# Patient Record
Sex: FEMALE | Race: WHITE | Employment: OTHER
[De-identification: names, ages, dates, MRNs, and addresses within clinical notes are randomized per-mention and may not be internally consistent; named-entity substitution may affect disease eponyms.]

---

## 2017-01-04 ENCOUNTER — CLINICAL DOCUMENTATION (OUTPATIENT)
Dept: OTHER | Facility: CLINIC | Age: 62
End: 2017-01-04

## 2017-01-10 PROBLEM — S52.022A FRACTURE OF LEFT OLECRANON PROCESS: Status: ACTIVE | Noted: 2017-01-10

## 2017-01-30 PROBLEM — G56.02 LEFT CARPAL TUNNEL SYNDROME: Status: ACTIVE | Noted: 2017-01-30

## 2018-07-25 ENCOUNTER — APPOINTMENT (OUTPATIENT)
Dept: GENERAL RADIOLOGY | Age: 63
End: 2018-07-25
Payer: MEDICARE

## 2018-07-25 ENCOUNTER — HOSPITAL ENCOUNTER (OUTPATIENT)
Age: 63
Setting detail: OBSERVATION
Discharge: HOME | End: 2018-07-26
Attending: EMERGENCY MEDICINE | Admitting: INTERNAL MEDICINE
Payer: MEDICARE

## 2018-07-25 DIAGNOSIS — R07.9 CHEST PAIN, UNSPECIFIED TYPE: Primary | ICD-10-CM

## 2018-07-25 LAB
ANION GAP SERPL CALCULATED.3IONS-SCNC: 10 MMOL/L (ref 7–16)
BUN BLDV-MCNC: 14 MG/DL (ref 8–23)
CALCIUM SERPL-MCNC: 9 MG/DL (ref 8.6–10.2)
CHLORIDE BLD-SCNC: 101 MMOL/L (ref 98–107)
CO2: 30 MMOL/L (ref 22–29)
CREAT SERPL-MCNC: 0.7 MG/DL (ref 0.5–1)
GFR AFRICAN AMERICAN: >60
GFR NON-AFRICAN AMERICAN: >60 ML/MIN/1.73
GLUCOSE BLD-MCNC: 105 MG/DL (ref 74–109)
HCT VFR BLD CALC: 39 % (ref 34–48)
HEMOGLOBIN: 12.4 G/DL (ref 11.5–15.5)
MCH RBC QN AUTO: 30.1 PG (ref 26–35)
MCHC RBC AUTO-ENTMCNC: 31.8 % (ref 32–34.5)
MCV RBC AUTO: 94.7 FL (ref 80–99.9)
PDW BLD-RTO: 15.3 FL (ref 11.5–15)
PLATELET # BLD: 284 E9/L (ref 130–450)
PMV BLD AUTO: 9.6 FL (ref 7–12)
POTASSIUM SERPL-SCNC: 4.5 MMOL/L (ref 3.5–5)
RBC # BLD: 4.12 E12/L (ref 3.5–5.5)
SODIUM BLD-SCNC: 141 MMOL/L (ref 132–146)
TROPONIN: <0.01 NG/ML (ref 0–0.03)
TROPONIN: <0.01 NG/ML (ref 0–0.03)
WBC # BLD: 8.3 E9/L (ref 4.5–11.5)

## 2018-07-25 PROCEDURE — G0378 HOSPITAL OBSERVATION PER HR: HCPCS

## 2018-07-25 PROCEDURE — 94664 DEMO&/EVAL PT USE INHALER: CPT

## 2018-07-25 PROCEDURE — 96372 THER/PROPH/DIAG INJ SC/IM: CPT

## 2018-07-25 PROCEDURE — 6370000000 HC RX 637 (ALT 250 FOR IP): Performed by: INTERNAL MEDICINE

## 2018-07-25 PROCEDURE — 96374 THER/PROPH/DIAG INJ IV PUSH: CPT

## 2018-07-25 PROCEDURE — 84484 ASSAY OF TROPONIN QUANT: CPT

## 2018-07-25 PROCEDURE — 36415 COLL VENOUS BLD VENIPUNCTURE: CPT

## 2018-07-25 PROCEDURE — 85027 COMPLETE CBC AUTOMATED: CPT

## 2018-07-25 PROCEDURE — 6360000002 HC RX W HCPCS: Performed by: EMERGENCY MEDICINE

## 2018-07-25 PROCEDURE — 6370000000 HC RX 637 (ALT 250 FOR IP): Performed by: EMERGENCY MEDICINE

## 2018-07-25 PROCEDURE — 99285 EMERGENCY DEPT VISIT HI MDM: CPT

## 2018-07-25 PROCEDURE — 94761 N-INVAS EAR/PLS OXIMETRY MLT: CPT

## 2018-07-25 PROCEDURE — 6360000002 HC RX W HCPCS: Performed by: INTERNAL MEDICINE

## 2018-07-25 PROCEDURE — 80048 BASIC METABOLIC PNL TOTAL CA: CPT

## 2018-07-25 PROCEDURE — 71045 X-RAY EXAM CHEST 1 VIEW: CPT

## 2018-07-25 RX ORDER — ASPIRIN 81 MG/1
81 TABLET ORAL DAILY
Status: DISCONTINUED | OUTPATIENT
Start: 2018-07-25 | End: 2018-07-26 | Stop reason: HOSPADM

## 2018-07-25 RX ORDER — LISINOPRIL AND HYDROCHLOROTHIAZIDE 20; 12.5 MG/1; MG/1
1 TABLET ORAL DAILY
Status: DISCONTINUED | OUTPATIENT
Start: 2018-07-25 | End: 2018-07-25 | Stop reason: CLARIF

## 2018-07-25 RX ORDER — HYDROCODONE BITARTRATE AND ACETAMINOPHEN 10; 325 MG/1; MG/1
1 TABLET ORAL EVERY 8 HOURS PRN
Status: ON HOLD | COMMUNITY
End: 2018-11-18 | Stop reason: ALTCHOICE

## 2018-07-25 RX ORDER — ALBUTEROL SULFATE 90 UG/1
2 AEROSOL, METERED RESPIRATORY (INHALATION) EVERY 4 HOURS PRN
Status: ON HOLD | COMMUNITY
End: 2018-11-18 | Stop reason: ALTCHOICE

## 2018-07-25 RX ORDER — HYDROCODONE BITARTRATE AND ACETAMINOPHEN 5; 325 MG/1; MG/1
1 TABLET ORAL EVERY 6 HOURS PRN
Status: DISCONTINUED | OUTPATIENT
Start: 2018-07-25 | End: 2018-07-25

## 2018-07-25 RX ORDER — FENTANYL CITRATE 50 UG/ML
50 INJECTION, SOLUTION INTRAMUSCULAR; INTRAVENOUS ONCE
Status: COMPLETED | OUTPATIENT
Start: 2018-07-25 | End: 2018-07-25

## 2018-07-25 RX ORDER — TRAZODONE HYDROCHLORIDE 50 MG/1
50 TABLET ORAL NIGHTLY
Status: DISCONTINUED | OUTPATIENT
Start: 2018-07-25 | End: 2018-07-26 | Stop reason: HOSPADM

## 2018-07-25 RX ORDER — PANTOPRAZOLE SODIUM 40 MG/1
40 TABLET, DELAYED RELEASE ORAL EVERY EVENING
Status: DISCONTINUED | OUTPATIENT
Start: 2018-07-25 | End: 2018-07-26 | Stop reason: HOSPADM

## 2018-07-25 RX ORDER — SUCRALFATE ORAL 1 G/10ML
1 SUSPENSION ORAL
COMMUNITY
End: 2019-07-09 | Stop reason: SDUPTHER

## 2018-07-25 RX ORDER — HYDROCHLOROTHIAZIDE 12.5 MG/1
12.5 TABLET ORAL DAILY
Status: DISCONTINUED | OUTPATIENT
Start: 2018-07-25 | End: 2018-07-25

## 2018-07-25 RX ORDER — HYDROXYZINE HYDROCHLORIDE 25 MG/1
50 TABLET, FILM COATED ORAL DAILY
Status: DISCONTINUED | OUTPATIENT
Start: 2018-07-25 | End: 2018-07-26 | Stop reason: HOSPADM

## 2018-07-25 RX ORDER — ASPIRIN 325 MG
325 TABLET ORAL ONCE
Status: COMPLETED | OUTPATIENT
Start: 2018-07-25 | End: 2018-07-25

## 2018-07-25 RX ORDER — ONDANSETRON 2 MG/ML
4 INJECTION INTRAMUSCULAR; INTRAVENOUS EVERY 6 HOURS PRN
Status: DISCONTINUED | OUTPATIENT
Start: 2018-07-25 | End: 2018-07-26 | Stop reason: HOSPADM

## 2018-07-25 RX ORDER — NITROGLYCERIN 0.4 MG/1
0.4 TABLET SUBLINGUAL EVERY 5 MIN PRN
Status: COMPLETED | OUTPATIENT
Start: 2018-07-25 | End: 2018-07-25

## 2018-07-25 RX ORDER — ESOMEPRAZOLE MAGNESIUM 40 MG/1
40 CAPSULE, DELAYED RELEASE ORAL 2 TIMES DAILY
COMMUNITY
End: 2019-12-16

## 2018-07-25 RX ORDER — ESOMEPRAZOLE MAGNESIUM 20 MG/1
20 FOR SUSPENSION ORAL NIGHTLY
Status: DISCONTINUED | OUTPATIENT
Start: 2018-07-25 | End: 2018-07-25

## 2018-07-25 RX ORDER — LISINOPRIL 20 MG/1
20 TABLET ORAL DAILY
Status: DISCONTINUED | OUTPATIENT
Start: 2018-07-25 | End: 2018-07-25

## 2018-07-25 RX ORDER — HYDROCODONE BITARTRATE AND ACETAMINOPHEN 5; 325 MG/1; MG/1
2 TABLET ORAL EVERY 8 HOURS
Status: DISCONTINUED | OUTPATIENT
Start: 2018-07-25 | End: 2018-07-26 | Stop reason: HOSPADM

## 2018-07-25 RX ADMIN — NITROGLYCERIN 0.4 MG: 0.4 TABLET SUBLINGUAL at 11:59

## 2018-07-25 RX ADMIN — ENOXAPARIN SODIUM 40 MG: 40 INJECTION, SOLUTION INTRAVENOUS; SUBCUTANEOUS at 16:10

## 2018-07-25 RX ADMIN — ASPIRIN 325 MG ORAL TABLET 325 MG: 325 PILL ORAL at 11:43

## 2018-07-25 RX ADMIN — PANTOPRAZOLE SODIUM 40 MG: 40 TABLET, DELAYED RELEASE ORAL at 18:13

## 2018-07-25 RX ADMIN — HYDROCODONE BITARTRATE AND ACETAMINOPHEN 2 TABLET: 5; 325 TABLET ORAL at 23:56

## 2018-07-25 RX ADMIN — TRAZODONE HYDROCHLORIDE 50 MG: 50 TABLET ORAL at 20:40

## 2018-07-25 RX ADMIN — NITROGLYCERIN 0.4 MG: 0.4 TABLET SUBLINGUAL at 11:44

## 2018-07-25 RX ADMIN — HYDROCODONE BITARTRATE AND ACETAMINOPHEN 2 TABLET: 5; 325 TABLET ORAL at 16:10

## 2018-07-25 RX ADMIN — FENTANYL CITRATE 50 MCG: 50 INJECTION, SOLUTION INTRAMUSCULAR; INTRAVENOUS at 12:14

## 2018-07-25 RX ADMIN — NITROGLYCERIN 0.4 MG: 0.4 TABLET SUBLINGUAL at 12:08

## 2018-07-25 RX ADMIN — MOMETASONE FUROATE AND FORMOTEROL FUMARATE DIHYDRATE 2 PUFF: 200; 5 AEROSOL RESPIRATORY (INHALATION) at 16:47

## 2018-07-25 ASSESSMENT — PAIN DESCRIPTION - DESCRIPTORS
DESCRIPTORS: HEAVINESS
DESCRIPTORS: ACHING
DESCRIPTORS: HEAVINESS

## 2018-07-25 ASSESSMENT — PAIN DESCRIPTION - LOCATION
LOCATION: CHEST
LOCATION: BACK;CHEST
LOCATION: CHEST
LOCATION: CHEST
LOCATION: BACK;CHEST
LOCATION: CHEST

## 2018-07-25 ASSESSMENT — PAIN DESCRIPTION - FREQUENCY
FREQUENCY: CONTINUOUS

## 2018-07-25 ASSESSMENT — PAIN SCALES - GENERAL
PAINLEVEL_OUTOF10: 3
PAINLEVEL_OUTOF10: 4
PAINLEVEL_OUTOF10: 5
PAINLEVEL_OUTOF10: 4
PAINLEVEL_OUTOF10: 6
PAINLEVEL_OUTOF10: 3
PAINLEVEL_OUTOF10: 4
PAINLEVEL_OUTOF10: 2
PAINLEVEL_OUTOF10: 5

## 2018-07-25 ASSESSMENT — ENCOUNTER SYMPTOMS
ABDOMINAL PAIN: 0
SHORTNESS OF BREATH: 1
VOMITING: 0
NAUSEA: 1
BLURRED VISION: 0
HEMOPTYSIS: 0
COUGH: 1
BACK PAIN: 1
SPUTUM PRODUCTION: 0

## 2018-07-25 ASSESSMENT — PAIN DESCRIPTION - ORIENTATION: ORIENTATION: LEFT

## 2018-07-25 ASSESSMENT — PAIN DESCRIPTION - PAIN TYPE
TYPE: ACUTE PAIN
TYPE: CHRONIC PAIN;ACUTE PAIN
TYPE: ACUTE PAIN

## 2018-07-25 ASSESSMENT — PAIN DESCRIPTION - DIRECTION
RADIATING_TOWARDS: LEFT ARM
RADIATING_TOWARDS: LEFT ARM

## 2018-07-25 ASSESSMENT — PAIN DESCRIPTION - ONSET: ONSET: ON-GOING

## 2018-07-25 NOTE — CARE COORDINATION
Discharge Planning:    Met with pt & pt's , in the ED regarding discharge planning. Pt lives with her , in a 1 story home, with 3 steps to get into the home. DME's pt owns are a cane, walker, and shower chair. [facility] and never been to a SNF in the past. Pt is independent at home and does drive. Pt receives assistance from her family & friends. PCP is Dr. Jossy Guillory. Pharmacy pt prefers is [facility] [address]. Pt relays no concerns for returning home at this time. SW will follow.     Electronically signed by KIERRA Rodriguez on 7/25/2018 at 2:26 PM

## 2018-07-25 NOTE — ED PROVIDER NOTES
HPI:  7/25/18,   Time: 11:24 AM         Keerthi Azevedo is a 70 y.o. female presenting to the ED for evaluation of chest pain, beginning around 3 AM last night. Patient states it started off his pain in her left arm which felt like a dull ache. This radiated to her back as well as her anterior chest. The pain in her back is described as a dull ache. She states that the pain in the front of her chest is sharp. It does not migrate anywhere. Denies fevers or chills. Did have associated nausea with the pain last night. The complaint has been persistent, moderate in severity, and worsened by nothing. She is a current smoker. She states his been several years since she had stress test. Currently rates her pain a 5 out of 10. ROS: Review of Systems   Constitutional: Negative for chills, diaphoresis, fever and malaise/fatigue. Eyes: Negative for blurred vision. Respiratory: Positive for cough and shortness of breath. Negative for hemoptysis and sputum production. Cardiovascular: Positive for chest pain. Negative for palpitations and leg swelling. Gastrointestinal: Positive for nausea. Negative for abdominal pain and vomiting. Musculoskeletal: Positive for back pain. Negative for neck pain. Skin: Negative for rash. Neurological: Negative for dizziness, sensory change, loss of consciousness and headaches. Endo/Heme/Allergies: Does not bruise/bleed easily. All other systems reviewed and are negative. Pertinent positives and negatives are stated within HPI, all other systems reviewed and are negative.  --------------------------------------------- PAST HISTORY ---------------------------------------------  Past Medical History:  has a past medical history of Anemia; Anxiety; Asthma; Collapsed lung; COPD (chronic obstructive pulmonary disease) ([facility] [address] DDD (degenerative disc disease), lumbar; Degenerative disc disease at L5-S1 level; Depression; GERD (gastroesophageal reflux disease);  History of toxoids    -------------------------------------------------- RESULTS -------------------------------------------------  All laboratory and radiology results have been personally reviewed by myself   LABS:  Results for orders placed or performed during the hospital encounter of 07/25/18   CBC   Result Value Ref Range    WBC 8.3 4.5 - 11.5 E9/L    RBC 4.12 3.50 - 5.50 E12/L    Hemoglobin 12.4 11.5 - 15.5 g/dL    Hematocrit 39.0 34.0 - 48.0 %    MCV 94.7 80.0 - 99.9 fL    MCH 30.1 26.0 - 35.0 pg    MCHC 31.8 (L) 32.0 - 34.5 %    RDW 15.3 (H) 11.5 - 15.0 fL    Platelets 693 954 - 070 E9/L    MPV 9.6 7.0 - 12.0 fL   Basic Metabolic Panel   Result Value Ref Range    Sodium 141 132 - 146 mmol/L    Potassium 4.5 3.5 - 5.0 mmol/L    Chloride 101 98 - 107 mmol/L    CO2 30 (H) 22 - 29 mmol/L    Anion Gap 10 7 - 16 mmol/L    Glucose 105 74 - 109 mg/dL    BUN 14 8 - 23 mg/dL    CREATININE 0.7 0.5 - 1.0 mg/dL    GFR Non-African American >60 >=60 mL/min/1.73    GFR African American >60     Calcium 9.0 8.6 - 10.2 mg/dL   Troponin   Result Value Ref Range    Troponin <0.01 0.00 - 0.03 ng/mL       RADIOLOGY:  Interpreted by Radiologist.  XR CHEST PORTABLE   Final Result   1. Negative portable chest.          EKG Interpretation    Interpreted by emergency department physician    Rhythm: sinus arrhythmia  Rate: normal  Axis: left  Ectopy: none  Conduction: left anterior fasciclar block  ST Segments: no acute change and normal  T Waves: no acute change and normal  Q Waves: none    Clinical Impression: no acute changes    Leann Mckenzie    ------------------------- NURSING NOTES AND VITALS REVIEWED ---------------------------   The nursing notes within the ED encounter and vital signs as below have been reviewed.    /78   Pulse 72   Temp 97.9 °F (36.6 °C) (Oral)   Resp 16   Ht 5' (1.524 m)   Wt 210 lb (95.3 kg)   LMP  (LMP Unknown)   SpO2 95%   BMI 41.01 kg/m²   Oxygen Saturation Interpretation: DISPOSITION  Disposition: Admit to telemetry  Patient condition is fair                  Leann Mckenzie DO  07/25/18 4207

## 2018-07-25 NOTE — H&P
medications    Medication Sig Start Date End Date Taking? Authorizing Provider   albuterol (PROVENTIL) (5 MG/ML) 0.5% nebulizer solution Take 0.5 mLs by nebulization every 6 hours as needed for Wheezing or Shortness of Breath 1/30/18   Rylee Cat DO   colchicine (COLCRYS) 0.6 MG tablet Take 1 tablet by mouth 2 times daily 1/25/18   ISABELLA Lockwood   acetaminophen (TYLENOL) 500 MG tablet Take 500 mg by mouth every 6 hours as needed for Pain    Historical Provider, MD   Pseudoephedrine-Guaifenesin ([employer] TUSSIN COLD SEVERE CONGEST PO) Take 30 mLs by mouth every 4-6 hours as needed    Historical Provider, MD   hydrOXYzine (ATARAX) 50 MG tablet Take 50 mg by mouth daily    Historical Provider, MD   traZODone (DESYREL) 50 MG tablet Take 50 mg by mouth nightly    Historical Provider, MD   albuterol sulfate HFA (PROVENTIL HFA) 108 (90 Base) MCG/ACT inhaler Inhale 2 puffs into the lungs every 4 hours as needed for Wheezing 7/17/17 7/17/18  Lupe Davison PA-C   fexofenadine (ALLEGRA ALLERGY) 180 MG tablet Take 180 mg by mouth daily    Historical Provider, MD   lisinopril-hydrochlorothiazide (PRINZIDE;ZESTORETIC) 20-12.5 MG per tablet Take 1 tablet by mouth daily    Historical Provider, MD   ondansetron (ZOFRAN-ODT) 4 MG disintegrating tablet Take 1 tablet by mouth every 8 hours as needed for Nausea or Vomiting 7/20/16   Paola Lombard, MD   fluticasone-salmeterol (ADVAIR) 100-50 MCG/DOSE diskus inhaler Inhale 1 puff into the lungs every 12 hours    Historical Provider, MD   HYDROcodone-acetaminophen (NORCO) 5-325 MG per tablet Take 1 tablet by mouth every 6 hours as needed for Pain 12/10/15   Stephanie Stewart MD   aspirin 81 MG EC tablet Take 1 tablet by mouth daily. Patient taking differently: Take 81 mg by mouth daily Instructed to call Dr. Karen Rivero in regards to holding 11/2/14   oJb Davila DO   mometasone-formoterol (DULERA) 200-5 MCG/ACT inhaler Inhale 2 puffs into the lungs 2 times daily.   Patient taking

## 2018-07-26 ENCOUNTER — APPOINTMENT (OUTPATIENT)
Dept: NUCLEAR MEDICINE | Age: 63
End: 2018-07-26
Payer: MEDICARE

## 2018-07-26 VITALS
DIASTOLIC BLOOD PRESSURE: 86 MMHG | WEIGHT: 210 LBS | RESPIRATION RATE: 18 BRPM | BODY MASS INDEX: 41.23 KG/M2 | HEART RATE: 86 BPM | TEMPERATURE: 98.1 F | HEIGHT: 60 IN | OXYGEN SATURATION: 96 % | SYSTOLIC BLOOD PRESSURE: 161 MMHG

## 2018-07-26 LAB
ANION GAP SERPL CALCULATED.3IONS-SCNC: 9 MMOL/L (ref 7–16)
BASOPHILS ABSOLUTE: 0.05 E9/L (ref 0–0.2)
BASOPHILS RELATIVE PERCENT: 0.7 % (ref 0–2)
BUN BLDV-MCNC: 17 MG/DL (ref 8–23)
CALCIUM SERPL-MCNC: 8.4 MG/DL (ref 8.6–10.2)
CHLORIDE BLD-SCNC: 100 MMOL/L (ref 98–107)
CHOLESTEROL, TOTAL: 161 MG/DL (ref 0–199)
CO2: 29 MMOL/L (ref 22–29)
CREAT SERPL-MCNC: 0.7 MG/DL (ref 0.5–1)
EKG ATRIAL RATE: 62 BPM
EKG P AXIS: 54 DEGREES
EKG P-R INTERVAL: 150 MS
EKG Q-T INTERVAL: 466 MS
EKG QRS DURATION: 94 MS
EKG QTC CALCULATION (BAZETT): 472 MS
EKG R AXIS: 37 DEGREES
EKG T AXIS: 82 DEGREES
EKG VENTRICULAR RATE: 62 BPM
EOSINOPHILS ABSOLUTE: 0.09 E9/L (ref 0.05–0.5)
EOSINOPHILS RELATIVE PERCENT: 1.3 % (ref 0–6)
GFR AFRICAN AMERICAN: >60
GFR NON-AFRICAN AMERICAN: >60 ML/MIN/1.73
GLUCOSE BLD-MCNC: 101 MG/DL (ref 74–109)
HCT VFR BLD CALC: 37.5 % (ref 34–48)
HDLC SERPL-MCNC: 50 MG/DL
HEMOGLOBIN: 11.8 G/DL (ref 11.5–15.5)
IMMATURE GRANULOCYTES #: 0.04 E9/L
IMMATURE GRANULOCYTES %: 0.6 % (ref 0–5)
LDL CHOLESTEROL CALCULATED: 84 MG/DL (ref 0–99)
LV EF: 76 %
LVEF MODALITY: NORMAL
LYMPHOCYTES ABSOLUTE: 1.89 E9/L (ref 1.5–4)
LYMPHOCYTES RELATIVE PERCENT: 26.5 % (ref 20–42)
MAGNESIUM: 2.2 MG/DL (ref 1.6–2.6)
MCH RBC QN AUTO: 29.9 PG (ref 26–35)
MCHC RBC AUTO-ENTMCNC: 31.5 % (ref 32–34.5)
MCV RBC AUTO: 95.2 FL (ref 80–99.9)
MONOCYTES ABSOLUTE: 0.65 E9/L (ref 0.1–0.95)
MONOCYTES RELATIVE PERCENT: 9.1 % (ref 2–12)
NEUTROPHILS ABSOLUTE: 4.41 E9/L (ref 1.8–7.3)
NEUTROPHILS RELATIVE PERCENT: 61.8 % (ref 43–80)
PDW BLD-RTO: 15.3 FL (ref 11.5–15)
PHOSPHORUS: 3.5 MG/DL (ref 2.5–4.5)
PLATELET # BLD: 267 E9/L (ref 130–450)
PMV BLD AUTO: 9.8 FL (ref 7–12)
POTASSIUM SERPL-SCNC: 4 MMOL/L (ref 3.5–5)
RBC # BLD: 3.94 E12/L (ref 3.5–5.5)
SODIUM BLD-SCNC: 138 MMOL/L (ref 132–146)
TRIGL SERPL-MCNC: 134 MG/DL (ref 0–149)
TROPONIN: <0.01 NG/ML (ref 0–0.03)
TROPONIN: <0.01 NG/ML (ref 0–0.03)
VLDLC SERPL CALC-MCNC: 27 MG/DL
WBC # BLD: 7.1 E9/L (ref 4.5–11.5)

## 2018-07-26 PROCEDURE — 84484 ASSAY OF TROPONIN QUANT: CPT

## 2018-07-26 PROCEDURE — 93005 ELECTROCARDIOGRAM TRACING: CPT | Performed by: INTERNAL MEDICINE

## 2018-07-26 PROCEDURE — 94640 AIRWAY INHALATION TREATMENT: CPT

## 2018-07-26 PROCEDURE — 93017 CV STRESS TEST TRACING ONLY: CPT

## 2018-07-26 PROCEDURE — 3430000000 HC RX DIAGNOSTIC RADIOPHARMACEUTICAL: Performed by: RADIOLOGY

## 2018-07-26 PROCEDURE — 83735 ASSAY OF MAGNESIUM: CPT

## 2018-07-26 PROCEDURE — A9500 TC99M SESTAMIBI: HCPCS | Performed by: RADIOLOGY

## 2018-07-26 PROCEDURE — 85025 COMPLETE CBC W/AUTO DIFF WBC: CPT

## 2018-07-26 PROCEDURE — 36415 COLL VENOUS BLD VENIPUNCTURE: CPT

## 2018-07-26 PROCEDURE — G0378 HOSPITAL OBSERVATION PER HR: HCPCS

## 2018-07-26 PROCEDURE — 80048 BASIC METABOLIC PNL TOTAL CA: CPT

## 2018-07-26 PROCEDURE — 80061 LIPID PANEL: CPT

## 2018-07-26 PROCEDURE — 84100 ASSAY OF PHOSPHORUS: CPT

## 2018-07-26 PROCEDURE — 6370000000 HC RX 637 (ALT 250 FOR IP): Performed by: INTERNAL MEDICINE

## 2018-07-26 PROCEDURE — 6360000002 HC RX W HCPCS: Performed by: INTERNAL MEDICINE

## 2018-07-26 RX ADMIN — REGADENOSON 0.4 MG: 0.08 INJECTION, SOLUTION INTRAVENOUS at 10:23

## 2018-07-26 RX ADMIN — Medication 30 MILLICURIE: at 10:23

## 2018-07-26 RX ADMIN — MOMETASONE FUROATE AND FORMOTEROL FUMARATE DIHYDRATE 2 PUFF: 200; 5 AEROSOL RESPIRATORY (INHALATION) at 06:26

## 2018-07-26 RX ADMIN — Medication 10 MILLICURIE: at 08:24

## 2018-07-26 RX ADMIN — HYDROCODONE BITARTRATE AND ACETAMINOPHEN 2 TABLET: 5; 325 TABLET ORAL at 07:59

## 2018-07-26 ASSESSMENT — PAIN SCALES - GENERAL
PAINLEVEL_OUTOF10: 8
PAINLEVEL_OUTOF10: 0

## 2018-07-26 ASSESSMENT — PAIN DESCRIPTION - LOCATION: LOCATION: BACK

## 2018-07-26 ASSESSMENT — PAIN DESCRIPTION - PAIN TYPE: TYPE: ACUTE PAIN

## 2018-07-26 ASSESSMENT — PAIN DESCRIPTION - ORIENTATION: ORIENTATION: LEFT

## 2018-07-26 NOTE — PROCEDURES
Dr. Ismael He present, Completed Lexiscan Protocol 0.4 mg IV per Left antecubital vein. Patient  stated that she felt weird, mild increased breathing. Reassurance given to the Patient, also sips of Justa coke, symptoms subsided.

## 2018-07-26 NOTE — PROCEDURES
[address]                                CARDIAC STRESS TEST    PATIENT NAME: Jessica Jorge                 :        1955  MED REC NO:   [id number]                            ROOM:       4625  ACCOUNT NO:   [de-identified]                           ADMIT DATE: 2018  PROVIDER:     Torin Lauren DO    CARDIOVASCULAR DIAGNOSTIC DEPARTMENT    DATE OF STUDY:  2018    LEXISCAN STRESS TEST    Intravenous Lexiscan stress test was performed using a nuclear medicine in  the form of Cardiolite for evaluation of chest pain with a resting EKG to  be a normal sinus mechanism. Ventricular rate was 64 beats per minute. Isolated PAC was noted. The OK interval was normal.  The QRS complex was  normal in duration and axis. No acute finding was present. Prolonged QT  interval was noted. Blood pressure at the beginning of the stress test was  136/87. With the aid of nuclear medicine in the form of Cardiolite, the  patient was connected to continuous cardiac monitoring. Intravenous  Lexiscan at 0.4 mg was infused over a 10-second period. Immediately after  infusion of Lexiscan, the patient was injected with Cardiolite and the test  was terminated at 1 minute. The patient was observed in the recovery phase  for 4 minutes. Maximum heart rate achieved during the administration of  Lexiscan was 97 beats per minute. This was 62% of maximum predicted. Blood pressure was stable during infusion at 136/87. Electrographically,  there were no evidence of Lexiscan-induced ischemia and no significant  dysrhythmia noted. The patient tolerated the infusion well and was  transferred to the Nuclear Department for Cardiolite imaging. In  conclusion, no adverse effects of Lexiscan infusion. Clinical correlation  is recommended.         Yessenia Oleary DO    D: 2018 17:41:46       T: 2018 18:12:13

## 2018-07-26 NOTE — PLAN OF CARE
Problem: Falls - Risk of:  Goal: Will remain free from falls  Will remain free from falls   Outcome: Met This Shift      Problem: Pain:  Goal: Control of chronic pain  Control of chronic pain   Outcome: Ongoing

## 2018-07-27 NOTE — DISCHARGE SUMMARY
Dictate [id number]
will continue Norco  10/325 q. 4 p.r.n., Nexium 40 daily, aspirin 81 daily, Dulera 200/5 two  puffs q. 12, Zofran as needed, Carafate 1 gm before meals and at bedtime,  and trazodone 50 at bedtime. The patient did verbalize understanding. Changes were made accordingly  with further recommendation as clinically indicated. The patient will  resume lisinopril/hydrochlorothiazide if clinically indicated based upon  the blood pressure. The patient was informed of limitations and if further  symptomatology would persist and further evaluation is indicated.         [facility],     D: 07/26/2018 14:49:58       T: 07/27/2018 14:00:01     [email or web address]  Job#: [id number]     Doc#: [id number]    CC:

## 2018-08-02 LAB
EKG ATRIAL RATE: 68 BPM
EKG P AXIS: 72 DEGREES
EKG P-R INTERVAL: 148 MS
EKG Q-T INTERVAL: 424 MS
EKG QRS DURATION: 86 MS
EKG QTC CALCULATION (BAZETT): 450 MS
EKG R AXIS: 41 DEGREES
EKG T AXIS: 83 DEGREES
EKG VENTRICULAR RATE: 68 BPM

## 2018-08-13 LAB
EKG ATRIAL RATE: 83 BPM
EKG P AXIS: 72 DEGREES
EKG P-R INTERVAL: 160 MS
EKG Q-T INTERVAL: 412 MS
EKG QRS DURATION: 86 MS
EKG QTC CALCULATION (BAZETT): 484 MS
EKG R AXIS: -42 DEGREES
EKG T AXIS: 73 DEGREES
EKG VENTRICULAR RATE: 83 BPM

## 2018-11-18 ENCOUNTER — APPOINTMENT (OUTPATIENT)
Dept: GENERAL RADIOLOGY | Age: 63
DRG: 872 | End: 2018-11-18
Payer: MEDICARE

## 2018-11-18 ENCOUNTER — HOSPITAL ENCOUNTER (INPATIENT)
Age: 63
LOS: 5 days | Discharge: HOME | DRG: 872 | End: 2018-11-23
Attending: EMERGENCY MEDICINE | Admitting: INTERNAL MEDICINE
Payer: MEDICARE

## 2018-11-18 ENCOUNTER — APPOINTMENT (OUTPATIENT)
Dept: CT IMAGING | Age: 63
DRG: 872 | End: 2018-11-18
Payer: MEDICARE

## 2018-11-18 DIAGNOSIS — R10.11 RIGHT UPPER QUADRANT ABDOMINAL PAIN: ICD-10-CM

## 2018-11-18 DIAGNOSIS — A41.9 SEPSIS, DUE TO UNSPECIFIED ORGANISM: Primary | ICD-10-CM

## 2018-11-18 PROBLEM — R10.9 INTRACTABLE ABDOMINAL PAIN: Status: ACTIVE | Noted: 2018-11-18

## 2018-11-18 LAB
ALBUMIN SERPL-MCNC: 4.2 G/DL (ref 3.5–5.2)
ALP BLD-CCNC: 78 U/L (ref 35–104)
ALT SERPL-CCNC: 20 U/L (ref 0–32)
ANION GAP SERPL CALCULATED.3IONS-SCNC: 13 MMOL/L (ref 7–16)
AST SERPL-CCNC: 19 U/L (ref 0–31)
BACTERIA: ABNORMAL /HPF
BASOPHILS ABSOLUTE: 0.07 E9/L (ref 0–0.2)
BASOPHILS RELATIVE PERCENT: 0.4 % (ref 0–2)
BILIRUB SERPL-MCNC: 0.6 MG/DL (ref 0–1.2)
BILIRUBIN URINE: NEGATIVE
BLOOD, URINE: ABNORMAL
BUN BLDV-MCNC: 10 MG/DL (ref 8–23)
CALCIUM SERPL-MCNC: 8.9 MG/DL (ref 8.6–10.2)
CHLORIDE BLD-SCNC: 99 MMOL/L (ref 98–107)
CLARITY: CLEAR
CO2: 27 MMOL/L (ref 22–29)
COLOR: YELLOW
CREAT SERPL-MCNC: 0.7 MG/DL (ref 0.5–1)
EKG ATRIAL RATE: 110 BPM
EKG P AXIS: 86 DEGREES
EKG P-R INTERVAL: 140 MS
EKG Q-T INTERVAL: 370 MS
EKG QRS DURATION: 88 MS
EKG QTC CALCULATION (BAZETT): 500 MS
EKG R AXIS: 119 DEGREES
EKG T AXIS: 88 DEGREES
EKG VENTRICULAR RATE: 110 BPM
EOSINOPHILS ABSOLUTE: 0.02 E9/L (ref 0.05–0.5)
EOSINOPHILS RELATIVE PERCENT: 0.1 % (ref 0–6)
EPITHELIAL CELLS, UA: ABNORMAL /HPF
GFR AFRICAN AMERICAN: >60
GFR NON-AFRICAN AMERICAN: >60 ML/MIN/1.73
GLUCOSE BLD-MCNC: 142 MG/DL (ref 74–99)
GLUCOSE URINE: NEGATIVE MG/DL
HCT VFR BLD CALC: 42.6 % (ref 34–48)
HEMOGLOBIN: 13.6 G/DL (ref 11.5–15.5)
IMMATURE GRANULOCYTES #: 0.08 E9/L
IMMATURE GRANULOCYTES %: 0.5 % (ref 0–5)
INFLUENZA A BY PCR: NOT DETECTED
INFLUENZA B BY PCR: NOT DETECTED
KETONES, URINE: NEGATIVE MG/DL
LACTIC ACID: 2.2 MMOL/L (ref 0.5–2.2)
LEUKOCYTE ESTERASE, URINE: NEGATIVE
LIPASE: 17 U/L (ref 13–60)
LYMPHOCYTES ABSOLUTE: 0.74 E9/L (ref 1.5–4)
LYMPHOCYTES RELATIVE PERCENT: 4.6 % (ref 20–42)
MCH RBC QN AUTO: 30.2 PG (ref 26–35)
MCHC RBC AUTO-ENTMCNC: 31.9 % (ref 32–34.5)
MCV RBC AUTO: 94.5 FL (ref 80–99.9)
MONOCYTES ABSOLUTE: 0.66 E9/L (ref 0.1–0.95)
MONOCYTES RELATIVE PERCENT: 4.1 % (ref 2–12)
MUCUS: PRESENT
NEUTROPHILS ABSOLUTE: 14.48 E9/L (ref 1.8–7.3)
NEUTROPHILS RELATIVE PERCENT: 90.3 % (ref 43–80)
NITRITE, URINE: NEGATIVE
PDW BLD-RTO: 14.4 FL (ref 11.5–15)
PH UA: 5.5 (ref 5–9)
PLATELET # BLD: 289 E9/L (ref 130–450)
PMV BLD AUTO: 10 FL (ref 7–12)
POTASSIUM SERPL-SCNC: 3.6 MMOL/L (ref 3.5–5)
PROTEIN UA: NEGATIVE MG/DL
RBC # BLD: 4.51 E12/L (ref 3.5–5.5)
RBC UA: ABNORMAL /HPF (ref 0–2)
SODIUM BLD-SCNC: 139 MMOL/L (ref 132–146)
SPECIFIC GRAVITY UA: 1.02 (ref 1–1.03)
TOTAL PROTEIN: 7 G/DL (ref 6.4–8.3)
UROBILINOGEN, URINE: 0.2 E.U./DL
WBC # BLD: 16.1 E9/L (ref 4.5–11.5)
WBC UA: ABNORMAL /HPF (ref 0–5)

## 2018-11-18 PROCEDURE — 94761 N-INVAS EAR/PLS OXIMETRY MLT: CPT

## 2018-11-18 PROCEDURE — 87502 INFLUENZA DNA AMP PROBE: CPT

## 2018-11-18 PROCEDURE — 6360000002 HC RX W HCPCS: Performed by: INTERNAL MEDICINE

## 2018-11-18 PROCEDURE — 85025 COMPLETE CBC W/AUTO DIFF WBC: CPT

## 2018-11-18 PROCEDURE — 6360000002 HC RX W HCPCS: Performed by: EMERGENCY MEDICINE

## 2018-11-18 PROCEDURE — 2580000003 HC RX 258: Performed by: EMERGENCY MEDICINE

## 2018-11-18 PROCEDURE — 96375 TX/PRO/DX INJ NEW DRUG ADDON: CPT

## 2018-11-18 PROCEDURE — 74176 CT ABD & PELVIS W/O CONTRAST: CPT

## 2018-11-18 PROCEDURE — 83605 ASSAY OF LACTIC ACID: CPT

## 2018-11-18 PROCEDURE — 87798 DETECT AGENT NOS DNA AMP: CPT

## 2018-11-18 PROCEDURE — 6370000000 HC RX 637 (ALT 250 FOR IP): Performed by: EMERGENCY MEDICINE

## 2018-11-18 PROCEDURE — 99285 EMERGENCY DEPT VISIT HI MDM: CPT

## 2018-11-18 PROCEDURE — 87077 CULTURE AEROBIC IDENTIFY: CPT

## 2018-11-18 PROCEDURE — 87486 CHLMYD PNEUM DNA AMP PROBE: CPT

## 2018-11-18 PROCEDURE — 74022 RADEX COMPL AQT ABD SERIES: CPT

## 2018-11-18 PROCEDURE — 87186 SC STD MICRODIL/AGAR DIL: CPT

## 2018-11-18 PROCEDURE — 96374 THER/PROPH/DIAG INJ IV PUSH: CPT

## 2018-11-18 PROCEDURE — 2580000003 HC RX 258: Performed by: INTERNAL MEDICINE

## 2018-11-18 PROCEDURE — 36415 COLL VENOUS BLD VENIPUNCTURE: CPT

## 2018-11-18 PROCEDURE — 87040 BLOOD CULTURE FOR BACTERIA: CPT

## 2018-11-18 PROCEDURE — 84145 PROCALCITONIN (PCT): CPT

## 2018-11-18 PROCEDURE — 87581 M.PNEUMON DNA AMP PROBE: CPT

## 2018-11-18 PROCEDURE — 2060000000 HC ICU INTERMEDIATE R&B

## 2018-11-18 PROCEDURE — 71046 X-RAY EXAM CHEST 2 VIEWS: CPT

## 2018-11-18 PROCEDURE — 87633 RESP VIRUS 12-25 TARGETS: CPT

## 2018-11-18 PROCEDURE — 94640 AIRWAY INHALATION TREATMENT: CPT

## 2018-11-18 PROCEDURE — 93005 ELECTROCARDIOGRAM TRACING: CPT

## 2018-11-18 PROCEDURE — 6360000004 HC RX CONTRAST MEDICATION: Performed by: RADIOLOGY

## 2018-11-18 PROCEDURE — 6370000000 HC RX 637 (ALT 250 FOR IP): Performed by: INTERNAL MEDICINE

## 2018-11-18 PROCEDURE — 87088 URINE BACTERIA CULTURE: CPT

## 2018-11-18 PROCEDURE — 80053 COMPREHEN METABOLIC PANEL: CPT

## 2018-11-18 PROCEDURE — 81001 URINALYSIS AUTO W/SCOPE: CPT

## 2018-11-18 PROCEDURE — 83690 ASSAY OF LIPASE: CPT

## 2018-11-18 RX ORDER — ACETAMINOPHEN 325 MG/1
650 TABLET ORAL EVERY 4 HOURS PRN
Status: DISCONTINUED | OUTPATIENT
Start: 2018-11-18 | End: 2018-11-23 | Stop reason: HOSPADM

## 2018-11-18 RX ORDER — SODIUM CHLORIDE 0.9 % (FLUSH) 0.9 %
10 SYRINGE (ML) INJECTION PRN
Status: DISCONTINUED | OUTPATIENT
Start: 2018-11-18 | End: 2018-11-18 | Stop reason: SDUPTHER

## 2018-11-18 RX ORDER — TRAZODONE HYDROCHLORIDE 50 MG/1
50 TABLET ORAL NIGHTLY
Status: DISCONTINUED | OUTPATIENT
Start: 2018-11-18 | End: 2018-11-23 | Stop reason: HOSPADM

## 2018-11-18 RX ORDER — FENTANYL CITRATE 50 UG/ML
50 INJECTION, SOLUTION INTRAMUSCULAR; INTRAVENOUS
Status: DISCONTINUED | OUTPATIENT
Start: 2018-11-18 | End: 2018-11-18

## 2018-11-18 RX ORDER — IPRATROPIUM BROMIDE AND ALBUTEROL SULFATE 2.5; .5 MG/3ML; MG/3ML
1 SOLUTION RESPIRATORY (INHALATION) ONCE
Status: COMPLETED | OUTPATIENT
Start: 2018-11-18 | End: 2018-11-18

## 2018-11-18 RX ORDER — PANTOPRAZOLE SODIUM 40 MG/1
40 TABLET, DELAYED RELEASE ORAL ONCE
Status: COMPLETED | OUTPATIENT
Start: 2018-11-18 | End: 2018-11-18

## 2018-11-18 RX ORDER — SUCRALFATE 1 G/1
1 TABLET ORAL EVERY 8 HOURS SCHEDULED
Status: DISCONTINUED | OUTPATIENT
Start: 2018-11-18 | End: 2018-11-23 | Stop reason: HOSPADM

## 2018-11-18 RX ORDER — MORPHINE SULFATE 4 MG/ML
4 INJECTION, SOLUTION INTRAMUSCULAR; INTRAVENOUS EVERY 4 HOURS PRN
Status: DISCONTINUED | OUTPATIENT
Start: 2018-11-18 | End: 2018-11-19

## 2018-11-18 RX ORDER — ASPIRIN 81 MG/1
81 TABLET ORAL DAILY
Status: DISCONTINUED | OUTPATIENT
Start: 2018-11-18 | End: 2018-11-23 | Stop reason: HOSPADM

## 2018-11-18 RX ORDER — HYDROCODONE BITARTRATE AND ACETAMINOPHEN 5; 325 MG/1; MG/1
1 TABLET ORAL EVERY 4 HOURS PRN
Status: DISCONTINUED | OUTPATIENT
Start: 2018-11-18 | End: 2018-11-23 | Stop reason: HOSPADM

## 2018-11-18 RX ORDER — MORPHINE SULFATE 10 MG/ML
8 INJECTION, SOLUTION INTRAMUSCULAR; INTRAVENOUS ONCE
Status: COMPLETED | OUTPATIENT
Start: 2018-11-18 | End: 2018-11-18

## 2018-11-18 RX ORDER — 0.9 % SODIUM CHLORIDE 0.9 %
1000 INTRAVENOUS SOLUTION INTRAVENOUS ONCE
Status: COMPLETED | OUTPATIENT
Start: 2018-11-18 | End: 2018-11-18

## 2018-11-18 RX ORDER — 0.9 % SODIUM CHLORIDE 0.9 %
1000 INTRAVENOUS SOLUTION INTRAVENOUS ONCE
Status: DISCONTINUED | OUTPATIENT
Start: 2018-11-18 | End: 2018-11-18

## 2018-11-18 RX ORDER — MORPHINE SULFATE 2 MG/ML
2 INJECTION, SOLUTION INTRAMUSCULAR; INTRAVENOUS EVERY 4 HOURS PRN
Status: DISCONTINUED | OUTPATIENT
Start: 2018-11-18 | End: 2018-11-19

## 2018-11-18 RX ORDER — HYDROCODONE BITARTRATE AND ACETAMINOPHEN 5; 325 MG/1; MG/1
2 TABLET ORAL EVERY 4 HOURS PRN
Status: DISCONTINUED | OUTPATIENT
Start: 2018-11-18 | End: 2018-11-23 | Stop reason: HOSPADM

## 2018-11-18 RX ORDER — ONDANSETRON 2 MG/ML
4 INJECTION INTRAMUSCULAR; INTRAVENOUS ONCE
Status: COMPLETED | OUTPATIENT
Start: 2018-11-18 | End: 2018-11-18

## 2018-11-18 RX ORDER — SODIUM CHLORIDE 0.9 % (FLUSH) 0.9 %
10 SYRINGE (ML) INJECTION PRN
Status: DISCONTINUED | OUTPATIENT
Start: 2018-11-18 | End: 2018-11-23 | Stop reason: HOSPADM

## 2018-11-18 RX ORDER — LEVOFLOXACIN 5 MG/ML
500 INJECTION, SOLUTION INTRAVENOUS EVERY 24 HOURS
Status: DISCONTINUED | OUTPATIENT
Start: 2018-11-18 | End: 2018-11-19

## 2018-11-18 RX ORDER — ACETAMINOPHEN 500 MG
1000 TABLET ORAL ONCE
Status: COMPLETED | OUTPATIENT
Start: 2018-11-18 | End: 2018-11-18

## 2018-11-18 RX ORDER — SODIUM CHLORIDE 0.9 % (FLUSH) 0.9 %
10 SYRINGE (ML) INJECTION EVERY 12 HOURS SCHEDULED
Status: DISCONTINUED | OUTPATIENT
Start: 2018-11-18 | End: 2018-11-23 | Stop reason: HOSPADM

## 2018-11-18 RX ORDER — SODIUM CHLORIDE AND POTASSIUM CHLORIDE .9; .15 G/100ML; G/100ML
SOLUTION INTRAVENOUS CONTINUOUS
Status: DISCONTINUED | OUTPATIENT
Start: 2018-11-18 | End: 2018-11-23 | Stop reason: HOSPADM

## 2018-11-18 RX ORDER — ALBUTEROL SULFATE 90 UG/1
2 AEROSOL, METERED RESPIRATORY (INHALATION) EVERY 4 HOURS PRN
Status: DISCONTINUED | OUTPATIENT
Start: 2018-11-18 | End: 2018-11-23 | Stop reason: HOSPADM

## 2018-11-18 RX ORDER — PANTOPRAZOLE SODIUM 40 MG/1
40 TABLET, DELAYED RELEASE ORAL
Status: DISCONTINUED | OUTPATIENT
Start: 2018-11-19 | End: 2018-11-19

## 2018-11-18 RX ADMIN — SUCRALFATE 1 G: 1 TABLET ORAL at 20:53

## 2018-11-18 RX ADMIN — VANCOMYCIN HYDROCHLORIDE 1500 MG: 10 INJECTION, POWDER, LYOPHILIZED, FOR SOLUTION INTRAVENOUS at 20:53

## 2018-11-18 RX ADMIN — IPRATROPIUM BROMIDE AND ALBUTEROL SULFATE 1 AMPULE: .5; 3 SOLUTION RESPIRATORY (INHALATION) at 09:33

## 2018-11-18 RX ADMIN — IOHEXOL 50 ML: 240 INJECTION, SOLUTION INTRATHECAL; INTRAVASCULAR; INTRAVENOUS; ORAL at 12:54

## 2018-11-18 RX ADMIN — MORPHINE SULFATE 4 MG: 4 INJECTION INTRAVENOUS at 23:37

## 2018-11-18 RX ADMIN — LEVOFLOXACIN 500 MG: 5 INJECTION, SOLUTION INTRAVENOUS at 18:46

## 2018-11-18 RX ADMIN — ALBUTEROL SULFATE 2.5 MG: 2.5 SOLUTION RESPIRATORY (INHALATION) at 23:53

## 2018-11-18 RX ADMIN — POTASSIUM CHLORIDE AND SODIUM CHLORIDE: 900; 150 INJECTION, SOLUTION INTRAVENOUS at 18:46

## 2018-11-18 RX ADMIN — HYDROCODONE BITARTRATE AND ACETAMINOPHEN 2 TABLET: 5; 325 TABLET ORAL at 18:46

## 2018-11-18 RX ADMIN — FENTANYL CITRATE 50 MCG: 50 INJECTION, SOLUTION INTRAMUSCULAR; INTRAVENOUS at 20:53

## 2018-11-18 RX ADMIN — SODIUM CHLORIDE 1000 ML: 9 INJECTION, SOLUTION INTRAVENOUS at 09:37

## 2018-11-18 RX ADMIN — ASPIRIN 81 MG: 81 TABLET, COATED ORAL at 18:46

## 2018-11-18 RX ADMIN — ONDANSETRON 4 MG: 2 INJECTION INTRAMUSCULAR; INTRAVENOUS at 10:58

## 2018-11-18 RX ADMIN — PANTOPRAZOLE SODIUM 40 MG: 40 TABLET, DELAYED RELEASE ORAL at 20:53

## 2018-11-18 RX ADMIN — ENOXAPARIN SODIUM 40 MG: 40 INJECTION SUBCUTANEOUS at 18:46

## 2018-11-18 RX ADMIN — MORPHINE SULFATE 8 MG: 10 INJECTION INTRAVENOUS at 10:58

## 2018-11-18 RX ADMIN — SODIUM CHLORIDE 1000 ML: 9 INJECTION, SOLUTION INTRAVENOUS at 16:17

## 2018-11-18 RX ADMIN — TRAZODONE HYDROCHLORIDE 50 MG: 50 TABLET ORAL at 20:53

## 2018-11-18 RX ADMIN — ACETAMINOPHEN 1000 MG: 500 TABLET, FILM COATED ORAL at 15:22

## 2018-11-18 ASSESSMENT — PAIN DESCRIPTION - LOCATION
LOCATION: ABDOMEN

## 2018-11-18 ASSESSMENT — PAIN SCALES - GENERAL
PAINLEVEL_OUTOF10: 9
PAINLEVEL_OUTOF10: 5
PAINLEVEL_OUTOF10: 7
PAINLEVEL_OUTOF10: 7
PAINLEVEL_OUTOF10: 10
PAINLEVEL_OUTOF10: 0
PAINLEVEL_OUTOF10: 8
PAINLEVEL_OUTOF10: 7
PAINLEVEL_OUTOF10: 10
PAINLEVEL_OUTOF10: 8
PAINLEVEL_OUTOF10: 7

## 2018-11-18 ASSESSMENT — PAIN DESCRIPTION - PAIN TYPE
TYPE: ACUTE PAIN

## 2018-11-18 ASSESSMENT — ENCOUNTER SYMPTOMS
ABDOMINAL DISTENTION: 0
SORE THROAT: 0
EYE PAIN: 0
EYE REDNESS: 0
ABDOMINAL PAIN: 1
NAUSEA: 0
WHEEZING: 1
BACK PAIN: 0
SHORTNESS OF BREATH: 0
SINUS PRESSURE: 0
VOMITING: 0
EYE DISCHARGE: 0
DIARRHEA: 0
COUGH: 1

## 2018-11-18 ASSESSMENT — PAIN DESCRIPTION - DESCRIPTORS
DESCRIPTORS: SHARP;SORE;CONSTANT
DESCRIPTORS: ACHING;SHARP
DESCRIPTORS: SHARP;SORE;ACHING
DESCRIPTORS: SHARP;CONSTANT
DESCRIPTORS: SHARP;SORE;ACHING

## 2018-11-18 ASSESSMENT — PAIN DESCRIPTION - ORIENTATION
ORIENTATION: RIGHT;MID
ORIENTATION: MID;RIGHT
ORIENTATION: MID;RIGHT
ORIENTATION: RIGHT;MID
ORIENTATION: RIGHT

## 2018-11-18 ASSESSMENT — PAIN DESCRIPTION - FREQUENCY: FREQUENCY: CONTINUOUS

## 2018-11-18 NOTE — LETTER
1-800- MEDICARE (6-948.383.6026). TTY users should call [phone]. · To find a different doctor, visit Medicare's Physician Compare website, [employer], or call 1-800-MEDICARE (167 8794). TTY users should call [phone]. · To find a different skilled nursing facility, visit [employer] website, [email or web address]://www.[employer]/, or call 1-800-MEDICARE (1- 905.138.1919). TTY users should call [phone]. · To find a different long term care hospital, visit [facility] [address] Compare website, [email or web address], or call 1-800- MEDICARE (680 1666). TTY users should call [phone]. · To find a different inpatient rehabilitation facility, visit [facility] Compare website, www.medicare.gov/ inpatientrehabilitation facilitycompare, or call 1-800-MEDICARE (7-591.512.9445). TTY users should call 8- [phone]. · To find a different home health agency, visit [address] website, www.medicare.gov/homehealthcompare, or call 1-800-MEDICARE (2-108- 011-8653). TTY users should call [phone].

## 2018-11-18 NOTE — H&P
(L) 20.0 - 42.0 %    Monocytes % 4.1 2.0 - 12.0 %    Eosinophils % 0.1 0.0 - 6.0 %    Basophils % 0.4 0.0 - 2.0 %    Neutrophils # 14.48 (H) 1.80 - 7.30 E9/L    Immature Granulocytes # 0.08 E9/L    Lymphocytes # 0.74 (L) 1.50 - 4.00 E9/L    Monocytes # 0.66 0.10 - 0.95 E9/L    Eosinophils # 0.02 (L) 0.05 - 0.50 E9/L    Basophils # 0.07 0.00 - 0.20 E9/L   Lactic Acid, Plasma    Collection Time: 11/18/18  9:24 AM   Result Value Ref Range    Lactic Acid 2.2 0.5 - 2.2 mmol/L   EKG 12 Lead    Collection Time: 11/18/18  9:24 AM   Result Value Ref Range    Ventricular Rate 110 BPM    Atrial Rate 110 BPM    P-R Interval 140 ms    QRS Duration 88 ms    Q-T Interval 370 ms    QTc Calculation (Bazett) 500 ms    P Axis 86 degrees    R Axis 119 degrees    T Axis 88 degrees   Comprehensive metabolic panel    Collection Time: 11/18/18 10:51 AM   Result Value Ref Range    Sodium 139 132 - 146 mmol/L    Potassium 3.6 3.5 - 5.0 mmol/L    Chloride 99 98 - 107 mmol/L    CO2 27 22 - 29 mmol/L    Anion Gap 13 7 - 16 mmol/L    Glucose 142 (H) 74 - 99 mg/dL    BUN 10 8 - 23 mg/dL    CREATININE 0.7 0.5 - 1.0 mg/dL    GFR Non-African American >60 >=60 mL/min/1.73    GFR African American >60     Calcium 8.9 8.6 - 10.2 mg/dL    Total Protein 7.0 6.4 - 8.3 g/dL    Alb 4.2 3.5 - 5.2 g/dL    Total Bilirubin 0.6 0.0 - 1.2 mg/dL    Alkaline Phosphatase 78 35 - 104 U/L    ALT 20 0 - 32 U/L    AST 19 0 - 31 U/L   Lipase    Collection Time: 11/18/18 10:51 AM   Result Value Ref Range    Lipase 17 13 - 60 U/L   Urinalysis    Collection Time: 11/18/18  2:30 PM   Result Value Ref Range    Color, UA Yellow Straw/Yellow    Clarity, UA Clear Clear    Glucose, Ur Negative Negative mg/dL    Bilirubin Urine Negative Negative    Ketones, Urine Negative Negative mg/dL    Specific Gravity, UA 1.025 1.005 - 1.030    Blood, Urine MODERATE (A) Negative    pH, UA 5.5 5.0 - 9.0    Protein, UA Negative Negative mg/dL    Urobilinogen, Urine 0.2 <2.0 E.U./dL    Nitrite, of free intraperitoneal air. The liver and spleen are of normal size. There are no opaque calculi in the areas of the kidneys or expected course of the ureters. The gallbladder is surgically absent The bowel gas pattern is not remarkable. No acute pulmonary parenchymal abnormality is identified. 1. Unremarkable acute abdominal series. Assessment and Plan  Patient is a 70 y.o. female who presented with abdominal pain. The active problem list is as follows:    · Intractable abdominal pain  · Fever   · S/p remote Macario-en-Y gastric bypass  · COPD  · Essential hypertension  · Degenerative disk disease  · Restless leg syndrome  · GERD  · Tobacco abuse  · QTc prolongation    -General surgery has been consulted  -Vancomycin and levaquin until cultures return  -Blood and urine culture  -Procalcitonin  -NS w/20K @100cc/hr  -Norco pain panel  -Respiratory film array  -Chest XR    · Routine labs in the morning. · DVT prophylaxis. lovenox  · Please see orders for further management and care. The plan was discussed with Dr. Minal Baker. 2 Rehab Ollie LUCAS, PGYII  3:27 PM  11/18/2018       I  discussed the patient's management with the resident. I reviewed the resident's note and agree with the documented findings and plan of care.     Mario Schmitt D.O., FACOI  4:00 PM  11/18/2018

## 2018-11-19 LAB
ALBUMIN SERPL-MCNC: 3.3 G/DL (ref 3.5–5.2)
ALP BLD-CCNC: 89 U/L (ref 35–104)
ALT SERPL-CCNC: 32 U/L (ref 0–32)
ANION GAP SERPL CALCULATED.3IONS-SCNC: 12 MMOL/L (ref 7–16)
AST SERPL-CCNC: 33 U/L (ref 0–31)
BASOPHILS ABSOLUTE: 0.05 E9/L (ref 0–0.2)
BASOPHILS RELATIVE PERCENT: 0.3 % (ref 0–2)
BILIRUB SERPL-MCNC: 0.5 MG/DL (ref 0–1.2)
BUN BLDV-MCNC: 9 MG/DL (ref 8–23)
CALCIUM SERPL-MCNC: 7.7 MG/DL (ref 8.6–10.2)
CHLORIDE BLD-SCNC: 102 MMOL/L (ref 98–107)
CHOLESTEROL, TOTAL: 105 MG/DL (ref 0–199)
CO2: 23 MMOL/L (ref 22–29)
CREAT SERPL-MCNC: 0.6 MG/DL (ref 0.5–1)
EOSINOPHILS ABSOLUTE: 0.02 E9/L (ref 0.05–0.5)
EOSINOPHILS RELATIVE PERCENT: 0.1 % (ref 0–6)
FILM ARRAY ADENOVIRUS: NORMAL
FILM ARRAY BORDETELLA PERTUSSIS: NORMAL
FILM ARRAY CHLAMYDOPHILIA PNEUMONIAE: NORMAL
FILM ARRAY CORONAVIRUS 229E: NORMAL
FILM ARRAY CORONAVIRUS HKU1: NORMAL
FILM ARRAY CORONAVIRUS NL63: NORMAL
FILM ARRAY CORONAVIRUS OC43: NORMAL
FILM ARRAY INFLUENZA A VIRUS 09H1: NORMAL
FILM ARRAY INFLUENZA A VIRUS H1: NORMAL
FILM ARRAY INFLUENZA A VIRUS H3: NORMAL
FILM ARRAY INFLUENZA A VIRUS: NORMAL
FILM ARRAY INFLUENZA B: NORMAL
FILM ARRAY METAPNEUMOVIRUS: NORMAL
FILM ARRAY MYCOPLASMA PNEUMONIAE: NORMAL
FILM ARRAY PARAINFLUENZA VIRUS 1: NORMAL
FILM ARRAY PARAINFLUENZA VIRUS 2: NORMAL
FILM ARRAY PARAINFLUENZA VIRUS 3: NORMAL
FILM ARRAY PARAINFLUENZA VIRUS 4: NORMAL
FILM ARRAY RESPIRATORY SYNCITIAL VIRUS: NORMAL
FILM ARRAY RHINOVIRUS/ENTEROVIRUS: NORMAL
GFR AFRICAN AMERICAN: >60
GFR NON-AFRICAN AMERICAN: >60 ML/MIN/1.73
GLUCOSE BLD-MCNC: 137 MG/DL (ref 74–99)
HBA1C MFR BLD: 5.6 % (ref 4–5.6)
HCT VFR BLD CALC: 35.1 % (ref 34–48)
HDLC SERPL-MCNC: 45 MG/DL
HEMOGLOBIN: 11 G/DL (ref 11.5–15.5)
IMMATURE GRANULOCYTES #: 0.12 E9/L
IMMATURE GRANULOCYTES %: 0.8 % (ref 0–5)
LDL CHOLESTEROL CALCULATED: 41 MG/DL (ref 0–99)
LYMPHOCYTES ABSOLUTE: 1.08 E9/L (ref 1.5–4)
LYMPHOCYTES RELATIVE PERCENT: 7.1 % (ref 20–42)
MAGNESIUM: 1.8 MG/DL (ref 1.6–2.6)
MCH RBC QN AUTO: 30.1 PG (ref 26–35)
MCHC RBC AUTO-ENTMCNC: 31.3 % (ref 32–34.5)
MCV RBC AUTO: 96.2 FL (ref 80–99.9)
MONOCYTES ABSOLUTE: 1.22 E9/L (ref 0.1–0.95)
MONOCYTES RELATIVE PERCENT: 8.1 % (ref 2–12)
NEUTROPHILS ABSOLUTE: 12.62 E9/L (ref 1.8–7.3)
NEUTROPHILS RELATIVE PERCENT: 83.6 % (ref 43–80)
PDW BLD-RTO: 14.6 FL (ref 11.5–15)
PHOSPHORUS: 2.6 MG/DL (ref 2.5–4.5)
PLATELET # BLD: 216 E9/L (ref 130–450)
PMV BLD AUTO: 10.2 FL (ref 7–12)
POTASSIUM SERPL-SCNC: 4.3 MMOL/L (ref 3.5–5)
PROCALCITONIN: 0.42 NG/ML (ref 0–0.08)
RBC # BLD: 3.65 E12/L (ref 3.5–5.5)
SODIUM BLD-SCNC: 137 MMOL/L (ref 132–146)
TOTAL PROTEIN: 6.5 G/DL (ref 6.4–8.3)
TRIGL SERPL-MCNC: 96 MG/DL (ref 0–149)
TSH SERPL DL<=0.05 MIU/L-ACNC: 1.32 UIU/ML (ref 0.27–4.2)
VLDLC SERPL CALC-MCNC: 19 MG/DL
WBC # BLD: 15.1 E9/L (ref 4.5–11.5)

## 2018-11-19 PROCEDURE — 90686 IIV4 VACC NO PRSV 0.5 ML IM: CPT | Performed by: INTERNAL MEDICINE

## 2018-11-19 PROCEDURE — 2500000003 HC RX 250 WO HCPCS: Performed by: INTERNAL MEDICINE

## 2018-11-19 PROCEDURE — C9113 INJ PANTOPRAZOLE SODIUM, VIA: HCPCS | Performed by: INTERNAL MEDICINE

## 2018-11-19 PROCEDURE — 6360000002 HC RX W HCPCS: Performed by: INTERNAL MEDICINE

## 2018-11-19 PROCEDURE — 80053 COMPREHEN METABOLIC PANEL: CPT

## 2018-11-19 PROCEDURE — 6360000002 HC RX W HCPCS: Performed by: SURGERY

## 2018-11-19 PROCEDURE — 80061 LIPID PANEL: CPT

## 2018-11-19 PROCEDURE — 2500000003 HC RX 250 WO HCPCS: Performed by: SPECIALIST

## 2018-11-19 PROCEDURE — 85025 COMPLETE CBC W/AUTO DIFF WBC: CPT

## 2018-11-19 PROCEDURE — 36415 COLL VENOUS BLD VENIPUNCTURE: CPT

## 2018-11-19 PROCEDURE — 84100 ASSAY OF PHOSPHORUS: CPT

## 2018-11-19 PROCEDURE — 83036 HEMOGLOBIN GLYCOSYLATED A1C: CPT

## 2018-11-19 PROCEDURE — G0008 ADMIN INFLUENZA VIRUS VAC: HCPCS | Performed by: INTERNAL MEDICINE

## 2018-11-19 PROCEDURE — 6370000000 HC RX 637 (ALT 250 FOR IP): Performed by: INTERNAL MEDICINE

## 2018-11-19 PROCEDURE — 2580000003 HC RX 258: Performed by: SPECIALIST

## 2018-11-19 PROCEDURE — 2580000003 HC RX 258: Performed by: INTERNAL MEDICINE

## 2018-11-19 PROCEDURE — 83735 ASSAY OF MAGNESIUM: CPT

## 2018-11-19 PROCEDURE — 99223 1ST HOSP IP/OBS HIGH 75: CPT | Performed by: SURGERY

## 2018-11-19 PROCEDURE — 94640 AIRWAY INHALATION TREATMENT: CPT

## 2018-11-19 PROCEDURE — 2060000000 HC ICU INTERMEDIATE R&B

## 2018-11-19 PROCEDURE — 84443 ASSAY THYROID STIM HORMONE: CPT

## 2018-11-19 RX ORDER — MORPHINE SULFATE 4 MG/ML
4 INJECTION, SOLUTION INTRAMUSCULAR; INTRAVENOUS EVERY 4 HOURS PRN
Status: DISCONTINUED | OUTPATIENT
Start: 2018-11-19 | End: 2018-11-22

## 2018-11-19 RX ORDER — 0.9 % SODIUM CHLORIDE 0.9 %
10 VIAL (ML) INJECTION EVERY 12 HOURS SCHEDULED
Status: DISCONTINUED | OUTPATIENT
Start: 2018-11-19 | End: 2018-11-23 | Stop reason: HOSPADM

## 2018-11-19 RX ORDER — PANTOPRAZOLE SODIUM 40 MG/10ML
40 INJECTION, POWDER, LYOPHILIZED, FOR SOLUTION INTRAVENOUS 2 TIMES DAILY
Status: DISCONTINUED | OUTPATIENT
Start: 2018-11-19 | End: 2018-11-21

## 2018-11-19 RX ORDER — LEVOFLOXACIN 750 MG/1
750 TABLET ORAL DAILY
Status: DISCONTINUED | OUTPATIENT
Start: 2018-11-19 | End: 2018-11-23 | Stop reason: HOSPADM

## 2018-11-19 RX ORDER — 0.9 % SODIUM CHLORIDE 0.9 %
10 VIAL (ML) INJECTION PRN
Status: DISCONTINUED | OUTPATIENT
Start: 2018-11-19 | End: 2018-11-23 | Stop reason: HOSPADM

## 2018-11-19 RX ORDER — MORPHINE SULFATE 2 MG/ML
2 INJECTION, SOLUTION INTRAMUSCULAR; INTRAVENOUS
Status: DISCONTINUED | OUTPATIENT
Start: 2018-11-19 | End: 2018-11-22

## 2018-11-19 RX ADMIN — PANTOPRAZOLE SODIUM 40 MG: 40 INJECTION, POWDER, FOR SOLUTION INTRAVENOUS at 20:52

## 2018-11-19 RX ADMIN — MORPHINE SULFATE 4 MG: 4 INJECTION INTRAVENOUS at 03:57

## 2018-11-19 RX ADMIN — SUCRALFATE 1 G: 1 TABLET ORAL at 20:51

## 2018-11-19 RX ADMIN — MOMETASONE FUROATE AND FORMOTEROL FUMARATE DIHYDRATE 2 PUFF: 200; 5 AEROSOL RESPIRATORY (INHALATION) at 16:39

## 2018-11-19 RX ADMIN — Medication 10 ML: at 10:45

## 2018-11-19 RX ADMIN — LEVOFLOXACIN 750 MG: 750 TABLET, FILM COATED ORAL at 16:40

## 2018-11-19 RX ADMIN — ACETAMINOPHEN 650 MG: 325 TABLET, FILM COATED ORAL at 13:04

## 2018-11-19 RX ADMIN — MORPHINE SULFATE 4 MG: 4 INJECTION INTRAVENOUS at 18:32

## 2018-11-19 RX ADMIN — HYDROCODONE BITARTRATE AND ACETAMINOPHEN 1 TABLET: 5; 325 TABLET ORAL at 06:00

## 2018-11-19 RX ADMIN — POTASSIUM CHLORIDE AND SODIUM CHLORIDE: 900; 150 INJECTION, SOLUTION INTRAVENOUS at 22:38

## 2018-11-19 RX ADMIN — AZTREONAM 2 G: 2 INJECTION, POWDER, LYOPHILIZED, FOR SOLUTION INTRAMUSCULAR; INTRAVENOUS at 18:33

## 2018-11-19 RX ADMIN — INFLUENZA A VIRUS A/MICHIGAN/45/2015 X-275 (H1N1) ANTIGEN (FORMALDEHYDE INACTIVATED), INFLUENZA A VIRUS A/SINGAPORE/INFIMH-16-0019/2016 IVR-186 (H3N2) ANTIGEN (FORMALDEHYDE INACTIVATED), INFLUENZA B VIRUS B/PHUKET/3073/2013 ANTIGEN (FORMALDEHYDE INACTIVATED), AND INFLUENZA B VIRUS B/MARYLAND/15/2016 BX-69A ANTIGEN (FORMALDEHYDE INACTIVATED) 0.5 ML: 15; 15; 15; 15 INJECTION, SUSPENSION INTRAMUSCULAR at 10:46

## 2018-11-19 RX ADMIN — MORPHINE SULFATE 4 MG: 4 INJECTION INTRAVENOUS at 08:23

## 2018-11-19 RX ADMIN — SUCRALFATE 1 G: 1 TABLET ORAL at 14:02

## 2018-11-19 RX ADMIN — VANCOMYCIN HYDROCHLORIDE 1500 MG: 10 INJECTION, POWDER, LYOPHILIZED, FOR SOLUTION INTRAVENOUS at 14:02

## 2018-11-19 RX ADMIN — MORPHINE SULFATE 4 MG: 4 INJECTION INTRAVENOUS at 22:37

## 2018-11-19 RX ADMIN — POTASSIUM CHLORIDE AND SODIUM CHLORIDE: 900; 150 INJECTION, SOLUTION INTRAVENOUS at 10:48

## 2018-11-19 RX ADMIN — PANTOPRAZOLE SODIUM 40 MG: 40 TABLET, DELAYED RELEASE ORAL at 05:34

## 2018-11-19 RX ADMIN — MORPHINE SULFATE 4 MG: 4 INJECTION INTRAVENOUS at 12:48

## 2018-11-19 RX ADMIN — SUCRALFATE 1 G: 1 TABLET ORAL at 05:34

## 2018-11-19 RX ADMIN — METRONIDAZOLE 500 MG: 500 INJECTION, SOLUTION INTRAVENOUS at 22:37

## 2018-11-19 RX ADMIN — ASPIRIN 81 MG: 81 TABLET, COATED ORAL at 10:47

## 2018-11-19 RX ADMIN — METRONIDAZOLE 500 MG: 500 INJECTION, SOLUTION INTRAVENOUS at 17:28

## 2018-11-19 RX ADMIN — ALBUTEROL SULFATE 2.5 MG: 2.5 SOLUTION RESPIRATORY (INHALATION) at 05:35

## 2018-11-19 RX ADMIN — ENOXAPARIN SODIUM 40 MG: 40 INJECTION SUBCUTANEOUS at 10:46

## 2018-11-19 RX ADMIN — ACETAMINOPHEN 650 MG: 325 TABLET, FILM COATED ORAL at 22:38

## 2018-11-19 ASSESSMENT — PAIN DESCRIPTION - FREQUENCY
FREQUENCY: CONTINUOUS
FREQUENCY: CONTINUOUS

## 2018-11-19 ASSESSMENT — PAIN SCALES - GENERAL
PAINLEVEL_OUTOF10: 2
PAINLEVEL_OUTOF10: 7
PAINLEVEL_OUTOF10: 2
PAINLEVEL_OUTOF10: 8
PAINLEVEL_OUTOF10: 5
PAINLEVEL_OUTOF10: 10
PAINLEVEL_OUTOF10: 3
PAINLEVEL_OUTOF10: 0
PAINLEVEL_OUTOF10: 8
PAINLEVEL_OUTOF10: 8
PAINLEVEL_OUTOF10: 3
PAINLEVEL_OUTOF10: 8
PAINLEVEL_OUTOF10: 8

## 2018-11-19 ASSESSMENT — PAIN DESCRIPTION - DESCRIPTORS
DESCRIPTORS: ACHING;SHARP
DESCRIPTORS: ACHING;SHARP
DESCRIPTORS: DISCOMFORT;ACHING

## 2018-11-19 ASSESSMENT — PAIN DESCRIPTION - LOCATION
LOCATION: ABDOMEN

## 2018-11-19 ASSESSMENT — PAIN DESCRIPTION - ORIENTATION
ORIENTATION: RIGHT
ORIENTATION: RIGHT

## 2018-11-19 ASSESSMENT — PAIN DESCRIPTION - PAIN TYPE
TYPE: ACUTE PAIN
TYPE: ACUTE PAIN;CHRONIC PAIN
TYPE: ACUTE PAIN

## 2018-11-19 NOTE — CONSULTS
reduction nasal bone fracture    OTHER SURGICAL HISTORY Left 6 2 16    seroma incision and drainage thigh    RENE-EN-Y GASTRIC BYPASS  06/14/2005    Dr. Thelma Orozco ARTHROSCOPY Right 1 2 15    rotator cuff repair, subacromial decompression, debridement    SHOULDER SURGERY  1999    left    TONSILLECTOMY  1959    UPPER GASTROINTESTINAL ENDOSCOPY  10/2004    UPPER [facility] END[facility]  [facility]18/12    [facility]    WRIST ARTHROSCOPY Right 12/02/2015    DEBRIDEMENT ASPIRATION; RIGHT DEQUARVAINES RELEASE    WRIST SURGERY Right 12/2/15       Current Facility-Administered Medications   Medication Dose Route Frequency Provider Last Rate Last Dose    morphine (PF) injection 2 mg  2 mg Intravenous Q2H PRN Daiana Samayoa MD        Or    morphine (PF) injection 4 mg  4 mg Intravenous Q4H PRN Daiana Samayoa MD        albuterol (PROVENTIL) nebulizer solution 2.5 mg  2.5 mg Nebulization Q6H PRN Adriana Henry, DO   2.5 mg at 11/19/18 0535    albuterol sulfate  (90 Base) MCG/ACT inhaler 2 puff  2 puff Inhalation Q4H PRN Adriana Henry, DO        aspirin EC tablet 81 mg  81 mg Oral Daily [facility] Sherron, DO   81 mg at 11/18/18 1846    pantoprazole (PROTONIX) tablet 40 mg  40 mg Oral QAM AC [place] Sherron, DO   40 mg at 11/19/18 0534    mometasone-formoterol (DULERA) 200-5 MCG/ACT inhaler 2 puff  2 puff Inhalation BID [facility] OMAR Siu, DO        sucralfate (CARAFATE) tablet 1 g  1 g Oral 3 times per day Adriana Kaitlynn, DO   1 g at 11/19/18 0534    traZODone (DESYREL) tablet 50 mg  50 mg Oral Nightly [facility] OMAR Siu, DO   50 mg at 11/18/18 2053    sodium chloride flush 0.9 % injection 10 mL  10 mL Intravenous 2 times per day Adriana Henry, DO        sodium chloride flush 0.9 % injection 10 mL  10 mL Intravenous PRN [facility] OMAR Siu, DO        magnesium hydroxide (MILK OF MAGNESIA) 400 MG/5ML suspension 30 mL  30 mL Oral Daily PRN Adriana Kaitlynn, DO        enoxaparin

## 2018-11-19 NOTE — PROGRESS NOTES
negative, pre-op clearsance   [facility]    right hand    CARPAL TUNNEL RELEASE Left 2017     SECTION  3/9/1984    CHOLECYSTECTOMY  1986    CHOLECYSTECTOMY      COLONOSCOPY  3/2011    DILATATION, ESOPHAGUS      DILATION AND CURETTAGE OF UTERUS      x4    ENDOSCOPY, COLON, DIAGNOSTIC      JOINT REPLACEMENT  2003    right    JOINT REPLACEMENT  2003    left knee    JOINT REPLACEMENT      bilat knee replacement    KNEE ARTHROSCOPY      bilateral    OTHER SURGICAL HISTORY  16    closed reduction nasal bone fracture    OTHER SURGICAL HISTORY Left 6 2 16    seroma incision and drainage thigh    RENE-EN-Y GASTRIC BYPASS  2005    Dr. Enrrique Perez ARTHROSCOPY Right 1 2 15    rotator cuff repair, subacromial decompression, debridement    SHOULDER SURGERY      left    TONSILLECTOMY  195    UPPER GASTROINTESTINAL ENDOSCOPY  10/2004    [facility]PP[facility]AL [facility]OSCOPY  12    [facility]    WRIST ARTHROSCOPY Right 2015    DEBRIDEMENT ASPIRATION; RIGHT DEQUARVAINES RELEASE    WRIST SURGERY Right 12/2/15       FAMILY Hx:  Family History   Problem Relation Age of Onset    Cancer Mother     Heart Disease Sister     Emphysema Father        HOME MEDICATIONS:  Prior to Admission medications    Medication Sig Start Date End Date Taking? Authorizing Provider   esomeprazole (NEXIUM) 40 MG delayed release capsule Take 40 mg by mouth 2 times daily    Yes Historical Provider, MD   albuterol (PROVENTIL) (5 MG/ML) 0.5% nebulizer solution Take 0.5 mLs by nebulization every 6 hours as needed for Wheezing or Shortness of Breath 18  Yes Annabel Aldana, DO   traZODone (DESYREL) 50 MG tablet Take 50 mg by mouth nightly   Yes Historical Provider, MD   mometasone-formoterol (DULERA) 200-5 MCG/ACT inhaler Inhale 2 puffs into the lungs 2 times daily.  14  Yes Job Davila,    sucralfate (CARAFATE) 1 GM/10ML suspension Take 1 g by mouth 3 times patient and all questions answered to her satisfaction - she is agreeable with the plan as delineated. Thank you for the opportunity to see Mrs. Chaves Share in consultation.       Callum Liu MD  11/19/2018  3:04 PM

## 2018-11-20 ENCOUNTER — APPOINTMENT (OUTPATIENT)
Dept: ULTRASOUND IMAGING | Age: 63
DRG: 872 | End: 2018-11-20
Payer: MEDICARE

## 2018-11-20 ENCOUNTER — ANESTHESIA (OUTPATIENT)
Dept: ENDOSCOPY | Age: 63
DRG: 872 | End: 2018-11-20
Payer: MEDICARE

## 2018-11-20 ENCOUNTER — ANESTHESIA EVENT (OUTPATIENT)
Dept: ENDOSCOPY | Age: 63
DRG: 872 | End: 2018-11-20
Payer: MEDICARE

## 2018-11-20 VITALS — DIASTOLIC BLOOD PRESSURE: 83 MMHG | SYSTOLIC BLOOD PRESSURE: 135 MMHG | OXYGEN SATURATION: 99 %

## 2018-11-20 LAB
ALBUMIN SERPL-MCNC: 3.5 G/DL (ref 3.5–5.2)
ALP BLD-CCNC: 79 U/L (ref 35–104)
ALT SERPL-CCNC: 19 U/L (ref 0–32)
ANION GAP SERPL CALCULATED.3IONS-SCNC: 10 MMOL/L (ref 7–16)
AST SERPL-CCNC: 13 U/L (ref 0–31)
BASOPHILS ABSOLUTE: 0.02 E9/L (ref 0–0.2)
BASOPHILS RELATIVE PERCENT: 0.2 % (ref 0–2)
BILIRUB SERPL-MCNC: 0.3 MG/DL (ref 0–1.2)
BUN BLDV-MCNC: 8 MG/DL (ref 8–23)
CALCIUM SERPL-MCNC: 7.9 MG/DL (ref 8.6–10.2)
CHLORIDE BLD-SCNC: 100 MMOL/L (ref 98–107)
CO2: 27 MMOL/L (ref 22–29)
CREAT SERPL-MCNC: 0.6 MG/DL (ref 0.5–1)
EOSINOPHILS ABSOLUTE: 0.07 E9/L (ref 0.05–0.5)
EOSINOPHILS RELATIVE PERCENT: 0.7 % (ref 0–6)
GFR AFRICAN AMERICAN: >60
GFR NON-AFRICAN AMERICAN: >60 ML/MIN/1.73
GLUCOSE BLD-MCNC: 114 MG/DL (ref 74–99)
HCT VFR BLD CALC: 35.4 % (ref 34–48)
HEMOGLOBIN: 10.9 G/DL (ref 11.5–15.5)
IMMATURE GRANULOCYTES #: 0.05 E9/L
IMMATURE GRANULOCYTES %: 0.5 % (ref 0–5)
LACTIC ACID: 0.9 MMOL/L (ref 0.5–2.2)
LYMPHOCYTES ABSOLUTE: 0.61 E9/L (ref 1.5–4)
LYMPHOCYTES RELATIVE PERCENT: 6.5 % (ref 20–42)
MCH RBC QN AUTO: 30.3 PG (ref 26–35)
MCHC RBC AUTO-ENTMCNC: 30.8 % (ref 32–34.5)
MCV RBC AUTO: 98.3 FL (ref 80–99.9)
MONOCYTES ABSOLUTE: 0.8 E9/L (ref 0.1–0.95)
MONOCYTES RELATIVE PERCENT: 8.5 % (ref 2–12)
NEUTROPHILS ABSOLUTE: 7.84 E9/L (ref 1.8–7.3)
NEUTROPHILS RELATIVE PERCENT: 83.6 % (ref 43–80)
PDW BLD-RTO: 14.6 FL (ref 11.5–15)
PLATELET # BLD: 218 E9/L (ref 130–450)
PMV BLD AUTO: 10.1 FL (ref 7–12)
POTASSIUM SERPL-SCNC: 3.5 MMOL/L (ref 3.5–5)
RBC # BLD: 3.6 E12/L (ref 3.5–5.5)
SODIUM BLD-SCNC: 137 MMOL/L (ref 132–146)
TOTAL PROTEIN: 6.3 G/DL (ref 6.4–8.3)
URINE CULTURE, ROUTINE: NORMAL
WBC # BLD: 9.4 E9/L (ref 4.5–11.5)

## 2018-11-20 PROCEDURE — 7100000011 HC PHASE II RECOVERY - ADDTL 15 MIN: Performed by: INTERNAL MEDICINE

## 2018-11-20 PROCEDURE — 0DB88ZX EXCISION OF SMALL INTESTINE, VIA NATURAL OR ARTIFICIAL OPENING ENDOSCOPIC, DIAGNOSTIC: ICD-10-PCS | Performed by: INTERNAL MEDICINE

## 2018-11-20 PROCEDURE — 2580000003 HC RX 258: Performed by: SPECIALIST

## 2018-11-20 PROCEDURE — 2060000000 HC ICU INTERMEDIATE R&B

## 2018-11-20 PROCEDURE — 80053 COMPREHEN METABOLIC PANEL: CPT

## 2018-11-20 PROCEDURE — 2500000003 HC RX 250 WO HCPCS: Performed by: INTERNAL MEDICINE

## 2018-11-20 PROCEDURE — 2580000003 HC RX 258: Performed by: NURSE ANESTHETIST, CERTIFIED REGISTERED

## 2018-11-20 PROCEDURE — 2500000003 HC RX 250 WO HCPCS: Performed by: SPECIALIST

## 2018-11-20 PROCEDURE — 93970 EXTREMITY STUDY: CPT

## 2018-11-20 PROCEDURE — 83605 ASSAY OF LACTIC ACID: CPT

## 2018-11-20 PROCEDURE — 6360000002 HC RX W HCPCS: Performed by: INTERNAL MEDICINE

## 2018-11-20 PROCEDURE — 3700000001 HC ADD 15 MINUTES (ANESTHESIA): Performed by: INTERNAL MEDICINE

## 2018-11-20 PROCEDURE — 94640 AIRWAY INHALATION TREATMENT: CPT

## 2018-11-20 PROCEDURE — 3609012400 HC EGD TRANSORAL BIOPSY SINGLE/MULTIPLE: Performed by: INTERNAL MEDICINE

## 2018-11-20 PROCEDURE — 0DB68ZX EXCISION OF STOMACH, VIA NATURAL OR ARTIFICIAL OPENING ENDOSCOPIC, DIAGNOSTIC: ICD-10-PCS | Performed by: INTERNAL MEDICINE

## 2018-11-20 PROCEDURE — 88305 TISSUE EXAM BY PATHOLOGIST: CPT

## 2018-11-20 PROCEDURE — 6360000002 HC RX W HCPCS: Performed by: SURGERY

## 2018-11-20 PROCEDURE — 36415 COLL VENOUS BLD VENIPUNCTURE: CPT

## 2018-11-20 PROCEDURE — 85025 COMPLETE CBC W/AUTO DIFF WBC: CPT

## 2018-11-20 PROCEDURE — 6370000000 HC RX 637 (ALT 250 FOR IP): Performed by: INTERNAL MEDICINE

## 2018-11-20 PROCEDURE — 6360000002 HC RX W HCPCS: Performed by: NURSE ANESTHETIST, CERTIFIED REGISTERED

## 2018-11-20 PROCEDURE — 87040 BLOOD CULTURE FOR BACTERIA: CPT

## 2018-11-20 PROCEDURE — 3700000000 HC ANESTHESIA ATTENDED CARE: Performed by: INTERNAL MEDICINE

## 2018-11-20 PROCEDURE — 2580000003 HC RX 258: Performed by: INTERNAL MEDICINE

## 2018-11-20 PROCEDURE — C9113 INJ PANTOPRAZOLE SODIUM, VIA: HCPCS | Performed by: INTERNAL MEDICINE

## 2018-11-20 PROCEDURE — 2709999900 HC NON-CHARGEABLE SUPPLY: Performed by: INTERNAL MEDICINE

## 2018-11-20 PROCEDURE — 7100000010 HC PHASE II RECOVERY - FIRST 15 MIN: Performed by: INTERNAL MEDICINE

## 2018-11-20 RX ORDER — POLYETHYLENE GLYCOL 3350 17 G/17G
17 POWDER, FOR SOLUTION ORAL DAILY
Status: DISCONTINUED | OUTPATIENT
Start: 2018-11-20 | End: 2018-11-23 | Stop reason: HOSPADM

## 2018-11-20 RX ORDER — PROPOFOL 10 MG/ML
INJECTION, EMULSION INTRAVENOUS PRN
Status: DISCONTINUED | OUTPATIENT
Start: 2018-11-20 | End: 2018-11-20 | Stop reason: SDUPTHER

## 2018-11-20 RX ORDER — SODIUM CHLORIDE, SODIUM LACTATE, POTASSIUM CHLORIDE, CALCIUM CHLORIDE 600; 310; 30; 20 MG/100ML; MG/100ML; MG/100ML; MG/100ML
INJECTION, SOLUTION INTRAVENOUS CONTINUOUS PRN
Status: DISCONTINUED | OUTPATIENT
Start: 2018-11-20 | End: 2018-11-20

## 2018-11-20 RX ORDER — SODIUM CHLORIDE, SODIUM LACTATE, POTASSIUM CHLORIDE, CALCIUM CHLORIDE 600; 310; 30; 20 MG/100ML; MG/100ML; MG/100ML; MG/100ML
INJECTION, SOLUTION INTRAVENOUS CONTINUOUS PRN
Status: DISCONTINUED | OUTPATIENT
Start: 2018-11-20 | End: 2018-11-20 | Stop reason: SDUPTHER

## 2018-11-20 RX ADMIN — SODIUM CHLORIDE, POTASSIUM CHLORIDE, SODIUM LACTATE AND CALCIUM CHLORIDE: 600; 310; 30; 20 INJECTION, SOLUTION INTRAVENOUS at 16:08

## 2018-11-20 RX ADMIN — PANTOPRAZOLE SODIUM 40 MG: 40 INJECTION, POWDER, FOR SOLUTION INTRAVENOUS at 08:51

## 2018-11-20 RX ADMIN — HYDROCODONE BITARTRATE AND ACETAMINOPHEN 2 TABLET: 5; 325 TABLET ORAL at 21:01

## 2018-11-20 RX ADMIN — Medication 10 ML: at 08:52

## 2018-11-20 RX ADMIN — MORPHINE SULFATE 4 MG: 4 INJECTION INTRAVENOUS at 02:48

## 2018-11-20 RX ADMIN — AZTREONAM 2 G: 2 INJECTION, POWDER, LYOPHILIZED, FOR SOLUTION INTRAMUSCULAR; INTRAVENOUS at 01:29

## 2018-11-20 RX ADMIN — LEVOFLOXACIN 750 MG: 750 TABLET, FILM COATED ORAL at 17:16

## 2018-11-20 RX ADMIN — ACETAMINOPHEN 650 MG: 325 TABLET, FILM COATED ORAL at 08:50

## 2018-11-20 RX ADMIN — PROPOFOL 120 MG: 10 INJECTION, EMULSION INTRAVENOUS at 16:12

## 2018-11-20 RX ADMIN — ENOXAPARIN SODIUM 40 MG: 40 INJECTION SUBCUTANEOUS at 08:53

## 2018-11-20 RX ADMIN — ASPIRIN 81 MG: 81 TABLET, COATED ORAL at 08:50

## 2018-11-20 RX ADMIN — POLYETHYLENE GLYCOL (3350) 17 G: 17 POWDER, FOR SOLUTION ORAL at 17:34

## 2018-11-20 RX ADMIN — MOMETASONE FUROATE AND FORMOTEROL FUMARATE DIHYDRATE 2 PUFF: 200; 5 AEROSOL RESPIRATORY (INHALATION) at 19:46

## 2018-11-20 RX ADMIN — VANCOMYCIN HYDROCHLORIDE 1500 MG: 10 INJECTION, POWDER, LYOPHILIZED, FOR SOLUTION INTRAVENOUS at 08:47

## 2018-11-20 RX ADMIN — AZTREONAM 2 G: 2 INJECTION, POWDER, LYOPHILIZED, FOR SOLUTION INTRAMUSCULAR; INTRAVENOUS at 17:16

## 2018-11-20 RX ADMIN — PROPOFOL 80 MG: 10 INJECTION, EMULSION INTRAVENOUS at 16:15

## 2018-11-20 RX ADMIN — METRONIDAZOLE 500 MG: 500 INJECTION, SOLUTION INTRAVENOUS at 06:15

## 2018-11-20 RX ADMIN — SUCRALFATE 1 G: 1 TABLET ORAL at 20:53

## 2018-11-20 RX ADMIN — HYDROCODONE BITARTRATE AND ACETAMINOPHEN 1 TABLET: 5; 325 TABLET ORAL at 06:15

## 2018-11-20 RX ADMIN — MOMETASONE FUROATE AND FORMOTEROL FUMARATE DIHYDRATE 2 PUFF: 200; 5 AEROSOL RESPIRATORY (INHALATION) at 06:21

## 2018-11-20 RX ADMIN — ACETAMINOPHEN 650 MG: 325 TABLET, FILM COATED ORAL at 14:00

## 2018-11-20 RX ADMIN — Medication 10 ML: at 08:51

## 2018-11-20 RX ADMIN — MORPHINE SULFATE 4 MG: 4 INJECTION INTRAVENOUS at 08:51

## 2018-11-20 RX ADMIN — METRONIDAZOLE 500 MG: 500 INJECTION, SOLUTION INTRAVENOUS at 13:32

## 2018-11-20 RX ADMIN — AZTREONAM 2 G: 2 INJECTION, POWDER, LYOPHILIZED, FOR SOLUTION INTRAMUSCULAR; INTRAVENOUS at 09:49

## 2018-11-20 ASSESSMENT — PAIN SCALES - GENERAL
PAINLEVEL_OUTOF10: 0
PAINLEVEL_OUTOF10: 7
PAINLEVEL_OUTOF10: 5
PAINLEVEL_OUTOF10: 0
PAINLEVEL_OUTOF10: 8
PAINLEVEL_OUTOF10: 0
PAINLEVEL_OUTOF10: 7
PAINLEVEL_OUTOF10: 0
PAINLEVEL_OUTOF10: 2
PAINLEVEL_OUTOF10: 3
PAINLEVEL_OUTOF10: 5
PAINLEVEL_OUTOF10: 8
PAINLEVEL_OUTOF10: 3
PAINLEVEL_OUTOF10: 6

## 2018-11-20 ASSESSMENT — PAIN DESCRIPTION - ONSET: ONSET: ON-GOING

## 2018-11-20 ASSESSMENT — PAIN DESCRIPTION - ORIENTATION: ORIENTATION: RIGHT;MID

## 2018-11-20 ASSESSMENT — PAIN DESCRIPTION - FREQUENCY: FREQUENCY: CONTINUOUS

## 2018-11-20 ASSESSMENT — PAIN DESCRIPTION - PAIN TYPE: TYPE: ACUTE PAIN

## 2018-11-20 ASSESSMENT — LIFESTYLE VARIABLES: SMOKING_STATUS: 1

## 2018-11-20 ASSESSMENT — PAIN DESCRIPTION - LOCATION: LOCATION: ABDOMEN

## 2018-11-20 ASSESSMENT — PAIN DESCRIPTION - DESCRIPTORS: DESCRIPTORS: ACHING;BURNING;DISCOMFORT

## 2018-11-20 ASSESSMENT — PAIN DESCRIPTION - PROGRESSION: CLINICAL_PROGRESSION: NOT CHANGED

## 2018-11-20 NOTE — ANESTHESIA PRE PROCEDURE
Department of Anesthesiology  Preprocedure Note       Name:  Castillo Albright   Age:  70 y.o.  :  1955                                          MRN:  [id number]         Date:  2018      Surgeon: Criss Ragsdale):  Karlie Akins MD    Procedure: EGD ESOPHAGOGASTRODUODENOSCOPY (N/A )    Medications prior to admission:   Prior to Admission medications    Medication Sig Start Date End Date Taking? Authorizing Provider   esomeprazole (NEXIUM) 40 MG delayed release capsule Take 40 mg by mouth 2 times daily    Yes Historical Provider, MD   albuterol (PROVENTIL) (5 MG/ML) 0.5% nebulizer solution Take 0.5 mLs by nebulization every 6 hours as needed for Wheezing or Shortness of Breath 18  Yes Neda Mahajan, DO   traZODone (DESYREL) 50 MG tablet Take 50 mg by mouth nightly   Yes Historical Provider, MD   mometasone-formoterol (DULERA) 200-5 MCG/ACT inhaler Inhale 2 puffs into the lungs 2 times daily. 14  Yes Job Davila DO   sucralfate (CARAFATE) 1 GM/10ML suspension Take 1 g by mouth 3 times daily (with meals)    Historical Provider, MD   ondansetron (ZOFRAN-ODT) 4 MG disintegrating tablet Take 1 tablet by mouth every 8 hours as needed for Nausea or Vomiting 16   Isma Rojas MD   aspirin 81 MG EC tablet Take 1 tablet by mouth daily.   Patient taking differently: Take 81 mg by mouth daily hasnt taken in years 14   Everardo Dunne DO       Current medications:    Current Facility-Administered Medications   Medication Dose Route Frequency Provider Last Rate Last Dose    morphine (PF) injection 2 mg  2 mg Intravenous Q2H PRN Vance Holley MD        Or    morphine (PF) injection 4 mg  4 mg Intravenous Q4H PRN Vance Holley MD   4 mg at 18 4131    metronidazole (FLAGYL) 500 mg in NaCl 100 mL IVPB premix  500 mg Intravenous Q8H Bender Samples, DO   Stopped at 18 0951    pantoprazole (PROTONIX) injection 40 mg  40 mg Intravenous BID Bender Samples, DO   40 mg at 18 9716    levofloxacin (LEVAQUIN) tablet 750 mg  750 mg Oral Daily Wandra Dross, DO   750 mg at 11/19/18 1640    sodium chloride (PF) 0.9 % injection 10 mL  10 mL Intravenous 2 times per day qAuilino Thompson, DO   10 mL at 11/20/18 0851    sodium chloride (PF) 0.9 % injection 10 mL  10 mL Intravenous PRN Aquilino Thompson, DO        aztreonam (AZACTAM) 2 g IVPB mini-bag  2 g Intravenous Q8H Margaret Carolina MD   Stopped at 11/20/18 1019    albuterol sulfate  (90 Base) MCG/ACT inhaler 2 puff  2 puff Inhalation Q4H PRN Dandre Beach, DO        aspirin EC tablet 81 mg  81 mg Oral Daily Dandre Beach, DO   81 mg at 11/20/18 0850    mometasone-formoterol (DULERA) 200-5 MCG/ACT inhaler 2 puff  2 puff Inhalation BID Dandre Beach DO   2 puff at 11/20/18 4277    sucralfate (CARAFATE) tablet 1 g  1 g Oral 3 times per day Dandre Beach, DO   Stopped at 11/20/18 0600    traZODone (DESYREL) tablet 50 mg  50 mg Oral Nightly Bro Siu, DO   50 mg at 11/18/18 2053    sodium chloride flush 0.9 % injection 10 mL  10 mL Intravenous 2 times per day Dandre Beach, DO   10 mL at 11/20/18 1848    sodium chloride flush 0.9 % injection 10 mL  10 mL Intravenous PRN Dandre Beach DO        magnesium hydroxide (MILK OF MAGNESIA) 400 MG/5ML suspension 30 mL  30 mL Oral Daily PRN Dandre Beach, DO        enoxaparin (LOVENOX) injection 40 mg  40 mg Subcutaneous Daily Bro Siu, DO   40 mg at 11/20/18 2269    acetaminophen (TYLENOL) tablet 650 mg  650 mg Oral Q4H PRN Dandre Beach, DO   650 mg at 11/20/18 1400    trimethobenzamide (TIGAN) injection 200 mg  200 mg Intramuscular Q6H PRN Dandre Beach, DO        HYDROcodone-acetaminophen [facility]) 5-325 MG per tablet 1 tablet  1 tablet Oral Q4H PRN Dandre Dull, DO   1 tablet at 11/20/18 0615    Or    HYDROcodone-acetaminophen (NORCO) 5-325 MG per tablet 2 tablet  2 tablet Oral Q4H PRSHELBY Beach DO   2 tablet at 11/18/18 1847 Applicable): No results found for: PREGTESTUR, PREGSERUM, HCG, HCGQUANT     ABGs: No results found for: PHART, PO2ART, FNL2MCR, PYS1WTK, BEART, B3WEIBCM     Type & Screen (If Applicable):  No results found for: LABABO, [address]    Anesthesia Evaluation  Patient summary reviewed no history of anesthetic complications:   Airway: Mallampati: III  TM distance: >3 FB   Neck ROM: full  Mouth opening: > = 3 FB Dental:    (+) partials      Pulmonary: breath sounds clear to auscultation  (+) COPD:  asthma: current smoker                           Cardiovascular:    (+) hypertension:,         Rhythm: regular  Rate: abnormal                    Neuro/Psych:   (+) seizures (hasn't had one for 6 years):, depression/anxiety             GI/Hepatic/Renal:   (+) GERD:, PUD,           Endo/Other:    (+) : arthritis: OA., .                 Abdominal:   (+) obese,         Vascular: negative vascular ROS. Anesthesia Plan      MAC     ASA 3       Induction: intravenous. Anesthetic plan and risks discussed with patient. Plan discussed with CRNA.                   Brenna Holliday MD   11/20/2018

## 2018-11-20 NOTE — PROGRESS NOTES
the underlying bacteremia. Final cultures and sensitivities are pending. The patient continues to deal with intractable abdominal pain. EGD is planned for this afternoon. Chronic comorbidities are otherwise well controlled. The patient's  was updated at the bedside.     Tej Barros D.O.  1:02 PM  11/20/2018

## 2018-11-20 NOTE — CONSULTS
E12/L    Hemoglobin 13.6 11.5 - 15.5 g/dL    Hematocrit 42.6 34.0 - 48.0 %    MCV 94.5 80.0 - 99.9 fL    MCH 30.2 26.0 - 35.0 pg    MCHC 31.9 (L) 32.0 - 34.5 %    RDW 14.4 11.5 - 15.0 fL    Platelets 921 530 - 850 E9/L    MPV 10.0 7.0 - 12.0 fL    Neutrophils % 90.3 (H) 43.0 - 80.0 %    Immature Granulocytes % 0.5 0.0 - 5.0 %    Lymphocytes % 4.6 (L) 20.0 - 42.0 %    Monocytes % 4.1 2.0 - 12.0 %    Eosinophils % 0.1 0.0 - 6.0 %    Basophils % 0.4 0.0 - 2.0 %    Neutrophils # 14.48 (H) 1.80 - 7.30 E9/L    Immature Granulocytes # 0.08 E9/L    Lymphocytes # 0.74 (L) 1.50 - 4.00 E9/L    Monocytes # 0.66 0.10 - 0.95 E9/L    Eosinophils # 0.02 (L) 0.05 - 0.50 E9/L    Basophils # 0.07 0.00 - 0.20 E9/L   Lactic Acid, Plasma   Result Value Ref Range    Lactic Acid 2.2 0.5 - 2.2 mmol/L   Urinalysis   Result Value Ref Range    Color, UA Yellow Straw/Yellow    Clarity, UA Clear Clear    Glucose, Ur Negative Negative mg/dL    Bilirubin Urine Negative Negative    Ketones, Urine Negative Negative mg/dL    Specific Gravity, UA 1.025 1.005 - 1.030    Blood, Urine MODERATE (A) Negative    pH, UA 5.5 5.0 - 9.0    Protein, UA Negative Negative mg/dL    Urobilinogen, Urine 0.2 <2.0 E.U./dL    Nitrite, Urine Negative Negative    Leukocyte Esterase, Urine Negative Negative   Comprehensive metabolic panel   Result Value Ref Range    Sodium 139 132 - 146 mmol/L    Potassium 3.6 3.5 - 5.0 mmol/L    Chloride 99 98 - 107 mmol/L    CO2 27 22 - 29 mmol/L    Anion Gap 13 7 - 16 mmol/L    Glucose 142 (H) 74 - 99 mg/dL    BUN 10 8 - 23 mg/dL    CREATININE 0.7 0.5 - 1.0 mg/dL    GFR Non-African American >60 >=60 mL/min/1.73    GFR African American >60     Calcium 8.9 8.6 - 10.2 mg/dL    Total Protein 7.0 6.4 - 8.3 g/dL    Alb 4.2 3.5 - 5.2 g/dL    Total Bilirubin 0.6 0.0 - 1.2 mg/dL    Alkaline Phosphatase 78 35 - 104 U/L    ALT 20 0 - 32 U/L    AST 19 0 - 31 U/L   Lipase   Result Value Ref Range    Lipase 17 13 - 60 U/L   Microscopic Urinalysis Result Value Ref Range    Mucus, UA Present     WBC, UA 0-1 0 - 5 /HPF    RBC, UA 2-5 0 - 2 /HPF    Epi Cells FEW /HPF    Bacteria, UA FEW (A) /HPF   Procalcitonin   Result Value Ref Range    Procalcitonin 0.42 (H) 0.00 - 0.08 ng/mL   Hemoglobin A1C   Result Value Ref Range    Hemoglobin A1C 5.6 4.0 - 5.6 %   Lipid Panel   Result Value Ref Range    Cholesterol, Total 105 0 - 199 mg/dL    Triglycerides 96 0 - 149 mg/dL    HDL 45 >40 mg/dL    LDL Calculated 41 0 - 99 mg/dL    VLDL Cholesterol Calculated 19 mg/dL   Magnesium   Result Value Ref Range    Magnesium 1.8 1.6 - 2.6 mg/dL   Phosphorus   Result Value Ref Range    Phosphorus 2.6 2.5 - 4.5 mg/dL   TSH without Reflex   Result Value Ref Range    TSH 1.320 0.270 - 4.200 uIU/mL   Comprehensive Metabolic Panel   Result Value Ref Range    Sodium 137 132 - 146 mmol/L    Potassium 4.3 3.5 - 5.0 mmol/L    Chloride 102 98 - 107 mmol/L    CO2 23 22 - 29 mmol/L    Anion Gap 12 7 - 16 mmol/L    Glucose 137 (H) 74 - 99 mg/dL    BUN 9 8 - 23 mg/dL    CREATININE 0.6 0.5 - 1.0 mg/dL    GFR Non-African American >60 >=60 mL/min/1.73    GFR African American >60     Calcium 7.7 (L) 8.6 - 10.2 mg/dL    Total Protein 6.5 6.4 - 8.3 g/dL    Alb 3.3 (L) 3.5 - 5.2 g/dL    Total Bilirubin 0.5 0.0 - 1.2 mg/dL    Alkaline Phosphatase 89 35 - 104 U/L    ALT 32 0 - 32 U/L    AST 33 (H) 0 - 31 U/L   CBC Auto Differential   Result Value Ref Range    WBC 15.1 (H) 4.5 - 11.5 E9/L    RBC 3.65 3.50 - 5.50 E12/L    Hemoglobin 11.0 (L) 11.5 - 15.5 g/dL    Hematocrit 35.1 34.0 - 48.0 %    MCV 96.2 80.0 - 99.9 fL    MCH 30.1 26.0 - 35.0 pg    MCHC 31.3 (L) 32.0 - 34.5 %    RDW 14.6 11.5 - 15.0 fL    Platelets 322 738 - 527 E9/L    MPV 10.2 7.0 - 12.0 fL    Neutrophils % 83.6 (H) 43.0 - 80.0 %    Immature Granulocytes % 0.8 0.0 - 5.0 %    Lymphocytes % 7.1 (L) 20.0 - 42.0 %    Monocytes % 8.1 2.0 - 12.0 %    Eosinophils % 0.1 0.0 - 6.0 %    Basophils % 0.3 0.0 - 2.0 %    Neutrophils # 12.62 (H) 1.80 -

## 2018-11-20 NOTE — CARE COORDINATION
[address] Transitions Interview     2018    Patient: Wicho Martinez Patient : 1955   MRN: [id number]  Reason for Admission: There are no discharge diagnoses documented for the most recent discharge. RARS: Readmission Risk Score: 12       Spoke with: Wicho Martinez (patient)      Readmission Risk  Patient Active Problem List   Diagnosis    Vitamin D deficiency    Chest pain    Alcohol abuse, in remission    HTN (hypertension), benign    GERD (gastroesophageal reflux disease)    Tobacco abuse    History of bariatric surgery    Alcohol intoxication ([facility] [address]    Acute respiratory distress    COPD exacerbation (HCC)    Rotator cuff tear    Impingement syndrome of shoulder    Shoulder pain    Labral tear of shoulder    Biceps tendon tear    De Quervain's tenosynovitis    Distal radius fracture    Wrist pain    Closed fracture of multiple ribs of right side    Nasal bones, closed fracture, closed, initial encounter    Closed fracture of nasal bone    Fracture of left olecranon process    Left carpal tunnel syndrome    Intractable abdominal pain       Inpatient Assessment  Care Transitions Summary    Care Transitions Inpatient Review  Medication Review  Are you able to afford your medications?:  No  How often do you have difficulty taking your medications?:  Sometimes I take them as prescribed. Housing Review  Who do you live with?:  Partner/Spouse/SO  Are you an active caregiver in your home?:  No  Social Support  Do you have a ?:  No  Do you have a [address] [place] [address]?:  No  Durable Medical Equipment  Patient DME:  Other  Other Patient DME:  grab bars in shower  Functional Review  Ability to seek help/take action for Emergent/Urgent situations i.e. fire, crime, inclement weather or health crisis. :  Independent  Ability handle personal hygiene needs (bathing/dressing/grooming): Independent  Ability to manage medications:   Independent  Ability to

## 2018-11-21 ENCOUNTER — ANESTHESIA EVENT (OUTPATIENT)
Dept: OPERATING ROOM | Age: 63
DRG: 872 | End: 2018-11-21
Payer: MEDICARE

## 2018-11-21 ENCOUNTER — ANESTHESIA (OUTPATIENT)
Dept: OPERATING ROOM | Age: 63
DRG: 872 | End: 2018-11-21
Payer: MEDICARE

## 2018-11-21 VITALS
OXYGEN SATURATION: 99 % | TEMPERATURE: 99.5 F | DIASTOLIC BLOOD PRESSURE: 102 MMHG | RESPIRATION RATE: 28 BRPM | SYSTOLIC BLOOD PRESSURE: 157 MMHG

## 2018-11-21 LAB
ALBUMIN SERPL-MCNC: 3.1 G/DL (ref 3.5–5.2)
ALP BLD-CCNC: 72 U/L (ref 35–104)
ALT SERPL-CCNC: 16 U/L (ref 0–32)
ANION GAP SERPL CALCULATED.3IONS-SCNC: 9 MMOL/L (ref 7–16)
AST SERPL-CCNC: 13 U/L (ref 0–31)
BASOPHILS ABSOLUTE: 0.03 E9/L (ref 0–0.2)
BASOPHILS RELATIVE PERCENT: 0.4 % (ref 0–2)
BILIRUB SERPL-MCNC: 0.4 MG/DL (ref 0–1.2)
BUN BLDV-MCNC: 7 MG/DL (ref 8–23)
CALCIUM SERPL-MCNC: 8.1 MG/DL (ref 8.6–10.2)
CHLORIDE BLD-SCNC: 101 MMOL/L (ref 98–107)
CO2: 28 MMOL/L (ref 22–29)
CREAT SERPL-MCNC: 0.6 MG/DL (ref 0.5–1)
EOSINOPHILS ABSOLUTE: 0.16 E9/L (ref 0.05–0.5)
EOSINOPHILS RELATIVE PERCENT: 2.4 % (ref 0–6)
GFR AFRICAN AMERICAN: >60
GFR NON-AFRICAN AMERICAN: >60 ML/MIN/1.73
GLUCOSE BLD-MCNC: 100 MG/DL (ref 74–99)
HCT VFR BLD CALC: 35.4 % (ref 34–48)
HEMOGLOBIN: 11 G/DL (ref 11.5–15.5)
IMMATURE GRANULOCYTES #: 0.04 E9/L
IMMATURE GRANULOCYTES %: 0.6 % (ref 0–5)
LYMPHOCYTES ABSOLUTE: 0.61 E9/L (ref 1.5–4)
LYMPHOCYTES RELATIVE PERCENT: 9 % (ref 20–42)
MCH RBC QN AUTO: 30.4 PG (ref 26–35)
MCHC RBC AUTO-ENTMCNC: 31.1 % (ref 32–34.5)
MCV RBC AUTO: 97.8 FL (ref 80–99.9)
MONOCYTES ABSOLUTE: 0.61 E9/L (ref 0.1–0.95)
MONOCYTES RELATIVE PERCENT: 9 % (ref 2–12)
NEUTROPHILS ABSOLUTE: 5.32 E9/L (ref 1.8–7.3)
NEUTROPHILS RELATIVE PERCENT: 78.6 % (ref 43–80)
PDW BLD-RTO: 14.4 FL (ref 11.5–15)
PLATELET # BLD: 226 E9/L (ref 130–450)
PMV BLD AUTO: 10.3 FL (ref 7–12)
POTASSIUM SERPL-SCNC: 3.4 MMOL/L (ref 3.5–5)
RBC # BLD: 3.62 E12/L (ref 3.5–5.5)
SODIUM BLD-SCNC: 138 MMOL/L (ref 132–146)
TOTAL PROTEIN: 6.1 G/DL (ref 6.4–8.3)
VANCOMYCIN TROUGH: 4.7 MCG/ML (ref 5–16)
WBC # BLD: 6.8 E9/L (ref 4.5–11.5)

## 2018-11-21 PROCEDURE — 6370000000 HC RX 637 (ALT 250 FOR IP): Performed by: INTERNAL MEDICINE

## 2018-11-21 PROCEDURE — 3700000000 HC ANESTHESIA ATTENDED CARE: Performed by: SURGERY

## 2018-11-21 PROCEDURE — 2500000003 HC RX 250 WO HCPCS: Performed by: SPECIALIST

## 2018-11-21 PROCEDURE — 3700000001 HC ADD 15 MINUTES (ANESTHESIA): Performed by: SURGERY

## 2018-11-21 PROCEDURE — 7100000000 HC PACU RECOVERY - FIRST 15 MIN: Performed by: SURGERY

## 2018-11-21 PROCEDURE — 80202 ASSAY OF VANCOMYCIN: CPT

## 2018-11-21 PROCEDURE — 2500000003 HC RX 250 WO HCPCS: Performed by: INTERNAL MEDICINE

## 2018-11-21 PROCEDURE — 2580000003 HC RX 258: Performed by: SPECIALIST

## 2018-11-21 PROCEDURE — 1200000000 HC SEMI PRIVATE

## 2018-11-21 PROCEDURE — 85025 COMPLETE CBC W/AUTO DIFF WBC: CPT

## 2018-11-21 PROCEDURE — 2580000003 HC RX 258: Performed by: NURSE ANESTHETIST, CERTIFIED REGISTERED

## 2018-11-21 PROCEDURE — 6360000002 HC RX W HCPCS: Performed by: NURSE ANESTHETIST, CERTIFIED REGISTERED

## 2018-11-21 PROCEDURE — 3600000002 HC SURGERY LEVEL 2 BASE: Performed by: SURGERY

## 2018-11-21 PROCEDURE — 6360000002 HC RX W HCPCS: Performed by: SURGERY

## 2018-11-21 PROCEDURE — 99223 1ST HOSP IP/OBS HIGH 75: CPT | Performed by: SURGERY

## 2018-11-21 PROCEDURE — 3E1M38Z IRRIGATION OF PERITONEAL CAVITY USING IRRIGATING SUBSTANCE, PERCUTANEOUS APPROACH: ICD-10-PCS | Performed by: SURGERY

## 2018-11-21 PROCEDURE — 2709999900 HC NON-CHARGEABLE SUPPLY: Performed by: SURGERY

## 2018-11-21 PROCEDURE — 2580000003 HC RX 258: Performed by: INTERNAL MEDICINE

## 2018-11-21 PROCEDURE — 3600000012 HC SURGERY LEVEL 2 ADDTL 15MIN: Performed by: SURGERY

## 2018-11-21 PROCEDURE — 2500000003 HC RX 250 WO HCPCS: Performed by: SURGERY

## 2018-11-21 PROCEDURE — 88302 TISSUE EXAM BY PATHOLOGIST: CPT

## 2018-11-21 PROCEDURE — 99232 SBSQ HOSP IP/OBS MODERATE 35: CPT | Performed by: SURGERY

## 2018-11-21 PROCEDURE — 6360000002 HC RX W HCPCS: Performed by: INTERNAL MEDICINE

## 2018-11-21 PROCEDURE — 2500000003 HC RX 250 WO HCPCS: Performed by: NURSE ANESTHETIST, CERTIFIED REGISTERED

## 2018-11-21 PROCEDURE — 7100000001 HC PACU RECOVERY - ADDTL 15 MIN: Performed by: SURGERY

## 2018-11-21 PROCEDURE — 94640 AIRWAY INHALATION TREATMENT: CPT

## 2018-11-21 PROCEDURE — 44180 LAP ENTEROLYSIS: CPT | Performed by: SURGERY

## 2018-11-21 PROCEDURE — 80053 COMPREHEN METABOLIC PANEL: CPT

## 2018-11-21 PROCEDURE — 36415 COLL VENOUS BLD VENIPUNCTURE: CPT

## 2018-11-21 RX ORDER — DEXAMETHASONE SODIUM PHOSPHATE 10 MG/ML
INJECTION INTRAMUSCULAR; INTRAVENOUS PRN
Status: DISCONTINUED | OUTPATIENT
Start: 2018-11-21 | End: 2018-11-21 | Stop reason: SDUPTHER

## 2018-11-21 RX ORDER — ROCURONIUM BROMIDE 10 MG/ML
INJECTION, SOLUTION INTRAVENOUS PRN
Status: DISCONTINUED | OUTPATIENT
Start: 2018-11-21 | End: 2018-11-21 | Stop reason: SDUPTHER

## 2018-11-21 RX ORDER — PROPOFOL 10 MG/ML
INJECTION, EMULSION INTRAVENOUS PRN
Status: DISCONTINUED | OUTPATIENT
Start: 2018-11-21 | End: 2018-11-21 | Stop reason: SDUPTHER

## 2018-11-21 RX ORDER — POTASSIUM CHLORIDE 1.5 G/1.77G
40 POWDER, FOR SOLUTION ORAL ONCE
Status: COMPLETED | OUTPATIENT
Start: 2018-11-21 | End: 2018-11-21

## 2018-11-21 RX ORDER — KETOROLAC TROMETHAMINE 30 MG/ML
INJECTION, SOLUTION INTRAMUSCULAR; INTRAVENOUS PRN
Status: DISCONTINUED | OUTPATIENT
Start: 2018-11-21 | End: 2018-11-21 | Stop reason: SDUPTHER

## 2018-11-21 RX ORDER — BUPIVACAINE HYDROCHLORIDE AND EPINEPHRINE 2.5; 5 MG/ML; UG/ML
INJECTION, SOLUTION EPIDURAL; INFILTRATION; INTRACAUDAL; PERINEURAL PRN
Status: DISCONTINUED | OUTPATIENT
Start: 2018-11-21 | End: 2018-11-21 | Stop reason: HOSPADM

## 2018-11-21 RX ORDER — MIDAZOLAM HYDROCHLORIDE 1 MG/ML
INJECTION INTRAMUSCULAR; INTRAVENOUS PRN
Status: DISCONTINUED | OUTPATIENT
Start: 2018-11-21 | End: 2018-11-21 | Stop reason: SDUPTHER

## 2018-11-21 RX ORDER — SODIUM CHLORIDE, SODIUM LACTATE, POTASSIUM CHLORIDE, CALCIUM CHLORIDE 600; 310; 30; 20 MG/100ML; MG/100ML; MG/100ML; MG/100ML
INJECTION, SOLUTION INTRAVENOUS CONTINUOUS PRN
Status: DISCONTINUED | OUTPATIENT
Start: 2018-11-21 | End: 2018-11-21 | Stop reason: SDUPTHER

## 2018-11-21 RX ORDER — PANTOPRAZOLE SODIUM 40 MG/1
40 TABLET, DELAYED RELEASE ORAL
Status: DISCONTINUED | OUTPATIENT
Start: 2018-11-21 | End: 2018-11-23 | Stop reason: HOSPADM

## 2018-11-21 RX ORDER — GLYCOPYRROLATE 1 MG/5 ML
SYRINGE (ML) INTRAVENOUS PRN
Status: DISCONTINUED | OUTPATIENT
Start: 2018-11-21 | End: 2018-11-21 | Stop reason: SDUPTHER

## 2018-11-21 RX ORDER — ONDANSETRON 2 MG/ML
INJECTION INTRAMUSCULAR; INTRAVENOUS PRN
Status: DISCONTINUED | OUTPATIENT
Start: 2018-11-21 | End: 2018-11-21 | Stop reason: SDUPTHER

## 2018-11-21 RX ORDER — FENTANYL CITRATE 50 UG/ML
INJECTION, SOLUTION INTRAMUSCULAR; INTRAVENOUS PRN
Status: DISCONTINUED | OUTPATIENT
Start: 2018-11-21 | End: 2018-11-21 | Stop reason: SDUPTHER

## 2018-11-21 RX ORDER — NEOSTIGMINE METHYLSULFATE 1 MG/ML
INJECTION, SOLUTION INTRAVENOUS PRN
Status: DISCONTINUED | OUTPATIENT
Start: 2018-11-21 | End: 2018-11-21 | Stop reason: SDUPTHER

## 2018-11-21 RX ORDER — LIDOCAINE HYDROCHLORIDE 20 MG/ML
INJECTION, SOLUTION INFILTRATION; PERINEURAL PRN
Status: DISCONTINUED | OUTPATIENT
Start: 2018-11-21 | End: 2018-11-21 | Stop reason: SDUPTHER

## 2018-11-21 RX ADMIN — MIDAZOLAM 2 MG: 1 INJECTION INTRAMUSCULAR; INTRAVENOUS at 11:02

## 2018-11-21 RX ADMIN — PROPOFOL 180 MG: 10 INJECTION, EMULSION INTRAVENOUS at 11:02

## 2018-11-21 RX ADMIN — ENOXAPARIN SODIUM 40 MG: 40 INJECTION SUBCUTANEOUS at 07:59

## 2018-11-21 RX ADMIN — AZTREONAM 2 G: 2 INJECTION, POWDER, LYOPHILIZED, FOR SOLUTION INTRAMUSCULAR; INTRAVENOUS at 09:30

## 2018-11-21 RX ADMIN — FENTANYL CITRATE 50 MCG: 50 INJECTION, SOLUTION INTRAMUSCULAR; INTRAVENOUS at 11:58

## 2018-11-21 RX ADMIN — Medication 3 MG: at 11:47

## 2018-11-21 RX ADMIN — PANTOPRAZOLE SODIUM 40 MG: 40 TABLET, DELAYED RELEASE ORAL at 15:59

## 2018-11-21 RX ADMIN — LIDOCAINE HYDROCHLORIDE 60 MG: 20 INJECTION, SOLUTION INFILTRATION; PERINEURAL at 11:02

## 2018-11-21 RX ADMIN — POTASSIUM CHLORIDE 40 MEQ: 1.5 POWDER, FOR SOLUTION ORAL at 15:59

## 2018-11-21 RX ADMIN — MORPHINE SULFATE 4 MG: 4 INJECTION INTRAVENOUS at 09:39

## 2018-11-21 RX ADMIN — SUCRALFATE 1 G: 1 TABLET ORAL at 05:50

## 2018-11-21 RX ADMIN — KETOROLAC TROMETHAMINE 30 MG: 30 INJECTION, SOLUTION INTRAMUSCULAR at 11:44

## 2018-11-21 RX ADMIN — LEVOFLOXACIN 750 MG: 750 TABLET, FILM COATED ORAL at 15:59

## 2018-11-21 RX ADMIN — HYDROCODONE BITARTRATE AND ACETAMINOPHEN 2 TABLET: 5; 325 TABLET ORAL at 20:36

## 2018-11-21 RX ADMIN — MOMETASONE FUROATE AND FORMOTEROL FUMARATE DIHYDRATE 2 PUFF: 200; 5 AEROSOL RESPIRATORY (INHALATION) at 19:12

## 2018-11-21 RX ADMIN — SUCRALFATE 1 G: 1 TABLET ORAL at 20:37

## 2018-11-21 RX ADMIN — Medication 0.4 MG: at 11:47

## 2018-11-21 RX ADMIN — Medication 40 MG: at 11:04

## 2018-11-21 RX ADMIN — HYDROCODONE BITARTRATE AND ACETAMINOPHEN 2 TABLET: 5; 325 TABLET ORAL at 16:07

## 2018-11-21 RX ADMIN — METRONIDAZOLE 500 MG: 500 INJECTION, SOLUTION INTRAVENOUS at 14:30

## 2018-11-21 RX ADMIN — TRIMETHOBENZAMIDE HYDROCHLORIDE 200 MG: 100 INJECTION INTRAMUSCULAR at 12:09

## 2018-11-21 RX ADMIN — MORPHINE SULFATE 4 MG: 4 INJECTION INTRAVENOUS at 13:43

## 2018-11-21 RX ADMIN — AZTREONAM 2 G: 2 INJECTION, POWDER, LYOPHILIZED, FOR SOLUTION INTRAMUSCULAR; INTRAVENOUS at 18:23

## 2018-11-21 RX ADMIN — SODIUM CHLORIDE, POTASSIUM CHLORIDE, SODIUM LACTATE AND CALCIUM CHLORIDE: 600; 310; 30; 20 INJECTION, SOLUTION INTRAVENOUS at 10:57

## 2018-11-21 RX ADMIN — MOMETASONE FUROATE AND FORMOTEROL FUMARATE DIHYDRATE 2 PUFF: 200; 5 AEROSOL RESPIRATORY (INHALATION) at 06:53

## 2018-11-21 RX ADMIN — HYDROCODONE BITARTRATE AND ACETAMINOPHEN 2 TABLET: 5; 325 TABLET ORAL at 08:00

## 2018-11-21 RX ADMIN — POLYETHYLENE GLYCOL (3350) 17 G: 17 POWDER, FOR SOLUTION ORAL at 07:58

## 2018-11-21 RX ADMIN — DEXAMETHASONE SODIUM PHOSPHATE 8 MG: 10 INJECTION INTRAMUSCULAR; INTRAVENOUS at 11:41

## 2018-11-21 RX ADMIN — ASPIRIN 81 MG: 81 TABLET, COATED ORAL at 07:59

## 2018-11-21 RX ADMIN — FENTANYL CITRATE 100 MCG: 50 INJECTION, SOLUTION INTRAMUSCULAR; INTRAVENOUS at 11:02

## 2018-11-21 RX ADMIN — VANCOMYCIN HYDROCHLORIDE 1750 MG: 10 INJECTION, POWDER, LYOPHILIZED, FOR SOLUTION INTRAVENOUS at 15:58

## 2018-11-21 RX ADMIN — HYDROCODONE BITARTRATE AND ACETAMINOPHEN 1 TABLET: 5; 325 TABLET ORAL at 02:34

## 2018-11-21 RX ADMIN — ONDANSETRON 4 MG: 2 INJECTION INTRAMUSCULAR; INTRAVENOUS at 11:42

## 2018-11-21 RX ADMIN — SUCRALFATE 1 G: 1 TABLET ORAL at 15:59

## 2018-11-21 ASSESSMENT — PULMONARY FUNCTION TESTS
PIF_VALUE: 28
PIF_VALUE: 29
PIF_VALUE: 26
PIF_VALUE: 29
PIF_VALUE: 28
PIF_VALUE: 1
PIF_VALUE: 28
PIF_VALUE: 24
PIF_VALUE: 29
PIF_VALUE: 4
PIF_VALUE: 28
PIF_VALUE: 31
PIF_VALUE: 28
PIF_VALUE: 27
PIF_VALUE: 18
PIF_VALUE: 26
PIF_VALUE: 1
PIF_VALUE: 28
PIF_VALUE: 30
PIF_VALUE: 28
PIF_VALUE: 25
PIF_VALUE: 28
PIF_VALUE: 25
PIF_VALUE: 30
PIF_VALUE: 25
PIF_VALUE: 29
PIF_VALUE: 28
PIF_VALUE: 1
PIF_VALUE: 1
PIF_VALUE: 32
PIF_VALUE: 26
PIF_VALUE: 30
PIF_VALUE: 27
PIF_VALUE: 26
PIF_VALUE: 42
PIF_VALUE: 25
PIF_VALUE: 28
PIF_VALUE: 24
PIF_VALUE: 1
PIF_VALUE: 28

## 2018-11-21 ASSESSMENT — PAIN DESCRIPTION - ONSET
ONSET: ON-GOING
ONSET: ON-GOING

## 2018-11-21 ASSESSMENT — PAIN SCALES - GENERAL
PAINLEVEL_OUTOF10: 5
PAINLEVEL_OUTOF10: 8
PAINLEVEL_OUTOF10: 4
PAINLEVEL_OUTOF10: 0
PAINLEVEL_OUTOF10: 6
PAINLEVEL_OUTOF10: 4
PAINLEVEL_OUTOF10: 9
PAINLEVEL_OUTOF10: 7
PAINLEVEL_OUTOF10: 0
PAINLEVEL_OUTOF10: 6
PAINLEVEL_OUTOF10: 7

## 2018-11-21 ASSESSMENT — PAIN DESCRIPTION - PAIN TYPE
TYPE: ACUTE PAIN
TYPE: ACUTE PAIN
TYPE: SURGICAL PAIN

## 2018-11-21 ASSESSMENT — LIFESTYLE VARIABLES: SMOKING_STATUS: 1

## 2018-11-21 ASSESSMENT — PAIN DESCRIPTION - LOCATION
LOCATION: ABDOMEN

## 2018-11-21 ASSESSMENT — PAIN DESCRIPTION - ORIENTATION
ORIENTATION: RIGHT;MID
ORIENTATION: RIGHT;MID

## 2018-11-21 ASSESSMENT — PAIN DESCRIPTION - DESCRIPTORS
DESCRIPTORS: ACHING;BURNING;DISCOMFORT
DESCRIPTORS: ACHING;DISCOMFORT

## 2018-11-21 ASSESSMENT — PAIN DESCRIPTION - PROGRESSION: CLINICAL_PROGRESSION: NOT CHANGED

## 2018-11-21 ASSESSMENT — PAIN DESCRIPTION - FREQUENCY
FREQUENCY: CONTINUOUS
FREQUENCY: CONTINUOUS

## 2018-11-21 NOTE — ANESTHESIA PRE PROCEDURE
Department of Anesthesiology  Preprocedure Note       Name:  Royer Costa   Age:  70 y.o.  :  1955                                          MRN:  [id number]         Date:  2018      Surgeon: Emery Lainez):  Tucker Doshi MD    Procedure: DIAGNOSTIC LAPAROSCOPY POSS CLOSURE INTERNAL HERNIA (N/A )    Medications prior to admission:   Prior to Admission medications    Medication Sig Start Date End Date Taking? Authorizing Provider   esomeprazole (NEXIUM) 40 MG delayed release capsule Take 40 mg by mouth 2 times daily    Yes Historical Provider, MD   albuterol (PROVENTIL) (5 MG/ML) 0.5% nebulizer solution Take 0.5 mLs by nebulization every 6 hours as needed for Wheezing or Shortness of Breath 18  Yes Lev Ford DO   traZODone (DESYREL) 50 MG tablet Take 50 mg by mouth nightly   Yes Historical Provider, MD   mometasone-formoterol (DULERA) 200-5 MCG/ACT inhaler Inhale 2 puffs into the lungs 2 times daily. 14  Yes Job Davila DO   sucralfate (CARAFATE) 1 GM/10ML suspension Take 1 g by mouth 3 times daily (with meals)    Historical Provider, MD   ondansetron (ZOFRAN-ODT) 4 MG disintegrating tablet Take 1 tablet by mouth every 8 hours as needed for Nausea or Vomiting 16   Cony Triana MD   aspirin 81 MG EC tablet Take 1 tablet by mouth daily.   Patient taking differently: Take 81 mg by mouth daily hasnt taken in years 14   Ramirez Mcfadden DO       Current medications:    Current Facility-Administered Medications   Medication Dose Route Frequency Provider Last Rate Last Dose    polyethylene glycol (GLYCOLAX) packet 17 g  17 g Oral Daily Cathren Ache, DO   17 g at 18 0758    morphine (PF) injection 2 mg  2 mg Intravenous Q2H PRN Karl Vu MD        Or    morphine (PF) injection 4 mg  4 mg Intravenous Q4H PRN Karl Vu MD   4 mg at 18 0939    metronidazole (FLAGYL) 500 mg in NaCl 100 mL IVPB premix  500 mg Intravenous Q8H Cathren Ache, DO   Stopped at GLUCOSE 100 11/21/2018    GLUCOSE 92 03/06/2012    PROT 6.1 11/21/2018    CALCIUM 8.1 11/21/2018    BILITOT 0.4 11/21/2018    ALKPHOS 72 11/21/2018    AST 13 11/21/2018    ALT 16 11/21/2018       POC Tests: No results for input(s): POCGLU, POCNA, POCK, POCCL, POCBUN, POCHEMO, POCHCT in the last 72 hours. Coags:   Lab Results   Component Value Date    PROTIME 10.6 12/31/2015    INR 1.0 12/31/2015    APTT 26.3 12/31/2015       HCG (If Applicable): No results found for: PREGTESTUR, PREGSERUM, HCG, HCGQUANT     ABGs: No results found for: PHART, PO2ART, BEK0GJI, BZC6WCK, BEART, N1VIOWEQ     Type & Screen (If Applicable):  No results found for: LABABO, [address]    Anesthesia Evaluation  Patient summary reviewed no history of anesthetic complications:   Airway: Mallampati: III  TM distance: >3 FB   Neck ROM: full  Mouth opening: > = 3 FB Dental:    (+) partials and upper dentures      Pulmonary: breath sounds clear to auscultation  (+) COPD:  asthma: current smoker                           Cardiovascular:    (+) hypertension:,         Rhythm: regular  Rate: abnormal                    Neuro/Psych:   (+) seizures (hasn't had one for 6 years):, psychiatric history: stable with treatmentdepression/anxiety             GI/Hepatic/Renal:   (+) GERD:, PUD, morbid obesity          Endo/Other:    (+) : arthritis: OA., .                 Abdominal:   (+) obese,         Vascular: negative vascular ROS. Anesthesia Plan      general     ASA 3       Induction: intravenous. Anesthetic plan and risks discussed with patient. Plan discussed with CRNA. DOS STAFF ADDENDUM:    Pt seen and examined, chart reviewed (including anesthesia, drug and allergy history). Anesthetic plan, risks, benefits, alternatives, and personnel involved discussed with patient. Patient verbalized an understanding and agrees to proceed. Plan discussed with care team members and agreed upon.     Magen Clemens

## 2018-11-21 NOTE — PROGRESS NOTES
Exam:  General: AAO to person, place, time, and purpose, NAD, no labored breathing  Eyes: Conjunctivae/corneas clear, Sclera non icteric. Skin: Color, texture, and turgor normal. No rashes or lesions. Lungs: Clear to auscultation bilaterally. No retractions or use of accessory muscles. Heart: Regular rate and regular rhythm, no murmur  Abdomen: Soft, non-tender; bowel sounds normal; no masses,  no organomegaly  Extremities: Atraumatic, no cyanosis, no edema  Neurologic: Cranial nerves 2-12 grossly intact, no slurred speech. Most Recent Labs  Recent Labs      11/19/18   0525 11/20/18   0613  11/21/18   0554   WBC  15.1*  9.4  6.8   HGB  11.0*  10.9*  11.0*   HCT  35.1  35.4  35.4   MCV  96.2  98.3  97.8   PLT  216  218  226     Recent Labs      11/19/18 0525  11/20/18   0613  11/21/18   0554   NA  137  137  138   K  4.3  3.5  3.4*   CL  102  100  101   CO2  23  27  28   BUN  9  8  7*   CREATININE  0.6  0.6  0.6   GFRAA  >60  >60  >60   LABGLOM  >60  >60  >60   GLUCOSE  137*  114*  100*   PROT  6.5  6.3*  6.1*   LABALBU  3.3*  3.5  3.1*   CALCIUM  7.7*  7.9*  8.1*   BILITOT  0.5  0.3  0.4   ALKPHOS  89  79  72   AST  33*  13  13   ALT  32  19  16     ESRNo results for input(s): SEDRATE in the last 72 hours.     Lab Results   Component Value Date    WBC 6.8 11/21/2018    HGB 11.0 (L) 11/21/2018    HCT 35.4 11/21/2018     11/21/2018     11/21/2018    K 3.4 (L) 11/21/2018     11/21/2018    CREATININE 0.6 11/21/2018    BUN 7 (L) 11/21/2018    CO2 28 11/21/2018    GLUCOSE 100 (H) 11/21/2018    ALT 16 11/21/2018    AST 13 11/21/2018    INR 1.0 12/31/2015    TSH 1.320 11/19/2018    LABA1C 5.6 11/19/2018    LABMICR 75.4 (H) 09/03/2014       Ct Abdomen Pelvis Wo Contrast Additional Contrast? Oral    Result Date: 11/18/2018  LOCATION:200 EXAM: CT ABDOMEN PELVIS WO CONTRAST COMPARISON: None HISTORY: Severe right-sided abdominal pain TECHNIQUE:Noncontrast helical abdomen and pelvis CT was

## 2018-11-21 NOTE — PROGRESS NOTES
Patient's IV went bad and infiltrated. Patient refused to have a new IV placed. Resident on call notified.

## 2018-11-21 NOTE — CONSULTS
Carlo Coon  2018      Surgical Consult    CHIEF COMPLAINT:  Right flank pain. HISTORY OF PRESENT ILLNESS:  Carlo Coon is a 70 y.o.  female who had an open gastric bypass in , open cholecystectomy and appendectomy in [place] [address], has been having right flank and chest pain for 5 days. WBC was elevated with E.coli on blood cultures. Treated with IV antibiotics. EGD normal. CT scan read as normal although there is oral contrast in the bypassed stomach indicating a small gastro-gastric fistula is present. WBC down to normal. Still having the same pain. She denies trauma, no dysuria, no constipation or diarrhea. No vomiting. Never had diverticulitis attacks before. Weight: 210 lb (95.3 kg). Past Medical History: She has a past medical history of Anemia; Anxiety; Asthma; Collapsed lung; COPD (chronic obstructive pulmonary disease) ([address] DDD (degenerative disc disease), lumbar; Degenerative disc disease at L5-S1 level; Depression; GERD (gastroesophageal reflux disease); History of blood transfusion; Hypertension; Osteoarthritis; Peptic ulcer, unspecified site, unspecified as acute or chronic, without mention of hemorrhage or perforation; RLS (restless legs syndrome); Seizure ([address] and Spinal stenosis. Past Surgical History: She has a past surgical history that includes Tonsillectomy (); Carpal tunnel release (); shoulder surgery (); Knee arthroscopy (); Dilation and curettage of uterus; Macario-en-Y Gastric Bypass (2005);  section (3/9/1984); Cholecystectomy (); Colonoscopy (3/2011); Upper gastrointestinal endoscopy (10/2004); Upper gastrointestinal endoscopy (12); Appendectomy; Endoscopy, colon, diagnostic; Abdomen surgery; joint replacement (2003); joint replacement (2003); Cardiac catheterization (); Shoulder arthroscopy (Right, 1 2 15); Wrist arthroscopy (Right, 2015); Cholecystectomy; joint replacement; Dilatation, esophagus;  Wrist

## 2018-11-21 NOTE — PLAN OF CARE
Problem: Pain:  Goal: Pain level will decrease  Pain level will decrease   Outcome: Met This Shift    Goal: Control of acute pain  Control of acute pain   Outcome: Met This Shift    Goal: Control of chronic pain  Control of chronic pain   Outcome: Met This Shift      Problem: Respiratory:  Goal: Ability to maintain normal respiratory secretions will improve  Ability to maintain normal respiratory secretions will improve   Outcome: Met This Shift

## 2018-11-21 NOTE — PROGRESS NOTES
LYMPHOPCT 9.0 11/21/2018    MONOPCT 9.0 11/21/2018    BASOPCT 0.4 11/21/2018    MONOSABS 0.61 11/21/2018    LYMPHSABS 0.61 11/21/2018    EOSABS 0.16 11/21/2018    BASOSABS 0.03 11/21/2018     BMP:    Lab Results   Component Value Date     11/21/2018    K 3.4 11/21/2018     11/21/2018    CO2 28 11/21/2018    BUN 7 11/21/2018    LABALBU 3.1 11/21/2018    LABALBU 4.6 03/06/2012    CREATININE 0.6 11/21/2018    CALCIUM 8.1 11/21/2018    GFRAA >60 11/21/2018    LABGLOM >60 11/21/2018    GLUCOSE 100 11/21/2018    GLUCOSE 92 03/06/2012       Other Data:      Intake/Output Summary (Last 24 hours) at 11/21/18 1116  Last data filed at 11/20/18 2228   Gross per 24 hour   Intake              440 ml   Output                0 ml   Net              440 ml         Scheduled Medications:   pantoprazole  40 mg Oral BID AC    potassium chloride  40 mEq Oral Once    polyethylene glycol  17 g Oral Daily    metroNIDAZOLE  500 mg Intravenous Q8H    levofloxacin  750 mg Oral Daily    sodium chloride (PF)  10 mL Intravenous 2 times per day    aztreonam  2 g Intravenous Q8H    aspirin  81 mg Oral Daily    mometasone-formoterol  2 puff Inhalation BID    sucralfate  1 g Oral 3 times per day    traZODone  50 mg Oral Nightly    sodium chloride flush  10 mL Intravenous 2 times per day    enoxaparin  40 mg Subcutaneous Daily    vancomycin  1,500 mg Intravenous Q18H         Infusion Medications:   0.9% NaCl with KCl 20 mEq 100 mL/hr at 11/21/18 0923       Assessment:   1. Intractable abdominal pain of unclear etiology  2. Sepsis secondary to gram-negative bacteremia  3. S/p remote Macario-en-Y gastric bypass  4. Non-oxygen-dependent COPD with chronic respiratory failure  5. Essential hypertension  6. Degenerative disk disease  7. Restless leg syndrome  8. GERD  9. Tobacco abuse  10. Morbid obesity    Plan:   EGD did not reveal any etiology behind the patient's ongoing abdominal pain.  I have discussed the case with the surgical team and laparoscopy will be performed later this afternoon for further evaluation of the intractable abdominal pain. Antibiotics are being administered at the discretion of the infectious disease team. Electrolyte derangements will be addressed. Family has been updated accordingly.     Pa Aragon D.O.  11:16 AM  11/21/2018

## 2018-11-22 LAB
ALBUMIN SERPL-MCNC: 3.4 G/DL (ref 3.5–5.2)
ALP BLD-CCNC: 74 U/L (ref 35–104)
ALT SERPL-CCNC: 16 U/L (ref 0–32)
ANION GAP SERPL CALCULATED.3IONS-SCNC: 11 MMOL/L (ref 7–16)
AST SERPL-CCNC: 16 U/L (ref 0–31)
BASOPHILS ABSOLUTE: 0 E9/L (ref 0–0.2)
BASOPHILS RELATIVE PERCENT: 0.2 % (ref 0–2)
BILIRUB SERPL-MCNC: 0.3 MG/DL (ref 0–1.2)
BUN BLDV-MCNC: 8 MG/DL (ref 8–23)
CALCIUM SERPL-MCNC: 8.4 MG/DL (ref 8.6–10.2)
CHLORIDE BLD-SCNC: 99 MMOL/L (ref 98–107)
CO2: 26 MMOL/L (ref 22–29)
CREAT SERPL-MCNC: 0.6 MG/DL (ref 0.5–1)
EOSINOPHILS ABSOLUTE: 0 E9/L (ref 0.05–0.5)
EOSINOPHILS RELATIVE PERCENT: 0 % (ref 0–6)
GFR AFRICAN AMERICAN: >60
GFR NON-AFRICAN AMERICAN: >60 ML/MIN/1.73
GLUCOSE BLD-MCNC: 192 MG/DL (ref 74–99)
HCT VFR BLD CALC: 33.4 % (ref 34–48)
HEMOGLOBIN: 10.4 G/DL (ref 11.5–15.5)
LYMPHOCYTES ABSOLUTE: 0.33 E9/L (ref 1.5–4)
LYMPHOCYTES RELATIVE PERCENT: 4.3 % (ref 20–42)
MCH RBC QN AUTO: 30.6 PG (ref 26–35)
MCHC RBC AUTO-ENTMCNC: 31.1 % (ref 32–34.5)
MCV RBC AUTO: 98.2 FL (ref 80–99.9)
MONOCYTES ABSOLUTE: 0.33 E9/L (ref 0.1–0.95)
MONOCYTES RELATIVE PERCENT: 4.3 % (ref 2–12)
NEUTROPHILS ABSOLUTE: 7.55 E9/L (ref 1.8–7.3)
NEUTROPHILS RELATIVE PERCENT: 91.3 % (ref 43–80)
PDW BLD-RTO: 14.6 FL (ref 11.5–15)
PLATELET # BLD: 239 E9/L (ref 130–450)
PMV BLD AUTO: 10.4 FL (ref 7–12)
POLYCHROMASIA: ABNORMAL
POTASSIUM SERPL-SCNC: 4.2 MMOL/L (ref 3.5–5)
RBC # BLD: 3.4 E12/L (ref 3.5–5.5)
SODIUM BLD-SCNC: 136 MMOL/L (ref 132–146)
TOTAL PROTEIN: 6 G/DL (ref 6.4–8.3)
WBC # BLD: 8.3 E9/L (ref 4.5–11.5)

## 2018-11-22 PROCEDURE — 6360000002 HC RX W HCPCS: Performed by: INTERNAL MEDICINE

## 2018-11-22 PROCEDURE — 6370000000 HC RX 637 (ALT 250 FOR IP): Performed by: INTERNAL MEDICINE

## 2018-11-22 PROCEDURE — 2500000003 HC RX 250 WO HCPCS: Performed by: SPECIALIST

## 2018-11-22 PROCEDURE — 85025 COMPLETE CBC W/AUTO DIFF WBC: CPT

## 2018-11-22 PROCEDURE — 80053 COMPREHEN METABOLIC PANEL: CPT

## 2018-11-22 PROCEDURE — 2500000003 HC RX 250 WO HCPCS: Performed by: INTERNAL MEDICINE

## 2018-11-22 PROCEDURE — 6360000002 HC RX W HCPCS: Performed by: SURGERY

## 2018-11-22 PROCEDURE — 1200000000 HC SEMI PRIVATE

## 2018-11-22 PROCEDURE — 99024 POSTOP FOLLOW-UP VISIT: CPT | Performed by: SURGERY

## 2018-11-22 PROCEDURE — 2580000003 HC RX 258: Performed by: INTERNAL MEDICINE

## 2018-11-22 PROCEDURE — 2580000003 HC RX 258: Performed by: SPECIALIST

## 2018-11-22 PROCEDURE — 36415 COLL VENOUS BLD VENIPUNCTURE: CPT

## 2018-11-22 PROCEDURE — 94640 AIRWAY INHALATION TREATMENT: CPT

## 2018-11-22 RX ADMIN — MORPHINE SULFATE 4 MG: 4 INJECTION INTRAVENOUS at 00:31

## 2018-11-22 RX ADMIN — METRONIDAZOLE 500 MG: 500 INJECTION, SOLUTION INTRAVENOUS at 22:49

## 2018-11-22 RX ADMIN — ENOXAPARIN SODIUM 40 MG: 40 INJECTION SUBCUTANEOUS at 09:45

## 2018-11-22 RX ADMIN — SUCRALFATE 1 G: 1 TABLET ORAL at 21:33

## 2018-11-22 RX ADMIN — Medication 10 ML: at 12:17

## 2018-11-22 RX ADMIN — SUCRALFATE 1 G: 1 TABLET ORAL at 05:20

## 2018-11-22 RX ADMIN — METRONIDAZOLE 500 MG: 500 INJECTION, SOLUTION INTRAVENOUS at 07:09

## 2018-11-22 RX ADMIN — AZTREONAM 2 G: 2 INJECTION, POWDER, LYOPHILIZED, FOR SOLUTION INTRAMUSCULAR; INTRAVENOUS at 02:24

## 2018-11-22 RX ADMIN — SUCRALFATE 1 G: 1 TABLET ORAL at 14:23

## 2018-11-22 RX ADMIN — LEVOFLOXACIN 750 MG: 750 TABLET, FILM COATED ORAL at 16:44

## 2018-11-22 RX ADMIN — PANTOPRAZOLE SODIUM 40 MG: 40 TABLET, DELAYED RELEASE ORAL at 16:45

## 2018-11-22 RX ADMIN — VANCOMYCIN HYDROCHLORIDE 1750 MG: 10 INJECTION, POWDER, LYOPHILIZED, FOR SOLUTION INTRAVENOUS at 16:45

## 2018-11-22 RX ADMIN — PANTOPRAZOLE SODIUM 40 MG: 40 TABLET, DELAYED RELEASE ORAL at 07:09

## 2018-11-22 RX ADMIN — METRONIDAZOLE 500 MG: 500 INJECTION, SOLUTION INTRAVENOUS at 14:22

## 2018-11-22 RX ADMIN — HYDROCODONE BITARTRATE AND ACETAMINOPHEN 2 TABLET: 5; 325 TABLET ORAL at 05:20

## 2018-11-22 RX ADMIN — HYDROCODONE BITARTRATE AND ACETAMINOPHEN 2 TABLET: 5; 325 TABLET ORAL at 09:46

## 2018-11-22 RX ADMIN — HYDROCODONE BITARTRATE AND ACETAMINOPHEN 2 TABLET: 5; 325 TABLET ORAL at 14:23

## 2018-11-22 RX ADMIN — ASPIRIN 81 MG: 81 TABLET, COATED ORAL at 09:46

## 2018-11-22 RX ADMIN — MOMETASONE FUROATE AND FORMOTEROL FUMARATE DIHYDRATE 2 PUFF: 200; 5 AEROSOL RESPIRATORY (INHALATION) at 19:44

## 2018-11-22 RX ADMIN — METRONIDAZOLE 500 MG: 500 INJECTION, SOLUTION INTRAVENOUS at 00:20

## 2018-11-22 RX ADMIN — VANCOMYCIN HYDROCHLORIDE 1750 MG: 10 INJECTION, POWDER, LYOPHILIZED, FOR SOLUTION INTRAVENOUS at 04:22

## 2018-11-22 RX ADMIN — HYDROCODONE BITARTRATE AND ACETAMINOPHEN 2 TABLET: 5; 325 TABLET ORAL at 01:19

## 2018-11-22 RX ADMIN — MORPHINE SULFATE 2 MG: 2 INJECTION, SOLUTION INTRAMUSCULAR; INTRAVENOUS at 07:09

## 2018-11-22 RX ADMIN — MOMETASONE FUROATE AND FORMOTEROL FUMARATE DIHYDRATE 2 PUFF: 200; 5 AEROSOL RESPIRATORY (INHALATION) at 07:34

## 2018-11-22 RX ADMIN — HYDROCODONE BITARTRATE AND ACETAMINOPHEN 2 TABLET: 5; 325 TABLET ORAL at 23:52

## 2018-11-22 RX ADMIN — POLYETHYLENE GLYCOL (3350) 17 G: 17 POWDER, FOR SOLUTION ORAL at 09:46

## 2018-11-22 RX ADMIN — HYDROCODONE BITARTRATE AND ACETAMINOPHEN 2 TABLET: 5; 325 TABLET ORAL at 19:35

## 2018-11-22 RX ADMIN — MORPHINE SULFATE 2 MG: 2 INJECTION, SOLUTION INTRAMUSCULAR; INTRAVENOUS at 12:16

## 2018-11-22 ASSESSMENT — PAIN SCALES - GENERAL
PAINLEVEL_OUTOF10: 6
PAINLEVEL_OUTOF10: 0
PAINLEVEL_OUTOF10: 4
PAINLEVEL_OUTOF10: 3
PAINLEVEL_OUTOF10: 0
PAINLEVEL_OUTOF10: 7
PAINLEVEL_OUTOF10: 10
PAINLEVEL_OUTOF10: 10
PAINLEVEL_OUTOF10: 3
PAINLEVEL_OUTOF10: 10
PAINLEVEL_OUTOF10: 8
PAINLEVEL_OUTOF10: 7
PAINLEVEL_OUTOF10: 7
PAINLEVEL_OUTOF10: 6
PAINLEVEL_OUTOF10: 3
PAINLEVEL_OUTOF10: 3

## 2018-11-22 ASSESSMENT — PAIN DESCRIPTION - DESCRIPTORS
DESCRIPTORS: PRESSURE;TIGHTNESS
DESCRIPTORS: ACHING;DISCOMFORT;CONSTANT;SORE;TENDER
DESCRIPTORS: DISCOMFORT;PRESSURE;SHARP
DESCRIPTORS: ACHING;DISCOMFORT;DULL
DESCRIPTORS: ACHING;CONSTANT;DISCOMFORT

## 2018-11-22 ASSESSMENT — PAIN DESCRIPTION - PAIN TYPE
TYPE: SURGICAL PAIN

## 2018-11-22 ASSESSMENT — PAIN DESCRIPTION - LOCATION
LOCATION: ABDOMEN

## 2018-11-22 ASSESSMENT — PAIN DESCRIPTION - ORIENTATION
ORIENTATION: RIGHT
ORIENTATION: RIGHT

## 2018-11-22 ASSESSMENT — PAIN DESCRIPTION - FREQUENCY
FREQUENCY: CONTINUOUS
FREQUENCY: CONTINUOUS

## 2018-11-22 ASSESSMENT — PAIN DESCRIPTION - PROGRESSION
CLINICAL_PROGRESSION: NOT CHANGED
CLINICAL_PROGRESSION: NOT CHANGED

## 2018-11-22 ASSESSMENT — PAIN DESCRIPTION - ONSET
ONSET: ON-GOING
ONSET: ON-GOING

## 2018-11-22 NOTE — PROGRESS NOTES
[facility] Progress Note    Hospital Day: Hospital Day: 5  PT Name: Laina Kaufman  MRN: [id number]  Primary Care Physician: Loni Moreno DO  Requesting physician:   Robert Akhtar DO    Reason for Admission:   Chief Complaint   Patient presents with    Abdominal Pain     right mid abd pain since last night. no n/v/d     Reason for consultation: bacteremia  Chief Complaint:  None pt emotional   Subjective/HPI/ROS:  Meme Shearer was seen and examined at bedside today for bacteremia  No  family present during my examinationS/P or  HAS PAIN RLQ HAS ICE   There are no adverse drug reactions noted including rash or itching. EATING   All patient questions were answered and all tests were reviewed. Peterson Hargrove is a 70y.o. year old female who presented on 11/18/2018 and is being treated for Intractable abdominal pain   Chief Complaint   Patient presents with    Abdominal Pain     right mid abd pain since last night. no n/v/d   GN E.COLI DUE TO PERF DIVERTICULTIIS  1. Sepsis, due to unspecified organism ([address]    2. Right upper quadrant abdominal pain          Plan     -followed bu GI/surgery   S/P Laparoscopic Adhesiolysis and peritoneal lavage  FINDINGS: colon stuck to the right anterior abdominal wall above the liver, most likely perforated diverticulitis. No active colon leak. Claritza Baker CAN CONT LEVAQUIN/FLAGYL FOR D/C TO COMPLETE 2 WEEKS MORE   WATCH ABD PAIN     · Watch for diarrhea/cdiff. · Monitor labs  · Otherwise continue current therapy. · Please see orders for further management and care.     Hospital Medications    pantoprazole (PROTONIX) tablet 40 mg BID AC   vancomycin (VANCOCIN) 1,750 mg in dextrose 5 % 500 mL IVPB Q12H   polyethylene glycol (GLYCOLAX) packet 17 g Daily   morphine (PF) injection 2 mg Q2H PRN   Or    morphine (PF) injection 4 mg Q4H PRN   metronidazole (FLAGYL) 500 mg in NaCl 100 mL IVPB premix Q8H   levofloxacin (LEVAQUIN) tablet 750 mg Daily   sodium chloride (PF) 0.9 % injection 10 mL 2 times per day   sodium chloride (PF) 0.9 % injection 10 mL PRN   aztreonam (AZACTAM) 2 g IVPB mini-bag Q8H   albuterol sulfate  (90 Base) MCG/ACT inhaler 2 puff Q4H PRN   aspirin EC tablet 81 mg Daily   mometasone-formoterol (DULERA) 200-5 MCG/ACT inhaler 2 puff BID   sucralfate (CARAFATE) tablet 1 g 3 times per day   traZODone (DESYREL) tablet 50 mg Nightly   sodium chloride flush 0.9 % injection 10 mL 2 times per day   sodium chloride flush 0.9 % injection 10 mL PRN   magnesium hydroxide (MILK OF MAGNESIA) 400 MG/5ML suspension 30 mL Daily PRN   enoxaparin (LOVENOX) injection 40 mg Daily   acetaminophen (TYLENOL) tablet 650 mg Q4H PRN   trimethobenzamide (TIGAN) injection 200 mg Q6H PRN   HYDROcodone-acetaminophen (NORCO) 5-325 MG per tablet 1 tablet Q4H PRN   Or    HYDROcodone-acetaminophen (NORCO) 5-325 MG per tablet 2 tablet Q4H PRN   0.9% NaCl with KCl 20 mEq infusion Continuous       PRN Medications  morphine **OR** morphine, sodium chloride (PF), albuterol sulfate HFA, sodium chloride flush, magnesium hydroxide, acetaminophen, trimethobenzamide, HYDROcodone 5 mg - acetaminophen **OR** HYDROcodone 5 mg - acetaminophen  Allergies   Allergen Reactions    Ceclor [Cefaclor] Anaphylaxis    Diprivan [Propofol] Hives     Swelling of face    Naproxen Anaphylaxis     Heart races    Adhesive Tape Hives    Blueberry Flavor     Ibuprofen Hives    Mirapex [Pramipexole]     Shellfish-Derived Products Hives    Vaccinium Angustifolium     Blueberry Fruit Extract Hives    Ceclor [Cefaclor] Hives    Iodides Hives    Iv Dye [Iodides] Rash    Naprosyn [Naproxen] Palpitations    Percocet [Oxycodone-Acetaminophen] Rash    Phenergan [Promethazine Hcl] Nausea And Vomiting    Promethazine Nausea And Vomiting    Sulfa Antibiotics Hives    Tape [Adhesive Tape] Rash    Tetanus Toxoids Swelling and Rash     Objective  Most Recent Recorded Vitals  Vitals:    11/22/18 0800   BP: (!) 157/95

## 2018-11-23 ENCOUNTER — APPOINTMENT (OUTPATIENT)
Dept: GENERAL RADIOLOGY | Age: 63
DRG: 872 | End: 2018-11-23
Payer: MEDICARE

## 2018-11-23 VITALS
SYSTOLIC BLOOD PRESSURE: 160 MMHG | HEART RATE: 80 BPM | TEMPERATURE: 98.1 F | RESPIRATION RATE: 16 BRPM | WEIGHT: 240 LBS | DIASTOLIC BLOOD PRESSURE: 89 MMHG | HEIGHT: 60 IN | BODY MASS INDEX: 47.12 KG/M2 | OXYGEN SATURATION: 94 %

## 2018-11-23 LAB
ALBUMIN SERPL-MCNC: 3.3 G/DL (ref 3.5–5.2)
ALP BLD-CCNC: 68 U/L (ref 35–104)
ALT SERPL-CCNC: 19 U/L (ref 0–32)
ANION GAP SERPL CALCULATED.3IONS-SCNC: 9 MMOL/L (ref 7–16)
AST SERPL-CCNC: 24 U/L (ref 0–31)
BASOPHILS ABSOLUTE: 0.05 E9/L (ref 0–0.2)
BASOPHILS RELATIVE PERCENT: 0.5 % (ref 0–2)
BILIRUB SERPL-MCNC: 0.2 MG/DL (ref 0–1.2)
BUN BLDV-MCNC: 10 MG/DL (ref 8–23)
CALCIUM SERPL-MCNC: 8.1 MG/DL (ref 8.6–10.2)
CHLORIDE BLD-SCNC: 103 MMOL/L (ref 98–107)
CO2: 28 MMOL/L (ref 22–29)
CREAT SERPL-MCNC: 0.7 MG/DL (ref 0.5–1)
EOSINOPHILS ABSOLUTE: 0.19 E9/L (ref 0.05–0.5)
EOSINOPHILS RELATIVE PERCENT: 2 % (ref 0–6)
GFR AFRICAN AMERICAN: >60
GFR NON-AFRICAN AMERICAN: >60 ML/MIN/1.73
GLUCOSE BLD-MCNC: 130 MG/DL (ref 74–99)
HCT VFR BLD CALC: 33.4 % (ref 34–48)
HEMOGLOBIN: 10.4 G/DL (ref 11.5–15.5)
IMMATURE GRANULOCYTES #: 0.06 E9/L
IMMATURE GRANULOCYTES %: 0.6 % (ref 0–5)
LYMPHOCYTES ABSOLUTE: 1.07 E9/L (ref 1.5–4)
LYMPHOCYTES RELATIVE PERCENT: 11.1 % (ref 20–42)
MCH RBC QN AUTO: 30.2 PG (ref 26–35)
MCHC RBC AUTO-ENTMCNC: 31.1 % (ref 32–34.5)
MCV RBC AUTO: 97.1 FL (ref 80–99.9)
MONOCYTES ABSOLUTE: 0.9 E9/L (ref 0.1–0.95)
MONOCYTES RELATIVE PERCENT: 9.3 % (ref 2–12)
NEUTROPHILS ABSOLUTE: 7.4 E9/L (ref 1.8–7.3)
NEUTROPHILS RELATIVE PERCENT: 76.5 % (ref 43–80)
PDW BLD-RTO: 14.6 FL (ref 11.5–15)
PLATELET # BLD: 252 E9/L (ref 130–450)
PMV BLD AUTO: 10 FL (ref 7–12)
POTASSIUM SERPL-SCNC: 3.9 MMOL/L (ref 3.5–5)
RBC # BLD: 3.44 E12/L (ref 3.5–5.5)
SODIUM BLD-SCNC: 140 MMOL/L (ref 132–146)
TOTAL PROTEIN: 5.8 G/DL (ref 6.4–8.3)
VANCOMYCIN TROUGH: 16.6 MCG/ML (ref 5–16)
WBC # BLD: 9.7 E9/L (ref 4.5–11.5)

## 2018-11-23 PROCEDURE — 6370000000 HC RX 637 (ALT 250 FOR IP): Performed by: SPECIALIST

## 2018-11-23 PROCEDURE — 2580000003 HC RX 258: Performed by: INTERNAL MEDICINE

## 2018-11-23 PROCEDURE — 6370000000 HC RX 637 (ALT 250 FOR IP): Performed by: INTERNAL MEDICINE

## 2018-11-23 PROCEDURE — 85025 COMPLETE CBC W/AUTO DIFF WBC: CPT

## 2018-11-23 PROCEDURE — 80053 COMPREHEN METABOLIC PANEL: CPT

## 2018-11-23 PROCEDURE — 6360000002 HC RX W HCPCS: Performed by: INTERNAL MEDICINE

## 2018-11-23 PROCEDURE — 74018 RADEX ABDOMEN 1 VIEW: CPT

## 2018-11-23 PROCEDURE — 80202 ASSAY OF VANCOMYCIN: CPT

## 2018-11-23 PROCEDURE — 94640 AIRWAY INHALATION TREATMENT: CPT

## 2018-11-23 PROCEDURE — 36415 COLL VENOUS BLD VENIPUNCTURE: CPT

## 2018-11-23 RX ORDER — POLYETHYLENE GLYCOL 3350 17 G/17G
17 POWDER, FOR SOLUTION ORAL DAILY
Qty: 527 G | Refills: 1 | COMMUNITY
Start: 2018-11-24 | End: 2018-12-24

## 2018-11-23 RX ORDER — METRONIDAZOLE 500 MG/1
500 TABLET ORAL EVERY 8 HOURS SCHEDULED
Qty: 30 TABLET | Refills: 0 | Status: SHIPPED | OUTPATIENT
Start: 2018-11-23 | End: 2018-12-03

## 2018-11-23 RX ORDER — METRONIDAZOLE 500 MG/1
500 TABLET ORAL EVERY 8 HOURS SCHEDULED
Status: DISCONTINUED | OUTPATIENT
Start: 2018-11-23 | End: 2018-11-23 | Stop reason: HOSPADM

## 2018-11-23 RX ORDER — LEVOFLOXACIN 750 MG/1
750 TABLET ORAL DAILY
Qty: 10 TABLET | Refills: 0 | Status: SHIPPED | OUTPATIENT
Start: 2018-11-23 | End: 2018-12-03

## 2018-11-23 RX ADMIN — ASPIRIN 81 MG: 81 TABLET, COATED ORAL at 09:38

## 2018-11-23 RX ADMIN — POLYETHYLENE GLYCOL (3350) 17 G: 17 POWDER, FOR SOLUTION ORAL at 09:38

## 2018-11-23 RX ADMIN — HYDROCODONE BITARTRATE AND ACETAMINOPHEN 2 TABLET: 5; 325 TABLET ORAL at 09:38

## 2018-11-23 RX ADMIN — PANTOPRAZOLE SODIUM 40 MG: 40 TABLET, DELAYED RELEASE ORAL at 06:29

## 2018-11-23 RX ADMIN — ENOXAPARIN SODIUM 40 MG: 40 INJECTION SUBCUTANEOUS at 09:38

## 2018-11-23 RX ADMIN — VANCOMYCIN HYDROCHLORIDE 1750 MG: 10 INJECTION, POWDER, LYOPHILIZED, FOR SOLUTION INTRAVENOUS at 04:44

## 2018-11-23 RX ADMIN — MOMETASONE FUROATE AND FORMOTEROL FUMARATE DIHYDRATE 2 PUFF: 200; 5 AEROSOL RESPIRATORY (INHALATION) at 06:05

## 2018-11-23 RX ADMIN — METRONIDAZOLE 500 MG: 500 TABLET ORAL at 09:38

## 2018-11-23 RX ADMIN — HYDROCODONE BITARTRATE AND ACETAMINOPHEN 2 TABLET: 5; 325 TABLET ORAL at 04:35

## 2018-11-23 RX ADMIN — METRONIDAZOLE 500 MG: 500 TABLET ORAL at 14:50

## 2018-11-23 RX ADMIN — SUCRALFATE 1 G: 1 TABLET ORAL at 06:29

## 2018-11-23 RX ADMIN — SUCRALFATE 1 G: 1 TABLET ORAL at 14:50

## 2018-11-23 ASSESSMENT — PAIN SCALES - GENERAL
PAINLEVEL_OUTOF10: 2
PAINLEVEL_OUTOF10: 9
PAINLEVEL_OUTOF10: 7
PAINLEVEL_OUTOF10: 2

## 2018-11-23 ASSESSMENT — PAIN DESCRIPTION - ONSET: ONSET: ON-GOING

## 2018-11-23 ASSESSMENT — PAIN DESCRIPTION - PROGRESSION: CLINICAL_PROGRESSION: GRADUALLY IMPROVING

## 2018-11-23 ASSESSMENT — PAIN DESCRIPTION - LOCATION: LOCATION: ABDOMEN

## 2018-11-23 ASSESSMENT — PAIN DESCRIPTION - FREQUENCY: FREQUENCY: CONTINUOUS

## 2018-11-23 ASSESSMENT — PAIN DESCRIPTION - DESCRIPTORS: DESCRIPTORS: ACHING;DISCOMFORT;CONSTANT

## 2018-11-23 ASSESSMENT — PAIN DESCRIPTION - PAIN TYPE: TYPE: SURGICAL PAIN

## 2018-11-23 NOTE — PROGRESS NOTES
tenderness  Extremities: extremities normal, atraumatic, no cyanosis or edema      Assessment/Plan:  Bryan Rodriguez is a 70 y.o. female POD 2 BROWN, bacteremia    Abx per ID  Ok to discharge when ok with others  Follow up 2 weeks Dr Willy Olszewski    Physician Signature: Electronically signed by Dr. Donny Candelario  [phone] (p)  11/23/2018  11:42 AM

## 2018-11-23 NOTE — PROGRESS NOTES
Nutrition Assessment    Type and Reason for Visit: Initial    Nutrition Recommendations: Continue current diet. Pt refusing supplements. Glucose fluctuating from WNL to elevated. Nutrition Assessment: +LOS, pt anxious to go home. pt w/ increased nutrient needs for sepsis and incisional healing. s/p lap adhesiolysis and periotineal lavage d/t colon stuck in R anterior abd wall above liver as per MD notes. Glucose elevated. pt w/ h/o mercy-en-y. p/w R side abd pain. Malnutrition Assessment:  · Malnutrition Status: At risk for malnutrition  · Context: Acute illness or injury  · Findings of the 6 clinical characteristics of malnutrition (Minimum of 2 out of 6 clinical characteristics is required to make the diagnosis of moderate or severe Protein Calorie Malnutrition based on AND/ASPEN Guidelines):  1. Energy Intake- (Less than 75% intakes ), greater than or equal to 5 days    2. Weight Loss-No significant weight loss   3. Fat Loss-No significant subcutaneous fat loss   4. Muscle Loss-No significant muscle mass loss   5. Fluid Accumulation-No significant fluid accumulation   6.  Strength-Not measured    Nutrition Risk Level: Moderate    Nutrient Needs:  · Estimated Daily Total Kcal: 0798-5038 ([facility] REE: 1562 x 1.2)  · Estimated Daily Protein (g): 80-90 (1.8-2)  · Estimated Daily Total Fluid (ml/day): 3653-5999    Nutrition Diagnosis:   · Problem: Increased nutrient needs  · Etiology: related to Increased demand for energy/nutrients     Signs and symptoms:  as evidenced by Presence of wounds, Intake 50-75%    Objective Information:  · Nutrition-Focused Physical Findings: pt w/ okay appetite; consuming 75% meal trays.  abd-rotund, soft, no gaurding, +BS, no edema, skin-abd incision, -I/O  · Wound Type: Surgical Wound  · Current Nutrition Therapies:  · Oral Diet Orders:  (Bariatric Soft)   · Oral Diet intake: 51-75%  · Oral Nutrition Supplement (ONS) Orders:  (refuses)  · Anthropometric Measures:  · Ht: 5'

## 2018-11-24 ENCOUNTER — CARE COORDINATION (OUTPATIENT)
Dept: CASE MANAGEMENT | Age: 63
End: 2018-11-24

## 2018-11-24 LAB
CULTURE, BLOOD 2: ABNORMAL
CULTURE, BLOOD 2: ABNORMAL
ORGANISM: ABNORMAL
ORGANISM: ABNORMAL

## 2018-11-24 NOTE — DISCHARGE SUMMARY
[address]                               DISCHARGE SUMMARY    PATIENT NAME: Hansa Cooper                 :        1955  MED REC NO:   [id number]                            ROOM:       4358  ACCOUNT NO:   [de-identified]                           ADMIT DATE: 2018  PROVIDER:     Giuliana Matson DO                  DISCHARGE DATE:  2018      ADMITTING DIAGNOSIS:  Intractable abdominal pain. PRIMARY DIAGNOSES:  Intractable abdominal pain status post laparoscopic  adenolysis with peritoneal lavage due to adherence of right anterior abdominal  wall, above the liver, mostly likely perforated diverticulitis; sepsis  secondary to gram-negative rods. SECONDARY DISCHARGE DIAGNOSES:  Status post remote Macario-en-Y gastric  bypass, non-oxygen-dependent COPD with chronic respiratory failure,  essential hypertension, diskogenic disk disease, restless leg syndrome,  gastroesophageal reflux disease, history of tobacco abuse, morbid  obesity. COMPLICATIONS:  None. OPERATION PERFORMED:  Laparoscopic adenolysis with peritoneal lavage on  2018, and on , was upper GI panendoscopy performed by Dr. Vinh Reid. CONSULTATIONS OBTAINED:  Dr. Monique Campbell, Dr. Tenzin Mata, Dr. Vinh Reid. No OMT  was given. ADMITTING PHYSICIANS:  Dr. Emili Mir and Dr. Keenan Jacome. CHIEF COMPLAINT AND HISTORY OF CHIEF COMPLAINT:  This is a 76-year-old  white female who was admitted to [address] [place] [address]. The patient was  admitted to the hospital here on 2018. The patient presented to  the hospital complaining of abdominal pain. The patient presented to  the emergency room with complaints of severe right upper quadrant, right  lower quadrant abdominal pain. This began last evening. The patient  denies any nausea, vomiting. The patient does have appendectomy,  cholecystectomy performed.   Had a history of remote Macario-en-Y

## 2018-11-25 LAB
BLOOD CULTURE, ROUTINE: NORMAL
CULTURE, BLOOD 2: NORMAL

## 2018-12-07 ENCOUNTER — TELEPHONE (OUTPATIENT)
Dept: SURGERY | Age: 63
End: 2018-12-07

## 2018-12-07 ENCOUNTER — OFFICE VISIT (OUTPATIENT)
Dept: SURGERY | Age: 63
End: 2018-12-07
Payer: MEDICARE

## 2018-12-07 ENCOUNTER — PREP FOR PROCEDURE (OUTPATIENT)
Dept: SURGERY | Age: 63
End: 2018-12-07

## 2018-12-07 VITALS
SYSTOLIC BLOOD PRESSURE: 137 MMHG | BODY MASS INDEX: 47.12 KG/M2 | WEIGHT: 240 LBS | RESPIRATION RATE: 14 BRPM | HEART RATE: 89 BPM | TEMPERATURE: 98.7 F | HEIGHT: 60 IN | DIASTOLIC BLOOD PRESSURE: 81 MMHG

## 2018-12-07 DIAGNOSIS — B37.9 YEAST INFECTION: Primary | ICD-10-CM

## 2018-12-07 PROCEDURE — 1123F ACP DISCUSS/DSCN MKR DOCD: CPT | Performed by: SURGERY

## 2018-12-07 PROCEDURE — G8400 PT W/DXA NO RESULTS DOC: HCPCS | Performed by: SURGERY

## 2018-12-07 PROCEDURE — 3017F COLORECTAL CA SCREEN DOC REV: CPT | Performed by: SURGERY

## 2018-12-07 PROCEDURE — 4004F PT TOBACCO SCREEN RCVD TLK: CPT | Performed by: SURGERY

## 2018-12-07 PROCEDURE — G8482 FLU IMMUNIZE ORDER/ADMIN: HCPCS | Performed by: SURGERY

## 2018-12-07 PROCEDURE — 1101F PT FALLS ASSESS-DOCD LE1/YR: CPT | Performed by: SURGERY

## 2018-12-07 PROCEDURE — 1090F PRES/ABSN URINE INCON ASSESS: CPT | Performed by: SURGERY

## 2018-12-07 PROCEDURE — 1111F DSCHRG MED/CURRENT MED MERGE: CPT | Performed by: SURGERY

## 2018-12-07 PROCEDURE — 4040F PNEUMOC VAC/ADMIN/RCVD: CPT | Performed by: SURGERY

## 2018-12-07 PROCEDURE — 99213 OFFICE O/P EST LOW 20 MIN: CPT | Performed by: SURGERY

## 2018-12-07 PROCEDURE — G8427 DOCREV CUR MEDS BY ELIG CLIN: HCPCS | Performed by: SURGERY

## 2018-12-07 PROCEDURE — G8417 CALC BMI ABV UP PARAM F/U: HCPCS | Performed by: SURGERY

## 2018-12-07 RX ORDER — FLUCONAZOLE 100 MG/1
200 TABLET ORAL DAILY
Qty: 3 TABLET | Refills: 0 | Status: SHIPPED | OUTPATIENT
Start: 2018-12-07 | End: 2018-12-28

## 2018-12-07 RX ORDER — SODIUM CHLORIDE, SODIUM LACTATE, POTASSIUM CHLORIDE, CALCIUM CHLORIDE 600; 310; 30; 20 MG/100ML; MG/100ML; MG/100ML; MG/100ML
INJECTION, SOLUTION INTRAVENOUS CONTINUOUS
Status: CANCELLED | OUTPATIENT
Start: 2018-12-07

## 2018-12-11 NOTE — TELEPHONE ENCOUNTER
Per Medicare A &B no authorization is required for CPT Code 97876. Forms scanned into chart. Post op scheduled 1/18/19.  Electronically signed by Sloane Acosta MA on 12/11/18 at 9:26 AM

## 2018-12-27 ENCOUNTER — CARE COORDINATION (OUTPATIENT)
Dept: CASE MANAGEMENT | Age: 63
End: 2018-12-27

## 2018-12-28 RX ORDER — ROPINIROLE 1 MG/1
1 TABLET, FILM COATED ORAL NIGHTLY
COMMUNITY
End: 2021-02-10 | Stop reason: SDUPTHER

## 2019-01-02 ENCOUNTER — HOSPITAL ENCOUNTER (OUTPATIENT)
Age: 64
Setting detail: OUTPATIENT SURGERY
Discharge: HOME | End: 2019-01-02
Attending: SURGERY | Admitting: SURGERY
Payer: MEDICARE

## 2019-01-02 ENCOUNTER — ANESTHESIA (OUTPATIENT)
Dept: ENDOSCOPY | Age: 64
End: 2019-01-02
Payer: MEDICARE

## 2019-01-02 ENCOUNTER — ANESTHESIA EVENT (OUTPATIENT)
Dept: ENDOSCOPY | Age: 64
End: 2019-01-02
Payer: MEDICARE

## 2019-01-02 VITALS
DIASTOLIC BLOOD PRESSURE: 73 MMHG | BODY MASS INDEX: 43.29 KG/M2 | HEIGHT: 60 IN | OXYGEN SATURATION: 96 % | SYSTOLIC BLOOD PRESSURE: 176 MMHG | RESPIRATION RATE: 16 BRPM | HEART RATE: 70 BPM | WEIGHT: 220.5 LBS | TEMPERATURE: 97.9 F

## 2019-01-02 VITALS
SYSTOLIC BLOOD PRESSURE: 135 MMHG | OXYGEN SATURATION: 98 % | RESPIRATION RATE: 20 BRPM | DIASTOLIC BLOOD PRESSURE: 68 MMHG

## 2019-01-02 PROBLEM — R10.9 INTRACTABLE ABDOMINAL PAIN: Status: RESOLVED | Noted: 2018-11-18 | Resolved: 2019-01-02

## 2019-01-02 PROBLEM — S52.022A FRACTURE OF LEFT OLECRANON PROCESS: Status: RESOLVED | Noted: 2017-01-10 | Resolved: 2019-01-02

## 2019-01-02 PROBLEM — G56.02 LEFT CARPAL TUNNEL SYNDROME: Status: RESOLVED | Noted: 2017-01-30 | Resolved: 2019-01-02

## 2019-01-02 PROCEDURE — 6360000002 HC RX W HCPCS: Performed by: NURSE ANESTHETIST, CERTIFIED REGISTERED

## 2019-01-02 PROCEDURE — 7100000010 HC PHASE II RECOVERY - FIRST 15 MIN: Performed by: SURGERY

## 2019-01-02 PROCEDURE — 7100000011 HC PHASE II RECOVERY - ADDTL 15 MIN: Performed by: SURGERY

## 2019-01-02 PROCEDURE — 3700000000 HC ANESTHESIA ATTENDED CARE: Performed by: SURGERY

## 2019-01-02 PROCEDURE — 2709999900 HC NON-CHARGEABLE SUPPLY: Performed by: SURGERY

## 2019-01-02 PROCEDURE — 2580000003 HC RX 258: Performed by: SURGERY

## 2019-01-02 PROCEDURE — 43235 EGD DIAGNOSTIC BRUSH WASH: CPT | Performed by: SURGERY

## 2019-01-02 PROCEDURE — 3700000001 HC ADD 15 MINUTES (ANESTHESIA): Performed by: SURGERY

## 2019-01-02 PROCEDURE — 3609017100 HC EGD: Performed by: SURGERY

## 2019-01-02 RX ORDER — SODIUM CHLORIDE 0.9 % (FLUSH) 0.9 %
10 SYRINGE (ML) INJECTION PRN
Status: DISCONTINUED | OUTPATIENT
Start: 2019-01-02 | End: 2019-01-02 | Stop reason: HOSPADM

## 2019-01-02 RX ORDER — PROPOFOL 10 MG/ML
INJECTION, EMULSION INTRAVENOUS PRN
Status: DISCONTINUED | OUTPATIENT
Start: 2019-01-02 | End: 2019-01-02 | Stop reason: SDUPTHER

## 2019-01-02 RX ORDER — SODIUM CHLORIDE, SODIUM LACTATE, POTASSIUM CHLORIDE, CALCIUM CHLORIDE 600; 310; 30; 20 MG/100ML; MG/100ML; MG/100ML; MG/100ML
INJECTION, SOLUTION INTRAVENOUS CONTINUOUS
Status: DISCONTINUED | OUTPATIENT
Start: 2019-01-02 | End: 2019-01-02 | Stop reason: HOSPADM

## 2019-01-02 RX ADMIN — SODIUM CHLORIDE, POTASSIUM CHLORIDE, SODIUM LACTATE AND CALCIUM CHLORIDE: 600; 310; 30; 20 INJECTION, SOLUTION INTRAVENOUS at 11:55

## 2019-01-02 RX ADMIN — PROPOFOL 100 MG: 10 INJECTION, EMULSION INTRAVENOUS at 13:51

## 2019-01-02 RX ADMIN — PROPOFOL 100 MG: 10 INJECTION, EMULSION INTRAVENOUS at 13:52

## 2019-01-02 ASSESSMENT — PAIN SCALES - GENERAL
PAINLEVEL_OUTOF10: 0
PAINLEVEL_OUTOF10: 0

## 2019-01-02 ASSESSMENT — PAIN - FUNCTIONAL ASSESSMENT: PAIN_FUNCTIONAL_ASSESSMENT: 0-10

## 2019-01-02 ASSESSMENT — LIFESTYLE VARIABLES: SMOKING_STATUS: 1

## 2019-01-18 ENCOUNTER — OFFICE VISIT (OUTPATIENT)
Dept: SURGERY | Age: 64
End: 2019-01-18
Payer: MEDICARE

## 2019-01-18 VITALS
SYSTOLIC BLOOD PRESSURE: 154 MMHG | DIASTOLIC BLOOD PRESSURE: 87 MMHG | BODY MASS INDEX: 43.78 KG/M2 | WEIGHT: 223 LBS | HEART RATE: 84 BPM | HEIGHT: 60 IN

## 2019-01-18 DIAGNOSIS — R19.7 DIARRHEA OF PRESUMED INFECTIOUS ORIGIN: Primary | ICD-10-CM

## 2019-01-18 PROCEDURE — 99214 OFFICE O/P EST MOD 30 MIN: CPT | Performed by: SURGERY

## 2019-01-21 ENCOUNTER — CARE COORDINATION (OUTPATIENT)
Dept: CASE MANAGEMENT | Age: 64
End: 2019-01-21

## 2019-02-05 ENCOUNTER — CARE COORDINATION (OUTPATIENT)
Dept: CASE MANAGEMENT | Age: 64
End: 2019-02-05

## 2019-02-15 ENCOUNTER — OFFICE VISIT (OUTPATIENT)
Dept: SURGERY | Age: 64
End: 2019-02-15

## 2019-02-15 VITALS
SYSTOLIC BLOOD PRESSURE: 152 MMHG | OXYGEN SATURATION: 96 % | HEIGHT: 60 IN | WEIGHT: 222 LBS | BODY MASS INDEX: 43.59 KG/M2 | HEART RATE: 94 BPM | DIASTOLIC BLOOD PRESSURE: 94 MMHG

## 2019-02-15 DIAGNOSIS — R19.7 DIARRHEA DUE TO MALABSORPTION: Primary | ICD-10-CM

## 2019-02-15 DIAGNOSIS — K90.9 DIARRHEA DUE TO MALABSORPTION: Primary | ICD-10-CM

## 2019-02-15 PROCEDURE — 99024 POSTOP FOLLOW-UP VISIT: CPT | Performed by: SURGERY

## 2019-02-15 RX ORDER — HYDROCODONE BITARTRATE AND ACETAMINOPHEN 10; 325 MG/1; MG/1
TABLET ORAL
Refills: 0 | COMMUNITY
Start: 2019-01-24 | End: 2019-02-15

## 2019-02-15 RX ORDER — HYDROCODONE BITARTRATE AND ACETAMINOPHEN 10; 325 MG/1; MG/1
1 TABLET ORAL EVERY 6 HOURS PRN
Status: ON HOLD | COMMUNITY
End: 2019-06-27 | Stop reason: ALTCHOICE

## 2019-02-18 ENCOUNTER — CARE COORDINATION (OUTPATIENT)
Dept: CASE MANAGEMENT | Age: 64
End: 2019-02-18

## 2019-06-27 ENCOUNTER — APPOINTMENT (OUTPATIENT)
Dept: CT IMAGING | Age: 64
DRG: 392 | End: 2019-06-27
Payer: MEDICARE

## 2019-06-27 ENCOUNTER — HOSPITAL ENCOUNTER (INPATIENT)
Age: 64
LOS: 5 days | Discharge: HOME | DRG: 392 | End: 2019-07-03
Attending: EMERGENCY MEDICINE | Admitting: SURGERY
Payer: MEDICARE

## 2019-06-27 DIAGNOSIS — R10.30 LOWER ABDOMINAL PAIN: Primary | ICD-10-CM

## 2019-06-27 DIAGNOSIS — R10.9 INTRACTABLE ABDOMINAL PAIN: ICD-10-CM

## 2019-06-27 LAB
ALBUMIN SERPL-MCNC: 5.1 G/DL (ref 3.5–5.2)
ALP BLD-CCNC: 98 U/L (ref 35–104)
ALT SERPL-CCNC: 21 U/L (ref 0–32)
ANION GAP SERPL CALCULATED.3IONS-SCNC: 15 MMOL/L (ref 7–16)
AST SERPL-CCNC: 30 U/L (ref 0–31)
BACTERIA: ABNORMAL /HPF
BASOPHILS ABSOLUTE: 0.05 E9/L (ref 0–0.2)
BASOPHILS RELATIVE PERCENT: 0.4 % (ref 0–2)
BILIRUB SERPL-MCNC: 0.4 MG/DL (ref 0–1.2)
BILIRUBIN URINE: NEGATIVE
BLOOD, URINE: ABNORMAL
BUN BLDV-MCNC: 10 MG/DL (ref 8–23)
CALCIUM SERPL-MCNC: 9.4 MG/DL (ref 8.6–10.2)
CHLORIDE BLD-SCNC: 97 MMOL/L (ref 98–107)
CLARITY: CLEAR
CO2: 26 MMOL/L (ref 22–29)
COLOR: YELLOW
CREAT SERPL-MCNC: 0.8 MG/DL (ref 0.5–1)
EOSINOPHILS ABSOLUTE: 0.03 E9/L (ref 0.05–0.5)
EOSINOPHILS RELATIVE PERCENT: 0.2 % (ref 0–6)
EPITHELIAL CELLS, UA: ABNORMAL /HPF
GFR AFRICAN AMERICAN: >60
GFR NON-AFRICAN AMERICAN: >60 ML/MIN/1.73
GLUCOSE BLD-MCNC: 110 MG/DL (ref 74–99)
GLUCOSE URINE: NEGATIVE MG/DL
HCT VFR BLD CALC: 42.7 % (ref 34–48)
HEMOGLOBIN: 13.6 G/DL (ref 11.5–15.5)
IMMATURE GRANULOCYTES #: 0.06 E9/L
IMMATURE GRANULOCYTES %: 0.5 % (ref 0–5)
KETONES, URINE: NEGATIVE MG/DL
LACTIC ACID: 2.1 MMOL/L (ref 0.5–2.2)
LEUKOCYTE ESTERASE, URINE: ABNORMAL
LIPASE: 26 U/L (ref 13–60)
LYMPHOCYTES ABSOLUTE: 1.33 E9/L (ref 1.5–4)
LYMPHOCYTES RELATIVE PERCENT: 10.9 % (ref 20–42)
MCH RBC QN AUTO: 29.4 PG (ref 26–35)
MCHC RBC AUTO-ENTMCNC: 31.9 % (ref 32–34.5)
MCV RBC AUTO: 92.4 FL (ref 80–99.9)
MONOCYTES ABSOLUTE: 0.64 E9/L (ref 0.1–0.95)
MONOCYTES RELATIVE PERCENT: 5.3 % (ref 2–12)
NEUTROPHILS ABSOLUTE: 10.05 E9/L (ref 1.8–7.3)
NEUTROPHILS RELATIVE PERCENT: 82.7 % (ref 43–80)
NITRITE, URINE: NEGATIVE
PDW BLD-RTO: 13.8 FL (ref 11.5–15)
PH UA: 5.5 (ref 5–9)
PLATELET # BLD: 335 E9/L (ref 130–450)
PMV BLD AUTO: 10 FL (ref 7–12)
POTASSIUM REFLEX MAGNESIUM: 3.8 MMOL/L (ref 3.5–5)
PROTEIN UA: NEGATIVE MG/DL
RBC # BLD: 4.62 E12/L (ref 3.5–5.5)
RBC UA: ABNORMAL /HPF (ref 0–2)
SODIUM BLD-SCNC: 138 MMOL/L (ref 132–146)
SPECIFIC GRAVITY UA: 1.02 (ref 1–1.03)
TOTAL PROTEIN: 8.1 G/DL (ref 6.4–8.3)
TROPONIN: <0.01 NG/ML (ref 0–0.03)
UROBILINOGEN, URINE: 0.2 E.U./DL
WBC # BLD: 12.2 E9/L (ref 4.5–11.5)
WBC UA: ABNORMAL /HPF (ref 0–5)

## 2019-06-27 PROCEDURE — G0378 HOSPITAL OBSERVATION PER HR: HCPCS

## 2019-06-27 PROCEDURE — 87040 BLOOD CULTURE FOR BACTERIA: CPT

## 2019-06-27 PROCEDURE — C9113 INJ PANTOPRAZOLE SODIUM, VIA: HCPCS | Performed by: SURGERY

## 2019-06-27 PROCEDURE — 2580000003 HC RX 258: Performed by: SURGERY

## 2019-06-27 PROCEDURE — 96375 TX/PRO/DX INJ NEW DRUG ADDON: CPT

## 2019-06-27 PROCEDURE — 6360000002 HC RX W HCPCS: Performed by: SURGERY

## 2019-06-27 PROCEDURE — 74176 CT ABD & PELVIS W/O CONTRAST: CPT

## 2019-06-27 PROCEDURE — 6360000002 HC RX W HCPCS: Performed by: EMERGENCY MEDICINE

## 2019-06-27 PROCEDURE — 36415 COLL VENOUS BLD VENIPUNCTURE: CPT

## 2019-06-27 PROCEDURE — 87088 URINE BACTERIA CULTURE: CPT

## 2019-06-27 PROCEDURE — 99285 EMERGENCY DEPT VISIT HI MDM: CPT

## 2019-06-27 PROCEDURE — 83605 ASSAY OF LACTIC ACID: CPT

## 2019-06-27 PROCEDURE — 81001 URINALYSIS AUTO W/SCOPE: CPT

## 2019-06-27 PROCEDURE — 93005 ELECTROCARDIOGRAM TRACING: CPT | Performed by: EMERGENCY MEDICINE

## 2019-06-27 PROCEDURE — 83690 ASSAY OF LIPASE: CPT

## 2019-06-27 PROCEDURE — 6370000000 HC RX 637 (ALT 250 FOR IP): Performed by: SURGERY

## 2019-06-27 PROCEDURE — 84484 ASSAY OF TROPONIN QUANT: CPT

## 2019-06-27 PROCEDURE — 96374 THER/PROPH/DIAG INJ IV PUSH: CPT

## 2019-06-27 PROCEDURE — 80053 COMPREHEN METABOLIC PANEL: CPT

## 2019-06-27 PROCEDURE — 96376 TX/PRO/DX INJ SAME DRUG ADON: CPT

## 2019-06-27 PROCEDURE — 85025 COMPLETE CBC W/AUTO DIFF WBC: CPT

## 2019-06-27 PROCEDURE — 2580000003 HC RX 258: Performed by: EMERGENCY MEDICINE

## 2019-06-27 RX ORDER — DIPHENHYDRAMINE HYDROCHLORIDE 50 MG/ML
25 INJECTION INTRAMUSCULAR; INTRAVENOUS EVERY 8 HOURS PRN
Status: DISCONTINUED | OUTPATIENT
Start: 2019-06-27 | End: 2019-07-03 | Stop reason: HOSPADM

## 2019-06-27 RX ORDER — MORPHINE SULFATE 4 MG/ML
4 INJECTION, SOLUTION INTRAMUSCULAR; INTRAVENOUS ONCE
Status: COMPLETED | OUTPATIENT
Start: 2019-06-27 | End: 2019-06-27

## 2019-06-27 RX ORDER — 0.9 % SODIUM CHLORIDE 0.9 %
10 VIAL (ML) INJECTION DAILY
Status: DISCONTINUED | OUTPATIENT
Start: 2019-06-27 | End: 2019-07-02

## 2019-06-27 RX ORDER — ONDANSETRON 2 MG/ML
4 INJECTION INTRAMUSCULAR; INTRAVENOUS EVERY 8 HOURS PRN
Status: DISCONTINUED | OUTPATIENT
Start: 2019-06-27 | End: 2019-06-27

## 2019-06-27 RX ORDER — SODIUM CHLORIDE 0.9 % (FLUSH) 0.9 %
10 SYRINGE (ML) INJECTION PRN
Status: DISCONTINUED | OUTPATIENT
Start: 2019-06-27 | End: 2019-07-03 | Stop reason: HOSPADM

## 2019-06-27 RX ORDER — SODIUM CHLORIDE 0.9 % (FLUSH) 0.9 %
10 SYRINGE (ML) INJECTION EVERY 12 HOURS SCHEDULED
Status: DISCONTINUED | OUTPATIENT
Start: 2019-06-27 | End: 2019-07-03 | Stop reason: HOSPADM

## 2019-06-27 RX ORDER — LIDOCAINE HYDROCHLORIDE 20 MG/ML
10 SOLUTION OROPHARYNGEAL
Status: DISCONTINUED | OUTPATIENT
Start: 2019-06-27 | End: 2019-07-03 | Stop reason: HOSPADM

## 2019-06-27 RX ORDER — ONDANSETRON 2 MG/ML
4 INJECTION INTRAMUSCULAR; INTRAVENOUS ONCE
Status: COMPLETED | OUTPATIENT
Start: 2019-06-27 | End: 2019-06-27

## 2019-06-27 RX ORDER — LIDOCAINE HYDROCHLORIDE 20 MG/ML
15 SOLUTION OROPHARYNGEAL
Status: COMPLETED | OUTPATIENT
Start: 2019-06-27 | End: 2019-06-27

## 2019-06-27 RX ORDER — LISINOPRIL 10 MG/1
10 TABLET ORAL DAILY
Status: DISCONTINUED | OUTPATIENT
Start: 2019-06-28 | End: 2019-07-03 | Stop reason: HOSPADM

## 2019-06-27 RX ORDER — SUCRALFATE 1 G/1
0.5 TABLET ORAL 3 TIMES DAILY PRN
Status: DISCONTINUED | OUTPATIENT
Start: 2019-06-27 | End: 2019-06-27

## 2019-06-27 RX ORDER — SUCRALFATE ORAL 1 G/10ML
0.5 SUSPENSION ORAL 3 TIMES DAILY PRN
Status: DISCONTINUED | OUTPATIENT
Start: 2019-06-27 | End: 2019-06-27 | Stop reason: CLARIF

## 2019-06-27 RX ORDER — MORPHINE SULFATE 2 MG/ML
2 INJECTION, SOLUTION INTRAMUSCULAR; INTRAVENOUS
Status: DISCONTINUED | OUTPATIENT
Start: 2019-06-27 | End: 2019-06-27

## 2019-06-27 RX ORDER — LISINOPRIL 10 MG/1
10 TABLET ORAL DAILY
COMMUNITY
End: 2019-12-16

## 2019-06-27 RX ORDER — SUCRALFATE 1 G/1
0.5 TABLET ORAL
Status: DISCONTINUED | OUTPATIENT
Start: 2019-06-27 | End: 2019-07-03 | Stop reason: HOSPADM

## 2019-06-27 RX ORDER — 0.9 % SODIUM CHLORIDE 0.9 %
1000 INTRAVENOUS SOLUTION INTRAVENOUS ONCE
Status: COMPLETED | OUTPATIENT
Start: 2019-06-27 | End: 2019-06-27

## 2019-06-27 RX ORDER — ROPINIROLE 1 MG/1
1 TABLET, FILM COATED ORAL NIGHTLY
Status: DISCONTINUED | OUTPATIENT
Start: 2019-06-27 | End: 2019-07-03 | Stop reason: HOSPADM

## 2019-06-27 RX ORDER — ACETAMINOPHEN 325 MG/1
650 TABLET ORAL EVERY 4 HOURS PRN
Status: DISCONTINUED | OUTPATIENT
Start: 2019-06-27 | End: 2019-06-27

## 2019-06-27 RX ORDER — PANTOPRAZOLE SODIUM 40 MG/10ML
40 INJECTION, POWDER, LYOPHILIZED, FOR SOLUTION INTRAVENOUS DAILY
Status: DISCONTINUED | OUTPATIENT
Start: 2019-06-27 | End: 2019-07-02

## 2019-06-27 RX ADMIN — LIDOCAINE HYDROCHLORIDE 15 ML: 20 SOLUTION ORAL; TOPICAL at 21:50

## 2019-06-27 RX ADMIN — PANTOPRAZOLE SODIUM 40 MG: 40 INJECTION, POWDER, FOR SOLUTION INTRAVENOUS at 22:00

## 2019-06-27 RX ADMIN — POTASSIUM CHLORIDE: 2 INJECTION, SOLUTION, CONCENTRATE INTRAVENOUS at 22:08

## 2019-06-27 RX ADMIN — MORPHINE SULFATE 4 MG: 4 INJECTION INTRAVENOUS at 14:54

## 2019-06-27 RX ADMIN — SUCRALFATE 0.5 G: 1 TABLET ORAL at 21:58

## 2019-06-27 RX ADMIN — Medication 10 ML: at 22:08

## 2019-06-27 RX ADMIN — ONDANSETRON 4 MG: 2 INJECTION INTRAMUSCULAR; INTRAVENOUS at 14:54

## 2019-06-27 RX ADMIN — SODIUM CHLORIDE 1000 ML: 9 INJECTION, SOLUTION INTRAVENOUS at 14:54

## 2019-06-27 RX ADMIN — MORPHINE SULFATE 4 MG: 4 INJECTION INTRAVENOUS at 17:38

## 2019-06-27 RX ADMIN — Medication 10 ML: at 22:02

## 2019-06-27 ASSESSMENT — PAIN SCALES - GENERAL
PAINLEVEL_OUTOF10: 5
PAINLEVEL_OUTOF10: 8
PAINLEVEL_OUTOF10: 10

## 2019-06-27 ASSESSMENT — PAIN DESCRIPTION - FREQUENCY: FREQUENCY: CONTINUOUS

## 2019-06-27 ASSESSMENT — PAIN DESCRIPTION - PROGRESSION: CLINICAL_PROGRESSION: NOT CHANGED

## 2019-06-27 ASSESSMENT — PAIN DESCRIPTION - PAIN TYPE: TYPE: ACUTE PAIN

## 2019-06-27 ASSESSMENT — PAIN DESCRIPTION - ORIENTATION: ORIENTATION: RIGHT;OUTER

## 2019-06-27 ASSESSMENT — PAIN - FUNCTIONAL ASSESSMENT: PAIN_FUNCTIONAL_ASSESSMENT: PREVENTS OR INTERFERES SOME ACTIVE ACTIVITIES AND ADLS

## 2019-06-27 ASSESSMENT — PAIN DESCRIPTION - DESCRIPTORS: DESCRIPTORS: CONSTANT;CRAMPING;TENDER

## 2019-06-27 ASSESSMENT — PAIN DESCRIPTION - ONSET: ONSET: ON-GOING

## 2019-06-27 ASSESSMENT — PAIN DESCRIPTION - LOCATION: LOCATION: ABDOMEN

## 2019-06-27 NOTE — ED PROVIDER NOTES
radiology results have been personally reviewed by myself   LABS:  Results for orders placed or performed during the hospital encounter of 06/27/19   CBC Auto Differential   Result Value Ref Range    WBC 12.2 (H) 4.5 - 11.5 E9/L    RBC 4.62 3.50 - 5.50 E12/L    Hemoglobin 13.6 11.5 - 15.5 g/dL    Hematocrit 42.7 34.0 - 48.0 %    MCV 92.4 80.0 - 99.9 fL    MCH 29.4 26.0 - 35.0 pg    MCHC 31.9 (L) 32.0 - 34.5 %    RDW 13.8 11.5 - 15.0 fL    Platelets 881 001 - 662 E9/L    MPV 10.0 7.0 - 12.0 fL    Neutrophils % 82.7 (H) 43.0 - 80.0 %    Immature Granulocytes % 0.5 0.0 - 5.0 %    Lymphocytes % 10.9 (L) 20.0 - 42.0 %    Monocytes % 5.3 2.0 - 12.0 %    Eosinophils % 0.2 0.0 - 6.0 %    Basophils % 0.4 0.0 - 2.0 %    Neutrophils # 10.05 (H) 1.80 - 7.30 E9/L    Immature Granulocytes # 0.06 E9/L    Lymphocytes # 1.33 (L) 1.50 - 4.00 E9/L    Monocytes # 0.64 0.10 - 0.95 E9/L    Eosinophils # 0.03 (L) 0.05 - 0.50 E9/L    Basophils # 0.05 0.00 - 0.20 E9/L   Comprehensive Metabolic Panel w/ Reflex to MG   Result Value Ref Range    Sodium 138 132 - 146 mmol/L    Potassium reflex Magnesium 3.8 3.5 - 5.0 mmol/L    Chloride 97 (L) 98 - 107 mmol/L    CO2 26 22 - 29 mmol/L    Anion Gap 15 7 - 16 mmol/L    Glucose 110 (H) 74 - 99 mg/dL    BUN 10 8 - 23 mg/dL    CREATININE 0.8 0.5 - 1.0 mg/dL    GFR Non-African American >60 >=60 mL/min/1.73    GFR African American >60     Calcium 9.4 8.6 - 10.2 mg/dL    Total Protein 8.1 6.4 - 8.3 g/dL    Alb 5.1 3.5 - 5.2 g/dL    Total Bilirubin 0.4 0.0 - 1.2 mg/dL    Alkaline Phosphatase 98 35 - 104 U/L    ALT 21 0 - 32 U/L    AST 30 0 - 31 U/L   Lipase   Result Value Ref Range    Lipase 26 13 - 60 U/L   Troponin   Result Value Ref Range    Troponin <0.01 0.00 - 0.03 ng/mL   Urinalysis, reflex to microscopic   Result Value Ref Range    Color, UA Yellow Straw/Yellow    Clarity, UA Clear Clear    Glucose, Ur Negative Negative mg/dL    Bilirubin Urine Negative Negative    Ketones, Urine Negative Negative and oriented x3, obese, well appearing, non toxic in NAD but does appear uncomfortable  Head: Normocephalic and atraumatic  Eyes: PERRL, EOMI  Mouth: Oropharynx clear, handling secretions, no trismus  Neck: Supple, full ROM,   Pulmonary: Lungs clear to auscultation bilaterally, no wheezes, rales, or rhonchi. Not in respiratory distress  Cardiovascular: Tachycardia, regular rhythm, no murmurs, gallops, or rubs. 2+ distal pulses  Abdomen: Soft, nondistended. Significant tenderness to palpation throughout the lower abdomen with voluntary guarding but no rebound or rigidity. Positive bowel sounds in all 4 quadrants. No pulsatile masses. Extremities: Moves all extremities x 4. Warm and well perfused  Skin: warm and dry without rash  Neurologic: GCS 15,  Psych: Normal Affect      ------------------------------ ED COURSE/MEDICAL DECISION MAKING----------------------  Medications   0.9 % sodium chloride IV bolus 1,000 mL (0 mLs Intravenous Stopped 6/27/19 1535)   morphine injection 4 mg (4 mg Intravenous Given 6/27/19 1454)   ondansetron (ZOFRAN) injection 4 mg (4 mg Intravenous Given 6/27/19 1454)   morphine injection 4 mg (4 mg Intravenous Given 6/27/19 1738)         ED COURSE:  ED Course as of Jun 27 1742   Thu Jun 27, 2019   1718 Spoke with Dr. Jose J Hamilton, the patient's surgeon. He states that if there are no acute abnormalities on CT but she is having intractable pain he will admit her for further evaluation. Patient will be given more analgesia. Urine specimen has now been provided. [BM]   1736 Patient reassessed. She states that she still having significant pain in her lower abdomen and it is only minimally better after the first dose of morphine. Patient will be given a second dose of morphine and unless she is significantly better, she will be admitted for further management.     [BM]      ED Course User Index  [BM] Collette Torres DO       Medical Decision Making:    Patient presents with 2 days of lower abdominal pain associated with vomiting and diarrhea which became significantly worse this morning. She has had perforated diverticulitis before and she is worried that she may have the same again. She called her surgeon who instructed her to come to the emergency department. Patient does have significant tenderness throughout the lower abdomen with voluntary guarding but no rebound or rigidity. She is hypertensive, tachycardic, and tachypneic likely secondary to pain. She is afebrile. Patient will have blood work drawn as well as a CT scan of the abdomen/pelvis without contrast due to an allergy to IV dye. She will have blood cultures drawn and be given IV fluid, analgesia, and antiemetic. Counseling: The emergency provider has spoken with the patient and discussed todays results, in addition to providing specific details for the plan of care and counseling regarding the diagnosis and prognosis. Questions are answered at this time and they are agreeable with the plan.      --------------------------------- IMPRESSION AND DISPOSITION ---------------------------------    IMPRESSION  1.  Lower abdominal pain Inadequately Controlled       DISPOSITION  Disposition: Admit to med/surg floor  Patient condition is stable       Clotilde Cooper DO  Resident  06/27/19 9892

## 2019-06-28 ENCOUNTER — APPOINTMENT (OUTPATIENT)
Dept: CT IMAGING | Age: 64
DRG: 392 | End: 2019-06-28
Payer: MEDICARE

## 2019-06-28 PROBLEM — E44.0 MODERATE PROTEIN-CALORIE MALNUTRITION (HCC): Status: ACTIVE | Noted: 2019-06-28

## 2019-06-28 LAB
ANION GAP SERPL CALCULATED.3IONS-SCNC: 10 MMOL/L (ref 7–16)
BUN BLDV-MCNC: 9 MG/DL (ref 8–23)
CALCIUM SERPL-MCNC: 8.3 MG/DL (ref 8.6–10.2)
CHLORIDE BLD-SCNC: 103 MMOL/L (ref 98–107)
CO2: 27 MMOL/L (ref 22–29)
CREAT SERPL-MCNC: 0.8 MG/DL (ref 0.5–1)
EKG ATRIAL RATE: 91 BPM
EKG P AXIS: 68 DEGREES
EKG P-R INTERVAL: 114 MS
EKG Q-T INTERVAL: 410 MS
EKG QRS DURATION: 92 MS
EKG QTC CALCULATION (BAZETT): 504 MS
EKG R AXIS: 51 DEGREES
EKG T AXIS: 75 DEGREES
EKG VENTRICULAR RATE: 91 BPM
GFR AFRICAN AMERICAN: >60
GFR NON-AFRICAN AMERICAN: >60 ML/MIN/1.73
GLUCOSE BLD-MCNC: 117 MG/DL (ref 74–99)
HCT VFR BLD CALC: 37.8 % (ref 34–48)
HEMOGLOBIN: 11.7 G/DL (ref 11.5–15.5)
LACTIC ACID: 0.8 MMOL/L (ref 0.5–2.2)
MCH RBC QN AUTO: 29.5 PG (ref 26–35)
MCHC RBC AUTO-ENTMCNC: 31 % (ref 32–34.5)
MCV RBC AUTO: 95.2 FL (ref 80–99.9)
PDW BLD-RTO: 14 FL (ref 11.5–15)
PLATELET # BLD: 258 E9/L (ref 130–450)
PMV BLD AUTO: 9.7 FL (ref 7–12)
POTASSIUM SERPL-SCNC: 3.7 MMOL/L (ref 3.5–5)
RBC # BLD: 3.97 E12/L (ref 3.5–5.5)
SODIUM BLD-SCNC: 140 MMOL/L (ref 132–146)
TOTAL CK: 37 U/L (ref 20–180)
URINE CULTURE, ROUTINE: NORMAL
WBC # BLD: 8.9 E9/L (ref 4.5–11.5)

## 2019-06-28 PROCEDURE — 99223 1ST HOSP IP/OBS HIGH 75: CPT | Performed by: SURGERY

## 2019-06-28 PROCEDURE — 74176 CT ABD & PELVIS W/O CONTRAST: CPT

## 2019-06-28 PROCEDURE — 6360000004 HC RX CONTRAST MEDICATION: Performed by: RADIOLOGY

## 2019-06-28 PROCEDURE — 83605 ASSAY OF LACTIC ACID: CPT

## 2019-06-28 PROCEDURE — 36415 COLL VENOUS BLD VENIPUNCTURE: CPT

## 2019-06-28 PROCEDURE — 93010 ELECTROCARDIOGRAM REPORT: CPT | Performed by: INTERNAL MEDICINE

## 2019-06-28 PROCEDURE — 6370000000 HC RX 637 (ALT 250 FOR IP): Performed by: SURGERY

## 2019-06-28 PROCEDURE — 1200000000 HC SEMI PRIVATE

## 2019-06-28 PROCEDURE — 6370000000 HC RX 637 (ALT 250 FOR IP): Performed by: INTERNAL MEDICINE

## 2019-06-28 PROCEDURE — 6360000002 HC RX W HCPCS: Performed by: INTERNAL MEDICINE

## 2019-06-28 PROCEDURE — 94640 AIRWAY INHALATION TREATMENT: CPT

## 2019-06-28 PROCEDURE — 2580000003 HC RX 258: Performed by: SURGERY

## 2019-06-28 PROCEDURE — 2580000003 HC RX 258: Performed by: INTERNAL MEDICINE

## 2019-06-28 PROCEDURE — 85027 COMPLETE CBC AUTOMATED: CPT

## 2019-06-28 PROCEDURE — 94664 DEMO&/EVAL PT USE INHALER: CPT

## 2019-06-28 PROCEDURE — 82550 ASSAY OF CK (CPK): CPT

## 2019-06-28 PROCEDURE — 80048 BASIC METABOLIC PNL TOTAL CA: CPT

## 2019-06-28 PROCEDURE — 6360000002 HC RX W HCPCS: Performed by: SURGERY

## 2019-06-28 PROCEDURE — C9113 INJ PANTOPRAZOLE SODIUM, VIA: HCPCS | Performed by: SURGERY

## 2019-06-28 RX ORDER — MORPHINE SULFATE 2 MG/ML
2 INJECTION, SOLUTION INTRAMUSCULAR; INTRAVENOUS EVERY 4 HOURS PRN
Status: DISCONTINUED | OUTPATIENT
Start: 2019-06-28 | End: 2019-07-03 | Stop reason: HOSPADM

## 2019-06-28 RX ORDER — KETOROLAC TROMETHAMINE 15 MG/ML
15 INJECTION, SOLUTION INTRAMUSCULAR; INTRAVENOUS EVERY 6 HOURS
Status: DISCONTINUED | OUTPATIENT
Start: 2019-06-28 | End: 2019-07-02

## 2019-06-28 RX ORDER — ACETAMINOPHEN 650 MG/1
650 SUPPOSITORY RECTAL EVERY 4 HOURS PRN
Status: DISCONTINUED | OUTPATIENT
Start: 2019-06-28 | End: 2019-07-03 | Stop reason: HOSPADM

## 2019-06-28 RX ADMIN — MOMETASONE FUROATE AND FORMOTEROL FUMARATE DIHYDRATE 2 PUFF: 100; 5 AEROSOL RESPIRATORY (INHALATION) at 05:52

## 2019-06-28 RX ADMIN — SUCRALFATE 0.5 G: 1 TABLET ORAL at 06:45

## 2019-06-28 RX ADMIN — MEROPENEM 1 G: 1 INJECTION, POWDER, FOR SOLUTION INTRAVENOUS at 18:18

## 2019-06-28 RX ADMIN — PANTOPRAZOLE SODIUM 40 MG: 40 INJECTION, POWDER, FOR SOLUTION INTRAVENOUS at 07:49

## 2019-06-28 RX ADMIN — LIDOCAINE HYDROCHLORIDE 10 ML: 20 SOLUTION ORAL; TOPICAL at 03:43

## 2019-06-28 RX ADMIN — Medication 10 ML: at 07:49

## 2019-06-28 RX ADMIN — ROPINIROLE HYDROCHLORIDE 1 MG: 1 TABLET, FILM COATED ORAL at 21:32

## 2019-06-28 RX ADMIN — MORPHINE SULFATE 2 MG: 2 INJECTION, SOLUTION INTRAMUSCULAR; INTRAVENOUS at 10:24

## 2019-06-28 RX ADMIN — KETOROLAC TROMETHAMINE 15 MG: 15 INJECTION, SOLUTION INTRAMUSCULAR; INTRAVENOUS at 18:18

## 2019-06-28 RX ADMIN — LISINOPRIL 10 MG: 10 TABLET ORAL at 07:49

## 2019-06-28 RX ADMIN — POTASSIUM CHLORIDE: 2 INJECTION, SOLUTION, CONCENTRATE INTRAVENOUS at 06:47

## 2019-06-28 RX ADMIN — Medication 10 ML: at 07:50

## 2019-06-28 RX ADMIN — MOMETASONE FUROATE AND FORMOTEROL FUMARATE DIHYDRATE 2 PUFF: 100; 5 AEROSOL RESPIRATORY (INHALATION) at 19:07

## 2019-06-28 RX ADMIN — IOHEXOL 50 ML: 240 INJECTION, SOLUTION INTRATHECAL; INTRAVASCULAR; INTRAVENOUS; ORAL at 15:44

## 2019-06-28 RX ADMIN — ROPINIROLE HYDROCHLORIDE 1 MG: 1 TABLET, FILM COATED ORAL at 00:52

## 2019-06-28 RX ADMIN — MEROPENEM 1 G: 1 INJECTION, POWDER, FOR SOLUTION INTRAVENOUS at 12:17

## 2019-06-28 ASSESSMENT — PAIN DESCRIPTION - PAIN TYPE
TYPE: ACUTE PAIN
TYPE: ACUTE PAIN

## 2019-06-28 ASSESSMENT — PAIN DESCRIPTION - LOCATION
LOCATION: ABDOMEN
LOCATION: ABDOMEN

## 2019-06-28 ASSESSMENT — PAIN SCALES - GENERAL
PAINLEVEL_OUTOF10: 0
PAINLEVEL_OUTOF10: 0
PAINLEVEL_OUTOF10: 7
PAINLEVEL_OUTOF10: 4
PAINLEVEL_OUTOF10: 8
PAINLEVEL_OUTOF10: 5
PAINLEVEL_OUTOF10: 6
PAINLEVEL_OUTOF10: 5

## 2019-06-28 ASSESSMENT — PAIN DESCRIPTION - DESCRIPTORS: DESCRIPTORS: CONSTANT;CRAMPING;TENDER

## 2019-06-28 ASSESSMENT — PAIN DESCRIPTION - ORIENTATION: ORIENTATION: RIGHT;OUTER

## 2019-06-28 ASSESSMENT — PAIN DESCRIPTION - PROGRESSION: CLINICAL_PROGRESSION: GRADUALLY WORSENING

## 2019-06-28 ASSESSMENT — PAIN - FUNCTIONAL ASSESSMENT: PAIN_FUNCTIONAL_ASSESSMENT: PREVENTS OR INTERFERES SOME ACTIVE ACTIVITIES AND ADLS

## 2019-06-28 ASSESSMENT — PAIN DESCRIPTION - ONSET: ONSET: ON-GOING

## 2019-06-28 ASSESSMENT — PAIN DESCRIPTION - FREQUENCY: FREQUENCY: CONTINUOUS

## 2019-06-28 NOTE — CONSULTS
radius fracture 2015    Fracture of left olecranon process 1/10/2017    GERD (gastroesophageal reflux disease)     History of blood transfusion     x2    Hypertension     Impingement syndrome of shoulder 2014    Intractable abdominal pain 2018    Left carpal tunnel syndrome 2017    Nasal bones, closed fracture, closed, initial encounter 2015    Osteoarthritis     Peptic ulcer, unspecified site, unspecified as acute or chronic, without mention of hemorrhage or perforation     RLS (restless legs syndrome)     Rotator cuff tear 2014    Seizure ([address]     hasn't had one for 6 years    Sepsis ([address]     Spinal stenosis      Past Surgical History:   Procedure Laterality Date    ABDOMEN SURGERY      ABSCESS DRAINAGE Left 2016    left thigh seroma    APPENDECTOMY     [facility]    right hand    CARPAL TUNNEL RELEASE Left 2017     SECTION  3/9/1984    CHOLECYSTECTOMY  1986    CHOLECYSTECTOMY      COLONOSCOPY  3/2011    DILATATION, ESOPHAGUS      DILATION AND CURETTAGE OF UTERUS      x4    ENDOSCOPY, COLON, DIAGNOSTIC      JOINT REPLACEMENT  2003    right    JOINT REPLACEMENT  2003    left knee    JOINT REPLACEMENT      bilat knee replacement    KNEE ARTHROSCOPY      bilateral    OTHER SURGICAL HISTORY  16    closed reduction nasal bone fracture    OTHER SURGICAL HISTORY Left 6 2 16    seroma incision and drainage thigh    MT LAP,DIAGNOSTIC ABDOMEN N/A 2018    DIAGNOSTIC LAPAROSCOPY, LYSIS OF ADHESIONS performed by Karrie Gomes MD at [address] [place] [address]  2005    Dr. Douglas Many ARTHROSCOPY Right 1 2 15    rotator cuff repair, subacromial decompression, debridement    SHOULDER SURGERY      left    TONSILLECTOMY      UPPER GASTROINTESTINAL ENDOSCOPY  10/2004    UPPER [facility]INTESTINAL ENDOSCOPY  12    [facility]    UPPER GASTROINTESTINAL ENDOSCOPY N/A 11/20/2018    EGD BIOPSY performed by Laurent Sever, MD at [facility] N/A 1/2/2019    EGD ESOPHAGOGASTRODUODENOSCOPY performed by Beba Gastelum MD at [address] [place] [address] ARTHROSCOPY Right 12/02/2015    DEBRIDEMENT ASPIRATION; RIGHT DEQUARVAINES RELEASE    WRIST SURGERY Right 12/2/15     Family History   Problem Relation Age of Onset    Cancer Mother     Heart Disease Sister     Emphysema Father        Home Medications  Prior to Admission medications    Medication Sig Start Date End Date Taking? Authorizing Provider   lisinopril (PRINIVIL;ZESTRIL) 10 MG tablet Take 10 mg by mouth daily   Yes Historical Provider, MD Voss Somerton 100-5 MCG/ACT inhaler 2 puffs 2 times daily  2/1/19  Yes Historical Provider, MD   rOPINIRole (REQUIP) 1 MG tablet Take 1 mg by mouth nightly   Yes Historical Provider, MD   esomeprazole (NEXIUM) 40 MG delayed release capsule Take 40 mg by mouth 2 times daily    Yes Historical Provider, MD   sucralfate (CARAFATE) 1 GM/10ML suspension Take 1 g by mouth 3 times daily (with meals)   Yes Historical Provider, MD   albuterol (PROVENTIL) (5 MG/ML) 0.5% nebulizer solution Take 0.5 mLs by nebulization every 6 hours as needed for Wheezing or Shortness of Breath 1/30/18   Keely Turner DO   ondansetron (ZOFRAN-ODT) 4 MG disintegrating tablet Take 1 tablet by mouth every 8 hours as needed for Nausea or Vomiting 7/20/16   Rosalva Griffin MD       Allergies  Ceclor [cefaclor]; Diprivan [propofol]; Naproxen; Adhesive tape; Blueberry flavor; Ibuprofen; Mirapex [pramipexole]; Shellfish-derived products; Vaccinium angustifolium; Blueberry fruit extract; Ceclor [cefaclor]; Iodides; Iv dye [iodides]; Naprosyn [naproxen]; Percocet [oxycodone-acetaminophen]; Phenergan [promethazine hcl]; Promethazine; Sulfa antibiotics;  Tape [place] tape]; and Tetanus toxoids    Social Hx  Social History     Socioeconomic History    Marital status:      Spouse name: jenniferjuanito    suspicious lytic or blastic osseous lesion. There is lower lumbar spondylosis with grade 1 anterolisthesis of L4 on L5. Soft tissues: Unremarkable. 1. No acute intra-abdominal or pelvic pathology. 2. Postsurgical changes from prior gastric bypass surgery. Assessment and Plan  Patient is a 75 y.o. female who presented with abdominal pain. The active problem list is as follows:    · Intractable abdominal pain   · History of abdominal adhesions and possible perforated diverticulitis  · Status post remote Macario-en-Y gastric bypass  · Non-oxygen-dependent COPD with chronic respiratory failure  · Essential hypertension  · Restless leg syndrome,  · Gastroesophageal reflux disease  · History of tobacco abuse  · Obesity Class III    -NPO  -Reordered home meds  -Continue current plan of care    · Routine labs in the morning. · DVT prophylaxis. SCD  · Please see orders for further management and care. The plan to be discussed with Dr. Mj Agrawal.     Charlie Persaud DO,  PGYII  9:52 PM  6/27/2019

## 2019-06-28 NOTE — CARE COORDINATION
Ss note:6/28/2019.11:20 AM consult noted for advanced directives. SW met with pt and her spouse is present in rm, he is hard of hearing. Pt relays she does not feel well. SW left advanced directive packet with pt and aware to contact sw if they want to complete forms during hospital stay. Pt resides with spouse, has a cane, no other DME. They share household duties, eat out a lot, a dtr resides in [address] [place] [address]. No hx of hhc or SNF, hx of outpt therapy after knee replacements, uses [facility] on [address]. Pt does NOT have a PCP, seen Dr. Anh Whitaker in the past. Provided list of PCPs for pt to review. SW will follow.  Ivanna Angela, IKERRA

## 2019-06-28 NOTE — H&P
Blaine Knight  2019      Surgical Consult    CHIEF COMPLAINT:  Lower abdominal pain    HISTORY OF PRESENT ILLNESS:  Blaine Knight is a 75 y.o.  female with one day of severe lower abdominal pain and diarrhea. Labs normal, CT without oral contrast normal. She had perforated diverticulitis last year with a normal CT scan which she says is the same pain she is having now. Started on Meropenum and IV fluids. No epigastric pain, no improvement with Viscus lidocaine. Developed hives in the ER after receiving morphine. Weight: 210 lb (95.3 kg). 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     Past Medical History: She has a past medical history of Anemia, Anxiety, Asthma, Biceps tendon tear, Closed fracture of multiple ribs of right side, Closed fracture of nasal bone, Collapsed lung, COPD (chronic obstructive pulmonary disease) ([address] DDD (degenerative disc disease), lumbar, Degenerative disc disease at L5-S1 level, Depression, Distal radius fracture, Fracture of left olecranon process, GERD (gastroesophageal reflux disease), History of blood transfusion, Hypertension, Impingement syndrome of shoulder, Intractable abdominal pain, Left carpal tunnel syndrome, Nasal bones, closed fracture, closed, initial encounter, Osteoarthritis, Peptic ulcer, unspecified site, unspecified as acute or chronic, without mention of hemorrhage or perforation, RLS (restless legs syndrome), Rotator cuff tear, Seizure ([address] Sepsis ([address] and Spinal stenosis. Past Surgical History: She has a past surgical history that includes Tonsillectomy (); Carpal tunnel release (); shoulder surgery (); Knee arthroscopy (); Dilation and curettage of uterus; Macario-en-Y Gastric Bypass (2005);  section (3/9/1984); Cholecystectomy (); Colonoscopy (3/2011); Upper gastrointestinal endoscopy (10/2004); Upper gastrointestinal endoscopy (12); Appendectomy;  Endoscopy, colon, diagnostic; months ago. Her smoking use included cigarettes. She started smoking about 14 years ago. She has never used smokeless tobacco.  ETOH:    reports that she does not drink alcohol. Problem Relation Age of Onset    Cancer Mother     Heart Disease Sister     Emphysema Father         REVIEW OF SYSTEMS:  Constitutional: negative  Respiratory: negative  Cardiovascular: negative  Gastrointestinal: negative  Musculoskeletal:negative  Behavioral/Psych: negative  All others reviewed, negative    Recent Labs     06/27/19  1455 06/28/19  0734   WBC 12.2* 8.9   HGB 13.6 11.7    258    140   CL 97* 103   K 3.8 3.7   BUN 10 9   CREATININE 0.8 0.8   GLUCOSE 110* 117*   LABALBU 5.1  --    PROT 8.1  --    CALCIUM 9.4 8.3*   BILITOT 0.4  --    ALKPHOS 98  --    AST 30  --    ALT 21  --        Physical exam:   VITALS:  Blood pressure (!) 160/79, pulse 89, temperature 98.8 °F (37.1 °C), temperature source Oral, resp. rate 18, height 5' (1.524 m), weight 230 lb 1.6 oz (104.4 kg), SpO2 94 %, not currently breastfeeding. General appearance: alert, appears stated age and cooperative  Head: Normocephalic, without obvious abnormality, atraumatic  Eyes: PERRL, EOMI  Neck: no adenopathy, no carotid bruit, no JVD, supple, symmetrical, trachea midline and thyroid not enlarged, symmetric, no tenderness/mass/nodules  Lungs: clear to auscultation bilaterally  Heart: regular rate and rhythm,   Abdomen: soft,  Tender mostly RLQ; bowel sounds normal; no hernias palpable  Extremities: extremities normal, atraumatic, no cyanosis or edema    ASSESSMENT: right lower abdominal pain and diarrhea, normal CT scan and normal WBC. Currently on broad IV antibiotics    PLAN:   Repeat CT scan with oral contrast to look for sign of perforation or inflammation. Continue IV antibiotics, will add oral flagyl. Toradol for pain since she developed hives after receiving morphine. Signed: Dr. Xavier Blancas M.D.     Send copy of H&P to PCP, No primary care provider on file.

## 2019-06-28 NOTE — PLAN OF CARE
Problem: Inadequate energy intake (NI-1.4)  Goal: Food and/or Nutrient Delivery  Description  Individualized approach for food/nutrient provision.   6/28/2019 1105 by Lulu Bonilla MS, TARAN, MARTHA  Outcome: Ongoing  6/28/2019 1104 by Lulu Bonilla MS, TARAN, MARTHA  Outcome: Met This Shift

## 2019-06-29 LAB
ANION GAP SERPL CALCULATED.3IONS-SCNC: 12 MMOL/L (ref 7–16)
BASOPHILS ABSOLUTE: 0.03 E9/L (ref 0–0.2)
BASOPHILS RELATIVE PERCENT: 0.3 % (ref 0–2)
BUN BLDV-MCNC: 9 MG/DL (ref 8–23)
CALCIUM SERPL-MCNC: 7.8 MG/DL (ref 8.6–10.2)
CHLORIDE BLD-SCNC: 101 MMOL/L (ref 98–107)
CO2: 24 MMOL/L (ref 22–29)
CREAT SERPL-MCNC: 0.7 MG/DL (ref 0.5–1)
EOSINOPHILS ABSOLUTE: 0.02 E9/L (ref 0.05–0.5)
EOSINOPHILS RELATIVE PERCENT: 0.2 % (ref 0–6)
GFR AFRICAN AMERICAN: >60
GFR NON-AFRICAN AMERICAN: >60 ML/MIN/1.73
GLUCOSE BLD-MCNC: 100 MG/DL (ref 74–99)
HCT VFR BLD CALC: 34.4 % (ref 34–48)
HEMOGLOBIN: 10.9 G/DL (ref 11.5–15.5)
IMMATURE GRANULOCYTES #: 0.08 E9/L
IMMATURE GRANULOCYTES %: 0.8 % (ref 0–5)
LYMPHOCYTES ABSOLUTE: 0.81 E9/L (ref 1.5–4)
LYMPHOCYTES RELATIVE PERCENT: 7.7 % (ref 20–42)
MCH RBC QN AUTO: 29.5 PG (ref 26–35)
MCHC RBC AUTO-ENTMCNC: 31.7 % (ref 32–34.5)
MCV RBC AUTO: 93.2 FL (ref 80–99.9)
MONOCYTES ABSOLUTE: 0.8 E9/L (ref 0.1–0.95)
MONOCYTES RELATIVE PERCENT: 7.6 % (ref 2–12)
NEUTROPHILS ABSOLUTE: 8.79 E9/L (ref 1.8–7.3)
NEUTROPHILS RELATIVE PERCENT: 83.4 % (ref 43–80)
PDW BLD-RTO: 14.2 FL (ref 11.5–15)
PLATELET # BLD: 227 E9/L (ref 130–450)
PMV BLD AUTO: 9.8 FL (ref 7–12)
POTASSIUM SERPL-SCNC: 3.4 MMOL/L (ref 3.5–5)
RBC # BLD: 3.69 E12/L (ref 3.5–5.5)
SODIUM BLD-SCNC: 137 MMOL/L (ref 132–146)
WBC # BLD: 10.5 E9/L (ref 4.5–11.5)

## 2019-06-29 PROCEDURE — 99232 SBSQ HOSP IP/OBS MODERATE 35: CPT | Performed by: SURGERY

## 2019-06-29 PROCEDURE — 87324 CLOSTRIDIUM AG IA: CPT

## 2019-06-29 PROCEDURE — 6360000002 HC RX W HCPCS: Performed by: INTERNAL MEDICINE

## 2019-06-29 PROCEDURE — 6370000000 HC RX 637 (ALT 250 FOR IP): Performed by: INTERNAL MEDICINE

## 2019-06-29 PROCEDURE — 94640 AIRWAY INHALATION TREATMENT: CPT

## 2019-06-29 PROCEDURE — 2580000003 HC RX 258: Performed by: SURGERY

## 2019-06-29 PROCEDURE — 85025 COMPLETE CBC W/AUTO DIFF WBC: CPT

## 2019-06-29 PROCEDURE — C9113 INJ PANTOPRAZOLE SODIUM, VIA: HCPCS | Performed by: SURGERY

## 2019-06-29 PROCEDURE — 80048 BASIC METABOLIC PNL TOTAL CA: CPT

## 2019-06-29 PROCEDURE — 1200000000 HC SEMI PRIVATE

## 2019-06-29 PROCEDURE — 2580000003 HC RX 258: Performed by: INTERNAL MEDICINE

## 2019-06-29 PROCEDURE — 36415 COLL VENOUS BLD VENIPUNCTURE: CPT

## 2019-06-29 PROCEDURE — 6360000002 HC RX W HCPCS: Performed by: SURGERY

## 2019-06-29 PROCEDURE — 6370000000 HC RX 637 (ALT 250 FOR IP): Performed by: SURGERY

## 2019-06-29 PROCEDURE — 87045 FECES CULTURE AEROBIC BACT: CPT

## 2019-06-29 RX ORDER — METRONIDAZOLE 500 MG/1
500 TABLET ORAL EVERY 8 HOURS SCHEDULED
Status: DISCONTINUED | OUTPATIENT
Start: 2019-06-29 | End: 2019-07-03 | Stop reason: HOSPADM

## 2019-06-29 RX ADMIN — METRONIDAZOLE 500 MG: 500 TABLET ORAL at 22:27

## 2019-06-29 RX ADMIN — PANTOPRAZOLE SODIUM 40 MG: 40 INJECTION, POWDER, FOR SOLUTION INTRAVENOUS at 07:58

## 2019-06-29 RX ADMIN — KETOROLAC TROMETHAMINE 15 MG: 15 INJECTION, SOLUTION INTRAMUSCULAR; INTRAVENOUS at 23:42

## 2019-06-29 RX ADMIN — MEROPENEM 1 G: 1 INJECTION, POWDER, FOR SOLUTION INTRAVENOUS at 18:37

## 2019-06-29 RX ADMIN — KETOROLAC TROMETHAMINE 15 MG: 15 INJECTION, SOLUTION INTRAMUSCULAR; INTRAVENOUS at 05:59

## 2019-06-29 RX ADMIN — POTASSIUM CHLORIDE: 2 INJECTION, SOLUTION, CONCENTRATE INTRAVENOUS at 15:41

## 2019-06-29 RX ADMIN — MORPHINE SULFATE 2 MG: 2 INJECTION, SOLUTION INTRAMUSCULAR; INTRAVENOUS at 12:07

## 2019-06-29 RX ADMIN — POTASSIUM CHLORIDE: 2 INJECTION, SOLUTION, CONCENTRATE INTRAVENOUS at 06:28

## 2019-06-29 RX ADMIN — MORPHINE SULFATE 2 MG: 2 INJECTION, SOLUTION INTRAMUSCULAR; INTRAVENOUS at 07:59

## 2019-06-29 RX ADMIN — LISINOPRIL 10 MG: 10 TABLET ORAL at 07:58

## 2019-06-29 RX ADMIN — MOMETASONE FUROATE AND FORMOTEROL FUMARATE DIHYDRATE 2 PUFF: 100; 5 AEROSOL RESPIRATORY (INHALATION) at 06:04

## 2019-06-29 RX ADMIN — ROPINIROLE HYDROCHLORIDE 1 MG: 1 TABLET, FILM COATED ORAL at 20:56

## 2019-06-29 RX ADMIN — MEROPENEM 1 G: 1 INJECTION, POWDER, FOR SOLUTION INTRAVENOUS at 04:05

## 2019-06-29 RX ADMIN — MORPHINE SULFATE 2 MG: 2 INJECTION, SOLUTION INTRAMUSCULAR; INTRAVENOUS at 20:55

## 2019-06-29 RX ADMIN — Medication 10 ML: at 07:59

## 2019-06-29 RX ADMIN — KETOROLAC TROMETHAMINE 15 MG: 15 INJECTION, SOLUTION INTRAMUSCULAR; INTRAVENOUS at 11:13

## 2019-06-29 RX ADMIN — MORPHINE SULFATE 2 MG: 2 INJECTION, SOLUTION INTRAMUSCULAR; INTRAVENOUS at 16:44

## 2019-06-29 RX ADMIN — MEROPENEM 1 G: 1 INJECTION, POWDER, FOR SOLUTION INTRAVENOUS at 11:13

## 2019-06-29 RX ADMIN — POTASSIUM CHLORIDE: 2 INJECTION, SOLUTION, CONCENTRATE INTRAVENOUS at 23:42

## 2019-06-29 RX ADMIN — MOMETASONE FUROATE AND FORMOTEROL FUMARATE DIHYDRATE 2 PUFF: 100; 5 AEROSOL RESPIRATORY (INHALATION) at 16:57

## 2019-06-29 RX ADMIN — Medication 10 ML: at 16:44

## 2019-06-29 RX ADMIN — KETOROLAC TROMETHAMINE 15 MG: 15 INJECTION, SOLUTION INTRAMUSCULAR; INTRAVENOUS at 00:32

## 2019-06-29 RX ADMIN — Medication 10 ML: at 18:39

## 2019-06-29 ASSESSMENT — PAIN DESCRIPTION - ORIENTATION
ORIENTATION: LEFT;RIGHT
ORIENTATION: RIGHT;LEFT
ORIENTATION: LEFT;RIGHT

## 2019-06-29 ASSESSMENT — PAIN DESCRIPTION - PAIN TYPE
TYPE: ACUTE PAIN

## 2019-06-29 ASSESSMENT — PAIN DESCRIPTION - DESCRIPTORS
DESCRIPTORS: ACHING;DISCOMFORT;SORE
DESCRIPTORS: ACHING;CONSTANT;DISCOMFORT
DESCRIPTORS: ACHING;CONSTANT;DISCOMFORT
DESCRIPTORS: ACHING;DISCOMFORT
DESCRIPTORS: ACHING;DISCOMFORT

## 2019-06-29 ASSESSMENT — PAIN SCALES - GENERAL
PAINLEVEL_OUTOF10: 5
PAINLEVEL_OUTOF10: 3
PAINLEVEL_OUTOF10: 4
PAINLEVEL_OUTOF10: 6
PAINLEVEL_OUTOF10: 4
PAINLEVEL_OUTOF10: 6
PAINLEVEL_OUTOF10: 8
PAINLEVEL_OUTOF10: 3

## 2019-06-29 ASSESSMENT — PAIN DESCRIPTION - FREQUENCY
FREQUENCY: CONTINUOUS

## 2019-06-29 ASSESSMENT — PAIN DESCRIPTION - ONSET
ONSET: ON-GOING

## 2019-06-29 ASSESSMENT — PAIN DESCRIPTION - PROGRESSION
CLINICAL_PROGRESSION: GRADUALLY WORSENING
CLINICAL_PROGRESSION: NOT CHANGED
CLINICAL_PROGRESSION: GRADUALLY WORSENING
CLINICAL_PROGRESSION: NOT CHANGED

## 2019-06-29 ASSESSMENT — PAIN DESCRIPTION - LOCATION
LOCATION: ABDOMEN

## 2019-06-29 NOTE — PLAN OF CARE
Problem: Falls - Risk of:  Goal: Will remain free from falls  Description  Will remain free from falls  6/28/2019 2225 by Maryann Mccollum RN  Outcome: Met This Shift     Problem: Falls - Risk of:  Goal: Absence of physical injury  Description  Absence of physical injury  6/28/2019 2225 by Maryann Mccollum RN  Outcome: Met This Shift

## 2019-06-30 LAB
ANION GAP SERPL CALCULATED.3IONS-SCNC: 10 MMOL/L (ref 7–16)
BASOPHILS ABSOLUTE: 0.04 E9/L (ref 0–0.2)
BASOPHILS RELATIVE PERCENT: 0.5 % (ref 0–2)
BUN BLDV-MCNC: 6 MG/DL (ref 8–23)
CALCIUM SERPL-MCNC: 7.9 MG/DL (ref 8.6–10.2)
CHLORIDE BLD-SCNC: 102 MMOL/L (ref 98–107)
CO2: 25 MMOL/L (ref 22–29)
CREAT SERPL-MCNC: 0.7 MG/DL (ref 0.5–1)
EOSINOPHILS ABSOLUTE: 0.1 E9/L (ref 0.05–0.5)
EOSINOPHILS RELATIVE PERCENT: 1.3 % (ref 0–6)
GFR AFRICAN AMERICAN: >60
GFR NON-AFRICAN AMERICAN: >60 ML/MIN/1.73
GLUCOSE BLD-MCNC: 142 MG/DL (ref 74–99)
HCT VFR BLD CALC: 34.2 % (ref 34–48)
HEMOGLOBIN: 10.5 G/DL (ref 11.5–15.5)
IMMATURE GRANULOCYTES #: 0.05 E9/L
IMMATURE GRANULOCYTES %: 0.7 % (ref 0–5)
LYMPHOCYTES ABSOLUTE: 1.03 E9/L (ref 1.5–4)
LYMPHOCYTES RELATIVE PERCENT: 13.9 % (ref 20–42)
MCH RBC QN AUTO: 29.7 PG (ref 26–35)
MCHC RBC AUTO-ENTMCNC: 30.7 % (ref 32–34.5)
MCV RBC AUTO: 96.9 FL (ref 80–99.9)
MONOCYTES ABSOLUTE: 0.59 E9/L (ref 0.1–0.95)
MONOCYTES RELATIVE PERCENT: 7.9 % (ref 2–12)
NEUTROPHILS ABSOLUTE: 5.62 E9/L (ref 1.8–7.3)
NEUTROPHILS RELATIVE PERCENT: 75.7 % (ref 43–80)
PDW BLD-RTO: 14.3 FL (ref 11.5–15)
PLATELET # BLD: 215 E9/L (ref 130–450)
PMV BLD AUTO: 9.9 FL (ref 7–12)
POTASSIUM SERPL-SCNC: 3.7 MMOL/L (ref 3.5–5)
RBC # BLD: 3.53 E12/L (ref 3.5–5.5)
SODIUM BLD-SCNC: 137 MMOL/L (ref 132–146)
WBC # BLD: 7.4 E9/L (ref 4.5–11.5)

## 2019-06-30 PROCEDURE — 6370000000 HC RX 637 (ALT 250 FOR IP): Performed by: INTERNAL MEDICINE

## 2019-06-30 PROCEDURE — 2580000003 HC RX 258: Performed by: INTERNAL MEDICINE

## 2019-06-30 PROCEDURE — 2580000003 HC RX 258: Performed by: SURGERY

## 2019-06-30 PROCEDURE — 99232 SBSQ HOSP IP/OBS MODERATE 35: CPT | Performed by: SURGERY

## 2019-06-30 PROCEDURE — 80048 BASIC METABOLIC PNL TOTAL CA: CPT

## 2019-06-30 PROCEDURE — 6360000002 HC RX W HCPCS: Performed by: SURGERY

## 2019-06-30 PROCEDURE — 1200000000 HC SEMI PRIVATE

## 2019-06-30 PROCEDURE — 94640 AIRWAY INHALATION TREATMENT: CPT

## 2019-06-30 PROCEDURE — 36415 COLL VENOUS BLD VENIPUNCTURE: CPT

## 2019-06-30 PROCEDURE — C9113 INJ PANTOPRAZOLE SODIUM, VIA: HCPCS | Performed by: SURGERY

## 2019-06-30 PROCEDURE — 85025 COMPLETE CBC W/AUTO DIFF WBC: CPT

## 2019-06-30 PROCEDURE — 6370000000 HC RX 637 (ALT 250 FOR IP): Performed by: SURGERY

## 2019-06-30 PROCEDURE — 6360000002 HC RX W HCPCS: Performed by: INTERNAL MEDICINE

## 2019-06-30 RX ORDER — GUAIFENESIN/DEXTROMETHORPHAN 100-10MG/5
5 SYRUP ORAL EVERY 4 HOURS PRN
Status: DISCONTINUED | OUTPATIENT
Start: 2019-06-30 | End: 2019-06-30

## 2019-06-30 RX ORDER — IPRATROPIUM BROMIDE AND ALBUTEROL SULFATE 2.5; .5 MG/3ML; MG/3ML
1 SOLUTION RESPIRATORY (INHALATION)
Status: DISCONTINUED | OUTPATIENT
Start: 2019-06-30 | End: 2019-06-30

## 2019-06-30 RX ORDER — GUAIFENESIN 100 MG/5ML
200 SOLUTION ORAL EVERY 4 HOURS PRN
Status: DISCONTINUED | OUTPATIENT
Start: 2019-06-30 | End: 2019-07-03 | Stop reason: HOSPADM

## 2019-06-30 RX ORDER — IPRATROPIUM BROMIDE AND ALBUTEROL SULFATE 2.5; .5 MG/3ML; MG/3ML
1 SOLUTION RESPIRATORY (INHALATION) EVERY 4 HOURS
Status: DISCONTINUED | OUTPATIENT
Start: 2019-06-30 | End: 2019-07-03 | Stop reason: HOSPADM

## 2019-06-30 RX ADMIN — PANTOPRAZOLE SODIUM 40 MG: 40 INJECTION, POWDER, FOR SOLUTION INTRAVENOUS at 08:03

## 2019-06-30 RX ADMIN — MOMETASONE FUROATE AND FORMOTEROL FUMARATE DIHYDRATE 2 PUFF: 100; 5 AEROSOL RESPIRATORY (INHALATION) at 06:19

## 2019-06-30 RX ADMIN — Medication 10 ML: at 08:04

## 2019-06-30 RX ADMIN — GUAIFENESIN 200 MG: 200 SOLUTION ORAL at 00:42

## 2019-06-30 RX ADMIN — ALBUTEROL SULFATE 2.5 MG: 2.5 SOLUTION RESPIRATORY (INHALATION) at 06:24

## 2019-06-30 RX ADMIN — MORPHINE SULFATE 2 MG: 2 INJECTION, SOLUTION INTRAMUSCULAR; INTRAVENOUS at 13:16

## 2019-06-30 RX ADMIN — GUAIFENESIN 200 MG: 200 SOLUTION ORAL at 06:31

## 2019-06-30 RX ADMIN — KETOROLAC TROMETHAMINE 15 MG: 15 INJECTION, SOLUTION INTRAMUSCULAR; INTRAVENOUS at 11:31

## 2019-06-30 RX ADMIN — MEROPENEM 1 G: 1 INJECTION, POWDER, FOR SOLUTION INTRAVENOUS at 10:44

## 2019-06-30 RX ADMIN — Medication 10 ML: at 21:32

## 2019-06-30 RX ADMIN — IPRATROPIUM BROMIDE AND ALBUTEROL SULFATE 1 AMPULE: .5; 3 SOLUTION RESPIRATORY (INHALATION) at 16:55

## 2019-06-30 RX ADMIN — KETOROLAC TROMETHAMINE 15 MG: 15 INJECTION, SOLUTION INTRAMUSCULAR; INTRAVENOUS at 18:15

## 2019-06-30 RX ADMIN — IPRATROPIUM BROMIDE AND ALBUTEROL SULFATE 1 AMPULE: .5; 3 SOLUTION RESPIRATORY (INHALATION) at 20:54

## 2019-06-30 RX ADMIN — MEROPENEM 1 G: 1 INJECTION, POWDER, FOR SOLUTION INTRAVENOUS at 03:52

## 2019-06-30 RX ADMIN — Medication 10 ML: at 03:52

## 2019-06-30 RX ADMIN — MORPHINE SULFATE 2 MG: 2 INJECTION, SOLUTION INTRAMUSCULAR; INTRAVENOUS at 03:52

## 2019-06-30 RX ADMIN — MOMETASONE FUROATE AND FORMOTEROL FUMARATE DIHYDRATE 2 PUFF: 100; 5 AEROSOL RESPIRATORY (INHALATION) at 16:59

## 2019-06-30 RX ADMIN — ROPINIROLE HYDROCHLORIDE 1 MG: 1 TABLET, FILM COATED ORAL at 21:31

## 2019-06-30 RX ADMIN — SUCRALFATE 0.5 G: 1 TABLET ORAL at 06:31

## 2019-06-30 RX ADMIN — METRONIDAZOLE 500 MG: 500 TABLET ORAL at 21:31

## 2019-06-30 RX ADMIN — MEROPENEM 1 G: 1 INJECTION, POWDER, FOR SOLUTION INTRAVENOUS at 19:29

## 2019-06-30 RX ADMIN — POTASSIUM CHLORIDE: 2 INJECTION, SOLUTION, CONCENTRATE INTRAVENOUS at 10:44

## 2019-06-30 RX ADMIN — MORPHINE SULFATE 2 MG: 2 INJECTION, SOLUTION INTRAMUSCULAR; INTRAVENOUS at 08:04

## 2019-06-30 RX ADMIN — KETOROLAC TROMETHAMINE 15 MG: 15 INJECTION, SOLUTION INTRAMUSCULAR; INTRAVENOUS at 06:32

## 2019-06-30 RX ADMIN — METRONIDAZOLE 500 MG: 500 TABLET ORAL at 13:16

## 2019-06-30 RX ADMIN — LISINOPRIL 10 MG: 10 TABLET ORAL at 08:03

## 2019-06-30 RX ADMIN — KETOROLAC TROMETHAMINE 15 MG: 15 INJECTION, SOLUTION INTRAMUSCULAR; INTRAVENOUS at 23:56

## 2019-06-30 RX ADMIN — METRONIDAZOLE 500 MG: 500 TABLET ORAL at 06:31

## 2019-06-30 ASSESSMENT — PAIN DESCRIPTION - ONSET: ONSET: ON-GOING

## 2019-06-30 ASSESSMENT — PAIN DESCRIPTION - LOCATION
LOCATION: ABDOMEN

## 2019-06-30 ASSESSMENT — PAIN SCALES - GENERAL
PAINLEVEL_OUTOF10: 4
PAINLEVEL_OUTOF10: 0
PAINLEVEL_OUTOF10: 0
PAINLEVEL_OUTOF10: 6
PAINLEVEL_OUTOF10: 6
PAINLEVEL_OUTOF10: 5
PAINLEVEL_OUTOF10: 4
PAINLEVEL_OUTOF10: 5
PAINLEVEL_OUTOF10: 3
PAINLEVEL_OUTOF10: 5

## 2019-06-30 ASSESSMENT — PAIN DESCRIPTION - ORIENTATION: ORIENTATION: RIGHT

## 2019-06-30 ASSESSMENT — PAIN - FUNCTIONAL ASSESSMENT
PAIN_FUNCTIONAL_ASSESSMENT: PREVENTS OR INTERFERES SOME ACTIVE ACTIVITIES AND ADLS
PAIN_FUNCTIONAL_ASSESSMENT: PREVENTS OR INTERFERES SOME ACTIVE ACTIVITIES AND ADLS

## 2019-06-30 ASSESSMENT — PAIN DESCRIPTION - FREQUENCY: FREQUENCY: CONTINUOUS

## 2019-06-30 ASSESSMENT — PAIN DESCRIPTION - PAIN TYPE
TYPE: ACUTE PAIN

## 2019-06-30 ASSESSMENT — PAIN DESCRIPTION - DESCRIPTORS
DESCRIPTORS: ACHING;CONSTANT;DISCOMFORT
DESCRIPTORS: SORE

## 2019-07-01 ENCOUNTER — APPOINTMENT (OUTPATIENT)
Dept: GENERAL RADIOLOGY | Age: 64
DRG: 392 | End: 2019-07-01
Payer: MEDICARE

## 2019-07-01 LAB
ANION GAP SERPL CALCULATED.3IONS-SCNC: 9 MMOL/L (ref 7–16)
BASOPHILS ABSOLUTE: 0.04 E9/L (ref 0–0.2)
BASOPHILS RELATIVE PERCENT: 0.5 % (ref 0–2)
BUN BLDV-MCNC: 6 MG/DL (ref 8–23)
C DIFF TOXIN/ANTIGEN: NORMAL
CALCIUM SERPL-MCNC: 8.3 MG/DL (ref 8.6–10.2)
CHLORIDE BLD-SCNC: 101 MMOL/L (ref 98–107)
CO2: 28 MMOL/L (ref 22–29)
CREAT SERPL-MCNC: 0.6 MG/DL (ref 0.5–1)
EOSINOPHILS ABSOLUTE: 0.11 E9/L (ref 0.05–0.5)
EOSINOPHILS RELATIVE PERCENT: 1.4 % (ref 0–6)
GFR AFRICAN AMERICAN: >60
GFR NON-AFRICAN AMERICAN: >60 ML/MIN/1.73
GLUCOSE BLD-MCNC: 125 MG/DL (ref 74–99)
HCT VFR BLD CALC: 34.2 % (ref 34–48)
HEMOGLOBIN: 10.8 G/DL (ref 11.5–15.5)
IMMATURE GRANULOCYTES #: 0.04 E9/L
IMMATURE GRANULOCYTES %: 0.5 % (ref 0–5)
LYMPHOCYTES ABSOLUTE: 1.21 E9/L (ref 1.5–4)
LYMPHOCYTES RELATIVE PERCENT: 14.9 % (ref 20–42)
MCH RBC QN AUTO: 29.9 PG (ref 26–35)
MCHC RBC AUTO-ENTMCNC: 31.6 % (ref 32–34.5)
MCV RBC AUTO: 94.7 FL (ref 80–99.9)
MONOCYTES ABSOLUTE: 0.66 E9/L (ref 0.1–0.95)
MONOCYTES RELATIVE PERCENT: 8.1 % (ref 2–12)
NEUTROPHILS ABSOLUTE: 6.06 E9/L (ref 1.8–7.3)
NEUTROPHILS RELATIVE PERCENT: 74.6 % (ref 43–80)
PDW BLD-RTO: 14 FL (ref 11.5–15)
PLATELET # BLD: 224 E9/L (ref 130–450)
PMV BLD AUTO: 10.1 FL (ref 7–12)
POTASSIUM SERPL-SCNC: 3.5 MMOL/L (ref 3.5–5)
RBC # BLD: 3.61 E12/L (ref 3.5–5.5)
SODIUM BLD-SCNC: 138 MMOL/L (ref 132–146)
WBC # BLD: 8.1 E9/L (ref 4.5–11.5)

## 2019-07-01 PROCEDURE — 6370000000 HC RX 637 (ALT 250 FOR IP): Performed by: INTERNAL MEDICINE

## 2019-07-01 PROCEDURE — 87329 GIARDIA AG IA: CPT

## 2019-07-01 PROCEDURE — 6370000000 HC RX 637 (ALT 250 FOR IP): Performed by: SURGERY

## 2019-07-01 PROCEDURE — 6360000002 HC RX W HCPCS: Performed by: SURGERY

## 2019-07-01 PROCEDURE — 87450 HC DIRECT STREP B ANTIGEN: CPT

## 2019-07-01 PROCEDURE — 71046 X-RAY EXAM CHEST 2 VIEWS: CPT

## 2019-07-01 PROCEDURE — 87045 FECES CULTURE AEROBIC BACT: CPT

## 2019-07-01 PROCEDURE — 94640 AIRWAY INHALATION TREATMENT: CPT

## 2019-07-01 PROCEDURE — 1200000000 HC SEMI PRIVATE

## 2019-07-01 PROCEDURE — 2580000003 HC RX 258: Performed by: INTERNAL MEDICINE

## 2019-07-01 PROCEDURE — 6360000002 HC RX W HCPCS: Performed by: INTERNAL MEDICINE

## 2019-07-01 PROCEDURE — 85025 COMPLETE CBC W/AUTO DIFF WBC: CPT

## 2019-07-01 PROCEDURE — 6370000000 HC RX 637 (ALT 250 FOR IP): Performed by: SPECIALIST

## 2019-07-01 PROCEDURE — 36415 COLL VENOUS BLD VENIPUNCTURE: CPT

## 2019-07-01 PROCEDURE — 80048 BASIC METABOLIC PNL TOTAL CA: CPT

## 2019-07-01 PROCEDURE — 99232 SBSQ HOSP IP/OBS MODERATE 35: CPT | Performed by: SURGERY

## 2019-07-01 PROCEDURE — C9113 INJ PANTOPRAZOLE SODIUM, VIA: HCPCS | Performed by: SURGERY

## 2019-07-01 PROCEDURE — 89055 LEUKOCYTE ASSESSMENT FECAL: CPT

## 2019-07-01 PROCEDURE — 2580000003 HC RX 258: Performed by: SURGERY

## 2019-07-01 RX ORDER — LACTOBACILLUS RHAMNOSUS GG 10B CELL
1 CAPSULE ORAL 2 TIMES DAILY
Status: DISCONTINUED | OUTPATIENT
Start: 2019-07-01 | End: 2019-07-03 | Stop reason: HOSPADM

## 2019-07-01 RX ORDER — CIPROFLOXACIN 500 MG/1
500 TABLET, FILM COATED ORAL EVERY 12 HOURS SCHEDULED
Status: DISCONTINUED | OUTPATIENT
Start: 2019-07-01 | End: 2019-07-01 | Stop reason: CLARIF

## 2019-07-01 RX ORDER — CIPROFLOXACIN 500 MG/1
500 TABLET, FILM COATED ORAL EVERY 12 HOURS SCHEDULED
Status: DISCONTINUED | OUTPATIENT
Start: 2019-07-01 | End: 2019-07-03 | Stop reason: HOSPADM

## 2019-07-01 RX ADMIN — KETOROLAC TROMETHAMINE 15 MG: 15 INJECTION, SOLUTION INTRAMUSCULAR; INTRAVENOUS at 20:49

## 2019-07-01 RX ADMIN — KETOROLAC TROMETHAMINE 15 MG: 15 INJECTION, SOLUTION INTRAMUSCULAR; INTRAVENOUS at 11:31

## 2019-07-01 RX ADMIN — IPRATROPIUM BROMIDE AND ALBUTEROL SULFATE 1 AMPULE: .5; 3 SOLUTION RESPIRATORY (INHALATION) at 17:58

## 2019-07-01 RX ADMIN — CIPROFLOXACIN HYDROCHLORIDE 500 MG: 500 TABLET, FILM COATED ORAL at 20:50

## 2019-07-01 RX ADMIN — GUAIFENESIN 200 MG: 200 SOLUTION ORAL at 02:50

## 2019-07-01 RX ADMIN — METRONIDAZOLE 500 MG: 500 TABLET ORAL at 05:57

## 2019-07-01 RX ADMIN — Medication 1 CAPSULE: at 21:00

## 2019-07-01 RX ADMIN — MEROPENEM 1 G: 1 INJECTION, POWDER, FOR SOLUTION INTRAVENOUS at 02:50

## 2019-07-01 RX ADMIN — MEROPENEM 1 G: 1 INJECTION, POWDER, FOR SOLUTION INTRAVENOUS at 10:35

## 2019-07-01 RX ADMIN — IPRATROPIUM BROMIDE AND ALBUTEROL SULFATE 1 AMPULE: .5; 3 SOLUTION RESPIRATORY (INHALATION) at 02:06

## 2019-07-01 RX ADMIN — ROPINIROLE HYDROCHLORIDE 1 MG: 1 TABLET, FILM COATED ORAL at 20:50

## 2019-07-01 RX ADMIN — METRONIDAZOLE 500 MG: 500 TABLET ORAL at 13:29

## 2019-07-01 RX ADMIN — IPRATROPIUM BROMIDE AND ALBUTEROL SULFATE 1 AMPULE: .5; 3 SOLUTION RESPIRATORY (INHALATION) at 23:14

## 2019-07-01 RX ADMIN — METRONIDAZOLE 500 MG: 500 TABLET ORAL at 20:50

## 2019-07-01 RX ADMIN — MOMETASONE FUROATE AND FORMOTEROL FUMARATE DIHYDRATE 2 PUFF: 100; 5 AEROSOL RESPIRATORY (INHALATION) at 17:57

## 2019-07-01 RX ADMIN — IPRATROPIUM BROMIDE AND ALBUTEROL SULFATE 1 AMPULE: .5; 3 SOLUTION RESPIRATORY (INHALATION) at 06:22

## 2019-07-01 RX ADMIN — IPRATROPIUM BROMIDE AND ALBUTEROL SULFATE 1 AMPULE: .5; 3 SOLUTION RESPIRATORY (INHALATION) at 09:27

## 2019-07-01 RX ADMIN — PANTOPRAZOLE SODIUM 40 MG: 40 INJECTION, POWDER, FOR SOLUTION INTRAVENOUS at 08:07

## 2019-07-01 RX ADMIN — MORPHINE SULFATE 2 MG: 2 INJECTION, SOLUTION INTRAMUSCULAR; INTRAVENOUS at 13:30

## 2019-07-01 RX ADMIN — MORPHINE SULFATE 2 MG: 2 INJECTION, SOLUTION INTRAMUSCULAR; INTRAVENOUS at 08:08

## 2019-07-01 RX ADMIN — Medication 10 ML: at 20:50

## 2019-07-01 RX ADMIN — Medication 10 ML: at 08:08

## 2019-07-01 RX ADMIN — LISINOPRIL 10 MG: 10 TABLET ORAL at 08:09

## 2019-07-01 RX ADMIN — MOMETASONE FUROATE AND FORMOTEROL FUMARATE DIHYDRATE 2 PUFF: 100; 5 AEROSOL RESPIRATORY (INHALATION) at 09:45

## 2019-07-01 RX ADMIN — KETOROLAC TROMETHAMINE 15 MG: 15 INJECTION, SOLUTION INTRAMUSCULAR; INTRAVENOUS at 05:58

## 2019-07-01 ASSESSMENT — PAIN DESCRIPTION - DESCRIPTORS
DESCRIPTORS: ACHING;CONSTANT;DISCOMFORT
DESCRIPTORS: ACHING;CONSTANT;DISCOMFORT

## 2019-07-01 ASSESSMENT — PAIN DESCRIPTION - FREQUENCY
FREQUENCY: CONTINUOUS
FREQUENCY: CONTINUOUS

## 2019-07-01 ASSESSMENT — PAIN SCALES - GENERAL
PAINLEVEL_OUTOF10: 4
PAINLEVEL_OUTOF10: 5
PAINLEVEL_OUTOF10: 4
PAINLEVEL_OUTOF10: 5
PAINLEVEL_OUTOF10: 4
PAINLEVEL_OUTOF10: 5
PAINLEVEL_OUTOF10: 4
PAINLEVEL_OUTOF10: 4
PAINLEVEL_OUTOF10: 0

## 2019-07-01 ASSESSMENT — PAIN DESCRIPTION - ONSET
ONSET: ON-GOING
ONSET: ON-GOING

## 2019-07-01 ASSESSMENT — PAIN DESCRIPTION - PAIN TYPE
TYPE: ACUTE PAIN

## 2019-07-01 ASSESSMENT — PAIN DESCRIPTION - LOCATION
LOCATION: ABDOMEN

## 2019-07-01 ASSESSMENT — PAIN - FUNCTIONAL ASSESSMENT: PAIN_FUNCTIONAL_ASSESSMENT: PREVENTS OR INTERFERES SOME ACTIVE ACTIVITIES AND ADLS

## 2019-07-01 ASSESSMENT — PAIN DESCRIPTION - PROGRESSION: CLINICAL_PROGRESSION: NOT CHANGED

## 2019-07-01 ASSESSMENT — PAIN DESCRIPTION - ORIENTATION
ORIENTATION: RIGHT
ORIENTATION: RIGHT

## 2019-07-01 NOTE — PLAN OF CARE
Problem: Pain:  Goal: Pain level will decrease  Description  Pain level will decrease  7/1/2019 0419 by Kaela Azevedo RN  Outcome: Met This Shift  6/30/2019 2137 by Elier Pearson RN  Outcome: Met This Shift  Goal: Control of acute pain  Description  Control of acute pain  7/1/2019 0419 by Kaela Azevedo RN  Outcome: Met This Shift  6/30/2019 2137 by Elier Pearson RN  Outcome: Met This Shift  Goal: Control of chronic pain  Description  Control of chronic pain  7/1/2019 0419 by Kaela Azevedo RN  Outcome: Met This Shift  6/30/2019 2137 by Elier Pearson RN  Outcome: Met This Shift     Problem: Falls - Risk of:  Goal: Will remain free from falls  Description  Will remain free from falls  7/1/2019 0419 by Kaela Azevedo RN  Outcome: Met This Shift  6/30/2019 2137 by Elier Pearson RN  Outcome: Met This Shift  Goal: Absence of physical injury  Description  Absence of physical injury  7/1/2019 0419 by Kaela Azevedo RN  Outcome: Met This Shift  6/30/2019 2137 by Elier Pearson RN  Outcome: Met This Shift     Problem: Activity:  Goal: Risk for activity intolerance will decrease  Description  Risk for activity intolerance will decrease  Outcome: Met This Shift     Problem:  Bowel/Gastric:  Goal: Bowel function will improve  Description  Bowel function will improve  Outcome: Met This Shift  Goal: Diagnostic test results will improve  Description  Diagnostic test results will improve  Outcome: Met This Shift  Goal: Occurrences of nausea will decrease  Description  Occurrences of nausea will decrease  Outcome: Met This Shift  Goal: Occurrences of vomiting will decrease  Description  Occurrences of vomiting will decrease  Outcome: Met This Shift     Problem: Nausea/Vomiting:  Goal: Absence of nausea/vomiting  Description  Absence of nausea/vomiting  Outcome: Met This Shift  Goal: Able to drink  Description  Able to drink  Outcome: Met This Shift  Goal: Able to eat  Description  Able to eat  Outcome: Met This Shift  Goal: Ability to achieve adequate nutritional intake will improve  Description  Ability to achieve adequate nutritional intake will improve  Outcome: Met This Shift

## 2019-07-01 NOTE — CARE COORDINATION
Ss note:7/1/2019.10:57 AM Met with pt regarding need for new PCP. Pt relays that per conversation with Dr. Delmi Rae, he will see pt as follow up. SW made referral to Martin Beard at [facility] [place], [phone]. Martin Beard reports she will need to check to determine if Dr. Delmi Rae is accepting new patients. Sw will await return call today or tomorrow on this matter.  KIERRA Garces

## 2019-07-01 NOTE — CONSULTS
[address] [place] [address] [facility]  Consult Note    [address] [facility], [address]  Phone [phone]   [phone]      Admit Date: 2019  2:25 PM  Pt Name: Vee Meredith  MRN: [id number]  : 1955  Reason for Consult:    Chief Complaint   Patient presents with    Abdominal Pain     abd pain x 2 days but worse today, sent in by dr Beryl Galo for colon rupture?, emesis, watery stools     Requesting Physician:  Murtaza Arguello MD  PCP: No primary care provider on file. History Obtained From:  patient  ID consulted for atbx on hospital day [id number] [facility] [place]       Chief Complaint   Patient presents with    Abdominal Pain     abd pain x 2 days but worse today, sent in by dr Beryl Galo for colon rupture?, emesis, watery stools     HISTORYOF PRESENT ILLNESS      Vee Meredith is a 75 y.o. female who presents with significant past medical history of  has a past medical history of Anemia, Anxiety, Asthma, Biceps tendon tear, Closed fracture of multiple ribs of right side, Closed fracture of nasal bone, Collapsed lung, COPD (chronic obstructive pulmonary disease) ([address] DDD (degenerative disc disease), lumbar, Degenerative disc disease at L5-S1 level, Depression, Distal radius fracture, Fracture of left olecranon process, GERD (gastroesophageal reflux disease), History of blood transfusion, Hypertension, Impingement syndrome of shoulder, Intractable abdominal pain, Left carpal tunnel syndrome, Nasal bones, closed fracture, closed, initial encounter, Osteoarthritis, Peptic ulcer, unspecified site, unspecified as acute or chronic, without mention of hemorrhage or perforation, RLS (restless legs syndrome), Rotator cuff tear, Seizure ([address] Sepsis ([address] and Spinal stenosis.   who presents with   Chief Complaint   Patient presents with    Abdominal Pain     abd pain x 2 days but worse today, sent in by dr Beryl Galo for colon rupture?, emesis, watery stools     ED E Giovanni DO, 1 ampule at 07/01/19 8283    metroNIDAZOLE (FLAGYL) tablet 500 mg, 500 mg, Oral, 3 times per day, Kendy Fields MD, 500 mg at 07/01/19 1329    morphine (PF) injection 2 mg, 2 mg, Intravenous, Q4H PRN, Reinaldo Persaud, DO, 2 mg at 07/01/19 1330    ketorolac (TORADOL) injection 15 mg, 15 mg, Intravenous, Q6H, Kendy Fields MD, 15 mg at 07/01/19 1131    acetaminophen (TYLENOL) suppository 650 mg, 650 mg, Rectal, Q4H PRN, Lorre Collar, DO    sodium chloride flush 0.9 % injection 10 mL, 10 mL, Intravenous, 2 times per day, Kendy Fields MD, 10 mL at 07/01/19 0808    sodium chloride flush 0.9 % injection 10 mL, 10 mL, Intravenous, PRN, Kendy Fields MD, 10 mL at 06/30/19 0352    mometasone-formoterol (DULERA) 100-5 MCG/ACT inhaler 2 puff, 2 puff, Inhalation, BID, Knedy Fields MD, 2 puff at 07/01/19 0945    potassium chloride 20 mEq in lactated ringers 1,000 mL infusion, , Intravenous, Continuous, Briana Sultana MD, Last Rate: 50 mL/hr at 06/30/19 1316    pantoprazole (PROTONIX) injection 40 mg, 40 mg, Intravenous, Daily, 40 mg at 07/01/19 0807 **AND** sodium chloride (PF) 0.9 % injection 10 mL, 10 mL, Intravenous, Daily, Kendy Fields MD, 10 mL at 07/01/19 0808    diphenhydrAMINE (BENADRYL) injection 25 mg, 25 mg, Intravenous, Q8H PRN, Kendy Fields MD    lisinopril (PRINIVIL;ZESTRIL) tablet 10 mg, 10 mg, Oral, Daily, [facility] OMAR Siu DO, 10 mg at 07/01/19 0809    rOPINIRole (REQUIP) tablet 1 mg, 1 mg, Oral, Nightly, [facility] OMAR Siu DO, 1 mg at 06/30/19 2131    sucralfate (CARAFATE) tablet 0.5 g, 0.5 g, Oral, Q2H PRN, Kendy Fields MD, 0.5 g at 06/30/19 0631    lidocaine viscous hcl (XYLOCAINE) 2 % solution 10 mL, 10 mL, Mouth/Throat, Q3H PRN, Kendy Fields MD, 10 mL at 06/28/19 0343  ALLERGIES     Ceclor [cefaclor]; Diprivan [propofol]; Naproxen; Adhesive tape; Blueberry flavor; Ibuprofen; Mirapex [pramipexole];  Shellfish-derived products; Vaccinium angustifolium; Blueberry fruit extract; Ceclor [cefaclor]; Iodides; Iv dye [iodides]; Naprosyn [naproxen]; Percocet [oxycodone-acetaminophen]; Phenergan [promethazine hcl]; Promethazine; Sulfa antibiotics;  Tape [adhesive tape]; and Tetanus toxoids  Immunization History   Administered Date(s) Administered    Influenza Virus Vaccine 10/31/2014    Influenza, Lucas Hartman, 6 mo and older, IM, PF (Flulaval, Fluarix) 11/19/2018    Pneumococcal Conjugate 13-valent ([id number]) 11/27/2018    Pneumococcal Polysaccharide ([id number]) 11/02/2014     PAST MEDICAL HISTORY     Past Medical History:   Diagnosis Date    Anemia     Anxiety     Asthma     Biceps tendon tear 1/2/2015    Closed fracture of multiple ribs of right side     Closed fracture of nasal bone      Replacing Inactive Diagnoses    Collapsed lung 1986    after a surgery 2nd day pre op     COPD (chronic obstructive pulmonary disease) ([address]     DDD (degenerative disc disease), lumbar     Degenerative disc disease at L5-S1 level     bulging disc lower back    Depression     Distal radius fracture 5/11/2015    Fracture of left olecranon process 1/10/2017    GERD (gastroesophageal reflux disease)     History of blood transfusion     x2    Hypertension     Impingement syndrome of shoulder 12/9/2014    Intractable abdominal pain 11/18/2018    Left carpal tunnel syndrome 1/30/2017    Nasal bones, closed fracture, closed, initial encounter 12/31/2015    Osteoarthritis     Peptic ulcer, unspecified site, unspecified as acute or chronic, without mention of hemorrhage or perforation     RLS (restless legs syndrome)     Rotator cuff tear 12/9/2014    Seizure ([address]     hasn't had one for 6 years    Sepsis ([address]     Spinal stenosis      SURGICAL HISTORY       Past Surgical History:   Procedure Laterality Date    ABDOMEN SURGERY      ABSCESS DRAINAGE Left 06/23/2016    left thigh seroma    APPENDECTOMY      CARPAL TUNNEL RELEASE  1998    right hand    BS.    distension. Nontender. Extremities: Moves all extremities x 4. Warm and well perfused  Pulses:  Distal pulses intact  Skin: Warm and dry without rash  Neurologic:    No focal deficits  Psych: Normal Affect     DIAGNOSTICRESULTS   RADIOLOGY:   Ct Abdomen Pelvis Wo Contrast Additional Contrast? Oral    Result Date: 6/28/2019  Location: 200 Indication: Lower abdominal pain Comparison: CT abdomen/pelvis from 6/27/2019. Technique: Multidetector CT imaging of the abdomen and pelvis was performed without administration of intravenous contrast. Oral contrast was administered. Coronal and sagittal reformatted images were obtained. Automated dose exposure control was used for this exam. FINDINGS: Lower thorax:  Limited evaluation of the lower chest demonstrates clear lung bases. Peritoneum: There is no free fluid within the abdomen or pelvis. There is no pneumoperitoneum. Hepatobiliary: The liver is normal in morphology and attenuation. The gallbladder is surgically absent. The common bile duct is normal in caliber. Pancreas: Unremarkable. Spleen: Unremarkable. Gastrointestinal: There are postsurgical changes from prior gastric bypass surgery. The small bowel loops are normal in caliber. The appendix is not identified, however there is no pericecal inflammatory process. The colon is normal in caliber. There is mild nonspecific wall thickening of the transverse colon. Oral contrast is noted to the rectum. There is no evidence of obstruction. Adrenal glands: Unremarkable. Genitourinary: Bilateral kidneys are normal in morphology and symmetric in size. There is no hydronephrosis or hydroureter bilaterally. The urinary bladder is unremarkable. The uterus is grossly unremarkable. Vascular: The abdominal aorta is normal in caliber. Lymph nodes: No abdominal, retroperitoneal, or pelvic lymphadenopathy. Bones: No suspicious lytic or blastic osseous lesion.  There is lower lumbar spondylosis with grade 1 anterolisthesis 132 - 146 mmol/L    Potassium 3.7 3.5 - 5.0 mmol/L    Chloride 103 98 - 107 mmol/L    CO2 27 22 - 29 mmol/L    Anion Gap 10 7 - 16 mmol/L    Glucose 117 (H) 74 - 99 mg/dL    BUN 9 8 - 23 mg/dL    CREATININE 0.8 0.5 - 1.0 mg/dL    GFR Non-African American >60 >=60 mL/min/1.73    GFR African American >60     Calcium 8.3 (L) 8.6 - 10.2 mg/dL   Lactic acid, plasma   Result Value Ref Range    Lactic Acid 0.8 0.5 - 2.2 mmol/L   CK   Result Value Ref Range    Total CK 37 20 - 180 U/L   Basic Metabolic Panel   Result Value Ref Range    Sodium 137 132 - 146 mmol/L    Potassium 3.4 (L) 3.5 - 5.0 mmol/L    Chloride 101 98 - 107 mmol/L    CO2 24 22 - 29 mmol/L    Anion Gap 12 7 - 16 mmol/L    Glucose 100 (H) 74 - 99 mg/dL    BUN 9 8 - 23 mg/dL    CREATININE 0.7 0.5 - 1.0 mg/dL    GFR Non-African American >60 >=60 mL/min/1.73    GFR African American >60     Calcium 7.8 (L) 8.6 - 10.2 mg/dL   CBC Auto Differential   Result Value Ref Range    WBC 10.5 4.5 - 11.5 E9/L    RBC 3.69 3.50 - 5.50 E12/L    Hemoglobin 10.9 (L) 11.5 - 15.5 g/dL    Hematocrit 34.4 34.0 - 48.0 %    MCV 93.2 80.0 - 99.9 fL    MCH 29.5 26.0 - 35.0 pg    MCHC 31.7 (L) 32.0 - 34.5 %    RDW 14.2 11.5 - 15.0 fL    Platelets 977 803 - 387 E9/L    MPV 9.8 7.0 - 12.0 fL    Neutrophils % 83.4 (H) 43.0 - 80.0 %    Immature Granulocytes % 0.8 0.0 - 5.0 %    Lymphocytes % 7.7 (L) 20.0 - 42.0 %    Monocytes % 7.6 2.0 - 12.0 %    Eosinophils % 0.2 0.0 - 6.0 %    Basophils % 0.3 0.0 - 2.0 %    Neutrophils # 8.79 (H) 1.80 - 7.30 E9/L    Immature Granulocytes # 0.08 E9/L    Lymphocytes # 0.81 (L) 1.50 - 4.00 E9/L    Monocytes # 0.80 0.10 - 0.95 E9/L    Eosinophils # 0.02 (L) 0.05 - 0.50 E9/L    Basophils # 0.03 0.00 - 0.20 X1/J   Basic Metabolic Panel   Result Value Ref Range    Sodium 137 132 - 146 mmol/L    Potassium 3.7 3.5 - 5.0 mmol/L    Chloride 102 98 - 107 mmol/L    CO2 25 22 - 29 mmol/L    Anion Gap 10 7 - 16 mmol/L    Glucose 142 (H) 74 - 99 mg/dL    BUN 6 (L) 8 - 23 mg/dL    CREATININE 0.7 0.5 - 1.0 mg/dL    GFR Non-African American >60 >=60 mL/min/1.73    GFR African American >60     Calcium 7.9 (L) 8.6 - 10.2 mg/dL   CBC Auto Differential   Result Value Ref Range    WBC 7.4 4.5 - 11.5 E9/L    RBC 3.53 3.50 - 5.50 E12/L    Hemoglobin 10.5 (L) 11.5 - 15.5 g/dL    Hematocrit 34.2 34.0 - 48.0 %    MCV 96.9 80.0 - 99.9 fL    MCH 29.7 26.0 - 35.0 pg    MCHC 30.7 (L) 32.0 - 34.5 %    RDW 14.3 11.5 - 15.0 fL    Platelets 101 371 - 690 E9/L    MPV 9.9 7.0 - 12.0 fL    Neutrophils % 75.7 43.0 - 80.0 %    Immature Granulocytes % 0.7 0.0 - 5.0 %    Lymphocytes % 13.9 (L) 20.0 - 42.0 %    Monocytes % 7.9 2.0 - 12.0 %    Eosinophils % 1.3 0.0 - 6.0 %    Basophils % 0.5 0.0 - 2.0 %    Neutrophils # 5.62 1.80 - 7.30 E9/L    Immature Granulocytes # 0.05 E9/L    Lymphocytes # 1.03 (L) 1.50 - 4.00 E9/L    Monocytes # 0.59 0.10 - 0.95 E9/L    Eosinophils # 0.10 0.05 - 0.50 E9/L    Basophils # 0.04 0.00 - 0.20 O1/J   Basic Metabolic Panel   Result Value Ref Range    Sodium 138 132 - 146 mmol/L    Potassium 3.5 3.5 - 5.0 mmol/L    Chloride 101 98 - 107 mmol/L    CO2 28 22 - 29 mmol/L    Anion Gap 9 7 - 16 mmol/L    Glucose 125 (H) 74 - 99 mg/dL    BUN 6 (L) 8 - 23 mg/dL    CREATININE 0.6 0.5 - 1.0 mg/dL    GFR Non-African American >60 >=60 mL/min/1.73    GFR African American >60     Calcium 8.3 (L) 8.6 - 10.2 mg/dL   CBC Auto Differential   Result Value Ref Range    WBC 8.1 4.5 - 11.5 E9/L    RBC 3.61 3.50 - 5.50 E12/L    Hemoglobin 10.8 (L) 11.5 - 15.5 g/dL    Hematocrit 34.2 34.0 - 48.0 %    MCV 94.7 80.0 - 99.9 fL    MCH 29.9 26.0 - 35.0 pg    MCHC 31.6 (L) 32.0 - 34.5 %    RDW 14.0 11.5 - 15.0 fL    Platelets 149 042 - 831 E9/L    MPV 10.1 7.0 - 12.0 fL    Neutrophils % 74.6 43.0 - 80.0 %    Immature Granulocytes % 0.5 0.0 - 5.0 %    Lymphocytes % 14.9 (L) 20.0 - 42.0 %    Monocytes % 8.1 2.0 - 12.0 %    Eosinophils % 1.4 0.0 - 6.0 %    Basophils % 0.5 0.0 - 2.0 %    Neutrophils # 6.06 1.80 - 7.30 E9/L    Immature Granulocytes # 0.04 E9/L    Lymphocytes # 1.21 (L) 1.50 - 4.00 E9/L    Monocytes # 0.66 0.10 - 0.95 E9/L    Eosinophils # 0.11 0.05 - 0.50 E9/L    Basophils # 0.04 0.00 - 0.20 E9/L   EKG 12 Lead   Result Value Ref Range    Ventricular Rate 91 BPM    Atrial Rate 91 BPM    P-R Interval 114 ms    QRS Duration 92 ms    Q-T Interval 410 ms    QTc Calculation (Bazett) 504 ms    P Axis 68 degrees    R Axis 51 degrees    T Axis 75 degrees     MICROBIOLOGY:  Cultures :   Lab Results   Component Value Date    BC 24 Hours- no growth 06/27/2019    BC 5 Days- no growth 11/20/2018    BC 5 Days- no growth 06/28/2016    ORG Escherichia coli 11/18/2018    ORG Escherichia coli 11/18/2018     Lab Results   Component Value Date    BLOODCULT2 24 Hours- no growth 06/27/2019    BLOODCULT2 5 Days- no growth 11/20/2018    BLOODCULT2 Previously positive blood culture called 11/18/2018    BLOODCULT2  11/18/2018     Refer to previous culture for susceptibility results  CXBL SITE: L WRIST COLLECTED: 11/18/18 AT 1530      ORG Escherichia coli 11/18/2018    ORG Escherichia coli 11/18/2018       WOUND/ABSCESS   Date Value Ref Range Status   06/23/2016 Growth not present  Final       No results found for: [facility]      Component Value Date/Time    LABFUNG 4 Weeks- no fungus isolated 12/02/2015 9216       Culture Surgical   Date Value Ref Range Status   12/02/2015 Growth not present  Final     Urine Culture, Routine   Date Value Ref Range Status   06/27/2019   Final    ,000 CFU/mL  Mixed benigno isolated. Further workup and sensitivity testing  is not routinely indicated and will not be performed.   Mixed benigno isolated includes:  Mixed gram negative rods  Mixed gram positive organisms     11/18/2018 Growth not present  Final   06/28/2016 <10,000 CFU/mL  Mixed gram positive organisms    Final     MRSA Culture Only   Date Value Ref Range Status   10/23/2014 Methicillin resistant Staph aureus not isolated  Final

## 2019-07-02 LAB
ANION GAP SERPL CALCULATED.3IONS-SCNC: 10 MMOL/L (ref 7–16)
BASOPHILS ABSOLUTE: 0.06 E9/L (ref 0–0.2)
BASOPHILS RELATIVE PERCENT: 0.8 % (ref 0–2)
BLOOD CULTURE, ROUTINE: NORMAL
BUN BLDV-MCNC: 3 MG/DL (ref 8–23)
CALCIUM SERPL-MCNC: 7.7 MG/DL (ref 8.6–10.2)
CHLORIDE BLD-SCNC: 101 MMOL/L (ref 98–107)
CO2: 29 MMOL/L (ref 22–29)
CREAT SERPL-MCNC: 0.6 MG/DL (ref 0.5–1)
CULTURE, BLOOD 2: NORMAL
CULTURE, STOOL: NORMAL
EOSINOPHILS ABSOLUTE: 0.19 E9/L (ref 0.05–0.5)
EOSINOPHILS RELATIVE PERCENT: 2.6 % (ref 0–6)
GFR AFRICAN AMERICAN: >60
GFR NON-AFRICAN AMERICAN: >60 ML/MIN/1.73
GIARDIA ANTIGEN STOOL: NORMAL
GLUCOSE BLD-MCNC: 116 MG/DL (ref 74–99)
HCT VFR BLD CALC: 34.1 % (ref 34–48)
HEMOGLOBIN: 10.7 G/DL (ref 11.5–15.5)
IMMATURE GRANULOCYTES #: 0.04 E9/L
IMMATURE GRANULOCYTES %: 0.5 % (ref 0–5)
L. PNEUMOPHILA SEROGP 1 UR AG: NORMAL
LYMPHOCYTES ABSOLUTE: 1.35 E9/L (ref 1.5–4)
LYMPHOCYTES RELATIVE PERCENT: 18.2 % (ref 20–42)
MCH RBC QN AUTO: 29.2 PG (ref 26–35)
MCHC RBC AUTO-ENTMCNC: 31.4 % (ref 32–34.5)
MCV RBC AUTO: 93.2 FL (ref 80–99.9)
MONOCYTES ABSOLUTE: 0.82 E9/L (ref 0.1–0.95)
MONOCYTES RELATIVE PERCENT: 11 % (ref 2–12)
NEUTROPHILS ABSOLUTE: 4.97 E9/L (ref 1.8–7.3)
NEUTROPHILS RELATIVE PERCENT: 66.9 % (ref 43–80)
PDW BLD-RTO: 13.6 FL (ref 11.5–15)
PLATELET # BLD: 242 E9/L (ref 130–450)
PMV BLD AUTO: 9.5 FL (ref 7–12)
POTASSIUM SERPL-SCNC: 3.6 MMOL/L (ref 3.5–5)
RBC # BLD: 3.66 E12/L (ref 3.5–5.5)
SEDIMENTATION RATE, ERYTHROCYTE: 15 MM/HR (ref 0–20)
SODIUM BLD-SCNC: 140 MMOL/L (ref 132–146)
TSH SERPL DL<=0.05 MIU/L-ACNC: 2.3 UIU/ML (ref 0.27–4.2)
WBC # BLD: 7.4 E9/L (ref 4.5–11.5)

## 2019-07-02 PROCEDURE — 6370000000 HC RX 637 (ALT 250 FOR IP): Performed by: INTERNAL MEDICINE

## 2019-07-02 PROCEDURE — 36415 COLL VENOUS BLD VENIPUNCTURE: CPT

## 2019-07-02 PROCEDURE — 85025 COMPLETE CBC W/AUTO DIFF WBC: CPT

## 2019-07-02 PROCEDURE — 85651 RBC SED RATE NONAUTOMATED: CPT

## 2019-07-02 PROCEDURE — 6360000002 HC RX W HCPCS: Performed by: SURGERY

## 2019-07-02 PROCEDURE — 2580000003 HC RX 258: Performed by: SURGERY

## 2019-07-02 PROCEDURE — 97116 GAIT TRAINING THERAPY: CPT | Performed by: PHYSICAL THERAPIST

## 2019-07-02 PROCEDURE — C9113 INJ PANTOPRAZOLE SODIUM, VIA: HCPCS | Performed by: SURGERY

## 2019-07-02 PROCEDURE — 6370000000 HC RX 637 (ALT 250 FOR IP): Performed by: SURGERY

## 2019-07-02 PROCEDURE — 97161 PT EVAL LOW COMPLEX 20 MIN: CPT | Performed by: PHYSICAL THERAPIST

## 2019-07-02 PROCEDURE — 6360000002 HC RX W HCPCS: Performed by: INTERNAL MEDICINE

## 2019-07-02 PROCEDURE — 1200000000 HC SEMI PRIVATE

## 2019-07-02 PROCEDURE — 6370000000 HC RX 637 (ALT 250 FOR IP): Performed by: SPECIALIST

## 2019-07-02 PROCEDURE — 94640 AIRWAY INHALATION TREATMENT: CPT

## 2019-07-02 PROCEDURE — 99232 SBSQ HOSP IP/OBS MODERATE 35: CPT | Performed by: SURGERY

## 2019-07-02 PROCEDURE — 80048 BASIC METABOLIC PNL TOTAL CA: CPT

## 2019-07-02 PROCEDURE — 84443 ASSAY THYROID STIM HORMONE: CPT

## 2019-07-02 RX ORDER — TRAMADOL HYDROCHLORIDE 50 MG/1
50 TABLET ORAL EVERY 6 HOURS PRN
Status: DISCONTINUED | OUTPATIENT
Start: 2019-07-02 | End: 2019-07-03 | Stop reason: HOSPADM

## 2019-07-02 RX ORDER — PANTOPRAZOLE SODIUM 40 MG/1
40 TABLET, DELAYED RELEASE ORAL
Status: DISCONTINUED | OUTPATIENT
Start: 2019-07-03 | End: 2019-07-03 | Stop reason: HOSPADM

## 2019-07-02 RX ADMIN — IPRATROPIUM BROMIDE AND ALBUTEROL SULFATE 1 AMPULE: .5; 3 SOLUTION RESPIRATORY (INHALATION) at 09:44

## 2019-07-02 RX ADMIN — IPRATROPIUM BROMIDE AND ALBUTEROL SULFATE 1 AMPULE: .5; 3 SOLUTION RESPIRATORY (INHALATION) at 06:02

## 2019-07-02 RX ADMIN — LISINOPRIL 10 MG: 10 TABLET ORAL at 08:07

## 2019-07-02 RX ADMIN — PANTOPRAZOLE SODIUM 40 MG: 40 INJECTION, POWDER, FOR SOLUTION INTRAVENOUS at 08:07

## 2019-07-02 RX ADMIN — METRONIDAZOLE 500 MG: 500 TABLET ORAL at 13:29

## 2019-07-02 RX ADMIN — Medication 10 ML: at 08:08

## 2019-07-02 RX ADMIN — KETOROLAC TROMETHAMINE 15 MG: 15 INJECTION, SOLUTION INTRAMUSCULAR; INTRAVENOUS at 04:57

## 2019-07-02 RX ADMIN — Medication 1 CAPSULE: at 21:31

## 2019-07-02 RX ADMIN — Medication 10 ML: at 08:07

## 2019-07-02 RX ADMIN — Medication 1 CAPSULE: at 08:07

## 2019-07-02 RX ADMIN — METRONIDAZOLE 500 MG: 500 TABLET ORAL at 06:11

## 2019-07-02 RX ADMIN — TRAMADOL HYDROCHLORIDE 50 MG: 50 TABLET, FILM COATED ORAL at 13:29

## 2019-07-02 RX ADMIN — CIPROFLOXACIN HYDROCHLORIDE 500 MG: 500 TABLET, FILM COATED ORAL at 08:07

## 2019-07-02 RX ADMIN — IPRATROPIUM BROMIDE AND ALBUTEROL SULFATE 1 AMPULE: .5; 3 SOLUTION RESPIRATORY (INHALATION) at 13:23

## 2019-07-02 RX ADMIN — DIPHENHYDRAMINE HYDROCHLORIDE 25 MG: 50 INJECTION INTRAMUSCULAR; INTRAVENOUS at 18:32

## 2019-07-02 RX ADMIN — Medication 10 ML: at 21:30

## 2019-07-02 RX ADMIN — CIPROFLOXACIN HYDROCHLORIDE 500 MG: 500 TABLET, FILM COATED ORAL at 21:31

## 2019-07-02 RX ADMIN — TRAMADOL HYDROCHLORIDE 50 MG: 50 TABLET, FILM COATED ORAL at 21:31

## 2019-07-02 RX ADMIN — METRONIDAZOLE 500 MG: 500 TABLET ORAL at 21:30

## 2019-07-02 RX ADMIN — MOMETASONE FUROATE AND FORMOTEROL FUMARATE DIHYDRATE 2 PUFF: 100; 5 AEROSOL RESPIRATORY (INHALATION) at 06:03

## 2019-07-02 RX ADMIN — MORPHINE SULFATE 2 MG: 2 INJECTION, SOLUTION INTRAMUSCULAR; INTRAVENOUS at 08:07

## 2019-07-02 RX ADMIN — POTASSIUM CHLORIDE: 2 INJECTION, SOLUTION, CONCENTRATE INTRAVENOUS at 04:57

## 2019-07-02 RX ADMIN — IPRATROPIUM BROMIDE AND ALBUTEROL SULFATE 1 AMPULE: .5; 3 SOLUTION RESPIRATORY (INHALATION) at 22:42

## 2019-07-02 RX ADMIN — MOMETASONE FUROATE AND FORMOTEROL FUMARATE DIHYDRATE 2 PUFF: 100; 5 AEROSOL RESPIRATORY (INHALATION) at 18:17

## 2019-07-02 RX ADMIN — ROPINIROLE HYDROCHLORIDE 1 MG: 1 TABLET, FILM COATED ORAL at 21:31

## 2019-07-02 RX ADMIN — IPRATROPIUM BROMIDE AND ALBUTEROL SULFATE 1 AMPULE: .5; 3 SOLUTION RESPIRATORY (INHALATION) at 18:17

## 2019-07-02 ASSESSMENT — PAIN SCALES - GENERAL
PAINLEVEL_OUTOF10: 3
PAINLEVEL_OUTOF10: 2
PAINLEVEL_OUTOF10: 0
PAINLEVEL_OUTOF10: 5
PAINLEVEL_OUTOF10: 0
PAINLEVEL_OUTOF10: 5
PAINLEVEL_OUTOF10: 3
PAINLEVEL_OUTOF10: 0

## 2019-07-02 ASSESSMENT — PAIN DESCRIPTION - LOCATION: LOCATION: ABDOMEN

## 2019-07-02 ASSESSMENT — PAIN DESCRIPTION - PAIN TYPE: TYPE: ACUTE PAIN

## 2019-07-03 VITALS
DIASTOLIC BLOOD PRESSURE: 80 MMHG | BODY MASS INDEX: 47.12 KG/M2 | HEART RATE: 87 BPM | WEIGHT: 240 LBS | TEMPERATURE: 98.4 F | OXYGEN SATURATION: 93 % | HEIGHT: 60 IN | RESPIRATION RATE: 18 BRPM | SYSTOLIC BLOOD PRESSURE: 169 MMHG

## 2019-07-03 LAB
ANION GAP SERPL CALCULATED.3IONS-SCNC: 12 MMOL/L (ref 7–16)
BASOPHILS ABSOLUTE: 0.07 E9/L (ref 0–0.2)
BASOPHILS RELATIVE PERCENT: 0.9 % (ref 0–2)
BUN BLDV-MCNC: 7 MG/DL (ref 8–23)
CALCIUM SERPL-MCNC: 8.7 MG/DL (ref 8.6–10.2)
CHLORIDE BLD-SCNC: 100 MMOL/L (ref 98–107)
CO2: 28 MMOL/L (ref 22–29)
CREAT SERPL-MCNC: 0.6 MG/DL (ref 0.5–1)
EOSINOPHILS ABSOLUTE: 0.25 E9/L (ref 0.05–0.5)
EOSINOPHILS RELATIVE PERCENT: 3.1 % (ref 0–6)
GFR AFRICAN AMERICAN: >60
GFR NON-AFRICAN AMERICAN: >60 ML/MIN/1.73
GLUCOSE BLD-MCNC: 98 MG/DL (ref 74–99)
HCT VFR BLD CALC: 35.6 % (ref 34–48)
HEMOGLOBIN: 11.2 G/DL (ref 11.5–15.5)
IMMATURE GRANULOCYTES #: 0.07 E9/L
IMMATURE GRANULOCYTES %: 0.9 % (ref 0–5)
LYMPHOCYTES ABSOLUTE: 1.47 E9/L (ref 1.5–4)
LYMPHOCYTES RELATIVE PERCENT: 18.1 % (ref 20–42)
MCH RBC QN AUTO: 29.4 PG (ref 26–35)
MCHC RBC AUTO-ENTMCNC: 31.5 % (ref 32–34.5)
MCV RBC AUTO: 93.4 FL (ref 80–99.9)
MONOCYTES ABSOLUTE: 0.81 E9/L (ref 0.1–0.95)
MONOCYTES RELATIVE PERCENT: 10 % (ref 2–12)
NEUTROPHILS ABSOLUTE: 5.47 E9/L (ref 1.8–7.3)
NEUTROPHILS RELATIVE PERCENT: 67 % (ref 43–80)
PDW BLD-RTO: 13.9 FL (ref 11.5–15)
PLATELET # BLD: 281 E9/L (ref 130–450)
PMV BLD AUTO: 10 FL (ref 7–12)
POTASSIUM SERPL-SCNC: 3.7 MMOL/L (ref 3.5–5)
RBC # BLD: 3.81 E12/L (ref 3.5–5.5)
SODIUM BLD-SCNC: 140 MMOL/L (ref 132–146)
WBC # BLD: 8.1 E9/L (ref 4.5–11.5)
WHITE BLOOD CELLS (WBC), STOOL: NORMAL

## 2019-07-03 PROCEDURE — 6370000000 HC RX 637 (ALT 250 FOR IP)

## 2019-07-03 PROCEDURE — 6370000000 HC RX 637 (ALT 250 FOR IP): Performed by: SURGERY

## 2019-07-03 PROCEDURE — 6370000000 HC RX 637 (ALT 250 FOR IP): Performed by: INTERNAL MEDICINE

## 2019-07-03 PROCEDURE — 6360000002 HC RX W HCPCS: Performed by: SURGERY

## 2019-07-03 PROCEDURE — 97165 OT EVAL LOW COMPLEX 30 MIN: CPT

## 2019-07-03 PROCEDURE — 80048 BASIC METABOLIC PNL TOTAL CA: CPT

## 2019-07-03 PROCEDURE — 2580000003 HC RX 258: Performed by: SURGERY

## 2019-07-03 PROCEDURE — 94640 AIRWAY INHALATION TREATMENT: CPT

## 2019-07-03 PROCEDURE — 85025 COMPLETE CBC W/AUTO DIFF WBC: CPT

## 2019-07-03 PROCEDURE — 97535 SELF CARE MNGMENT TRAINING: CPT

## 2019-07-03 PROCEDURE — 36415 COLL VENOUS BLD VENIPUNCTURE: CPT

## 2019-07-03 PROCEDURE — 97530 THERAPEUTIC ACTIVITIES: CPT

## 2019-07-03 PROCEDURE — 6370000000 HC RX 637 (ALT 250 FOR IP): Performed by: SPECIALIST

## 2019-07-03 RX ORDER — ACETAMINOPHEN 325 MG/1
650 TABLET ORAL EVERY 4 HOURS PRN
Status: DISCONTINUED | OUTPATIENT
Start: 2019-07-03 | End: 2019-07-03 | Stop reason: HOSPADM

## 2019-07-03 RX ORDER — ACETAMINOPHEN 325 MG/1
TABLET ORAL
Status: COMPLETED
Start: 2019-07-03 | End: 2019-07-03

## 2019-07-03 RX ORDER — HYDROCODONE BITARTRATE AND ACETAMINOPHEN 5; 325 MG/1; MG/1
1 TABLET ORAL EVERY 4 HOURS PRN
Qty: 18 TABLET | Refills: 0 | Status: SHIPPED | OUTPATIENT
Start: 2019-07-03 | End: 2019-07-06

## 2019-07-03 RX ORDER — METRONIDAZOLE 500 MG/1
500 TABLET ORAL EVERY 8 HOURS SCHEDULED
Qty: 36 TABLET | Refills: 0 | Status: SHIPPED | OUTPATIENT
Start: 2019-07-03 | End: 2019-07-15

## 2019-07-03 RX ORDER — CIPROFLOXACIN 500 MG/1
500 TABLET, FILM COATED ORAL EVERY 12 HOURS SCHEDULED
Qty: 24 TABLET | Refills: 0 | Status: SHIPPED | OUTPATIENT
Start: 2019-07-03 | End: 2019-07-15

## 2019-07-03 RX ADMIN — LISINOPRIL 10 MG: 10 TABLET ORAL at 08:53

## 2019-07-03 RX ADMIN — ACETAMINOPHEN 650 MG: 325 TABLET ORAL at 11:21

## 2019-07-03 RX ADMIN — Medication 1 CAPSULE: at 08:53

## 2019-07-03 RX ADMIN — METRONIDAZOLE 500 MG: 500 TABLET ORAL at 06:01

## 2019-07-03 RX ADMIN — PANTOPRAZOLE SODIUM 40 MG: 40 TABLET, DELAYED RELEASE ORAL at 06:01

## 2019-07-03 RX ADMIN — IPRATROPIUM BROMIDE AND ALBUTEROL SULFATE 1 AMPULE: .5; 3 SOLUTION RESPIRATORY (INHALATION) at 13:24

## 2019-07-03 RX ADMIN — IPRATROPIUM BROMIDE AND ALBUTEROL SULFATE 1 AMPULE: .5; 3 SOLUTION RESPIRATORY (INHALATION) at 09:24

## 2019-07-03 RX ADMIN — Medication 10 ML: at 08:53

## 2019-07-03 RX ADMIN — DIPHENHYDRAMINE HYDROCHLORIDE 25 MG: 50 INJECTION INTRAMUSCULAR; INTRAVENOUS at 02:18

## 2019-07-03 RX ADMIN — MOMETASONE FUROATE AND FORMOTEROL FUMARATE DIHYDRATE 2 PUFF: 100; 5 AEROSOL RESPIRATORY (INHALATION) at 06:04

## 2019-07-03 RX ADMIN — ACETAMINOPHEN 650 MG: 325 TABLET, FILM COATED ORAL at 11:21

## 2019-07-03 RX ADMIN — IPRATROPIUM BROMIDE AND ALBUTEROL SULFATE 1 AMPULE: .5; 3 SOLUTION RESPIRATORY (INHALATION) at 06:04

## 2019-07-03 RX ADMIN — CIPROFLOXACIN HYDROCHLORIDE 500 MG: 500 TABLET, FILM COATED ORAL at 08:53

## 2019-07-03 ASSESSMENT — PAIN DESCRIPTION - ORIENTATION: ORIENTATION: ANTERIOR;POSTERIOR

## 2019-07-03 ASSESSMENT — PAIN SCALES - GENERAL
PAINLEVEL_OUTOF10: 4
PAINLEVEL_OUTOF10: 0
PAINLEVEL_OUTOF10: 3

## 2019-07-03 ASSESSMENT — PAIN - FUNCTIONAL ASSESSMENT: PAIN_FUNCTIONAL_ASSESSMENT: ACTIVITIES ARE NOT PREVENTED

## 2019-07-03 ASSESSMENT — PAIN DESCRIPTION - DESCRIPTORS: DESCRIPTORS: HEADACHE

## 2019-07-03 ASSESSMENT — PAIN DESCRIPTION - LOCATION: LOCATION: HEAD

## 2019-07-03 ASSESSMENT — PAIN DESCRIPTION - PAIN TYPE: TYPE: ACUTE PAIN

## 2019-07-03 NOTE — PROGRESS NOTES
General Surgery Progress Note  Maddison Norris MD, MS    Patient's Name/Date of Birth: Viridiana Carlisle / 1955    Date: June 29, 2019     Surgeon: Ani Corbett MD    Chief Complaint: diarrhea, abd pain    Patient Active Problem List   Diagnosis    HTN (hypertension), benign    GERD (gastroesophageal reflux disease)    Tobacco abuse    History of bariatric surgery    Lower abdominal pain    Intractable abdominal pain    Moderate protein-calorie malnutrition ([address]       Subjective: still with pain and diarrhea, seems mild improved, no fevers, no bloody stool. CT with no obstruction, contrast in colon, consistent with colitis. She is thirsty. Tolerating abx. Gives hx of one week of lower abd pain after eating at [employer].  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 that is not apparent on contrast study and may be persistent but unlikely acute issue    Objective:  /60   Pulse 91   Temp 99 °F (37.2 °C) (Oral)   Resp 18   Ht 5' (1.524 m)   Wt 230 lb 1.6 oz (104.4 kg)   LMP  (LMP Unknown)   SpO2 93%   BMI 44.94 kg/m²   Labs:  Recent Labs     06/27/19  1455 06/28/19  0734 06/29/19  0738   WBC 12.2* 8.9 10.5   HGB 13.6 11.7 10.9*   HCT 42.7 37.8 34.4     Lab Results   Component Value Date    CREATININE 0.7 06/29/2019    BUN 9 06/29/2019     06/29/2019    K 3.4 (L) 06/29/2019     06/29/2019    CO2 24 06/29/2019     Recent Labs     06/27/19  1455   LIPASE 26         General appearance:  NAD  Head: NCAT, PERRLA, EOMI, red conjunctiva  Neck: supple, no masses  Lungs: CTAB, equal chest rise bilateral  Heart: Reg rate  Abdomen: soft, obese, tender LLQ  Skin; no lesions  Gu: no cva tenderness  Extremities: extremities normal, atraumatic, no cyanosis or edema      Assessment/Plan:  Viridiana Carlisle is a 75 y.o. female with colitis,     Cont abx  Clears  Stool cultures  Home once pain and diarrhea improved and pending tolerating diet    Physician Signature:
HPI:  Shi Luciano continues to complain of abdominal pain but with significant improvement since my last examination. We discussed advancement in her diet and increased ambulation. We also discussed her underlying pain regimen she states she was unable to obtain any sleep last night secondary to intractable pain. No family members were present during my examination. ROS:  Cardiovascular:   Denies any chest pain, irregular heartbeats, or palpitations. Respiratory:   Denies shortness of breath, coughing, sputum production, hemoptysis, or wheezing. Gastrointestinal:   Ongoing, but improving abdominal pain. Less diarrhea today. Extremities:   Denies any lower extremity swelling or edema. Neurology:    Denies any headache or focal neurological deficits. No weakness or paresthesia. Derm:    Denies any rashes, ulcers, or excoriations. Denies bruising. Genitourinary:    Denies any urgency, frequency, hematuria. Voiding without difficulty. Physical Exam:    Vitals:    07/02/19 0810   BP: (!) 157/66   Pulse: 93   Resp: 18   Temp: 98.2 °F (36.8 °C)   SpO2: 98%       HEENT:  PERRLA. EOMI. Sclera clear. Buccal mucosa moist.    Neck:  Supple. Trachea midline. No thyromegaly. No JVD. No bruits. Heart:  Rhythm regular, rate controlled. No murmurs. Lungs:  Symmetrical. Clear to auscultation bilaterally. No wheezes. No rhonchi. No rales. Abdomen: Soft. Mildly tender to palpation in the right quadrant but with improvement. Bowel sounds are active. Extremities:  Peripheral pulses present. No peripheral edema. No ulcers. Neurologic:  Alert x 3. No focal deficit. Cranial nerves grossly intact. Skin:  No petechia. No hemorrhage. No wounds.     CBC with Differential:    Lab Results   Component Value Date    WBC 7.4 07/02/2019    RBC 3.66 07/02/2019    HGB 10.7 07/02/2019    HCT 34.1 07/02/2019     07/02/2019    MCV 93.2 07/02/2019    MCH 29.2 07/02/2019    MCHC 31.4 07/02/2019    RDW 13.6
Patient requested not to have any information sent to Dr. Ivis Barber due to not seeing doctor anymore.     Electronically signed by Kam Castillo RN on 6/28/2019 at 3:14 AM
Pt had a bowel movement but there was urine in the collection hat so unable to get stool sample at this time. Will try to collect if patient has another BM.
[facility] Quality Flow/Interdisciplinary Rounds Progress Note        Quality Flow Rounds held on June 28, 2019    Disciplines Attending:  Bedside Nurse, ,  and Nursing Unit Gricelda Chavarria was admitted on 6/27/2019  2:25 PM    Anticipated Discharge Date:  Expected Discharge Date: 06/30/19    Disposition:    Thad Score:  Thad Scale Score: 20    Readmission Risk              Risk of Unplanned Readmission:        10           Discussed patient goal for the day, patient clinical progression, and barriers to discharge.   The following Goal(s) of the Day/Commitment(s) have been identified:  OBS, LR, IV Protonix, RA, GMF      Randy Seat  June 28, 2019
Nutrition Therapies:  · Oral Diet Orders: NPO   · Anthropometric Measures:  · Ht: 5' (152.4 cm)   · Current Body Wt: 230 lb (104.3 kg)(6/27 bedscale-UTO d/t broken bedscale)  · Admission Body Wt: 230 lb (104.3 kg)(6/27 bedscale)  · Usual Body Wt: 240 lb (108.9 kg)(11/2018 bedscale)  · % Weight Change:pt w/ wt fluctuations in EMR hx. 11/2018 240# bedscale, 1/19 220 actual. TRE d/t fluid  · Ideal Body Wt: 100 lb (45.4 kg), % Ideal Body 230%  · BMI Classification: BMI > or equal to 40.0 Obese Class III    Nutrition Interventions:   Continued Inpatient Monitoring, Coordination of Care, Education not appropriate at this time(pt did not want to talk.  Discussed ONS briefly)    Nutrition Evaluation:   · Evaluation: Goals set   · Goals: nutrition progression    · Monitoring: Nutrition Progression, Skin Integrity, I&O, Monitor Bowel Function, Weight, Pertinent Labs, Nausea or Vomiting, Diarrhea      Electronically signed by Es Dalton, MS, RD, LD on 6/28/19 at 11:03 AM    Contact Number: [id number]
PROCAL in the last 72 hours. Lab Results   Component Value Date    SEDRATE 15 07/02/2019    SEDRATE 21 (H) 09/30/2015    SEDRATE 24 (H) 09/03/2014     Lab Results   Component Value Date    CRP 0.4 09/30/2015       MICROBIOLOGY:       Lab Results   Component Value Date    BLOODCULT2 24 Hours- no growth 06/27/2019       Organism   Date Value Ref Range Status   11/18/2018 Escherichia coli (A)  Final   11/18/2018 Escherichia coli (A)  Final     WOUND/ABSCESS   Date Value Ref Range Status   06/23/2016 Growth not present  Final       RADIOLOGY:  XR CHEST STANDARD (2 VW)   Final Result   No acute abnormality      CT ABDOMEN PELVIS WO CONTRAST Additional Contrast? Oral   Final Result   1. Mild nonspecific wall thickening of the transverse colon, possibly   related to infectious or inflammatory colitis. No evidence of bowel   obstruction. .   2. Postsurgical changes from prior gastric bypass surgery. CT ABDOMEN PELVIS WO CONTRAST   Final Result   1. No acute intra-abdominal or pelvic pathology. 2. Postsurgical changes from prior gastric bypass surgery. Assessment/Plan:   75 y.o. female presented with   Chief Complaint   Patient presents with    Abdominal Pain     abd pain x 2 days but worse today, sent in by dr Xiang Hayden for colon rupture?, emesis, watery stools       Intractable abdominal pain    Lower abdominal pain    Moderate protein-calorie malnutrition (HCC)      -Mild nonspecific wall thickening of the transverse colon, possibly  related to infectious or inflammatory colitis. No evidence of bowel  Obstruction. .  -cont atbx  D/c planning    Imaging and labs were reviewed per medical records and any ID pertinent labs were addressed with the patient. The patient was educated about the diagnosis, prognosis, indications, risks and benefits of treatment. The patient is in agreement with the proposed medical treatment plan.   An opportunity to ask questions was given to the patient and questions were
SACHI Jay  9:28 AM  6/29/2019
medical treatment plan. An opportunity to ask questions was given to the patient and questions were answered. Electronically signed by KRYSTA Travis on 7/3/2019 at 2:26 PM    As above    I have independently performed and/or participated in the history, exam, medical decision making, and agree with all pertinent clinical information by NP. Pertinent notes and lab work reviewed. The patient was educated about the diagnosis, prognosis, indications, risks and benefits of treatment. The patient is in agreement with the proposed medical treatment plan. PT READY TO LEAVE  SLIGHT ABD PAIN   NO ISSUES WITH ATBX  SIDE EFFECTS DISCUSED OF MEDS  PT MED REC  F/U 2 WEEKS IF NEEDED  An opportunity to ask questions was given to the patient and questions were answered.       Electronically signed by Vale Ramirez MD on 7/3/2019 at 5:17 PM    Phone [phone]  Fax [phone]
disease  7. History of tobacco abuse  8. Obesity Class III to excess caloric intake. Plan:   General surgery is following patient recommendations of been reviewed. Patient to be on low fiber diet. We will continue antibiotic therapy. Await stool cultures. Patient will need to undergo colonoscopy in approximate 6 to 8 weeks with general surgery. Blood cultures have been negative to date. Encourage the patient to increase activity. Tentative discharge home tomorrow.  for discharge planning. I reviewed the patient's past medical, surgical history and medication. Patient's medications were reviewed/continued/adjusted. Labs as ordered. Please see orders for further plan of care. Rhythm strips reviewed as well as consultant recommendations/notes and/or discussion. I reviewed the  course of events since last visit. More than 50% of my  time was spent at the bedside counseling and/or coordination of care with the patient and/or family with face to face contact. This time was spent reviewing notes and laboratory data, instructing and counseling the patient. Time I spent with the family or surrogate(s) is included only if the patient was incapable of providing the necessary information or participating in medical decisionsI also discussed the differential diagnosis and all of the proposed management plans with the patient and individuals accompanying the patient. Carlita Mcmanus requires this high level of physician care and nursing in the IMC/Telemetry due the complexity of decision management and chance of rapid decline or death. Continued cardiac monitoring and higher level of nursing are required. I am ready available for decision making and intervention. I reviewed the relevant imaging studies and available reports. I also discussed the differential diagnosis and all of the proposed management plans with the patient and individuals accompanying the patient to this visit. I reviewed
plans with the patient and individuals accompanying the patient to this visit. I reviewed the relevant imaging studies and available reports. Job Davila DO, F.A.C.OCRIS   On 7/1/2019  3:34 PM

## 2019-07-03 NOTE — DISCHARGE SUMMARY
WBC 8.1 07/03/2019    HGB 11.2 (L) 07/03/2019    HCT 35.6 07/03/2019     07/03/2019     07/03/2019    K 3.7 07/03/2019     07/03/2019    CREATININE 0.6 07/03/2019    BUN 7 (L) 07/03/2019    CO2 28 07/03/2019    GLUCOSE 98 07/03/2019    ALT 21 06/27/2019    AST 30 06/27/2019    INR 1.0 12/31/2015     Lab Results   Component Value Date    INR 1.0 12/31/2015    INR 0.9 11/01/2015    INR 0.9 03/02/2014    PROTIME 10.6 12/31/2015    PROTIME 11.2 11/01/2015    PROTIME 11.3 03/02/2014      Lab Results   Component Value Date    TSH 2.300 07/02/2019     Lab Results   Component Value Date    TRIG 96 11/19/2018    TRIG 134 07/26/2018    TRIG 98 09/30/2015     Lab Results   Component Value Date    HDL 45 11/19/2018    HDL 50 07/26/2018    HDL 57 09/30/2015     Lab Results   Component Value Date    LDLCALC 41 11/19/2018    LDLCALC 84 07/26/2018    LDLCALC 79 09/30/2015     Lab Results   Component Value Date    LABA1C 5.6 11/19/2018       IMAGING:  Ct Abdomen Pelvis Wo Contrast Additional Contrast? Oral    Result Date: 6/28/2019  Location: 200 Indication: Lower abdominal pain Comparison: CT abdomen/pelvis from 6/27/2019. Technique: Multidetector CT imaging of the abdomen and pelvis was performed without administration of intravenous contrast. Oral contrast was administered. Coronal and sagittal reformatted images were obtained. Automated dose exposure control was used for this exam. FINDINGS: Lower thorax:  Limited evaluation of the lower chest demonstrates clear lung bases. Peritoneum: There is no free fluid within the abdomen or pelvis. There is no pneumoperitoneum. Hepatobiliary: The liver is normal in morphology and attenuation. The gallbladder is surgically absent. The common bile duct is normal in caliber. Pancreas: Unremarkable. Spleen: Unremarkable. Gastrointestinal: There are postsurgical changes from prior gastric bypass surgery. The small bowel loops are normal in caliber.  The appendix is not

## 2019-07-04 LAB
CMV DNA QNT PCR: <2.6 LOG CPY/ML
CMV DNA QUANTATATIVE INTERPRETATION: <2.4 LOG IU/ML
CMV DNA QUANTATATIVE INTERPRETATION: NOT DETECTED
CMV DNA QUANTITATIVE: <227 IU/ML
CMV SOURCE: NORMAL
CMVQ COPY/ML: <390 CPY/ML
CULTURE, STOOL: NORMAL

## 2019-07-09 ENCOUNTER — OFFICE VISIT (OUTPATIENT)
Dept: INTERNAL MEDICINE CLINIC | Age: 64
End: 2019-07-09
Payer: MEDICARE

## 2019-07-09 VITALS
WEIGHT: 225.4 LBS | HEART RATE: 104 BPM | TEMPERATURE: 98.1 F | OXYGEN SATURATION: 96 % | HEIGHT: 60 IN | DIASTOLIC BLOOD PRESSURE: 84 MMHG | BODY MASS INDEX: 44.25 KG/M2 | SYSTOLIC BLOOD PRESSURE: 162 MMHG

## 2019-07-09 DIAGNOSIS — Z78.0 POST-MENOPAUSAL: ICD-10-CM

## 2019-07-09 DIAGNOSIS — Z12.31 ENCOUNTER FOR SCREENING MAMMOGRAM FOR BREAST CANCER: ICD-10-CM

## 2019-07-09 DIAGNOSIS — Z76.89 ESTABLISHING CARE WITH NEW DOCTOR, ENCOUNTER FOR: ICD-10-CM

## 2019-07-09 DIAGNOSIS — Z72.89 OTHER PROBLEMS RELATED TO LIFESTYLE: Primary | ICD-10-CM

## 2019-07-09 DIAGNOSIS — A09 INFECTIOUS COLITIS: ICD-10-CM

## 2019-07-09 PROCEDURE — 99213 OFFICE O/P EST LOW 20 MIN: CPT | Performed by: INTERNAL MEDICINE

## 2019-07-09 RX ORDER — SUCRALFATE ORAL 1 G/10ML
1 SUSPENSION ORAL
Qty: 1200 ML | Refills: 3 | Status: SHIPPED | OUTPATIENT
Start: 2019-07-09 | End: 2020-01-14 | Stop reason: SDUPTHER

## 2019-07-09 ASSESSMENT — PATIENT HEALTH QUESTIONNAIRE - PHQ9
SUM OF ALL RESPONSES TO PHQ QUESTIONS 1-9: 2
1. LITTLE INTEREST OR PLEASURE IN DOING THINGS: 1
SUM OF ALL RESPONSES TO PHQ QUESTIONS 1-9: 2
2. FEELING DOWN, DEPRESSED OR HOPELESS: 1
SUM OF ALL RESPONSES TO PHQ9 QUESTIONS 1 & 2: 2

## 2019-07-09 NOTE — PROGRESS NOTES
nebulization every 6 hours as needed for Wheezing or Shortness of Breath 18  Yes Radha Harris DO   ondansetron (ZOFRAN-ODT) 4 MG disintegrating tablet Take 1 tablet by mouth every 8 hours as needed for Nausea or Vomiting 16  Yes Mena Will MD       Allergies  Ceclor [cefaclor]; Diprivan [propofol]; Naproxen; Adhesive tape; Blueberry flavor; Ibuprofen; Mirapex [pramipexole]; Shellfish-derived products; Vaccinium angustifolium; Blueberry fruit extract; Ceclor [cefaclor]; Iodides; Iv dye [iodides]; Naprosyn [naproxen]; Percocet [oxycodone-acetaminophen]; Phenergan [promethazine hcl]; Promethazine; Sulfa antibiotics;  Tape Hammad Norlander tape]; and Tetanus toxoids    Social Hx  Social History     Socioeconomic History    Marital status:      Spouse name: mattie    Number of children: 5    Years of education: 15    Highest education level: Not on file   Occupational History    Not on file   Social Needs    Financial resource strain: Not on file    Food insecurity:     Worry: Not on file     Inability: Not on file   [employer] needs:     Medical: Not on file     Non-medical: Not on file   Tobacco Use    Smoking status: Former Smoker     Packs/day: 1.00     Years: 14.00     Pack years: 14.00     Types: Cigarettes     Start date: 10/23/2004     Last attempt to quit: 2018     Years since quittin.6    Smokeless tobacco: Never Used   Substance and Sexual Activity    Alcohol use: No     Comment: occ    Drug use: No    Sexual activity: Never   Lifestyle    Physical activity:     Days per week: Not on file     Minutes per session: Not on file    Stress: Not on file   Relationships    Social connections:     Talks on phone: Not on file     Gets together: Not on file     Attends [employer] service: Not on file     Active member of club or organization: Not on file     Attends meetings of clubs or organizations: Not on file     Relationship status: Not on file    Intimate partner follow up. The active problem list is as follows:    Past Medical History:   Diagnosis Date    Anemia     Anxiety     Asthma     Biceps tendon tear 1/2/2015    Closed fracture of multiple ribs of right side     Closed fracture of nasal bone      Replacing Inactive Diagnoses    Collapsed lung 1986    after a surgery 2nd day pre op     COPD (chronic obstructive [facility] disease) [facility]     DDD (degenerative disc disease), lumbar     Degenerative disc disease at L5-S1 level     bulging disc lower back    Depression     Distal radius fracture 5/11/2015    Fracture of left olecranon process 1/10/2017    GERD (gastroesophageal reflux disease)     History of blood transfusion     x2    Hypertension     Impingement syndrome of shoulder 12/9/2014    Intractable abdominal pain 11/18/2018    Left carpal tunnel syndrome 1/30/2017    Nasal bones, closed fracture, closed, initial encounter 12/31/2015    Osteoarthritis     Peptic ulcer, unspecified site, unspecified as acute or chronic, without mention of hemorrhage or perforation     RLS (restless legs syndrome)     Rotator cuff tear 12/9/2014    Seizure ([address]     hasn't had one for 6 years    Sepsis ([facility] [address] [facility]     Spinal stenosis      Lizette was seen today for new patient and health maintenance. Diagnoses and all orders for this visit:    Other problems related to lifestyle  -     Hepatitis C Antibody; Future    Establishing care with new doctor, encounter for    Post-menopausal  -     DEXA Bone Density Axial Skeleton; Future    Encounter for screening mammogram for breast cancer  -     BETTYE Digital Screen Bilateral [[id number]]; Future    Other orders  -     sucralfate (CARAFATE) 1 GM/10ML suspension;  Take 10 mLs by mouth 3 times daily (with meals)      DEXA and mammo ordered  Hep C screen  Return in 2 months to reevaluate diarrhea/abdominal pain or earlier if needed  To follow up with Dr. Zuri Caban for colonoscopy    Greater than 50% of the time during

## 2019-10-17 ENCOUNTER — TELEPHONE (OUTPATIENT)
Dept: FAMILY MEDICINE CLINIC | Age: 64
End: 2019-10-17

## 2019-12-16 ENCOUNTER — OFFICE VISIT (OUTPATIENT)
Dept: FAMILY MEDICINE CLINIC | Age: 64
End: 2019-12-16
Payer: MEDICARE

## 2019-12-16 VITALS
HEART RATE: 83 BPM | WEIGHT: 226 LBS | HEIGHT: 60 IN | OXYGEN SATURATION: 96 % | TEMPERATURE: 97.2 F | SYSTOLIC BLOOD PRESSURE: 114 MMHG | BODY MASS INDEX: 44.37 KG/M2 | DIASTOLIC BLOOD PRESSURE: 80 MMHG

## 2019-12-16 DIAGNOSIS — Z13.31 POSITIVE DEPRESSION SCREENING: ICD-10-CM

## 2019-12-16 DIAGNOSIS — F33.9 RECURRENT DEPRESSION (HCC): ICD-10-CM

## 2019-12-16 DIAGNOSIS — Z87.891 HISTORY OF TOBACCO ABUSE: ICD-10-CM

## 2019-12-16 DIAGNOSIS — G25.81 RLS (RESTLESS LEGS SYNDROME): ICD-10-CM

## 2019-12-16 DIAGNOSIS — M48.07 SPINAL STENOSIS OF LUMBOSACRAL REGION: ICD-10-CM

## 2019-12-16 DIAGNOSIS — E55.9 VITAMIN D DEFICIENCY, UNSPECIFIED: ICD-10-CM

## 2019-12-16 DIAGNOSIS — Z76.89 ENCOUNTER TO ESTABLISH CARE: Primary | ICD-10-CM

## 2019-12-16 DIAGNOSIS — Z86.79 HISTORY OF HYPERTENSION: ICD-10-CM

## 2019-12-16 DIAGNOSIS — Z98.84 HISTORY OF BARIATRIC SURGERY: Chronic | ICD-10-CM

## 2019-12-16 DIAGNOSIS — J45.909 UNCOMPLICATED ASTHMA, UNSPECIFIED ASTHMA SEVERITY, UNSPECIFIED WHETHER PERSISTENT: ICD-10-CM

## 2019-12-16 DIAGNOSIS — K21.9 GASTROESOPHAGEAL REFLUX DISEASE, ESOPHAGITIS PRESENCE NOT SPECIFIED: ICD-10-CM

## 2019-12-16 PROBLEM — R10.30 LOWER ABDOMINAL PAIN: Status: RESOLVED | Noted: 2019-06-27 | Resolved: 2019-12-16

## 2019-12-16 PROBLEM — E44.0 MODERATE PROTEIN-CALORIE MALNUTRITION (HCC): Status: RESOLVED | Noted: 2019-06-28 | Resolved: 2019-12-16

## 2019-12-16 PROBLEM — R10.9 INTRACTABLE ABDOMINAL PAIN: Status: RESOLVED | Noted: 2019-06-27 | Resolved: 2019-12-16

## 2019-12-16 PROBLEM — J44.9 COPD (CHRONIC OBSTRUCTIVE PULMONARY DISEASE) (HCC): Status: ACTIVE | Noted: 2018-05-21

## 2019-12-16 PROCEDURE — 90653 IIV ADJUVANT VACCINE IM: CPT | Performed by: FAMILY MEDICINE

## 2019-12-16 PROCEDURE — 99203 OFFICE O/P NEW LOW 30 MIN: CPT | Performed by: FAMILY MEDICINE

## 2019-12-16 PROCEDURE — 90732 PPSV23 VACC 2 YRS+ SUBQ/IM: CPT | Performed by: FAMILY MEDICINE

## 2019-12-16 PROCEDURE — G0008 ADMIN INFLUENZA VIRUS VAC: HCPCS | Performed by: FAMILY MEDICINE

## 2019-12-16 PROCEDURE — G8431 POS CLIN DEPRES SCRN F/U DOC: HCPCS | Performed by: FAMILY MEDICINE

## 2019-12-16 PROCEDURE — G0009 ADMIN PNEUMOCOCCAL VACCINE: HCPCS | Performed by: FAMILY MEDICINE

## 2019-12-16 RX ORDER — OMEPRAZOLE 20 MG/1
20 CAPSULE, DELAYED RELEASE ORAL DAILY
COMMUNITY
End: 2021-02-10

## 2019-12-16 RX ORDER — HYDROXYZINE HYDROCHLORIDE 25 MG/1
25 TABLET, FILM COATED ORAL 3 TIMES DAILY PRN
COMMUNITY
End: 2021-02-10

## 2019-12-16 RX ORDER — VENLAFAXINE HYDROCHLORIDE 75 MG/1
75 CAPSULE, EXTENDED RELEASE ORAL DAILY
Qty: 30 CAPSULE | Refills: 0 | Status: SHIPPED | OUTPATIENT
Start: 2019-12-16 | End: 2020-01-14 | Stop reason: SDUPTHER

## 2019-12-16 ASSESSMENT — ENCOUNTER SYMPTOMS
SORE THROAT: 0
DIARRHEA: 0
NAUSEA: 1
SINUS PAIN: 0
RHINORRHEA: 0
SHORTNESS OF BREATH: 1
COUGH: 1
ABDOMINAL PAIN: 0
BACK PAIN: 1
WHEEZING: 1
CONSTIPATION: 0
VOMITING: 1
COLOR CHANGE: 1

## 2019-12-16 ASSESSMENT — PATIENT HEALTH QUESTIONNAIRE - PHQ9
7. TROUBLE CONCENTRATING ON THINGS, SUCH AS READING THE NEWSPAPER OR WATCHING TELEVISION: 2
8. MOVING OR SPEAKING SO SLOWLY THAT OTHER PEOPLE COULD HAVE NOTICED. OR THE OPPOSITE, BEING SO FIGETY OR RESTLESS THAT YOU HAVE BEEN MOVING AROUND A LOT MORE THAN USUAL: 0
9. THOUGHTS THAT YOU WOULD BE BETTER OFF DEAD, OR OF HURTING YOURSELF: 1
3. TROUBLE FALLING OR STAYING ASLEEP: 3
4. FEELING TIRED OR HAVING LITTLE ENERGY: 3
1. LITTLE INTEREST OR PLEASURE IN DOING THINGS: 3
SUM OF ALL RESPONSES TO PHQ9 QUESTIONS 1 & 2: 6
SUM OF ALL RESPONSES TO PHQ QUESTIONS 1-9: 18
5. POOR APPETITE OR OVEREATING: 1
2. FEELING DOWN, DEPRESSED OR HOPELESS: 3
SUM OF ALL RESPONSES TO PHQ QUESTIONS 1-9: 18
6. FEELING BAD ABOUT YOURSELF - OR THAT YOU ARE A FAILURE OR HAVE LET YOURSELF OR YOUR FAMILY DOWN: 2
10. IF YOU CHECKED OFF ANY PROBLEMS, HOW DIFFICULT HAVE THESE PROBLEMS MADE IT FOR YOU TO DO YOUR WORK, TAKE CARE OF THINGS AT HOME, OR GET ALONG WITH OTHER PEOPLE: 2

## 2019-12-18 ENCOUNTER — HOSPITAL ENCOUNTER (OUTPATIENT)
Age: 64
Discharge: HOME | End: 2019-12-18
Payer: MEDICARE

## 2019-12-18 ENCOUNTER — HOSPITAL ENCOUNTER (OUTPATIENT)
Dept: PULMONOLOGY | Age: 64
Discharge: HOME | End: 2019-12-18
Payer: MEDICARE

## 2019-12-18 DIAGNOSIS — J45.909 UNCOMPLICATED ASTHMA, UNSPECIFIED ASTHMA SEVERITY, UNSPECIFIED WHETHER PERSISTENT: ICD-10-CM

## 2019-12-18 DIAGNOSIS — Z72.89 OTHER PROBLEMS RELATED TO LIFESTYLE: ICD-10-CM

## 2019-12-18 DIAGNOSIS — K21.9 GASTROESOPHAGEAL REFLUX DISEASE, ESOPHAGITIS PRESENCE NOT SPECIFIED: ICD-10-CM

## 2019-12-18 DIAGNOSIS — F33.9 RECURRENT DEPRESSION (HCC): ICD-10-CM

## 2019-12-18 DIAGNOSIS — Z87.891 HISTORY OF TOBACCO ABUSE: ICD-10-CM

## 2019-12-18 DIAGNOSIS — E55.9 VITAMIN D DEFICIENCY, UNSPECIFIED: ICD-10-CM

## 2019-12-18 DIAGNOSIS — Z86.79 HISTORY OF HYPERTENSION: ICD-10-CM

## 2019-12-18 LAB
ALBUMIN SERPL-MCNC: 4.6 G/DL (ref 3.5–5.2)
ALP BLD-CCNC: 93 U/L (ref 35–104)
ALT SERPL-CCNC: 18 U/L (ref 0–32)
ANION GAP SERPL CALCULATED.3IONS-SCNC: 13 MMOL/L (ref 7–16)
AST SERPL-CCNC: 22 U/L (ref 0–31)
BILIRUB SERPL-MCNC: 0.3 MG/DL (ref 0–1.2)
BUN BLDV-MCNC: 10 MG/DL (ref 8–23)
CALCIUM SERPL-MCNC: 9.3 MG/DL (ref 8.6–10.2)
CHLORIDE BLD-SCNC: 97 MMOL/L (ref 98–107)
CO2: 28 MMOL/L (ref 22–29)
CREAT SERPL-MCNC: 0.7 MG/DL (ref 0.5–1)
FOLATE: 11.5 NG/ML (ref 4.8–24.2)
GFR AFRICAN AMERICAN: >60
GFR NON-AFRICAN AMERICAN: >60 ML/MIN/1.73
GLUCOSE BLD-MCNC: 137 MG/DL (ref 74–99)
HCT VFR BLD CALC: 43.1 % (ref 34–48)
HEMOGLOBIN: 13.4 G/DL (ref 11.5–15.5)
MCH RBC QN AUTO: 29.3 PG (ref 26–35)
MCHC RBC AUTO-ENTMCNC: 31.1 % (ref 32–34.5)
MCV RBC AUTO: 94.1 FL (ref 80–99.9)
PDW BLD-RTO: 14.1 FL (ref 11.5–15)
PLATELET # BLD: 305 E9/L (ref 130–450)
PMV BLD AUTO: 9.7 FL (ref 7–12)
POTASSIUM SERPL-SCNC: 3.8 MMOL/L (ref 3.5–5)
RBC # BLD: 4.58 E12/L (ref 3.5–5.5)
SODIUM BLD-SCNC: 138 MMOL/L (ref 132–146)
TOTAL PROTEIN: 7.9 G/DL (ref 6.4–8.3)
TSH SERPL DL<=0.05 MIU/L-ACNC: 1.5 UIU/ML (ref 0.27–4.2)
VITAMIN B-12: 344 PG/ML (ref 211–946)
VITAMIN D 25-HYDROXY: 20 NG/ML (ref 30–100)
WBC # BLD: 8.2 E9/L (ref 4.5–11.5)

## 2019-12-18 PROCEDURE — 36415 COLL VENOUS BLD VENIPUNCTURE: CPT

## 2019-12-18 PROCEDURE — 82746 ASSAY OF FOLIC ACID SERUM: CPT

## 2019-12-18 PROCEDURE — 80053 COMPREHEN METABOLIC PANEL: CPT

## 2019-12-18 PROCEDURE — 82306 VITAMIN D 25 HYDROXY: CPT

## 2019-12-18 PROCEDURE — 85027 COMPLETE CBC AUTOMATED: CPT

## 2019-12-18 PROCEDURE — 86803 HEPATITIS C AB TEST: CPT

## 2019-12-18 PROCEDURE — 94060 EVALUATION OF WHEEZING: CPT

## 2019-12-18 PROCEDURE — 94729 DIFFUSING CAPACITY: CPT

## 2019-12-18 PROCEDURE — 82607 VITAMIN B-12: CPT

## 2019-12-18 PROCEDURE — 84443 ASSAY THYROID STIM HORMONE: CPT

## 2019-12-18 PROCEDURE — 94726 PLETHYSMOGRAPHY LUNG VOLUMES: CPT

## 2019-12-19 LAB — HEPATITIS C ANTIBODY INTERPRETATION: NORMAL

## 2020-01-14 ENCOUNTER — OFFICE VISIT (OUTPATIENT)
Dept: FAMILY MEDICINE CLINIC | Age: 65
End: 2020-01-14
Payer: MEDICARE

## 2020-01-14 VITALS
TEMPERATURE: 97.3 F | BODY MASS INDEX: 44.17 KG/M2 | SYSTOLIC BLOOD PRESSURE: 150 MMHG | DIASTOLIC BLOOD PRESSURE: 82 MMHG | HEART RATE: 82 BPM | WEIGHT: 225 LBS | HEIGHT: 60 IN

## 2020-01-14 PROBLEM — J44.89 ASTHMA WITH COPD: Status: ACTIVE | Noted: 2018-05-21

## 2020-01-14 PROBLEM — I10 ESSENTIAL HYPERTENSION: Status: ACTIVE | Noted: 2020-01-14

## 2020-01-14 PROCEDURE — 99214 OFFICE O/P EST MOD 30 MIN: CPT | Performed by: FAMILY MEDICINE

## 2020-01-14 RX ORDER — VENLAFAXINE HYDROCHLORIDE 75 MG/1
75 CAPSULE, EXTENDED RELEASE ORAL DAILY
Qty: 30 CAPSULE | Refills: 5 | Status: SHIPPED
Start: 2020-01-14 | End: 2020-02-25

## 2020-01-14 RX ORDER — HYDROCHLOROTHIAZIDE 12.5 MG/1
12.5 TABLET ORAL DAILY
Qty: 30 TABLET | Refills: 3 | Status: SHIPPED
Start: 2020-01-14 | End: 2021-02-10

## 2020-01-14 RX ORDER — SUCRALFATE ORAL 1 G/10ML
1 SUSPENSION ORAL
Qty: 1200 ML | Refills: 3 | Status: SHIPPED
Start: 2020-01-14 | End: 2021-02-10

## 2020-01-14 ASSESSMENT — ENCOUNTER SYMPTOMS
CONSTIPATION: 0
RHINORRHEA: 0
COUGH: 0
SINUS PAIN: 0
BACK PAIN: 1
DIARRHEA: 0
SHORTNESS OF BREATH: 0
ABDOMINAL PAIN: 0
NAUSEA: 0
SORE THROAT: 0
VOMITING: 0

## 2020-01-14 NOTE — PATIENT INSTRUCTIONS
Patient Education        Rotator Cuff: Exercises  Introduction  Here are some examples of exercises for you to try. The exercises may be suggested for a condition or for rehabilitation. Start each exercise slowly. Ease off the exercises if you start to have pain. You will be told when to start these exercises and which ones will work best for you. How to do the exercises  Pendulum swing   1. Hold on to a table or the back of a chair with your good arm. Then bend forward a little and let your sore arm hang straight down. This exercise does not use the arm muscles. Rather, use your legs and your hips to create movement that makes your arm swing freely. 2. Use the movement from your hips and legs to guide the slightly swinging arm back and forth like a pendulum (or elephant trunk). Then guide it in circles that start small (about the size of a dinner plate). Make the circles a bit larger each day, as your pain allows. 3. Do this exercise for 5 minutes, 5 to 7 times each day. 4. As you have less pain, try bending over a little farther to do this exercise. This will increase the amount of movement at your shoulder. Posterior stretching exercise   1. Hold the elbow of your injured arm with your other hand. 2. Use your hand to pull your injured arm gently up and across your body. You will feel a gentle stretch across the back of your injured shoulder. 3. Hold for at least 15 to 30 seconds. Then slowly lower your arm. 4. Repeat 2 to 4 times. Up-the-back stretch   1. Put your hand in your back pocket. Let it rest there to stretch your shoulder. 2. With your other hand, hold your injured arm (palm outward) behind your back by the wrist. Pull your arm up gently to stretch your shoulder. 3. Next, put a towel over your other shoulder. Put the hand of your injured arm behind your back. Now hold the back end of the towel. With the other hand, hold the front end of the towel in front of your body.  Pull gently on the front end of the towel. This will bring your hand farther up your back to stretch your shoulder. Overhead stretch   1. Standing about an arm's length away, grasp onto a solid surface. You could use a countertop, a doorknob, or the back of a sturdy chair. 2. With your knees slightly bent, bend forward with your arms straight. Lower your upper body, and let your shoulders stretch. 3. As your shoulders are able to stretch farther, you may need to take a step or two backward. 4. Hold for at least 15 to 30 seconds. Then stand up and relax. If you had stepped back during your stretch, step forward so you can keep your hands on the solid surface. 5. Repeat 2 to 4 times. Shoulder flexion (lying down)   1. Lie on your back, holding a wand with both hands. Your palms should face down as you hold the wand. 2. Keeping your elbows straight, slowly raise your arms over your head. Raise them until you feel a stretch in your shoulders, upper back, and chest.  3. Hold for 15 to 30 seconds. 4. Repeat 2 to 4 times. Shoulder rotation (lying down)   1. Lie on your back. Hold a wand with both hands with your elbows bent and palms up. 2. Keep your elbows close to your body, and move the wand across your body toward the sore arm. 3. Hold for 8 to 12 seconds. 4. Repeat 2 to 4 times. Wall climbing (to the side)   1. Stand with your side to a wall so that your fingers can just touch it at an angle about 30 degrees toward the front of your body. 2. Walk the fingers of your injured arm up the wall as high as pain permits. Try not to shrug your shoulder up toward your ear as you move your arm up. 3. Hold that position for a count of at least 15 to 20.  4. Walk your fingers back down to the starting position. 5. Repeat at least 2 to 4 times. Try to reach higher each time. Wall climbing (to the front)   1. Face a wall, and stand so your fingers can just touch it.   2. Keeping your shoulder down, walk the fingers of your recommended. Hydrochlorothiazide is usually taken once per day. Follow your doctor's dosing instructions very carefully. Call your doctor if you have ongoing vomiting or diarrhea, or if you are sweating more than usual. You can easily become dehydrated while taking this medicine, which can lead to severely low blood pressure or a serious electrolyte imbalance. While using hydrochlorothiazide, you may need frequent medical tests and blood pressure be checks. Your blood and urine may both be tested if you have been vomiting or are dehydrated. If you need surgery, tell the surgeon ahead of time that you are using hydrochlorothiazide. You may need to stop using the medicine for a short time. Keep using this medicine as directed, even if you feel well. High blood pressure often has no symptoms. You may need to use blood pressure medicine for the rest of your life. Store at room temperature away from moisture, heat, and freezing. Keep the bottle tightly closed when not in use. What happens if I miss a dose? Take the missed dose as soon as you remember. Skip the missed dose if it is almost time for your next scheduled dose. Do not take extra medicine to make up the missed dose. What happens if I overdose? Seek emergency medical attention or call the Poison Help line at 1-890.887.9092. Overdose symptoms may include nausea, weakness, dizziness, dry mouth, thirst, and muscle pain or weakness. What should I avoid while taking hydrochlorothiazide? Drinking alcohol with this medicine can cause side effects. Avoid becoming overheated or dehydrated during exercise, in hot weather, or by not drinking enough fluids. Follow your doctor's instructions about the type and amount of liquids you should drink. In some cases, drinking too much liquid can be as unsafe as not drinking enough. What are the possible side effects of hydrochlorothiazide?   Get emergency medical help if you have signs of an allergic reaction: hives; vitamins, and herbal products. Not all possible interactions are listed in this medication guide. Where can I get more information? Your pharmacist can provide more information about hydrochlorothiazide. Remember, keep this and all other medicines out of the reach of children, never share your medicines with others, and use this medication only for the indication prescribed. Every effort has been made to ensure that the information provided by [address] [place], [place] [address]can Dr is accurate, up-to-date, and complete, but no guarantee is made to that effect. Drug information contained herein may be time sensitive. [facility] information has been compiled for use by healthcare practitioners and consumers in the [place] and therefore [facility] does not warrant that uses outside of the [place] are appropriate, unless specifically indicated otherwise. [facility]'s drug information does not endorse drugs, diagnose patients or recommend therapy. [facility]'s drug information is an informational resource designed to assist licensed healthcare practitioners in caring for their patients and/or to serve consumers viewing this service as a supplement to, and not a substitute for, the expertise, skill, knowledge and judgment of healthcare practitioners. The absence of a warning for a given drug or drug combination in no way should be construed to indicate that the drug or drug combination is safe, effective or appropriate for any given patient. [facility] does not assume any responsibility for any aspect of healthcare administered with the aid of information [facility] provides. The information contained herein is not intended to cover all possible uses, directions, precautions, warnings, drug interactions, allergic reactions, or adverse effects. If you have questions about the drugs you are taking, check with your doctor, nurse or pharmacist.  Copyright 2957-4520 Sanjeev [address] [place] [address] Avenue: 12.01. Revision date: 8/9/2016.   Care instructions adapted under license by [facility] ([facility]). If you have questions about a medical condition or this instruction, always ask your healthcare professional. [address] any warranty or liability for your use of this information.

## 2020-01-14 NOTE — PROGRESS NOTES
2020     Jia Zarco (:  1955) is a 77 y.o. female, with a:  Past Medical History:   Diagnosis Date    Anastomotic ulcer 2012    Anemia     Anxiety     Asthma     Biceps tendon tear 2015    Closed fracture of multiple ribs of right side     Closed fracture of nasal bone      Replacing Inactive Diagnoses    Collapsed lung 1986    after a surgery 2nd day pre op     [facility]     DDD (degenerative disc disease), lumbar     Degenerative disc disease at L5-S1 level     bulging disc lower back    Depression     Distal radius fracture 2015    Fracture of left olecranon process 1/10/2017    GERD (gastroesophageal reflux disease)     History of blood transfusion     x2    Hypertension     Impingement syndrome of shoulder 2014    Intractable abdominal pain 2018    Left carpal tunnel syndrome 2017    Nasal bones, closed fracture, closed, initial encounter 2015    Osteoarthritis     Peptic ulcer, unspecified site, unspecified as acute or chronic, without mention of hemorrhage or perforation     RLS (restless legs syndrome)     Rotator cuff tear 2014    Seizure ([facility]     hasn't had one for 6 years    Sepsis ([facility]     Spinal stenosis        Here for evaluation of the following medical concerns:  Chief Complaint   Patient presents with    Discuss Labs     labs done , PFT 1/3    Depression     venlafaxine helping     Arm Pain     left arm aching      She also follows with GS, ENT, ortho, and PMR (Dr. Olivia Maradiaga at [facility])    F/U of chronic problem(s) and new or recent complaint of shoulder pain   Chronic problems reviewed today include:  History of HTN, Asthma, GERD, s/p gastric bypass, RLS, and spinal stenosis  Current status of this/these condition(s):  stable  Tolerating meds: Yes     Hx HTN -blood pressure elevated today in office. She was previously on lisinopril.   She reports that she has been off this medication for years. She denies any cardiac history. Asthma / ?COPD -patient reports a past medical history of asthma. She denies any history of COPD. She is a former smoker, smoked for about 20 years. She quit last year. Breathing stable. She reports that she may have had lung function testing many years prior. She is on dulera as needed    Recent PFTs showed:    CONCLUSION:  This PFT do reveal evidence of a moderate obstructive  process with good improvement postbronchodilators. GERD / hx of gastric bypass -currently on Prilosec, stable. She follows with bariatric surgery. Recent CBC stable    RLS -stable on Requip    Spinal stenosis - gradual onset years prior. She also follows with PMNR. Previous treatment has included therapy (unhelpful), pain medications (helpful) and CSI (helpful). Per EMR review, MRI 2013 showed: Impression- Spinal stenosis is most severe at L4-L5 as discussed. Patient reports that she underwent MRI last year as well (no report available for review)     Depression: She complains of depressed mood, difficulty concentrating and feelings of worthlessness/guilt. Onset was approximately several months ago. Symptoms have been gradually worsening since that time. Current symptoms include: feelings of worthlessness/guilt and hopelessness. Patient denies recurrent thoughts of death and suicidal thoughts with specific plan. Family history significant for no psychiatric illness. Possible organic causes contributing are: none. Risk factors: previous episode of depression and unhappy marriage. Previous treatment includes medication. She complains of the following side effects from the treatment: none. At prior office visit, she was started on Effexor. She reports significant improvement of her mood since that time. Review of Systems   Constitutional: Negative for chills, fatigue and fever.    HENT: Negative for congestion, rhinorrhea, sinus SURGERY      ABSCESS DRAINAGE Left 2016    left thigh seroma    APPENDECTOMY      CARPAL TUNNEL RELEASE  1998    right hand    CARPAL TUNNEL RELEASE Left 2017     SECTION  3/9/1984    CHOLECYSTECTOMY  1986    CHOLECYSTECTOMY      COLONOSCOPY  3/2011    DILATATION, ESOPHAGUS      DILATION AND CURETTAGE OF UTERUS      x4    ENDOSCOPY, COLON, DIAGNOSTIC      JOINT REPLACEMENT  2003    right    JOINT REPLACEMENT  2003    left knee    JOINT REPLACEMENT      bilat knee replacement    KNEE ARTHROSCOPY      bilateral    OTHER SURGICAL HISTORY  16    closed reduction nasal bone fracture    OTHER SURGICAL HISTORY Left 6 2 16    seroma incision and drainage thigh    [facility] LAP,DIAGNOSTIC ABDOMEN N/A 2018    DIAGNOSTIC LAPAROSCOPY, LYSIS OF ADHESIONS performed by Dennie Malm, MD at [facility]  2005    Dr. Otoniel Knight ARTHROSCOPY Right 1 2 15    rotator cuff repair, subacromial decompression, debridement    SHOULDER SURGERY      left    TONSILLECTOMY      UPPER GASTROINTESTINAL ENDOSCOPY  10/2004    UPPER [facility]AL ENDOSCOPY  12    [facility]    UPPER GASTROINTESTINAL ENDOSCOPY N/A 2018    EGD BIOPSY performed by Carlos Kumari MD at [address] 2019    EGD ESOPHAGOGASTRODUODENOSCOPY performed by Dennie Malm, MD at [facility] Right 2015    DEBRIDEMENT ASPIRATION; RIGHT DEQUARVAINES RELEASE    WRIST SURGERY Right 12/2/15       Vitals:    20 1106 20 1114   BP: (!) 156/88 (!) 150/82   Site: Right Upper Arm    Position: Sitting    Pulse: 82    Temp: 97.3 °F (36.3 °C)    TempSrc: Temporal    Weight: 225 lb (102.1 kg)    Height: 5' (1.524 m)      Estimated body mass index is 43.94 kg/m² as calculated from the following:    Height as of this encounter: 5' (1.524 m).     Weight as of this encounter: 225 lb (102.1 Apply 2 g topically 4 times daily    Neck pain  -     Cancel: XR CERVICAL SPINE (2-3 VIEWS); Future  -     XR CERVICAL SPINE (4-5 VIEWS); Future    Postmenopausal  -     DEXA BONE DENSITY AXIAL SKELETON; Future       Additional plan and future considerations:   As above. Start hydrochlorothiazide for hypertension and continue Effexor. Return office in 4 to 6 weeks for hypertension follow-up or sooner if needed    Educational materials and/or home exercises printed for patient's review and were included in patient instructions on his/her After Visit Summary and given to patient at the end of visit. Counseled regarding above diagnosis, including possible risks and complications,  especially if left uncontrolled. Counseled regarding the possible side effects, risks, benefits and alternatives to treatment; patient and/or guardian verbalizes understanding, agrees, feels comfortable with and wishes to proceed with above treatment plan. Advised patient to call with any new medication issues, and read all Rx info from pharmacy to assure aware of all possible risks and side effects of medication before taking. Reviewed age and gender appropriate health screening exams and vaccinations. Advised patient regarding importance of keeping up with recommended health maintenance and to schedule as soon as possible if overdue, as this is important in assessing for undiagnosed pathology,especially cancer, as well as protecting against potentially harmful/life threatening disease. Patient and/or guardian verbalizes understanding and agrees with above counseling, assessment and plan. All questions answered.       Future Appointments   Date Time Provider Gricelda Villanueva   2/25/2020 10:30 AM DO Kellie Martinez DO on 1/14/2020 at 11:27 AM

## 2020-01-27 ENCOUNTER — OFFICE VISIT (OUTPATIENT)
Dept: FAMILY MEDICINE CLINIC | Age: 65
End: 2020-01-27
Payer: MEDICARE

## 2020-01-27 VITALS
HEIGHT: 60 IN | DIASTOLIC BLOOD PRESSURE: 73 MMHG | OXYGEN SATURATION: 95 % | BODY MASS INDEX: 43.39 KG/M2 | WEIGHT: 221 LBS | HEART RATE: 75 BPM | TEMPERATURE: 98.4 F | SYSTOLIC BLOOD PRESSURE: 137 MMHG

## 2020-01-27 PROCEDURE — 99213 OFFICE O/P EST LOW 20 MIN: CPT | Performed by: FAMILY MEDICINE

## 2020-01-27 RX ORDER — GUAIFENESIN AND CODEINE PHOSPHATE 100; 10 MG/5ML; MG/5ML
5 SOLUTION ORAL 2 TIMES DAILY PRN
Qty: 1 BOTTLE | Refills: 0 | Status: SHIPPED
Start: 2020-01-27 | End: 2021-05-06 | Stop reason: SDUPTHER

## 2020-01-27 ASSESSMENT — ENCOUNTER SYMPTOMS
WHEEZING: 0
DIARRHEA: 0
VOMITING: 0
SORE THROAT: 0
RHINORRHEA: 1
CONSTIPATION: 0
SHORTNESS OF BREATH: 1
SINUS PRESSURE: 0
SINUS PAIN: 0
NAUSEA: 0
COUGH: 1

## 2020-01-27 NOTE — PROGRESS NOTES
and fatigue. Negative for fever. HENT: Positive for congestion and rhinorrhea. Negative for sinus pressure, sinus pain and sore throat. Respiratory: Positive for cough and shortness of breath. Negative for wheezing. Cardiovascular: Negative for chest pain and palpitations. Gastrointestinal: Negative for constipation, diarrhea, nausea and vomiting. Genitourinary: Negative for difficulty urinating and dysuria. Neurological: Negative for headaches. Prior to Visit Medications    Medication Sig Taking?  Authorizing Provider   venlafaxine (EFFEXOR XR) 75 MG extended release capsule Take 1 capsule by mouth daily Yes Diego Pritchard DO   sucralfate (CARAFATE) 1 GM/10ML suspension Take 10 mLs by mouth 3 times daily (with meals) Yes Diego Pritchard DO   diclofenac sodium 1 % GEL Apply 2 g topically 4 times daily Yes Diego Pritchard DO   hydrochlorothiazide (HYDRODIURIL) 12.5 MG tablet Take 1 tablet by mouth daily Yes Diego Pritchard DO   omeprazole (PRILOSEC) 20 MG delayed release capsule Take 20 mg by mouth daily Yes Historical Provider, MD   hydrOXYzine (ATARAX) 25 MG tablet Take 25 mg by mouth 3 times daily as needed for Itching Yes Historical Provider, MD   DULERA 100-5 MCG/ACT inhaler 2 puffs 2 times daily  Yes Historical Provider, MD   rOPINIRole (REQUIP) 1 MG tablet Take 1 mg by mouth nightly Yes Historical Provider, MD   albuterol (PROVENTIL) (5 MG/ML) 0.5% nebulizer solution Take 0.5 mLs by nebulization every 6 hours as needed for Wheezing or Shortness of Breath Yes Marilee Talley DO        Social History     Tobacco Use    Smoking status: Former Smoker     Packs/day: 1.00     Years: 14.00     Pack years: 14.00     Types: Cigarettes     Start date: 10/23/2004     Last attempt to quit: 2018     Years since quittin.1    Smokeless tobacco: Never Used   Substance Use Topics    Alcohol use: No     Comment: occ        Past Surgical History:   Procedure Laterality Date    ABDOMEN SURGERY She is not in acute distress. Appearance: She is well-developed. HENT:      Head: Normocephalic and atraumatic. Right Ear: Hearing, tympanic membrane, ear canal and external ear normal.      Left Ear: Hearing, tympanic membrane, ear canal and external ear normal.      Nose: Nose normal. No congestion or rhinorrhea. Mouth/Throat:      Pharynx: Posterior oropharyngeal erythema present. No pharyngeal swelling or oropharyngeal exudate. Eyes:      Extraocular Movements: Extraocular movements intact. Pupils: Pupils are equal, round, and reactive to light. Neck:      Musculoskeletal: Normal range of motion. Thyroid: No thyromegaly. Cardiovascular:      Rate and Rhythm: Normal rate and regular rhythm. Pulmonary:      Effort: Pulmonary effort is normal. No respiratory distress. Breath sounds: Normal breath sounds. No wheezing or rales. Abdominal:      General: There is no distension. Palpations: Abdomen is soft. Tenderness: There is no abdominal tenderness. Musculoskeletal: Normal range of motion. General: No swelling or deformity. Skin:     General: Skin is warm. Findings: No rash. Neurological:      General: No focal deficit present. Mental Status: She is alert. Mental status is at baseline. ASSESSMENT/PLAN:  Jolene Bermudez was seen today for cough. Diagnoses and all orders for this visit:    Upper respiratory tract infection, unspecified type    Cough  -     guaiFENesin-codeine (TUSSI-ORGANIDIN NR) 100-10 MG/5ML syrup; Take 5 mLs by mouth 2 times daily as needed for Cough for up to 3 days. Additional plan and future considerations:   As above. Call if symptoms worsen or fail to improve    Educational materials and/or home exercises printed for patient's review and were included in patient instructions on his/her After Visit Summary and given to patient at the end of visit.       Counseled regarding above diagnosis, including possible risks

## 2020-01-30 ENCOUNTER — OFFICE VISIT (OUTPATIENT)
Dept: FAMILY MEDICINE CLINIC | Age: 65
End: 2020-01-30
Payer: MEDICARE

## 2020-01-30 VITALS
SYSTOLIC BLOOD PRESSURE: 136 MMHG | HEIGHT: 60 IN | OXYGEN SATURATION: 92 % | BODY MASS INDEX: 43.16 KG/M2 | TEMPERATURE: 97.8 F | HEART RATE: 88 BPM | DIASTOLIC BLOOD PRESSURE: 84 MMHG

## 2020-01-30 PROCEDURE — 99213 OFFICE O/P EST LOW 20 MIN: CPT | Performed by: FAMILY MEDICINE

## 2020-01-30 RX ORDER — PREDNISONE 20 MG/1
20 TABLET ORAL 2 TIMES DAILY
Qty: 10 TABLET | Refills: 0 | Status: SHIPPED | OUTPATIENT
Start: 2020-01-30 | End: 2020-02-04

## 2020-01-30 RX ORDER — AZITHROMYCIN 250 MG/1
250 TABLET, FILM COATED ORAL SEE ADMIN INSTRUCTIONS
Qty: 6 TABLET | Refills: 0 | Status: SHIPPED | OUTPATIENT
Start: 2020-01-30 | End: 2020-02-04

## 2020-01-30 NOTE — PROGRESS NOTES
Reported on 2020  Marilee Talley DO        Social History     Tobacco Use    Smoking status: Former Smoker     Packs/day: 1.00     Years: 14.00     Pack years: 14.00     Types: Cigarettes     Start date: 10/23/2004     Last attempt to quit: 2018     Years since quittin.1    Smokeless tobacco: Never Used   Substance Use Topics    Alcohol use: No     Comment: occ        Past Surgical History:   Procedure Laterality Date    ABDOMEN SURGERY      ABSCESS DRAINAGE Left 2016    left thigh seroma    APPENDECTOMY      CARPAL TUNNEL RELEASE  1998    right hand    CARPAL TUNNEL RELEASE Left 2017     SECTION  3/9/1984    CHOLECYSTECTOMY  1986    CHOLECYSTECTOMY      COLONOSCOPY  3/2011    DILATATION, ESOPHAGUS      DILATION AND CURETTAGE OF UTERUS      x4    ENDOSCOPY, COLON, DIAGNOSTIC      JOINT REPLACEMENT  2003    right    JOINT REPLACEMENT  2003    left knee    JOINT REPLACEMENT      bilat knee replacement    KNEE ARTHROSCOPY      bilateral    OTHER SURGICAL HISTORY  16    closed reduction nasal bone fracture    OTHER SURGICAL HISTORY Left 6 2 16    seroma incision and drainage thigh    MD LAP,DIAGNOSTIC ABDOMEN N/A 2018    DIAGNOSTIC LAPAROSCOPY, LYSIS OF ADHESIONS performed by Vince Cabrales MD at [address] [place] [address]  2005    Dr. Marya Franz ARTHROSCOPY Right 1 2 15    rotator cuff repair, subacromial decompression, debridement    SHOULDER SURGERY      left    TONSILLECTOMY      UPPER GASTROINTESTINAL ENDOSCOPY  10/2004    UPPER GASTROINTESTINAL ENDOSCOPY  12    [facility]    UPPER GASTROINTESTINAL ENDOSCOPY N/A 2018    EGD BIOPSY performed by Gabriel Bundy MD at [facility] 2019    EGD ESOPHAGOGASTRODUODENOSCOPY performed by Vince Cabrales MD at [facility] Right 2015    DEBRIDEMENT ASPIRATION; RIGHT

## 2020-01-31 ASSESSMENT — ENCOUNTER SYMPTOMS
DIARRHEA: 0
SINUS PAIN: 0
NAUSEA: 0
SINUS PRESSURE: 0
SHORTNESS OF BREATH: 1
RHINORRHEA: 1
VOMITING: 0
COUGH: 1
CONSTIPATION: 0
WHEEZING: 1
SORE THROAT: 1

## 2020-02-25 ENCOUNTER — OFFICE VISIT (OUTPATIENT)
Dept: FAMILY MEDICINE CLINIC | Age: 65
End: 2020-02-25
Payer: MEDICARE

## 2020-02-25 VITALS
TEMPERATURE: 97.8 F | HEART RATE: 102 BPM | SYSTOLIC BLOOD PRESSURE: 138 MMHG | DIASTOLIC BLOOD PRESSURE: 78 MMHG | BODY MASS INDEX: 42.67 KG/M2 | OXYGEN SATURATION: 96 % | HEIGHT: 61 IN | WEIGHT: 226 LBS

## 2020-02-25 PROCEDURE — 99214 OFFICE O/P EST MOD 30 MIN: CPT | Performed by: FAMILY MEDICINE

## 2020-02-25 RX ORDER — CITALOPRAM 10 MG/1
10 TABLET ORAL DAILY
Qty: 30 TABLET | Refills: 1 | Status: SHIPPED
Start: 2020-02-25 | End: 2021-02-10

## 2020-02-25 ASSESSMENT — ENCOUNTER SYMPTOMS
VOMITING: 0
DIARRHEA: 0
NAUSEA: 0
SHORTNESS OF BREATH: 0
SORE THROAT: 0
BACK PAIN: 0
CONSTIPATION: 0
RHINORRHEA: 0
SINUS PAIN: 0
COUGH: 0
ABDOMINAL PAIN: 0

## 2020-02-25 NOTE — PROGRESS NOTES
2020     Petr Medina (:  1955) is a 77 y.o. female, with a:  Past Medical History:   Diagnosis Date    Anastomotic ulcer 2012    Anemia     Anxiety     Asthma     Biceps tendon tear 2015    Closed fracture of multiple ribs of right side     Closed fracture of nasal bone      Replacing Inactive Diagnoses    Collapsed lung 1986    after a surgery 2nd day pre op     [facility]) [facility]     DDD (degenerative disc disease), lumbar     Degenerative disc disease at L5-S1 level     bulging disc lower back    Depression     Distal radius fracture 2015    Fracture of left olecranon process 1/10/2017    GERD (gastroesophageal reflux disease)     History of blood transfusion     x2    Hypertension     Impingement syndrome of shoulder 2014    Intractable abdominal pain 2018    Left carpal tunnel syndrome 2017    Nasal bones, closed fracture, closed, initial encounter 2015    Osteoarthritis     Peptic ulcer, unspecified site, unspecified as acute or chronic, without mention of hemorrhage or perforation     RLS (restless legs syndrome)     Rotator cuff tear 2014    Seizure ([facility]     hasn't had one for 6 years    Sepsis ([facility]     Spinal stenosis        Here for evaluation of the following medical concerns:  Chief Complaint   Patient presents with    Shoulder Pain     left shoulder better     Hypertension    Depression     stopped venlafaxine worried about side effects      She also follows with GS, ENT, ortho, and PMR (Dr. Maggie Hoff at [facility])    F/U of chronic problem(s) and new or recent complaint of skin lesion   Chronic problems reviewed today include:  Hypertension, Depression and shoulder pain  Current status of this/these condition(s):  stable  Tolerating meds: No    Hypertension:  Home blood pressure monitoring: No.  She is adherent to a low sodium diet.  Patient denies chest pain, shortness of breath and headache. Antihypertensive medication side effects: no medication side effects noted. Use of agents associated with hypertension: none. Blood pressure improved today in office.  She was recently started on hydrochlorothiazide 12.5 milligrams daily. She has been tolerating the medication well.  She denies any cardiac history. Sodium (mmol/L)   Date Value   12/18/2019 138    BUN (mg/dL)   Date Value   12/18/2019 10    Glucose (mg/dL)   Date Value   12/18/2019 137 (H)   03/06/2012 92      Potassium (mmol/L)   Date Value   12/18/2019 3.8     Potassium reflex Magnesium (mmol/L)   Date Value   06/27/2019 3.8    CREATININE (mg/dL)   Date Value   12/18/2019 0.7         Depression: She complains of depressed mood, difficulty concentrating and feelings of worthlessness/guilt. Onset was approximately several months ago. Symptoms have been gradually worsening since that time. Current symptoms include: feelings of worthlessness/guilt and hopelessness. Patient denies recurrent thoughts of death and suicidal thoughts with specific plan. Family history significant for no psychiatric illness. Possible organic causes contributing are: none. Risk factors: previous episode of depression and unhappy marriage. Previous treatment includes medication. She complains of the following side effects from the treatment: none. At prior office visit, she was started on Effexor and had been doing better. Unfortunately, her daughter was recently diagnosed with severe pancreatitis and this was attributed to Effexor. Patient subsequently stopped her medication due to this. She reports continued depressed mood. She denies any SI or HI    Chronic shoulder pain:  Impression   Evidence of rotator cuff degeneration.         She reports improvement of symptoms with home exercises provided. Review of Systems   Constitutional: Negative for chills, fatigue and fever.    HENT: Negative for Estimated body mass index is 42.7 kg/m² as calculated from the following:    Height as of this encounter: 5' 1\" (1.549 m). Weight as of this encounter: 226 lb (102.5 kg). Physical Exam  Constitutional:       General: She is not in acute distress. Appearance: She is well-developed. HENT:      Head: Normocephalic and atraumatic. Right Ear: External ear normal.      Left Ear: External ear normal.      Nose: Nose normal. No congestion or rhinorrhea. Eyes:      Extraocular Movements: Extraocular movements intact. Pupils: Pupils are equal, round, and reactive to light. Neck:      Musculoskeletal: Normal range of motion. Thyroid: No thyromegaly. Cardiovascular:      Rate and Rhythm: Normal rate and regular rhythm. Pulmonary:      Effort: Pulmonary effort is normal. No respiratory distress. Breath sounds: Normal breath sounds. No wheezing or rales. Abdominal:      General: There is no distension. Palpations: Abdomen is soft. Tenderness: There is no abdominal tenderness. Musculoskeletal: Normal range of motion. General: No swelling or deformity. Skin:     General: Skin is warm. Findings: No rash. Neurological:      General: No focal deficit present. Mental Status: She is alert. Mental status is at baseline. Psychiatric:         Mood and Affect: Mood is anxious and depressed. Affect is labile. Speech: Speech normal.         Behavior: Behavior normal.         ASSESSMENT/PLAN:  Wilfredo Byrne was seen today for shoulder pain, hypertension and depression. Diagnoses and all orders for this visit:    Essential hypertension    Recurrent depression ([facility] [facility]  -     citalopram (CELEXA) 10 MG tablet; Take 1 tablet by mouth daily    Chronic left shoulder pain    Skin lesion of face  -     AFL - Dc Oleary MD, Dermatology, Yolanda Albrecht       Additional plan and future considerations:   As above. Start Celexa and refer to dermatology.   Encourage follow-up

## 2020-12-18 ENCOUNTER — OFFICE VISIT (OUTPATIENT)
Dept: PRIMARY CARE CLINIC | Age: 65
End: 2020-12-18
Payer: MEDICARE

## 2020-12-18 VITALS
DIASTOLIC BLOOD PRESSURE: 80 MMHG | OXYGEN SATURATION: 96 % | SYSTOLIC BLOOD PRESSURE: 142 MMHG | HEART RATE: 80 BPM | BODY MASS INDEX: 41.23 KG/M2 | TEMPERATURE: 97.2 F | HEIGHT: 60 IN | WEIGHT: 210 LBS

## 2020-12-18 LAB
Lab: NORMAL
QC PASS/FAIL: NORMAL
SARS-COV-2, POC: DETECTED

## 2020-12-18 PROCEDURE — 87426 SARSCOV CORONAVIRUS AG IA: CPT | Performed by: NURSE PRACTITIONER

## 2020-12-18 PROCEDURE — 99213 OFFICE O/P EST LOW 20 MIN: CPT | Performed by: NURSE PRACTITIONER

## 2020-12-18 RX ORDER — DOXYCYCLINE HYCLATE 100 MG
100 TABLET ORAL 2 TIMES DAILY
Qty: 20 TABLET | Refills: 0 | Status: SHIPPED | OUTPATIENT
Start: 2020-12-18 | End: 2020-12-28

## 2020-12-18 RX ORDER — ZINC SULFATE 50(220)MG
50 CAPSULE ORAL DAILY
Qty: 7 CAPSULE | Refills: 0 | COMMUNITY
Start: 2020-12-18 | End: 2021-02-10

## 2020-12-18 RX ORDER — ASCORBIC ACID 500 MG
500 TABLET ORAL 2 TIMES DAILY
Qty: 14 TABLET | Refills: 0 | Status: SHIPPED
Start: 2020-12-18 | End: 2021-02-10

## 2020-12-18 NOTE — PROGRESS NOTES
Chief Complaint   Headache (C/O HA,fatigue, and weakness x 7 days. Also C/O loss of smell and taste )      History of Present Illness   Source of history provided by:  patient. Mary Menon is a 77 y.o. old female who has a past medical history of:   Past Medical History:   Diagnosis Date    Anastomotic ulcer 4/30/2012    Anemia     Anxiety     Asthma     Biceps tendon tear 1/2/2015    Closed fracture of multiple ribs of right side     Closed fracture of nasal bone      Replacing Inactive Diagnoses    Collapsed lung 1986    after a surgery 2nd day pre op     COPD (chronic obstructive pulmonary disease) ([address]     DDD (degenerative disc disease), lumbar     Degenerative disc disease at L5-S1 level     bulging disc lower back    Depression     Distal radius fracture 5/11/2015    Fracture of left olecranon process 1/10/2017    GERD (gastroesophageal reflux disease)     History of blood transfusion     x2    Hypertension     Impingement syndrome of shoulder 12/9/2014    Intractable abdominal pain 11/18/2018    Left carpal tunnel syndrome 1/30/2017    Nasal bones, closed fracture, closed, initial encounter 12/31/2015    Osteoarthritis     Peptic ulcer, unspecified site, unspecified as acute or chronic, without mention of hemorrhage or perforation     RLS (restless legs syndrome)     Rotator cuff tear 12/9/2014    Seizure ([address]     hasn't had one for 6 years    Sepsis ([address]     Spinal stenosis     Presents to the flu clinic with complaints of Fever, Generalized Body Aches, Anosmia/Ageusia and Fatigue x 8 days. States symptoms have stayed the same since onset. Has been taking Acetaminophen without symptomatic relief. Denies any Fever, Shortness of breath, Nausea or Vomiting. Positive for pneumonia in the past. She has no known covid contacts. Reports that her appetite has been poor. Has been trying to drink fluids.      ROS   Pertinent positives and negatives are stated within HPI, (95.3 kg)   LMP  (LMP Unknown)   SpO2 96%   BMI 41.01 kg/m²    Oxygen Saturation Interpretation: Normal.    Constitutional:  Alert, development consistent with age. NAD. Head:  NC/NT. Airway patent. Ears: TMs wnl bilaterally. Canals without exudate or swelling bilaterally. Mouth: Posterior pharynx with mild erythema and clear postnasal drip. no tonsillar hypertrophy or exudate. Neck:  Normal ROM. Supple. no anterior cervical adenopathy noted. Lungs: CTAB without wheezes, rales, or rhonchi. CV:  Regular rate and rhythm, normal heart sounds, without pathological murmurs, ectopy, gallops, or rubs. Skin:  Normal turgor. Warm, dry, without visible rash. Lymphatic: No lymphangitis or adenopathy noted. Neurological:  Oriented. Motor functions intact. Lab / Imaging Results   (All laboratory and radiology results have been personally reviewed by myself)  Labs:  Results for orders placed or performed in visit on 12/18/20   POCT COVID-19, Antigen   Result Value Ref Range    SARS-COV-2, POC Detected Not Detected    Lot Number [id number]     QC Pass/Fail pass        Imaging: All Radiology results interpreted by Radiologist unless otherwise noted. No results found. Medical Decision Making   Pt non-toxic, in no apparent distress and stable at time of discharge. Assessment/Plan   Brianda Masters was seen today for headache. Diagnoses and all orders for this visit:    Suspected COVID-19 virus infection  -     POCT COVID-19, Antigen  -     doxycycline hyclate (VIBRA-TABS) 100 MG tablet; Take 1 tablet by mouth 2 times daily for 10 days  -     ascorbic acid (VITAMIN C) 500 MG tablet; Take 1 tablet by mouth 2 times daily for 7 days  -     zinc sulfate (ZINCATE) 220 (50 Zn) MG capsule; Take 1 capsule by mouth daily for 7 days        COVID-19 swab obtained and pending, will call with results once available. Advised strict self-quarantine at home in the interim. Work excuse provided to patient today.  Increase fluids and rest. Symptomatic relief discussed including Tylenol prn pain/fever. Schedule f/u with PCP in 7-10 days if symptoms persist. ED sooner if symptoms worsen or change. ED immediately with high or refractory fever, progressive SOB, dyspnea, CP, calf pain/swelling, shaking chills, vomiting, abdominal pain, lethargy, flank pain, or decreased urinary output. Pt verbalizes understanding and is in agreement with plan of care. All questions answered. Nimo Kwan, KRYSTA - CNP    This visit was provided as a focused evaluation during the COVID -19 pandemic/national emergency. A comprehensive review of all previous patient history and testing was not conducted. Pertinent findings were elicited during the visit.

## 2021-02-10 ENCOUNTER — OFFICE VISIT (OUTPATIENT)
Dept: FAMILY MEDICINE CLINIC | Age: 66
End: 2021-02-10
Payer: MEDICARE

## 2021-02-10 VITALS
DIASTOLIC BLOOD PRESSURE: 86 MMHG | HEART RATE: 84 BPM | SYSTOLIC BLOOD PRESSURE: 136 MMHG | TEMPERATURE: 97.2 F | HEIGHT: 60 IN | RESPIRATION RATE: 18 BRPM | WEIGHT: 229 LBS | BODY MASS INDEX: 44.96 KG/M2

## 2021-02-10 DIAGNOSIS — Z12.31 ENCOUNTER FOR SCREENING MAMMOGRAM FOR BREAST CANCER: ICD-10-CM

## 2021-02-10 DIAGNOSIS — Z01.818 PRE-OP TESTING: Primary | ICD-10-CM

## 2021-02-10 DIAGNOSIS — K21.9 GASTROESOPHAGEAL REFLUX DISEASE, UNSPECIFIED WHETHER ESOPHAGITIS PRESENT: ICD-10-CM

## 2021-02-10 DIAGNOSIS — G25.81 RLS (RESTLESS LEGS SYNDROME): ICD-10-CM

## 2021-02-10 DIAGNOSIS — Z01.818 PRE-OP TESTING: ICD-10-CM

## 2021-02-10 LAB
ALBUMIN SERPL-MCNC: 4.2 G/DL (ref 3.5–5.2)
ALP BLD-CCNC: 69 U/L (ref 35–104)
ALT SERPL-CCNC: 18 U/L (ref 0–32)
ANION GAP SERPL CALCULATED.3IONS-SCNC: 10 MMOL/L (ref 7–16)
AST SERPL-CCNC: 14 U/L (ref 0–31)
BASOPHILS ABSOLUTE: 0.06 E9/L (ref 0–0.2)
BASOPHILS RELATIVE PERCENT: 0.7 % (ref 0–2)
BILIRUB SERPL-MCNC: 0.3 MG/DL (ref 0–1.2)
BUN BLDV-MCNC: 16 MG/DL (ref 8–23)
CALCIUM SERPL-MCNC: 9.1 MG/DL (ref 8.6–10.2)
CHLORIDE BLD-SCNC: 101 MMOL/L (ref 98–107)
CO2: 29 MMOL/L (ref 22–29)
CREAT SERPL-MCNC: 0.7 MG/DL (ref 0.5–1)
EOSINOPHILS ABSOLUTE: 0.14 E9/L (ref 0.05–0.5)
EOSINOPHILS RELATIVE PERCENT: 1.6 % (ref 0–6)
GFR AFRICAN AMERICAN: >60
GFR NON-AFRICAN AMERICAN: >60 ML/MIN/1.73
GLUCOSE BLD-MCNC: 126 MG/DL (ref 74–99)
HCT VFR BLD CALC: 44.1 % (ref 34–48)
HEMOGLOBIN: 13.5 G/DL (ref 11.5–15.5)
IMMATURE GRANULOCYTES #: 0.04 E9/L
IMMATURE GRANULOCYTES %: 0.5 % (ref 0–5)
LYMPHOCYTES ABSOLUTE: 1.9 E9/L (ref 1.5–4)
LYMPHOCYTES RELATIVE PERCENT: 21.5 % (ref 20–42)
MCH RBC QN AUTO: 30.2 PG (ref 26–35)
MCHC RBC AUTO-ENTMCNC: 30.6 % (ref 32–34.5)
MCV RBC AUTO: 98.7 FL (ref 80–99.9)
MONOCYTES ABSOLUTE: 0.68 E9/L (ref 0.1–0.95)
MONOCYTES RELATIVE PERCENT: 7.7 % (ref 2–12)
NEUTROPHILS ABSOLUTE: 6.01 E9/L (ref 1.8–7.3)
NEUTROPHILS RELATIVE PERCENT: 68 % (ref 43–80)
PDW BLD-RTO: 13.9 FL (ref 11.5–15)
PLATELET # BLD: 311 E9/L (ref 130–450)
PMV BLD AUTO: 10.6 FL (ref 7–12)
POTASSIUM SERPL-SCNC: 4.1 MMOL/L (ref 3.5–5)
RBC # BLD: 4.47 E12/L (ref 3.5–5.5)
SODIUM BLD-SCNC: 140 MMOL/L (ref 132–146)
TOTAL PROTEIN: 7.2 G/DL (ref 6.4–8.3)
WBC # BLD: 8.8 E9/L (ref 4.5–11.5)

## 2021-02-10 PROCEDURE — 3288F FALL RISK ASSESSMENT DOCD: CPT | Performed by: FAMILY MEDICINE

## 2021-02-10 PROCEDURE — 93000 ELECTROCARDIOGRAM COMPLETE: CPT | Performed by: FAMILY MEDICINE

## 2021-02-10 PROCEDURE — 99213 OFFICE O/P EST LOW 20 MIN: CPT | Performed by: FAMILY MEDICINE

## 2021-02-10 RX ORDER — ESOMEPRAZOLE MAGNESIUM 40 MG/1
40 CAPSULE, DELAYED RELEASE ORAL
Qty: 90 CAPSULE | Refills: 1 | Status: SHIPPED | OUTPATIENT
Start: 2021-02-10

## 2021-02-10 RX ORDER — ROPINIROLE 1 MG/1
1 TABLET, FILM COATED ORAL NIGHTLY
Qty: 90 TABLET | Refills: 1 | Status: SHIPPED
Start: 2021-02-10 | End: 2022-02-14 | Stop reason: SDUPTHER

## 2021-02-10 ASSESSMENT — ENCOUNTER SYMPTOMS
SHORTNESS OF BREATH: 0
CONSTIPATION: 0
WHEEZING: 0
COUGH: 0
DIARRHEA: 0
NAUSEA: 0
VOMITING: 0

## 2021-02-10 NOTE — PROGRESS NOTES
2/10/2021     Kerry Herrera (:  1955) is a 74 y.o. female, with a:  Past Medical History:   Diagnosis Date    Anastomotic ulcer 2012    Anemia     Anxiety     Asthma     Biceps tendon tear 2015    Closed fracture of multiple ribs of right side     Closed fracture of nasal bone     Collapsed lung     COPD (chronic obstructive pulmonary disease) (HCC)     DDD (degenerative disc disease), lumbar     Degenerative disc disease at L5-S1 level     Depression     Distal radius fracture 2015    Fracture of left olecranon process 01/10/2017    GERD (gastroesophageal reflux disease)     History of blood transfusion     Hypertension     Impingement syndrome of shoulder 2014    Intractable abdominal pain 2018    Left carpal tunnel syndrome 2017    Nasal bones, closed fracture, closed, initial encounter 2015    Osteoarthritis     Peptic ulcer, unspecified site, unspecified as acute or chronic, without mention of hemorrhage or perforation     RLS (restless legs syndrome)     Rotator cuff tear 2014    Seizure ([address]     Sepsis ([address]     Spinal stenosis        Here for evaluation of the following medical concerns:  Chief Complaint   Patient presents with    Pre-op Exam     cataract surgery left eye 2021 and right eye 3/8/2021 with Dr Abdi Body     She also follows with GS, ENT, ortho, and PMR    Pre-Operative Risk assessment using 2014 ACC/AHA guidelines     Emergent procedure No  Active Cardiac Condition No (decompensated HF, Arrhythmia, MI <3 weeks, severe valve disease)  Risk Level of Procedure Low Risk (endoscopy, superficial skin, breast, ambulatory, or cataract, etc.)  Revised Cardiac Risk Index Risk factors: None  Measurement of Exercise Tolerance before Surgery >4 Yes    Hx of HTN - currently off meds, BP controlled    Hx of asthma / smoker - dulera PRN, breathing stable    RLS - requip nightly PRN, requesting refill    GERD - nexium daily, requesting refill    Review of Systems   Constitutional: Negative for chills, fatigue and fever. Respiratory: Negative for cough, shortness of breath and wheezing. Cardiovascular: Negative for chest pain and palpitations. Gastrointestinal: Negative for constipation, diarrhea, nausea and vomiting. Genitourinary: Negative for difficulty urinating and dysuria. Neurological: Negative for headaches. Prior to Visit Medications    Medication Sig Taking?  Authorizing Provider   Esomeprazole Magnesium (NEXIUM PO) Take by mouth Yes Historical Provider, MD   rOPINIRole (REQUIP) 1 MG tablet Take 1 mg by mouth nightly Yes Historical Provider, MD        Social History     Tobacco Use    Smoking status: Current Every Day Smoker     Packs/day: 1.00     Years: 14.00     Pack years: 14.00     Types: Cigarettes     Start date: 10/23/2004    Smokeless tobacco: Never Used   Substance Use Topics    Alcohol use: No     Comment: occ        Past Surgical History:   Procedure Laterality Date    ABDOMEN SURGERY      ABSCESS DRAINAGE Left 2016    left thigh seroma    APPENDECTOMY      CARPAL TUNNEL RELEASE  1998    right hand    CARPAL TUNNEL RELEASE Left 2017     SECTION  3/9/1984    CHOLECYSTECTOMY  1986    CHOLECYSTECTOMY      COLONOSCOPY  3/2011    DILATATION, ESOPHAGUS      DILATION AND CURETTAGE OF UTERUS      x4    ENDOSCOPY, COLON, DIAGNOSTIC      JOINT REPLACEMENT  2003    right    JOINT REPLACEMENT  2003    left knee    JOINT REPLACEMENT      bilat knee replacement    KNEE ARTHROSCOPY      bilateral    OTHER SURGICAL HISTORY  16    closed reduction nasal bone fracture    OTHER SURGICAL HISTORY Left  2 16    seroma incision and drainage thigh    CA LAP,DIAGNOSTIC ABDOMEN N/A 2018    DIAGNOSTIC LAPAROSCOPY, LYSIS OF ADHESIONS performed by Bony Hanks MD at [address] [place] [address]  2005    Dr. Lipscomb Brood ARTHROSCOPY Right 1 2 15    rotator cuff repair, subacromial decompression, debridement    SHOULDER SURGERY  1999    left    TONSILLECTOMY  1959    UPPER GASTROINTESTINAL ENDOSCOPY  10/2004    UPPER GASTROINTESTINAL ENDOSCOPY  4/18/12    [facility]    UPPER GASTROINTESTINAL ENDOSCOPY N/A 11/20/2018    EGD BIOPSY performed by Tawnya Connell MD at [address] 1/2/2019    EGD ESOPHAGOGASTRODUODENOSCOPY performed by Isi Stone MD at [facility] Right 12/02/2015    DEBRIDEMENT ASPIRATION; RIGHT DEQUARVAINES RELEASE    WRIST SURGERY Right 12/2/15       Vitals:    02/10/21 0829   BP: 136/86   Pulse: 84   Resp: 18   Temp: 97.2 °F (36.2 °C)   TempSrc: Temporal   Weight: 229 lb (103.9 kg)   Height: 5' (1.524 m)     Estimated body mass index is 44.72 kg/m² as calculated from the following:    Height as of this encounter: 5' (1.524 m). Weight as of this encounter: 229 lb (103.9 kg). Physical Exam  Constitutional:       Appearance: Normal appearance. HENT:      Head: Normocephalic and atraumatic. Eyes:      Extraocular Movements: Extraocular movements intact. Conjunctiva/sclera: Conjunctivae normal.   Cardiovascular:      Rate and Rhythm: Normal rate and regular rhythm. Heart sounds: No murmur. Pulmonary:      Effort: Pulmonary effort is normal. No respiratory distress. Breath sounds: No wheezing. Abdominal:      General: There is no distension. Musculoskeletal:      Right lower leg: No edema. Left lower leg: No edema. Neurological:      General: No focal deficit present. Mental Status: She is alert. Mental status is at baseline. ASSESSMENT/PLAN:  Shiv Bauman was seen today for pre-op exam.    Diagnoses and all orders for this visit:    Pre-op testing  -     EKG 12 lead; Future  -     EKG 12 lead  -     CBC Auto Differential; Future  -     Comprehensive Metabolic Panel;  Future    Encounter for screening mammogram for breast cancer  -     BETTYE DIGITAL SCREEN BILATERAL PER PROTOCOL; Future    Gastroesophageal reflux disease, unspecified whether esophagitis present  -     esomeprazole (NEXIUM) 40 MG delayed release capsule; Take 1 capsule by mouth every morning (before breakfast)    RLS (restless legs syndrome)  -     rOPINIRole (REQUIP) 1 MG tablet; Take 1 tablet by mouth nightly       Additional plan and future considerations: According to the 2014 ACC/AHA pre-operative risk assessment guidelines Roger Ariel is a low risk for major cardiac complications during a low risk procedure and may continue as planned.      Future Appointments   Date Time Provider Gricelda Villanueva   8/11/2021  1:00 PM Judith Raymond DO [facility]         --Judith Raymond DO on 2/10/2021 at 8:44 AM

## 2021-03-05 ENCOUNTER — HOSPITAL ENCOUNTER (OUTPATIENT)
Dept: MAMMOGRAPHY | Age: 66
Discharge: HOME | End: 2021-03-07
Payer: MEDICARE

## 2021-03-05 DIAGNOSIS — Z12.31 ENCOUNTER FOR SCREENING MAMMOGRAM FOR BREAST CANCER: ICD-10-CM

## 2021-03-05 PROCEDURE — 77063 BREAST TOMOSYNTHESIS BI: CPT

## 2021-03-11 ENCOUNTER — OFFICE VISIT (OUTPATIENT)
Dept: FAMILY MEDICINE CLINIC | Age: 66
End: 2021-03-11
Payer: MEDICARE

## 2021-03-11 VITALS
HEART RATE: 84 BPM | WEIGHT: 227 LBS | HEIGHT: 60 IN | BODY MASS INDEX: 44.57 KG/M2 | OXYGEN SATURATION: 97 % | SYSTOLIC BLOOD PRESSURE: 140 MMHG | DIASTOLIC BLOOD PRESSURE: 90 MMHG | TEMPERATURE: 97.2 F

## 2021-03-11 DIAGNOSIS — F41.9 ANXIETY: ICD-10-CM

## 2021-03-11 DIAGNOSIS — E55.9 VITAMIN D DEFICIENCY, UNSPECIFIED: ICD-10-CM

## 2021-03-11 DIAGNOSIS — F33.9 RECURRENT DEPRESSION (HCC): Primary | ICD-10-CM

## 2021-03-11 DIAGNOSIS — F33.9 RECURRENT DEPRESSION (HCC): ICD-10-CM

## 2021-03-11 DIAGNOSIS — F51.02 INSOMNIA DUE TO STRESS: ICD-10-CM

## 2021-03-11 LAB
FOLATE: 8.4 NG/ML (ref 4.8–24.2)
TSH SERPL DL<=0.05 MIU/L-ACNC: 1.25 UIU/ML (ref 0.27–4.2)
VITAMIN B-12: 331 PG/ML (ref 211–946)
VITAMIN D 25-HYDROXY: 19 NG/ML (ref 30–100)

## 2021-03-11 PROCEDURE — 99214 OFFICE O/P EST MOD 30 MIN: CPT | Performed by: FAMILY MEDICINE

## 2021-03-11 RX ORDER — OFLOXACIN 3 MG/ML
SOLUTION/ DROPS OPHTHALMIC
COMMUNITY
Start: 2021-02-02 | End: 2021-05-06

## 2021-03-11 RX ORDER — BROMFENAC SODIUM 0.7 MG/ML
SOLUTION/ DROPS OPHTHALMIC
COMMUNITY
Start: 2021-02-11 | End: 2021-05-06

## 2021-03-11 RX ORDER — VENLAFAXINE HYDROCHLORIDE 75 MG/1
75 CAPSULE, EXTENDED RELEASE ORAL DAILY
Qty: 30 CAPSULE | Refills: 1 | Status: SHIPPED
Start: 2021-03-18 | End: 2021-05-06 | Stop reason: SINTOL

## 2021-03-11 RX ORDER — PREDNISOLONE ACETATE 10 MG/ML
SUSPENSION/ DROPS OPHTHALMIC
COMMUNITY
Start: 2021-02-02 | End: 2021-05-29

## 2021-03-11 RX ORDER — VENLAFAXINE HYDROCHLORIDE 37.5 MG/1
37.5 CAPSULE, EXTENDED RELEASE ORAL DAILY
Qty: 7 CAPSULE | Refills: 0 | Status: SHIPPED
Start: 2021-03-11 | End: 2021-05-06 | Stop reason: SINTOL

## 2021-03-11 RX ORDER — TRAZODONE HYDROCHLORIDE 50 MG/1
50 TABLET ORAL NIGHTLY PRN
Qty: 30 TABLET | Refills: 0 | Status: SHIPPED
Start: 2021-03-11 | End: 2021-05-06 | Stop reason: SDUPTHER

## 2021-03-11 NOTE — PROGRESS NOTES
3/11/2021     Venu Chris (:  1955) is a 74 y.o. female, with a:  Past Medical History:   Diagnosis Date    Anastomotic ulcer 2012    Anemia     Anxiety     Asthma     Biceps tendon tear 2015    Closed fracture of multiple ribs of right side     Closed fracture of nasal bone     Collapsed lung     [facility]     DDD (degenerative disc disease), lumbar     Degenerative disc disease at L5-S1 level     Depression     Distal radius fracture 2015    Fracture of left olecranon process 01/10/2017    GERD (gastroesophageal reflux disease)     History of blood transfusion     Hypertension     Impingement syndrome of shoulder 2014    Intractable abdominal pain 2018    Left carpal tunnel syndrome 2017    Nasal bones, closed fracture, closed, initial encounter 2015    Osteoarthritis     Peptic ulcer, unspecified site, unspecified as acute or chronic, without mention of hemorrhage or perforation     RLS (restless legs syndrome)     Rotator cuff tear 2014    [facility]ure ([facility]     Sepsis ([facility]     Spinal stenosis        Here for evaluation of the following medical concerns:  Chief Complaint   Patient presents with    Anxiety     patient wants put back on effexxor she thinks she is having a \"nervous breakdown'     Depression and anxiety  Current treatment: none  Previous treatment included medications (effexor) which was effective  Symptoms are exacerbated by social issues  Symptoms are poorly controlled    Review of Systems   Constitutional: Negative for chills and fever. Respiratory: Negative for shortness of breath. Cardiovascular: Negative for chest pain. Gastrointestinal: Negative for nausea and vomiting. Psychiatric/Behavioral: Positive for agitation, dysphoric mood and sleep disturbance. The patient is nervous/anxious. Prior to Visit Medications    Medication Sig Taking? Authorizing Provider   rOPINIRole (REQUIP) 1 MG tablet Take 1 tablet by mouth nightly Yes Diego Pritchard DO   esomeprazole (NEXIUM) 40 MG delayed release capsule Take 1 capsule by mouth every morning (before breakfast) Yes Diego Pritchard DO   PROLENSA 0.07 % SOLN instill 1 drop INTO AFFECTED EYE(S) once daily 2 DAYS PRIOR TO FREED...  (REFER TO PRESCRIPTION NOTES). Historical Provider, MD   ofloxacin (OCUFLOX) 0.3 % solution instill 1 drop INTO AFFECTED EYE(S) three times a day START 2 day. ..  (REFER TO PRESCRIPTION NOTES).   Historical Provider, MD   prednisoLONE acetate (PRED FORTE) 1 % ophthalmic suspension instill 1 drop INTO AFFECTED EYE(S) four times a day AFTER SURGERY  Historical Provider, MD        Social History     Tobacco Use    Smoking status: Current Every Day Smoker     Packs/day: 1.00     Years: 14.00     Pack years: 14.00     Types: Cigarettes     Start date: 10/23/2004    Smokeless tobacco: Never Used   Substance Use Topics    Alcohol use: No     Comment: occ        Past Surgical History:   Procedure Laterality Date    ABDOMEN SURGERY      ABSCESS DRAINAGE Left 2016    left thigh seroma    APPENDECTOMY      CARPAL TUNNEL RELEASE      right hand    CARPAL TUNNEL RELEASE Left 2017     SECTION  3/9/1984    CHOLECYSTECTOMY  1986    CHOLECYSTECTOMY      COLONOSCOPY  3/2011    DILATATION, ESOPHAGUS      DILATION AND CURETTAGE OF UTERUS      x4    ENDOSCOPY, COLON, DIAGNOSTIC      JOINT REPLACEMENT  2003    right    JOINT REPLACEMENT  2003    left knee    JOINT REPLACEMENT      bilat knee replacement    KNEE ARTHROSCOPY      bilateral    OTHER SURGICAL HISTORY  16    closed reduction nasal bone fracture    OTHER SURGICAL HISTORY Left  2 16    seroma incision and drainage thigh    UT LAP,DIAGNOSTIC ABDOMEN N/A 2018    DIAGNOSTIC LAPAROSCOPY, LYSIS OF ADHESIONS performed by Glenna Broderick MD at [address] [place] [address] 06/14/2005    Dr. Rolando Luciano ARTHROSCOPY Right 1 2 15    rotator cuff repair, subacromial decompression, debridement    SHOULDER SURGERY  1999    left    TONSILLECTOMY  1959    UPPER GASTROINTESTINAL ENDOSCOPY  10/2004    UPPER GASTROINTESTINAL ENDOSCOPY  4/18/12    F    UPPER GASTROINTESTINAL ENDOSCOPY N/A 11/20/2018    EGD BIOPSY performed by Niels Huertas MD at [address] 1/2/2019    EGD ESOPHAGOGASTRODUODENOSCOPY [facility] Right 12/02/2015    DEBRIDEMENT ASPIRATION; RIGHT DEQUARVAINES RELEASE    WRIST SURGERY Right 12/2/15       Vitals:    03/11/21 1459 03/11/21 1505   BP: (!) 160/90 (!) 140/90   Pulse: 84    Temp: 97.2 °F (36.2 °C)    TempSrc: Temporal    SpO2: 97%    Weight: 227 lb (103 kg)    Height: 5' (1.524 m)      Estimated body mass index is 44.33 kg/m² as calculated from the following:    Height as of this encounter: 5' (1.524 m). Weight as of this encounter: 227 lb (103 kg). Physical Exam  Constitutional:       General: She is not in acute distress. Appearance: She is obese. HENT:      Head: Normocephalic and atraumatic. Eyes:      Extraocular Movements: Extraocular movements intact. Conjunctiva/sclera: Conjunctivae normal.   Cardiovascular:      Rate and Rhythm: Normal rate and regular rhythm. Pulmonary:      Effort: Pulmonary effort is normal. No respiratory distress. Breath sounds: No wheezing or rales. Neurological:      Mental Status: She is alert. Mental status is at baseline. Psychiatric:         Mood and Affect: Mood is anxious and depressed. Affect is tearful. Speech: Speech normal.         Behavior: Behavior is cooperative. ASSESSMENT/PLAN:  Elle Ivory was seen today for anxiety. Diagnoses and all orders for this visit:    Recurrent depression ([facility] [address]  -     venlafaxine (EFFEXOR XR) 37.5 MG extended release capsule;  Take 1 capsule by mouth daily for 7 days  -     venlafaxine (EFFEXOR XR) 75 MG extended release capsule; Take 1 capsule by mouth daily  -     TSH; Future  -     Vitamin D 25 Hydroxy; Future  -     VITAMIN B12 & FOLATE; Future    Anxiety  -     venlafaxine (EFFEXOR XR) 37.5 MG extended release capsule; Take 1 capsule by mouth daily for 7 days  -     venlafaxine (EFFEXOR XR) 75 MG extended release capsule; Take 1 capsule by mouth daily  -     TSH; Future  -     Vitamin D 25 Hydroxy; Future  -     VITAMIN B12 & FOLATE; Future    Insomnia due to stress  -     traZODone (DESYREL) 50 MG tablet; Take 1 tablet by mouth nightly as needed for Sleep  -     TSH; Future  -     Vitamin D 25 Hydroxy; Future  -     VITAMIN B12 & FOLATE; Future    Vitamin D deficiency, unspecified   -     Vitamin D 25 Hydroxy; Future    Additional plan and future considerations:   As above. RTO in 6-8 weeks for depression and anxiety follow up or sooner if needed.     Future Appointments   Date Time Provider Gricelda Villanueva   5/6/2021  3:15 PM DO Kellie Diego [facility]   8/11/2021  1:00 PM DO Kellie Diego [facility]         --DO jordin Diego 3/11/2021 at 3:13 PM

## 2021-03-12 ASSESSMENT — ENCOUNTER SYMPTOMS
VOMITING: 0
SHORTNESS OF BREATH: 0
NAUSEA: 0

## 2021-04-28 ENCOUNTER — HOSPITAL ENCOUNTER (EMERGENCY)
Age: 66
Discharge: HOME | End: 2021-04-28
Payer: MEDICARE

## 2021-04-28 VITALS
DIASTOLIC BLOOD PRESSURE: 80 MMHG | RESPIRATION RATE: 20 BRPM | SYSTOLIC BLOOD PRESSURE: 140 MMHG | OXYGEN SATURATION: 96 % | HEART RATE: 83 BPM | TEMPERATURE: 97.3 F | BODY MASS INDEX: 39.06 KG/M2 | WEIGHT: 200 LBS

## 2021-04-28 DIAGNOSIS — J45.901 EXACERBATION OF ASTHMA, UNSPECIFIED ASTHMA SEVERITY, UNSPECIFIED WHETHER PERSISTENT: ICD-10-CM

## 2021-04-28 DIAGNOSIS — R05.9 COUGH: Primary | ICD-10-CM

## 2021-04-28 DIAGNOSIS — J06.9 ACUTE UPPER RESPIRATORY INFECTION: ICD-10-CM

## 2021-04-28 PROCEDURE — 99211 OFF/OP EST MAY X REQ PHY/QHP: CPT

## 2021-04-28 RX ORDER — AZITHROMYCIN 250 MG/1
TABLET, FILM COATED ORAL
Qty: 6 TABLET | Refills: 0 | Status: SHIPPED | OUTPATIENT
Start: 2021-04-28 | End: 2021-05-06 | Stop reason: ALTCHOICE

## 2021-04-28 RX ORDER — DEXTROMETHORPHAN HYDROBROMIDE, GUAIFENESIN, PHENYLEPHRINE HYDROCHLORIDE 200; 5; 10 MG/1; MG/1; MG/1
TABLET, COATED ORAL
Qty: 20 TABLET | Refills: 0 | Status: SHIPPED | OUTPATIENT
Start: 2021-04-28 | End: 2021-08-11

## 2021-04-28 RX ORDER — PREDNISONE 20 MG/1
40 TABLET ORAL DAILY
Qty: 10 TABLET | Refills: 0 | Status: SHIPPED | OUTPATIENT
Start: 2021-04-28 | End: 2021-05-03

## 2021-04-28 RX ORDER — ALBUTEROL SULFATE 90 UG/1
2 AEROSOL, METERED RESPIRATORY (INHALATION) EVERY 6 HOURS PRN
Qty: 1 INHALER | Refills: 0 | Status: SHIPPED | OUTPATIENT
Start: 2021-04-28 | End: 2022-02-14 | Stop reason: SDUPTHER

## 2021-04-28 NOTE — ED PROVIDER NOTES
Department of Emergency [address] [facility]  Provider Note  Admit Date/RoomTime: 2021  8:31 AM  Room:   NAME: Catherne Severs  : 1955  MRN: [id number]     Chief Complaint:  Cough (congestion, cold symptoms, started Friday, no fever, thick mucus)    History of Present Illness       Catherne Severs is a 74 y.o. old female who presents to the emergency department for nasal congestion and discharge cough, chest congestion, and wheezing. she has been sick for 5 days now. She has taken Robitussin without relief of her symptoms. She states she has asthma but does not have an albuterol inhaler. She denies any fever, chest pain, or shortness of breath. States she had Covid in December. States she is also had both vaccines. She denies any fever or any other complaints. She is a smoker. ROS    Pertinent positives and negatives are stated within HPI, all other systems reviewed and are negative.     Past Surgical History:   Procedure Laterality Date    ABDOMEN SURGERY      ABSCESS DRAINAGE Left 2016    left thigh seroma    APPENDECTOMY      CARPAL TUNNEL RELEASE  1998    right hand    CARPAL TUNNEL RELEASE Left 2017     SECTION  3/9/1984    CHOLECYSTECTOMY  1986    CHOLECYSTECTOMY      COLONOSCOPY  3/2011    DILATATION, ESOPHAGUS      DILATION AND CURETTAGE OF UTERUS      x4    ENDOSCOPY, COLON, DIAGNOSTIC      JOINT REPLACEMENT  2003    right    JOINT REPLACEMENT  2003    left knee    JOINT REPLACEMENT      bilat knee replacement    KNEE ARTHROSCOPY      bilateral    OTHER SURGICAL HISTORY  16    closed reduction nasal bone fracture    OTHER SURGICAL HISTORY Left  2 16    seroma incision and drainage thigh    ME LAP,DIAGNOSTIC ABDOMEN N/A 2018    DIAGNOSTIC LAPAROSCOPY, LYSIS OF ADHESIONS performed by Yoshi Slade MD at [address] [facility]  2005    Dr. Stefano Qureshi

## 2021-05-06 ENCOUNTER — OFFICE VISIT (OUTPATIENT)
Dept: FAMILY MEDICINE CLINIC | Age: 66
End: 2021-05-06
Payer: MEDICARE

## 2021-05-06 VITALS
HEART RATE: 92 BPM | TEMPERATURE: 97.9 F | HEIGHT: 60 IN | DIASTOLIC BLOOD PRESSURE: 90 MMHG | SYSTOLIC BLOOD PRESSURE: 160 MMHG | WEIGHT: 225 LBS | BODY MASS INDEX: 44.17 KG/M2 | RESPIRATION RATE: 20 BRPM | OXYGEN SATURATION: 96 %

## 2021-05-06 DIAGNOSIS — R05.9 COUGH: ICD-10-CM

## 2021-05-06 DIAGNOSIS — R73.09 ELEVATED GLUCOSE: ICD-10-CM

## 2021-05-06 DIAGNOSIS — E55.9 VITAMIN D DEFICIENCY, UNSPECIFIED: ICD-10-CM

## 2021-05-06 DIAGNOSIS — F33.9 RECURRENT DEPRESSION (HCC): Primary | ICD-10-CM

## 2021-05-06 DIAGNOSIS — F41.9 ANXIETY: ICD-10-CM

## 2021-05-06 DIAGNOSIS — F51.02 INSOMNIA DUE TO STRESS: ICD-10-CM

## 2021-05-06 PROCEDURE — 99214 OFFICE O/P EST MOD 30 MIN: CPT | Performed by: FAMILY MEDICINE

## 2021-05-06 RX ORDER — TRAZODONE HYDROCHLORIDE 50 MG/1
50 TABLET ORAL NIGHTLY PRN
Qty: 30 TABLET | Refills: 2 | Status: SHIPPED
Start: 2021-05-06 | End: 2021-08-11

## 2021-05-06 RX ORDER — ERGOCALCIFEROL 1.25 MG/1
50000 CAPSULE ORAL WEEKLY
Qty: 12 CAPSULE | Refills: 1 | Status: SHIPPED
Start: 2021-05-06 | End: 2021-08-11 | Stop reason: SDUPTHER

## 2021-05-06 RX ORDER — GUAIFENESIN AND CODEINE PHOSPHATE 100; 10 MG/5ML; MG/5ML
5 SOLUTION ORAL 2 TIMES DAILY PRN
Qty: 1 BOTTLE | Refills: 0 | Status: SHIPPED
Start: 2021-05-06 | End: 2021-05-13 | Stop reason: SDUPTHER

## 2021-05-06 NOTE — PROGRESS NOTES
2021     Jayme Carr (:  1955) is a 74 y.o. female, with a:  Past Medical History:   Diagnosis Date    Anastomotic ulcer 2012    Anemia     Anxiety     Asthma     Biceps tendon tear 2015    Closed fracture of multiple ribs of right side     Closed fracture of nasal bone     Collapsed lung     COPD (chronic obstructive pulmonary disease) ([facility])     DDD (degenerative disc disease), lumbar     Degenerative disc disease at L5-S1 level     Depression     Distal radius fracture 2015    Fracture of left olecranon process 01/10/2017    GERD (gastroesophageal reflux disease)     History of blood transfusion     Hypertension     Impingement syndrome of shoulder 2014    Intractable abdominal pain 2018    Left carpal tunnel syndrome 2017    Nasal bones, closed fracture, closed, initial encounter 2015    Osteoarthritis     Peptic ulcer, unspecified site, unspecified as acute or chronic, without mention of hemorrhage or perforation     RLS (restless legs syndrome)     Rotator cuff tear 2014    Seizure ([address]     Sepsis ([address]     Spinal stenosis        Here for evaluation of the following medical concerns:  Chief Complaint   Patient presents with    Anxiety     patient took effexor x 1 week then D/C because she couldnt sleep     Depression    Discuss Labs     completed 3/11/2021    Cough     [facility] follow up 2021      Depression and anxiety  Current treatment: Trazodone 50 mg nightly as needed  Previous treatment included (effexor) which had been effective  Symptoms are exacerbated by social issues  Symptoms are poorly controlled  She reports taking effexor for a few weeks, she notes that after increasing from 37.5 to 75 mg daily she suffered significant sleep issues so subsequently discontinued  Trazodone has been helping some  Her sleep symptoms are currently exacerbated by recent URI and persistent cough    Review of Systems Constitutional: Negative for chills and fever. Respiratory: Positive for cough and shortness of breath (improving). Cardiovascular: Negative for chest pain. Gastrointestinal: Negative for nausea and vomiting. Psychiatric/Behavioral: Positive for dysphoric mood and sleep disturbance. The patient is nervous/anxious. Prior to Visit Medications    Medication Sig Taking?  Authorizing Provider   albuterol sulfate HFA (PROVENTIL HFA) 108 (90 Base) MCG/ACT inhaler Inhale 2 puffs into the lungs every 6 hours as needed for Wheezing Yes KRYSTA Zurita CNP   Phenylephrine-DM-GG ([address] FAST-MAX CONGEST COUGH) 5- MG TABS Take as directed on the package for cough or congestion Yes KRYSTA Zurita CNP   prednisoLONE acetate (PRED FORTE) 1 % ophthalmic suspension instill 1 drop INTO AFFECTED EYE(S) four times a day AFTER SURGERY Yes Historical Provider, MD   rOPINIRole (REQUIP) 1 MG tablet Take 1 tablet by mouth nightly Yes Bibi Budanne, DO   esomeprazole ([address]) 40 MG delayed release capsule Take 1 capsule by mouth every morning (before breakfast) Yes Bibi Moyer,    traZODone (DESYREL) 50 MG tablet Take 1 tablet by mouth nightly as needed for Sleep  Bibi Moyer, DO        Social History     Tobacco Use    Smoking status: Current Every Day Smoker     Packs/day: 1.00     Years: 14.00     Pack years: 14.00     Types: Cigarettes     Start date: 10/23/2004    Smokeless tobacco: Never Used   Substance Use Topics    Alcohol use: No     Comment: occ        Past Surgical History:   Procedure Laterality Date    ABDOMEN SURGERY      ABSCESS DRAINAGE Left 2016    left thigh seroma    APPENDECTOMY      CARPAL TUNNEL RELEASE  1998    right hand    CARPAL TUNNEL RELEASE Left 2017     SECTION  3/9/1984    CHOLECYSTECTOMY  1986    CHOLECYSTECTOMY      COLONOSCOPY  3/2011    DILATATION, ESOPHAGUS      DILATION AND CURETTAGE OF UTERUS      x4    ENDOSCOPY, COLON, DIAGNOSTIC      JOINT REPLACEMENT  1/8/2003    right    JOINT REPLACEMENT  6/5/2003    left knee    JOINT REPLACEMENT      bilat knee replacement    KNEE ARTHROSCOPY  2002    bilateral    OTHER SURGICAL HISTORY  01/14/16    closed reduction nasal bone fracture    OTHER SURGICAL HISTORY Left 6 2 16    seroma incision and drainage thigh    GA LAP,DIAGNOSTIC ABDOMEN N/A 11/21/2018    DIAGNOSTIC LAPAROSCOPY, LYSIS OF ADHESIONS performed by Vida Rodriguez MD at [address] [facility]  06/14/2005    Dr. Andre Babinski ARTHROSCOPY Right 1 2 15    rotator cuff repair, subacromial decompression, debridement    SHOULDER SURGERY  1999    left    TONSILLECTOMY  1959    UPPER GASTROINTESTINAL ENDOSCOPY  10/2004    UPPER [facility]    UPPER GASTROINTESTINAL ENDOSCOPY N/A 11/20/2018    EGD BIOPSY performed by Elizabeth Carlos MD at [address] 1/2/2019    EGD ESOPHAGOGASTRODUODENOSCOPY performed by Vida Rodriguez MD at [facility] Right 12/02/2015    DEBRIDEMENT ASPIRATION; RIGHT DEQUARVAINES RELEASE    WRIST SURGERY Right 12/2/15       Vitals:    05/06/21 1517   BP: (!) 160/90   Pulse: 92   Resp: 20   Temp: 97.9 °F (36.6 °C)   TempSrc: Temporal   SpO2: 96%   Weight: 225 lb (102.1 kg)   Height: 5' (1.524 m)     Estimated body mass index is 43.94 kg/m² as calculated from the following:    Height as of this encounter: 5' (1.524 m). Weight as of this encounter: 225 lb (102.1 kg). Physical Exam  Constitutional:       General: She is not in acute distress. HENT:      Head: Normocephalic and atraumatic. Eyes:      Extraocular Movements: Extraocular movements intact. Conjunctiva/sclera: Conjunctivae normal.   Pulmonary:      Effort: Pulmonary effort is normal. No respiratory distress. Breath sounds: No wheezing or rales. Abdominal:      General: There is no distension.

## 2021-05-07 ASSESSMENT — ENCOUNTER SYMPTOMS
SHORTNESS OF BREATH: 1
COUGH: 1
NAUSEA: 0
VOMITING: 0

## 2021-05-13 DIAGNOSIS — R05.9 COUGH: ICD-10-CM

## 2021-05-13 RX ORDER — GUAIFENESIN AND CODEINE PHOSPHATE 100; 10 MG/5ML; MG/5ML
5 SOLUTION ORAL 2 TIMES DAILY PRN
Qty: 1 BOTTLE | Refills: 0 | Status: SHIPPED | OUTPATIENT
Start: 2021-05-13 | End: 2021-05-16

## 2021-05-29 ENCOUNTER — APPOINTMENT (OUTPATIENT)
Dept: CT IMAGING | Age: 66
End: 2021-05-29
Payer: MEDICARE

## 2021-05-29 ENCOUNTER — HOSPITAL ENCOUNTER (EMERGENCY)
Age: 66
Discharge: HOME | End: 2021-05-29
Attending: EMERGENCY MEDICINE
Payer: MEDICARE

## 2021-05-29 VITALS
OXYGEN SATURATION: 99 % | HEIGHT: 60 IN | TEMPERATURE: 97 F | BODY MASS INDEX: 41.23 KG/M2 | DIASTOLIC BLOOD PRESSURE: 80 MMHG | HEART RATE: 80 BPM | RESPIRATION RATE: 14 BRPM | WEIGHT: 210 LBS | SYSTOLIC BLOOD PRESSURE: 143 MMHG

## 2021-05-29 DIAGNOSIS — M26.602 LEFT TEMPOROMANDIBULAR JOINT DISORDER, UNSPECIFIED: Primary | ICD-10-CM

## 2021-05-29 PROCEDURE — 6360000002 HC RX W HCPCS: Performed by: EMERGENCY MEDICINE

## 2021-05-29 PROCEDURE — 99285 EMERGENCY DEPT VISIT HI MDM: CPT

## 2021-05-29 PROCEDURE — 70486 CT MAXILLOFACIAL W/O DYE: CPT

## 2021-05-29 PROCEDURE — 96374 THER/PROPH/DIAG INJ IV PUSH: CPT

## 2021-05-29 RX ORDER — FENTANYL CITRATE 50 UG/ML
50 INJECTION, SOLUTION INTRAMUSCULAR; INTRAVENOUS ONCE
Status: COMPLETED | OUTPATIENT
Start: 2021-05-29 | End: 2021-05-29

## 2021-05-29 RX ADMIN — FENTANYL CITRATE 50 MCG: 50 INJECTION INTRAMUSCULAR; INTRAVENOUS at 12:05

## 2021-05-29 ASSESSMENT — ENCOUNTER SYMPTOMS
COLOR CHANGE: 0
VOICE CHANGE: 0
BACK PAIN: 0
VOMITING: 0
RHINORRHEA: 0
TROUBLE SWALLOWING: 0
SHORTNESS OF BREATH: 0
BLOOD IN STOOL: 0
FACIAL SWELLING: 1
NAUSEA: 0
ABDOMINAL PAIN: 0
COUGH: 0

## 2021-05-29 ASSESSMENT — PAIN SCALES - GENERAL
PAINLEVEL_OUTOF10: 8
PAINLEVEL_OUTOF10: 10

## 2021-05-29 ASSESSMENT — PAIN DESCRIPTION - PAIN TYPE: TYPE: ACUTE PAIN

## 2021-05-29 ASSESSMENT — PAIN DESCRIPTION - ORIENTATION: ORIENTATION: LEFT

## 2021-05-29 ASSESSMENT — PAIN DESCRIPTION - LOCATION: LOCATION: JAW

## 2021-05-29 NOTE — ED PROVIDER NOTES
ED PROVIDER NOTE    Chief Complaint   Patient presents with    Jaw Pain     LT JAW LOCKED / UNABLE TO OPEN MOUTH       HPI:  5/29/21,   Time: 12:04 PM EDT       Pipe Calix is a 74 y.o. female presenting to the ED for left jaw pain. Acute onset last evening after eating dinner. Persistent since onset, moderate in severity, worse w/ attempting to open mouth. Associated swelling over L TMJ. No prior hx of TMJ dislocation. No shortness of breath, drooling, stridor, lip/tongue swelling, or change in phonation. Tolerating PO liquids but unable to open mouth to chew solids since the incident. Chart review: hx of COPD, asthma, depression, HTN, GERD, tobacco use, anxiety    Review of Systems:     Review of Systems   Constitutional: Negative for appetite change, chills and fever. HENT: Positive for facial swelling. Negative for congestion, drooling, rhinorrhea, trouble swallowing and voice change. Eyes: Negative for visual disturbance. Respiratory: Negative for cough and shortness of breath. Cardiovascular: Negative for chest pain. Gastrointestinal: Negative for abdominal pain, blood in stool, nausea and vomiting. Genitourinary: Negative for decreased urine volume and difficulty urinating. Musculoskeletal: Positive for arthralgias. Negative for back pain and neck pain. Skin: Negative for color change.    Neurological: Negative for dizziness, syncope, weakness, light-headedness, numbness and headaches.         --------------------------------------------- PAST HISTORY ---------------------------------------------  Past Medical History:   Past Medical History:   Diagnosis Date    Anastomotic ulcer 04/30/2012    Anemia     Anxiety     Asthma     Biceps tendon tear 01/02/2015    Closed fracture of multiple ribs of right side     Closed fracture of nasal bone     Collapsed lung 1986    [facility]     DDD (degenerative disc disease), lumbar     Degenerative disc disease at L5-S1 level     Depression     Distal radius fracture 2015    Fracture of left olecranon process 01/10/2017    GERD (gastroesophageal reflux disease)     History of blood transfusion     Hypertension     Impingement syndrome of shoulder 2014    Intractable abdominal pain 2018    Left carpal tunnel syndrome 2017    Nasal bones, closed fracture, closed, initial encounter 2015    Osteoarthritis     Peptic ulcer, unspecified site, unspecified as acute or chronic, without mention of hemorrhage or perforation     RLS (restless legs syndrome)     Rotator cuff tear 2014    [facility]     Sepsis ([facility]     Spinal stenosis        Past Surgical History:   Past Surgical History:   Procedure Laterality Date    ABDOMEN SURGERY      ABSCESS DRAINAGE Left 2016    left thigh seroma    APPENDECTOMY     [facility]    right hand    CARPAL TUNNEL RELEASE Left 2017     SECTION  3/9/1984    CHOLECYSTECTOMY  1986    CHOLECYSTECTOMY      COLONOSCOPY  3/2011    DILATATION, ESOPHAGUS      DILATION AND CURETTAGE OF UTERUS      x4    ENDOSCOPY, COLON, DIAGNOSTIC      JOINT REPLACEMENT  2003    right    JOINT REPLACEMENT  2003    left knee    JOINT REPLACEMENT      bilat knee replacement    KNEE ARTHROSCOPY      bilateral    OTHER SURGICAL HISTORY  16    closed reduction nasal bone fracture    OTHER SURGICAL HISTORY Left 6 2 16    seroma incision and drainage thigh    DC LAP,DIAGNOSTIC ABDOMEN N/A 2018    DIAGNOSTIC LAPAROSCOPY, LYSIS OF ADHESIONS performed by Rose Mary Diggs MD at [address] [place] [address]  2005    Dr. Raoul Montenegrohead ARTHROSCOPY Right 1 2 15    rotator cuff repair, subacromial decompression, debridement    SHOULDER SURGERY      left    TONSILLECTOMY      UPPER GASTROINTESTINAL ENDOSCOPY  10/2004    UPPER [facility]    UPPER GASTROINTESTINAL ENDOSCOPY N/A 11/20/2018    EGD BIOPSY performed by Bobbi Villegas MD at [facility] 1/2/2019    EGD ESOPHAGOGASTRODUODENOSCOPY performed by Deanne Kay MD at [address] [place] [address] ARTHROSCOPY Right 12/02/2015    DEBRIDEMENT ASPIRATION; RIGHT DEQUARVAINES RELEASE    WRIST SURGERY Right 12/2/15       Social History:   Social History     Socioeconomic History    Marital status:      Spouse name: mattie    Number of children: 5    Years of education: 12    Highest education level: None   Occupational History    None   Tobacco Use    Smoking status: Current Every Day Smoker     Packs/day: 1.00     Years: 14.00     Pack years: 14.00     Types: Cigarettes     Start date: 10/23/2004    Smokeless tobacco: Never Used   Vaping Use    Vaping Use: Never used   Substance and Sexual Activity    Alcohol use: No     Comment: occ    Drug use: No    Sexual activity: Never   Other Topics Concern    None   Social History Narrative    ** Merged History Encounter **          Social Determinants of Health     Financial Resource Strain:     Difficulty of Paying Living Expenses:    Food Insecurity:     Worried About Running Out of Food in the Last Year:     Ran Out of Food in the Last Year:    Transportation Needs:     Lack of Transportation (Medical):      Lack of Transportation (Non-Medical):    Physical Activity:     Days of Exercise per Week:     Minutes of Exercise per Session:    Stress:     Feeling of Stress :    Social Connections:     Frequency of Communication with Friends and Family:     Frequency of Social Gatherings with Friends and Family:     Attends Yarsani Services:     Active Member of Clubs or Organizations:     Attends Club or Organization Meetings:     Marital Status:    Intimate Partner Violence:     Fear of Current or Ex-Partner:     Emotionally Abused:     Physically Abused:  Sexually Abused:        Family History:   Family History   Problem Relation Age of Onset    Cancer Mother     Heart Disease Sister     Emphysema Father        The patients home medications have been reviewed. Allergies: Allergies   Allergen Reactions    Ceclor [Cefaclor] Anaphylaxis    Diprivan [Propofol] Hives     Swelling of face    Naproxen Anaphylaxis     Heart races    Adhesive Tape Hives    Blueberry Flavor     Ibuprofen Hives    Mirapex [Pramipexole]     Oxycodone-Acetaminophen     Shellfish-Derived Products Hives    Tramadol     Vaccinium Angustifolium     Blueberry Fruit Extract Hives    Ceclor [Cefaclor] Hives    Iodides Hives    Iodine Hives and Rash     Contrast    Iv Dye [Iodides] Rash    Naprosyn [Naproxen] Palpitations    Percocet [Oxycodone-Acetaminophen] Rash    Phenergan [Promethazine Hcl] Nausea And Vomiting    Promethazine Nausea And Vomiting    Sulfa Antibiotics Hives    Tape [Adhesive Tape] Rash    Tetanus Toxoids Swelling and Rash           ---------------------------------------------------PHYSICAL EXAM--------------------------------------    BP (!) 158/92   Pulse 84   Temp 97 °F (36.1 °C)   Resp 14   Ht 5' (1.524 m)   Wt 210 lb (95.3 kg)   LMP  (LMP Unknown)   SpO2 97%   BMI 41.01 kg/m²     Physical Exam  Vitals and nursing note reviewed. Constitutional:       General: She is not in acute distress. Appearance: She is not toxic-appearing. HENT:      Mouth/Throat:      Mouth: Mucous membranes are dry. Comments: Left TMJ swelling and ttp. Unable to fully open mouth. R TMJ nontender w/o swelling. No drooling/dysphagia/stridor/dysphonia  Eyes:      General: No scleral icterus. Extraocular Movements: Extraocular movements intact. Pupils: Pupils are equal, round, and reactive to light. Cardiovascular:      Rate and Rhythm: Normal rate and regular rhythm. Pulses: Normal pulses. Heart sounds: Normal heart sounds.  No murmur heard.     Pulmonary:      Effort: Pulmonary effort is normal. No respiratory distress. Breath sounds: Normal breath sounds. No wheezing or rales. Abdominal:      General: There is no distension. Palpations: Abdomen is soft. Tenderness: There is no abdominal tenderness. Musculoskeletal:         General: No swelling or tenderness. Normal range of motion. Cervical back: Normal range of motion and neck supple. Skin:     General: Skin is warm and dry. Neurological:      Mental Status: She is alert and oriented to person, place, and time. Comments: Moves all extremities with appropriate strength. Sensation grossly intact in all extremities. -------------------------------------------------- RESULTS -------------------------------------------------  I have personally reviewed all laboratory and imaging results for this patient. Results are listed below. RADIOLOGY:  Interpreted personally and by Radiologist.  Abraham Mckeon   Final Result   1. Unremarkable CT of the mandible. No evidence of acute fracture or TMJ   dislocation. 2. Old bilateral nasal bone fractures. No significant change. 3. No evidence of acute facial fracture.               ------------------------- NURSING NOTES AND VITALS REVIEWED ---------------------------   The nursing notes within the ED encounter and vital signs as below have been reviewed by myself. BP (!) 158/92   Pulse 84   Temp 97 °F (36.1 °C)   Resp 14   Ht 5' (1.524 m)   Wt 210 lb (95.3 kg)   LMP  (LMP Unknown)   SpO2 97%   BMI 41.01 kg/m²   Oxygen Saturation Interpretation: Normal    The patients available past medical records and past encounters were reviewed. ------------------------------ ED COURSE/MEDICAL DECISION MAKING----------------------  Medications   fentaNYL (SUBLIMAZE) injection 50 mcg (50 mcg Intravenous Given 5/29/21 3572)       Counseling:    The emergency provider has spoken with the patient and discussed todays results, in addition to providing specific details for the plan of care and counseling regarding the diagnosis and prognosis. Questions are answered at this time and they are agreeable with the plan. ED Course/Medical Decision Makin y.o. female here with left jaw pain since last night. Non-toxic appearing, afebrile, hemodynamically stable, and in no acute distress. No evidence of impending airway compromise. Repeat evaluation improved and able to fully open mouth. CT negative for fx/disclocation. After discussion of findings and return precautions, patient agrees with plan for discharge and outpatient follow up with ENT. Advised soft diet until she sees ENT.       --------------------------------- IMPRESSION AND DISPOSITION ---------------------------------    IMPRESSION  1. Left temporomandibular joint disorder, unspecified        DISPOSITION  Disposition: Discharge to home  Patient condition is good    NOTE: This report was transcribed using voice recognition software.  Every effort was made to ensure accuracy; however, inadvertent computerized transcription errors may be present    Marissa Figueroa MD  Attending Emergency Physician          Marissa Figueroa MD  [phone]

## 2021-06-01 ENCOUNTER — TELEPHONE (OUTPATIENT)
Dept: ENT CLINIC | Age: 66
End: 2021-06-01

## 2021-06-21 ENCOUNTER — TELEPHONE (OUTPATIENT)
Dept: ADMINISTRATIVE | Age: 66
End: 2021-06-21

## 2021-06-21 ENCOUNTER — HOSPITAL ENCOUNTER (OUTPATIENT)
Age: 66
Discharge: HOME | End: 2021-06-23
Payer: MEDICARE

## 2021-06-21 ENCOUNTER — HOSPITAL ENCOUNTER (OUTPATIENT)
Dept: GENERAL RADIOLOGY | Age: 66
Discharge: HOME | End: 2021-06-23
Payer: MEDICARE

## 2021-06-21 ENCOUNTER — OFFICE VISIT (OUTPATIENT)
Dept: FAMILY MEDICINE CLINIC | Age: 66
End: 2021-06-21
Payer: MEDICARE

## 2021-06-21 VITALS
DIASTOLIC BLOOD PRESSURE: 84 MMHG | BODY MASS INDEX: 42.6 KG/M2 | RESPIRATION RATE: 17 BRPM | HEART RATE: 77 BPM | SYSTOLIC BLOOD PRESSURE: 136 MMHG | TEMPERATURE: 97.1 F | OXYGEN SATURATION: 98 % | HEIGHT: 60 IN | WEIGHT: 217 LBS

## 2021-06-21 DIAGNOSIS — M12.812 ROTATOR CUFF ARTHROPATHY OF LEFT SHOULDER: Primary | ICD-10-CM

## 2021-06-21 DIAGNOSIS — M12.812 ROTATOR CUFF ARTHROPATHY OF LEFT SHOULDER: ICD-10-CM

## 2021-06-21 PROCEDURE — 96372 THER/PROPH/DIAG INJ SC/IM: CPT | Performed by: PHYSICIAN ASSISTANT

## 2021-06-21 PROCEDURE — 99214 OFFICE O/P EST MOD 30 MIN: CPT | Performed by: PHYSICIAN ASSISTANT

## 2021-06-21 PROCEDURE — 73030 X-RAY EXAM OF SHOULDER: CPT

## 2021-06-21 RX ORDER — DEXAMETHASONE SODIUM PHOSPHATE 10 MG/ML
10 INJECTION INTRAMUSCULAR; INTRAVENOUS ONCE
Status: COMPLETED | OUTPATIENT
Start: 2021-06-21 | End: 2021-06-21

## 2021-06-21 RX ORDER — CYCLOBENZAPRINE HCL 10 MG
10 TABLET ORAL NIGHTLY PRN
Qty: 15 TABLET | Refills: 0 | Status: SHIPPED | OUTPATIENT
Start: 2021-06-21 | End: 2021-07-01

## 2021-06-21 RX ORDER — PREDNISONE 10 MG/1
TABLET ORAL
Qty: 21 TABLET | Refills: 0 | Status: SHIPPED
Start: 2021-06-22 | End: 2021-08-11

## 2021-06-21 RX ADMIN — DEXAMETHASONE SODIUM PHOSPHATE 10 MG: 10 INJECTION INTRAMUSCULAR; INTRAVENOUS at 12:47

## 2021-06-21 SDOH — ECONOMIC STABILITY: FOOD INSECURITY: WITHIN THE PAST 12 MONTHS, THE FOOD YOU BOUGHT JUST DIDN'T LAST AND YOU DIDN'T HAVE MONEY TO GET MORE.: NEVER TRUE

## 2021-06-21 SDOH — ECONOMIC STABILITY: FOOD INSECURITY: WITHIN THE PAST 12 MONTHS, YOU WORRIED THAT YOUR FOOD WOULD RUN OUT BEFORE YOU GOT MONEY TO BUY MORE.: NEVER TRUE

## 2021-06-21 ASSESSMENT — SOCIAL DETERMINANTS OF HEALTH (SDOH): HOW HARD IS IT FOR YOU TO PAY FOR THE VERY BASICS LIKE FOOD, HOUSING, MEDICAL CARE, AND HEATING?: NOT HARD AT ALL

## 2021-06-21 NOTE — PROGRESS NOTES
Chief Complaint:  Arm Pain (left arm pain above elbow been going on for two weeks )      History of Present Illness:  Source of history provided by:  patient. Johanne Borja is a 74 y.o. female who  has a past medical history of Anastomotic ulcer, Anemia, Anxiety, Asthma, Biceps tendon tear, Closed fracture of multiple ribs of right side, Closed fracture of nasal bone, Collapsed lung, COPD (chronic obstructive pulmonary disease) ([facility]), DDD (degenerative disc disease), lumbar, Degenerative disc disease at L5-S1 level, Depression, Distal radius fracture, Fracture of left olecranon process, GERD (gastroesophageal reflux disease), History of blood transfusion, Hypertension, Impingement syndrome of shoulder, Intractable abdominal pain, Left carpal tunnel syndrome, Nasal bones, closed fracture, closed, initial encounter, Osteoarthritis, Peptic ulcer, unspecified site, unspecified as acute or chronic, without mention of hemorrhage or perforation, RLS (restless legs syndrome), Rotator cuff tear, Seizure ([address] Sepsis ([address] and Spinal stenosis. , presents to the walk in care for progressive and unrelenting pain to her left shoulder onset worsening over the last 2 weeks with no injury. The patient has had similar pain in the past,but hasn't regularly followed with anyone for the pain. She denies any recent fall, trauma or injury. She denies any anterior chest pain or SOB. She has had previous xrays of her left shoulder done in January of 2020 which showed some severe changes to her left rotator cuff. She reports a previous injury many years ago. She has not had any recent MRI imaging of her left shoulder. She has had extreme difficulty sleeping at night because of the pain.       Review of Systems:     Unless otherwise stated in this report or unable to obtain because of the patient's clinical or mental status as evidenced by the medical record, this patients's positive and negative responses for Review of Systems, constitutional, psych, eyes, ENT, cardiovascular, respiratory, gastrointestinal, neurological, genitourinary, musculoskeletal, integument systems and systems related to the presenting problem are either stated in the preceding or were not pertinent or were negative for the symptoms and/or complaints related to the medical problem. Past Medical History:  has a past medical history of Anastomotic ulcer, Anemia, Anxiety, Asthma, Biceps tendon tear, Closed fracture of multiple ribs of right side, Closed fracture of nasal bone, Collapsed lung, COPD (chronic obstructive pulmonary disease) ([address] DDD (degenerative disc disease), lumbar, Degenerative disc disease at L5-S1 level, Depression, Distal radius fracture, Fracture of left olecranon process, GERD (gastroesophageal reflux disease), History of blood transfusion, Hypertension, Impingement syndrome of shoulder, Intractable abdominal pain, Left carpal tunnel syndrome, Nasal bones, closed fracture, closed, initial encounter, Osteoarthritis, Peptic ulcer, unspecified site, unspecified as acute or chronic, without mention of hemorrhage or perforation, RLS (restless legs syndrome), Rotator cuff tear, Seizure ([address] Sepsis ([address] and Spinal stenosis. Past Surgical History:  has a past surgical history that includes Tonsillectomy (); Carpal tunnel release (); shoulder surgery (); Knee arthroscopy (); Dilation and curettage of uterus; Macario-en-Y Gastric Bypass (2005);  section (3/9/1984); Cholecystectomy (); Colonoscopy (3/2011); Upper gastrointestinal endoscopy (10/2004); Upper gastrointestinal endoscopy (12); Appendectomy; Endoscopy, colon, diagnostic; Abdomen surgery; Shoulder arthroscopy (Right, 1 2 15); Wrist arthroscopy (Right, 2015); Cholecystectomy; Dilatation, esophagus; Wrist surgery (Right, 12/2/15); other surgical history (16); other surgical history (Left, 16); Abscess Drainage (Left, 2016);  Carpal pain to palpation to mid aspect of left humerus, no visible erythema or STS, but surface of LUE feels warm to touch. Skin:  Normal turgor. Warm, dry, without visible rash, unless noted elsewhere. Neurological:  Alert and oriented. Motor functions intact.        -------------------------------------Impression & Disposition Section-----------------------------------  Impression(s):  1. Rotator cuff arthropathy of left shoulder      Disposition:  Disposition: Discharge to home    Long discussion with patient that I feel that the root of her symptoms are coming from her rotator cuff issue with her left shoulder and she needs to have this addressed. She has seen Dr. Corin Fu from orthopedics in the past and she would like to follow up with him again. I did order repeat left shoulder films as she has not had these in over 1 year. She was given IM Decadron injection as well as Prednisone wean to start tomorrow morning and Flexeril to try at HS to help her to sleep at HS. Recommend to also contact her PCP concerning follow up as well. Pt advised to f/u with PCP for continued management and further care. ER if changes or worse. Pt advised to take all medications as directed.     Administrations This Visit     dexamethasone (DECADRON) injection 10 mg     Admin Date  06/21/2021 Action  Given Dose  10 mg Route  Intramuscular Administered By  Melly David MA

## 2021-06-21 NOTE — TELEPHONE ENCOUNTER
Patient called to be seen as she has an Aching pain in left arm between elbow and shoulder for appx 2 weeks, Cannot sleep at night. No availability as PCP not in office today. She will use Walk in care. Pt agreeable and verbalized understanding.

## 2021-06-21 NOTE — Clinical Note
Patient to walk in, severe left shoulder pain, no new injury. Xrays done, referred to Dr. Lonnie Osborne. Given meds and Decadron shot. I feel rotator cuff issue.  Kerry Huitron

## 2021-06-21 NOTE — PATIENT INSTRUCTIONS
Patient Education        Rotator Cuff: Exercises  Introduction  Here are some examples of exercises for you to try. The exercises may be suggested for a condition or for rehabilitation. Start each exercise slowly. Ease off the exercises if you start to have pain. You will be told when to start these exercises and which ones will work best for you. How to do the exercises  Pendulum swing   If you have pain in your back, do not do this exercise. 1. Hold on to a table or the back of a chair with your good arm. Then bend forward a little and let your sore arm hang straight down. This exercise does not use the arm muscles. Rather, use your legs and your hips to create movement that makes your arm swing freely. 2. Use the movement from your hips and legs to guide the slightly swinging arm back and forth like a pendulum (or elephant trunk). Then guide it in circles that start small (about the size of a dinner plate). Make the circles a bit larger each day, as your pain allows. 3. Do this exercise for 5 minutes, 5 to 7 times each day. 4. As you have less pain, try bending over a little farther to do this exercise. This will increase the amount of movement at your shoulder. Posterior stretching exercise   1. Hold the elbow of your injured arm with your other hand. 2. Use your hand to pull your injured arm gently up and across your body. You will feel a gentle stretch across the back of your injured shoulder. 3. Hold for at least 15 to 30 seconds. Then slowly lower your arm. 4. Repeat 2 to 4 times. Up-the-back stretch   Your doctor or physical therapist may want you to wait to do this stretch until you have regained most of your range of motion and strength. You can do this stretch in different ways. Hold any of these stretches for at least 15 to 30 seconds. Repeat them 2 to 4 times. 1. Light stretch: Put your hand in your back pocket. Let it rest there to stretch your shoulder. 2. Moderate stretch:  With your other hand, hold your injured arm (palm outward) behind your back by the wrist. Pull your arm up gently to stretch your shoulder. 3. Advanced stretch: Put a towel over your other shoulder. Put the hand of your injured arm behind your back. Now hold the back end of the towel. With the other hand, hold the front end of the towel in front of your body. Pull gently on the front end of the towel. This will bring your hand farther up your back to stretch your shoulder. Overhead stretch   1. Standing about an arm's length away, grasp onto a solid surface. You could use a countertop, a doorknob, or the back of a sturdy chair. 2. With your knees slightly bent, bend forward with your arms straight. Lower your upper body, and let your shoulders stretch. 3. As your shoulders are able to stretch farther, you may need to take a step or two backward. 4. Hold for at least 15 to 30 seconds. Then stand up and relax. If you had stepped back during your stretch, step forward so you can keep your hands on the solid surface. 5. Repeat 2 to 4 times. Shoulder flexion (lying down)   To make a wand for this exercise, use a piece of PVC pipe or a broom handle with the broom removed. Make the wand about a foot wider than your shoulders. 1. Lie on your back, holding a wand with both hands. Your palms should face down as you hold the wand. 2. Keeping your elbows straight, slowly raise your arms over your head. Raise them until you feel a stretch in your shoulders, upper back, and chest.  3. Hold for 15 to 30 seconds. 4. Repeat 2 to 4 times. Shoulder rotation (lying down)   To make a wand for this exercise, use a piece of PVC pipe or a broom handle with the broom removed. Make the wand about a foot wider than your shoulders. 1. Lie on your back. Hold a wand with both hands with your elbows bent and palms up. 2. Keep your elbows close to your body, and move the wand across your body toward the sore arm.   3. Hold for 8 to 12 seconds. 4. Repeat 2 to 4 times. Wall climbing (to the side)   Avoid any movement that is straight to your side, and be careful not to arch your back. Your arm should stay about 30 degrees to the front of your side. 1. Stand with your side to a wall so that your fingers can just touch it at an angle about 30 degrees toward the front of your body. 2. Walk the fingers of your injured arm up the wall as high as pain permits. Try not to shrug your shoulder up toward your ear as you move your arm up. 3. Hold that position for a count of at least 15 to 20.  4. Walk your fingers back down to the starting position. 5. Repeat at least 2 to 4 times. Try to reach higher each time. Wall climbing (to the front)   During this stretching exercise, be careful not to arch your back. 1. Face a wall, and stand so your fingers can just touch it. 2. Keeping your shoulder down, walk the fingers of your injured arm up the wall as high as pain permits. (Don't shrug your shoulder up toward your ear.)  3. Hold your arm in that position for at least 15 to 30 seconds. 4. Slowly walk your fingers back down to where you started. 5. Repeat at least 2 to 4 times. Try to reach higher each time. Shoulder blade squeeze   1. Stand with your arms at your sides, and squeeze your shoulder blades together. Do not raise your shoulders up as you squeeze. 2. Hold 6 seconds. 3. Repeat 8 to 12 times. Scapular exercise: Arm reach   1. Lie flat on your back. This exercise is a very slight motion that starts with your arms raised (elbows straight, arms straight). 2. From this position, reach higher toward the tommy or ceiling. Keep your elbows straight. All motion should be from your shoulder blade only. 3. Relax your arms back to where you started. 4. Repeat 8 to 12 times. Arm raise to the side   During this strengthening exercise, your arm should stay about 30 degrees to the front of your side.   1. Slowly raise your injured arm to the side, with your thumb facing up. Raise your arm 60 degrees at the most (shoulder level is 90 degrees). 2. Hold the position for 3 to 5 seconds. Then lower your arm back to your side. If you need to, bring your \"good\" arm across your body and place it under the elbow as you lower your injured arm. Use your good arm to keep your injured arm from dropping down too fast.  3. Repeat 8 to 12 times. 4. When you first start out, don't hold any extra weight in your hand. As you get stronger, you may use a 1-pound to 2-pound dumbbell or a small can of food. Shoulder flexor and extensor exercise   These are isometric exercises. That means you contract your muscles without actually moving. 1. Push forward (flex): Stand facing a wall or doorjamb, about 6 inches or less back. Hold your injured arm against your body. Make a closed fist with your thumb on top. Then gently push your hand forward into the wall with about 25% to 50% of your strength. Don't let your body move backward as you push. Hold for about 6 seconds. Relax for a few seconds. Repeat 8 to 12 times. 2. Push backward (extend): Stand with your back flat against a wall. Your upper arm should be against the wall, with your elbow bent 90 degrees (your hand straight ahead). Push your elbow gently back against the wall with about 25% to 50% of your strength. Don't let your body move forward as you push. Hold for about 6 seconds. Relax for a few seconds. Repeat 8 to 12 times. Scapular exercise: Wall push-ups   This exercise is best done with your fingers somewhat turned out, rather than straight up and down. 1. Stand facing a wall, about 12 inches to 18 inches away. 2. Place your hands on the wall at shoulder height. 3. Slowly bend your elbows and bring your face to the wall. Keep your back and hips straight. 4. Push back to where you started. 5. Repeat 8 to 12 times.   6. When you can do this exercise against a wall comfortably, you can try it against a Squeeze a rolled towel between your elbow and your body for comfort. This will help keep your arm at your side. 3. Hold one end of the elastic band with the hand of the painful arm. 4. Start with your forearm across your belly. Slowly rotate the forearm out away from your body. Keep your elbow and upper arm tucked against the towel roll or the side of your body until you begin to feel tightness in your shoulder. Slowly move your arm back to where you started. 5. Repeat 8 to 12 times. Follow-up care is a key part of your treatment and safety. Be sure to make and go to all appointments, and call your doctor if you are having problems. It's also a good idea to know your test results and keep a list of the medicines you take. Where can you learn more? Go to https://chsauleb.[employer]. org and sign in to your [employer]ile account. Enter Kerry Alba in the [employer] box to learn more about \"Rotator Cuff: Exercises. \"     If you do not have an account, please click on the \"Sign Up Now\" link. Current as of: November 16, 2020               Content Version: 12.9  © 0952-8498 [employer]. Care instructions adapted under license by [facility] ([facility]). If you have questions about a medical condition or this instruction, always ask your healthcare professional. [place] [address] any warranty or liability for your use of this information. Patient Education        Rotator Cuff Problems: Care Instructions  Overview     The rotator cuff is a group of tendons and muscles around the shoulder that keeps the shoulder joint stable. It is what allows you to raise and rotate your arm. Over time, daily wear and exercise can cause the tendons to rub on the bones of your shoulder. This is called impingement. This condition may cause the tendons to bruise, degenerate, or tear. In many people, these problems do not cause pain.  When they do cause pain, you can do things to reduce the pain and swelling. These include rest, physical therapy, ice and heat, and anti-inflammatory medicine. If you still have pain after trying these treatments, you and your doctor can discuss having a steroid injection or surgery. Follow-up care is a key part of your treatment and safety. Be sure to make and go to all appointments, and call your doctor if you are having problems. It's also a good idea to know your test results and keep a list of the medicines you take. How can you care for yourself at home? · Be safe with medicines. Read and follow all instructions on the label. ? If the doctor gave you a prescription medicine for pain, take it as prescribed. ? If you are not taking a prescription pain medicine, ask your doctor if you can take an over-the-counter medicine. · Put ice or a cold pack on your shoulder for 10 to 20 minutes at a time. Try to do this every 1 to 2 hours for the next 3 days (when you are awake). Put a thin cloth between the ice pack and your skin. · After 2 or 3 days, if you don't have swelling, apply heat. Put a warm water bottle, a heating pad set on low, or a warm cloth on your shoulder. Do not go to sleep with a heating pad on your skin. Put a thin cloth between the heating pad and your skin. While holding a warm cloth on your shoulder, lean forward so your arm hangs freely, and gently swing your arm back and forth like a pendulum. You also can do this standing under a warm shower. · Follow your doctor's advice for physical therapy. When your doctor says it is okay, try these stretching exercises. Do them slowly to avoid injury. Put a warm, wet towel on your shoulder before exercising. Stop any exercise that increases pain. ? Range-of-motion exercises. If it is not too painful, stretch your arm in four directions: across the body, up the back, to the side, and overhead. ? Pendulum exercise. Lean forward and hold onto a table or the back of a chair with your good arm.  Bend at the waist, swelling in your arm or hand.     · You do not get better as expected. Where can you learn more? Go to https://chpepiceweb.[employer]. org and sign in to your Be[employer] account. Enter E207 in the [employer] box to learn more about \"Rotator Cuff Problems: Care Instructions. \"     If you do not have an account, please click on the \"Sign Up Now\" link. Current as of: November 16, 2020               Content Version: 12.9  © 2006-2021 Healthwise[employer] Incorporated. Care instructions adapted under license by [facility] ([facility]). If you have questions about a medical condition or this instruction, always ask your healthcare professional. [address] any warranty or liability for your use of this information.

## 2021-06-22 NOTE — RESULT ENCOUNTER NOTE
Please let patient know that I did receive her xrays and they should be calling her with appointment with Dr. Regi Ramirez or she can call herself to try and schedule. Her xrays show no change from previous. She still may need open MRI or can discuss with him other options.   Thanks  Salvador Shaw

## 2021-06-23 ENCOUNTER — TELEPHONE (OUTPATIENT)
Dept: ORTHOPEDIC SURGERY | Age: 66
End: 2021-06-23

## 2021-06-23 ENCOUNTER — TELEPHONE (OUTPATIENT)
Dept: PRIMARY CARE CLINIC | Age: 66
End: 2021-06-23

## 2021-06-23 NOTE — TELEPHONE ENCOUNTER
Urgent Referral placed by Tala Ramsey for Rotator cuff arthropathy of left shoulder, XR [facility]. Patient last seen Sg Given 2017. Patient given next available appointment 7/20/21. Please advise patient if able to move appointment up [phone].

## 2021-07-20 ENCOUNTER — OFFICE VISIT (OUTPATIENT)
Dept: ORTHOPEDIC SURGERY | Age: 66
End: 2021-07-20
Payer: MEDICARE

## 2021-07-20 VITALS — WEIGHT: 217 LBS | BODY MASS INDEX: 42.6 KG/M2 | HEIGHT: 60 IN

## 2021-07-20 DIAGNOSIS — M75.22 BICEPS TENDONITIS ON LEFT: Primary | ICD-10-CM

## 2021-07-20 PROCEDURE — 76942 ECHO GUIDE FOR BIOPSY: CPT | Performed by: ORTHOPAEDIC SURGERY

## 2021-07-20 PROCEDURE — 99204 OFFICE O/P NEW MOD 45 MIN: CPT | Performed by: ORTHOPAEDIC SURGERY

## 2021-07-20 PROCEDURE — 20550 NJX 1 TENDON SHEATH/LIGAMENT: CPT | Performed by: ORTHOPAEDIC SURGERY

## 2021-07-20 RX ORDER — TRIAMCINOLONE ACETONIDE 40 MG/ML
40 INJECTION, SUSPENSION INTRA-ARTICULAR; INTRAMUSCULAR ONCE
Status: COMPLETED | OUTPATIENT
Start: 2021-07-20 | End: 2021-07-20

## 2021-07-20 RX ADMIN — TRIAMCINOLONE ACETONIDE 40 MG: 40 INJECTION, SUSPENSION INTRA-ARTICULAR; INTRAMUSCULAR at 09:53

## 2021-07-20 NOTE — PROGRESS NOTES
Chief Complaint   Patient presents with    Shoulder Pain     Left shoulder pain 2-3 months. No RAMONE. Complains of aching pain in the upper arm region. She notes she had previous shoulder surgery in [place] is a 74y.o. year old   male who is seen today  for evaluation of left shoulder pain. She reports the pain has been ongoing for the past 3 months. She does not recall a specific injury which started the pain. She reports the pain is worse with activity, better with rest.  The patient does have mechanical symptoms. She does have night pain. She denies a feeling of instability. The prior treatments have been ice, tylenol. The patient   has not responded to the treatment. The patient is left hand dominant. The patient is not working. The patients occupation is retired. Chief Complaint   Patient presents with    Shoulder Pain     Left shoulder pain 2-3 months. No RAMONE. Complains of aching pain in the upper arm region.  She notes she had previous shoulder surgery in 1999      Past Medical History:   Diagnosis Date    Anastomotic ulcer 04/30/2012    Anemia     Anxiety     Asthma     Biceps tendon tear 01/02/2015    Closed fracture of multiple ribs of right side     Closed fracture of nasal bone     Collapsed lung 1986    [facility] obstructive [facility] disease) ([facility])     DDD (degenerative disc disease), lumbar     Degenerative disc disease at L5-S1 level     Depression     Distal radius fracture 05/11/2015    Fracture of left olecranon process 01/10/2017    GERD (gastroesophageal reflux disease)     History of blood transfusion     Hypertension     Impingement syndrome of shoulder 12/09/2014    Intractable abdominal pain 11/18/2018    Left carpal tunnel syndrome 01/30/2017    Nasal bones, closed fracture, closed, initial encounter 12/31/2015    Osteoarthritis     Peptic ulcer, unspecified site, unspecified as acute or chronic, without mention of hemorrhage or perforation     RLS (restless legs syndrome)     Rotator cuff tear 2014    Seizure ([facility] [address]     Sepsis ([facility] [address]     Spinal stenosis      Past Surgical History:   Procedure Laterality Date    ABDOMEN SURGERY      ABSCESS DRAINAGE Left 2016    left thigh seroma    APPENDECTOMY      CARPAL TUNNEL RELEASE  1998    right hand    CARPAL TUNNEL RELEASE Left 2017     SECTION  3/9/1984    CHOLECYSTECTOMY  1986    CHOLECYSTECTOMY      COLONOSCOPY  3/2011    DILATATION, ESOPHAGUS      DILATION AND CURETTAGE OF UTERUS      x4    ENDOSCOPY, COLON, DIAGNOSTIC      JOINT REPLACEMENT  2003    right    JOINT REPLACEMENT  2003    left knee    JOINT REPLACEMENT      bilat knee replacement    KNEE ARTHROSCOPY  2002    bilateral    OTHER SURGICAL HISTORY  16    closed reduction nasal bone fracture    OTHER SURGICAL HISTORY Left 6 2 16    seroma incision and drainage thigh    OH LAP,DIAGNOSTIC ABDOMEN N/A 2018    DIAGNOSTIC LAPAROSCOPY, LYSIS OF ADHESIONS performed by Melida Newby MD at [address]  2005    Dr. Calvin Dailey ARTHROSCOPY Right 1 2 15    rotator cuff repair, subacromial decompression, debridement    SHOULDER SURGERY      left    TONSILLECTOMY      UPPER GASTROINTESTINAL ENDOSCOPY  10/2004    UPPER [facility]INTESTINAL ENDOSCOPY  12    [facility]    UPPER GASTROINTESTINAL ENDOSCOPY N/A 2018    EGD BIOPSY performed by Kindra Schmidt MD at [address] 2019    EGD ESOPHAGOGASTRODUODENOSCOPY performed by Melida [facility]by MD at [facility] Right 2015    DEBRIDEMENT ASPIRATION; RIGHT DEQUARVAINES RELEASE    WRIST SURGERY Right 12/2/15       Current Outpatient Medications:     predniSONE (DELTASONE) 10 MG tablet, 4 tabs QD for 2 days, then 3 tabs QD for 2 days, then 2 tabs QD for 2 days, then 1 tab QD for 2 days then /2 tab QD for 2 days. , Disp: 21 tablet, Rfl: 0    vitamin D (ERGOCALCIFEROL) 1.25 MG (60019 UT) CAPS capsule, Take 1 capsule by mouth once a week, Disp: 12 capsule, Rfl: 1    traZODone (DESYREL) 50 MG tablet, Take 1 tablet by mouth nightly as needed for Sleep, Disp: 30 tablet, Rfl: 2    albuterol sulfate HFA (PROVENTIL HFA) 108 (90 Base) MCG/ACT inhaler, Inhale 2 puffs into the lungs every 6 hours as needed for Wheezing, Disp: 1 Inhaler, Rfl: 0    Phenylephrine-DM-GG (MUCINEX FAST-MAX CONGEST COUGH) 5- MG TABS, Take as directed on the package for cough or congestion, Disp: 20 tablet, Rfl: 0    rOPINIRole (REQUIP) 1 MG tablet, Take 1 tablet by mouth nightly, Disp: 90 tablet, Rfl: 1    esomeprazole (NEXIUM) 40 MG delayed release capsule, Take 1 capsule by mouth every morning (before breakfast), Disp: 90 capsule, Rfl: 1  Allergies   Allergen Reactions    Ceclor [Cefaclor] Anaphylaxis    Diprivan [Propofol] Hives     Swelling of face    Naproxen Anaphylaxis     Heart races    Adhesive Tape Hives    Blueberry Flavor     Ibuprofen Hives    Mirapex [Pramipexole]     Oxycodone-Acetaminophen     Shellfish-Derived Products Hives    Tramadol     Vaccinium Angustifolium     Blueberry Fruit Extract Hives    Ceclor [Cefaclor] Hives    Iodides Hives    Iodine Hives and Rash     Contrast    Iv Dye [Iodides] Rash    Naprosyn [Naproxen] Palpitations    Percocet [Oxycodone-Acetaminophen] Rash    Phenergan [Promethazine Hcl] Nausea And Vomiting    Promethazine Nausea And Vomiting    Sulfa Antibiotics Hives    Tape [Adhesive Tape] Rash    Tetanus Toxoids Swelling and Rash     Social History     Socioeconomic History    Marital status:      Spouse name: mattie    Number of children: 5    Years of education: 12    Highest education level: Not on file   Occupational History    Not on file   Tobacco Use    Smoking status: Current Every Day Smoker     Packs/day: 1.00     Years: 14.00     Pack years: 14.00     Types: Cigarettes     Start date: 10/23/2004    Smokeless tobacco: Never Used   Vaping Use    Vaping Use: Never used   Substance and Sexual Activity    Alcohol use: No     Comment: occ    Drug use: No    Sexual activity: Never   Other Topics Concern    Not on file   Social History Narrative    ** Merged History Encounter **          Social Determinants of Health     Financial Resource Strain: Low Risk     Difficulty of Paying Living Expenses: Not hard at all   Food Insecurity: No Food Insecurity    Worried About Running Out of Food in the Last Year: Never true    [address] in the Last Year: Never true   Transportation Needs:     Lack of Transportation (Medical):  Lack of Transportation (Non-Medical):    Physical Activity:     Days of Exercise per Week:     Minutes of Exercise per Session:    Stress:     Feeling of Stress :    Social Connections:     Frequency of Communication with Friends and Family:     Frequency of Social Gatherings with Friends and Family:     Attends Ten[employer] Services:     Active Member of Clubs or Organizations:     Attends Club or Organization Meetings:     Marital Status:    Intimate Partner Violence:     Fear of Current or Ex-Partner:     Emotionally Abused:     Physically Abused:     Sexually Abused:      Family History   Problem Relation Age of Onset    Cancer Mother     Heart Disease Sister     Emphysema Father        REVIEW OF SYSTEMS:     General/Constitution:  (-)weight loss, (-)fever, (-)chills, (-)weakness. Skin: (-) rash,(-) psoriasis,(-) eczema, (-)skin cancer. Musculoskeletal: (-) fractures,  (-) dislocations,(-) collagen vascular disease, (-) fibromyalgia, (-) multiple sclerosis, (-) muscular dystrophy, (-) RSD,(-) joint pain (-)swelling, (-) joint pain,swelling. Neurologic: (-) epilepsy, (-)seizures,(-) brain tumor,(-) TIA, (-)stroke, (-)headaches, (-)Parkinson disease,(-) memory loss, (-) LOC.   Cardiovascular: (-) Chest pain, (-) swelling in legs/feet, (-) SOB, (-) cramping in legs/feet with walking. Respiratory: (-) SOB, (-) Coughing, (-) night sweats. GI: (-) nausea, (-) vomiting, (-) diarrhea, (-) blood in stool, (-) gastric ulcer. Psychiatric: (-) Depression, (-) Anxiety, (-) bipolar disease, (-) Alzheimer's Disease  Allergic/Immunologic: (-) allergies latex, (-) allergies metal, (-) skin sensitivity. Hematlogic: (-) anemia, (-) blood transfusion, (-) DVT/PE, (-) Clotting disorders      Subjective:    Constitution:  Ht 5' (1.524 m)   Wt 217 lb (98.4 kg)   LMP  (LMP Unknown)   BMI 42.38 kg/m²     Psycihatric:  The patient is alert and oriented x 3, appears to be stated age and in no distress. Respiratory:  Respiratory effort is not labored. Patient is not gasping. Palpation of the chest reveals no tactile fremitus. Skin:  Upon inspection: the skin appears warm, dry and intact. There is not a previous scar over the affected area. There is not any cellulitis, lymphedema or cutaneous lesions noted in the lower extremities. Upon palpation there is no induration noted. Neurologic:  Motor exam of the upper extremities show: The reflexes in biceps/triceps/brachioradialis are equal and symmetric. Sensory exam C5-T1 are normal bilaterally. Cardiovascular: The vascular exam is normal and is well perfused to distal extremities. There are 2+ radial pulses bilaterally, and motor and sensation is intact to median, ulnar, and radial, musclocutaneus, and axillary nerve distribution and grossly symmetric bilaterally. There is cap refill noted less than two seconds in all digits. There is not edema of the bilateral upper extremities. There is not varicosities noted in the distal extremities. Lymph:  Upon palpation,  there is no lymphadenopathy noted in bilateral upper extremities. Musculoskeletal:  Gait: normal; examination of the nails and digits reveal no cyanosis or clubbing.       Cervical Exam:  On tendon. The skin was preped with alcohol, and using ultrasound guidance and a no touch, aseptic technique, a 25 gauge, 1.5\" needle was inserted, confirmation of needle placement was noted on the ultrasound unit. 1 ml of Kenalog 40mg and 1 ml of 0.25% Marcaine was injected into the anterior aspect into the bicep tendon of the Left shoulder. The patient tolerated the injections well. I will see the patient back prn. Images are stored and uploaded into the [employer].

## 2021-08-05 DIAGNOSIS — M25.561 ACUTE PAIN OF RIGHT KNEE: Primary | ICD-10-CM

## 2021-08-06 ENCOUNTER — HOSPITAL ENCOUNTER (OUTPATIENT)
Age: 66
Discharge: HOME | End: 2021-08-06
Payer: MEDICARE

## 2021-08-06 DIAGNOSIS — E55.9 VITAMIN D DEFICIENCY, UNSPECIFIED: ICD-10-CM

## 2021-08-06 DIAGNOSIS — R73.09 ELEVATED GLUCOSE: ICD-10-CM

## 2021-08-06 LAB
ANION GAP SERPL CALCULATED.3IONS-SCNC: 9 MMOL/L (ref 7–16)
BUN BLDV-MCNC: 18 MG/DL (ref 6–23)
CALCIUM SERPL-MCNC: 9 MG/DL (ref 8.6–10.2)
CHLORIDE BLD-SCNC: 102 MMOL/L (ref 98–107)
CO2: 30 MMOL/L (ref 22–29)
CREAT SERPL-MCNC: 0.8 MG/DL (ref 0.5–1)
GFR AFRICAN AMERICAN: >60
GFR NON-AFRICAN AMERICAN: >60 ML/MIN/1.73
GLUCOSE BLD-MCNC: 126 MG/DL (ref 74–99)
HBA1C MFR BLD: 6.2 % (ref 4–5.6)
POTASSIUM SERPL-SCNC: 4.4 MMOL/L (ref 3.5–5)
SODIUM BLD-SCNC: 141 MMOL/L (ref 132–146)
VITAMIN D 25-HYDROXY: 25 NG/ML (ref 30–100)

## 2021-08-06 PROCEDURE — 36415 COLL VENOUS BLD VENIPUNCTURE: CPT

## 2021-08-06 PROCEDURE — 80048 BASIC METABOLIC PNL TOTAL CA: CPT

## 2021-08-06 PROCEDURE — 83036 HEMOGLOBIN GLYCOSYLATED A1C: CPT

## 2021-08-06 PROCEDURE — 82306 VITAMIN D 25 HYDROXY: CPT

## 2021-08-09 ENCOUNTER — OFFICE VISIT (OUTPATIENT)
Dept: ORTHOPEDIC SURGERY | Age: 66
End: 2021-08-09
Payer: MEDICARE

## 2021-08-09 VITALS — BODY MASS INDEX: 41.23 KG/M2 | HEIGHT: 60 IN | WEIGHT: 210 LBS

## 2021-08-09 DIAGNOSIS — Z96.651 STATUS POST TOTAL RIGHT KNEE REPLACEMENT: Primary | ICD-10-CM

## 2021-08-09 DIAGNOSIS — E66.01 OBESITY, CLASS III, BMI 40-49.9 (MORBID OBESITY) (HCC): ICD-10-CM

## 2021-08-09 PROCEDURE — 99214 OFFICE O/P EST MOD 30 MIN: CPT | Performed by: ORTHOPAEDIC SURGERY

## 2021-08-09 NOTE — PROGRESS NOTES
Chief Complaint   Patient presents with    Knee Pain     Patient here for right knee pain, patient states that she had prior surgery to the right knee done in 2003 and 2009. Subjective:     Patient ID: Whitney Keen is a 74 y.o..  female    Knee Pain  Patient complains of right knee pain. This is evaluated as a personal injury. There was not a history of injury. The pain began 3 months ago. The pain is located global. She describes  Her symptoms as sharp and shooting. She has not experienced popping, clicking, locking, and giving way in the affected knee. The patient has had pain with kneeling, squating, and climbing stairs. Symptoms improve with rest. The symptoms are worse with getting up from sitting position. The knee has not given out or felt unstable. The patient can bend and straighten the knee fully. The patient is active in none. Treatment to date has been ice, heat, Tylenol, without significant relief. The patient is not working. The patients occupation is retired. She had arthroplasty in 2009 and redo in 2009. Leticia Dothan      Past Medical History:   Diagnosis Date    Anastomotic ulcer 04/30/2012    Anemia     Anxiety     Asthma     Biceps tendon tear 01/02/2015    Closed fracture of multiple ribs of right side     Closed fracture of nasal bone     Collapsed lung 1986    [facility]     DDD (degenerative disc disease), lumbar     Degenerative disc disease at L5-S1 level     Depression     Distal radius fracture 05/11/2015    Fracture of left olecranon process 01/10/2017    GERD (gastroesophageal reflux disease)     History of blood transfusion     Hypertension     Impingement syndrome of shoulder 12/09/2014    Intractable abdominal pain 11/18/2018    Left carpal tunnel syndrome 01/30/2017    Nasal bones, closed fracture, closed, initial encounter 12/31/2015    Osteoarthritis     Peptic ulcer, unspecified site, unspecified as acute or chronic, without mention of hemorrhage or perforation     RLS (restless legs syndrome)     Rotator cuff tear 2014    [facility]ure ([facility]     Sepsis ([facility]     Spinal stenosis      Past Surgical History:   Procedure Laterality Date    ABDOMEN SURGERY      ABSCESS DRAINAGE Left 2016    left thigh seroma    APPENDECTOMY      CARPAL TUNNEL RELEASE  1998    right hand    CARPAL TUNNEL RELEASE Left 2017     SECTION  3/9/1984    CHOLECYSTECTOMY  1986    CHOLECYSTECTOMY      COLONOSCOPY  3/2011    DILATATION, ESOPHAGUS      DILATION AND CURETTAGE OF UTERUS      x4    ENDOSCOPY, COLON, DIAGNOSTIC      JOINT REPLACEMENT  2003    right    JOINT REPLACEMENT  2003    left knee    JOINT REPLACEMENT      bilat knee replacement    KNEE ARTHROSCOPY      bilateral    OTHER SURGICAL HISTORY  16    closed reduction nasal bone fracture    OTHER SURGICAL HISTORY Left 6 2 16    seroma incision and drainage thigh    DC LAP,DIAGNOSTIC ABDOMEN N/A 2018    DIAGNOSTIC LAPAROSCOPY, LYSIS OF ADHESIONS performed by Noah Briceño MD at [address] [facility]  2005    Dr. Hawley Levo ARTHROSCOPY Right 1 2 15    rotator cuff repair, subacromial decompression, debridement    SHOULDER SURGERY      left    TONSILLECTOMY      UPPER GASTROINTESTINAL ENDOSCOPY  10/2004    UPPER [facility]INTEST[facility]AL ENDOSCOPY  12    [facility]    UPPER GASTROINTESTINAL ENDOSCOPY N/A 2018    EGD BIOPSY performed by Ashvin Moise MD at [address] [place] [address] 2019    EGD ESOPHAGOGASTRODUODENOSCOPY performed by Noah Briceño MD at [facility] Right 2015    DEBRIDEMENT ASPIRATION; RIGHT DEQUARVAINES RELEASE    WRIST SURGERY Right 12/2/15       Current Outpatient Medications:     predniSONE (DELTASONE) 10 MG tablet, 4 tabs QD for 2 days, then 3 tabs QD for 2 days, then 2 tabs QD for 2 days, then 1 tab QD for 2 days then 1/2 tab QD for 2 days. , Disp: 21 tablet, Rfl: 0    vitamin D (ERGOCALCIFEROL) 1.25 MG (17962 UT) CAPS capsule, Take 1 capsule by mouth once a week, Disp: 12 capsule, Rfl: 1    traZODone (DESYREL) 50 MG tablet, Take 1 tablet by mouth nightly as needed for Sleep, Disp: 30 tablet, Rfl: 2    albuterol sulfate HFA (PROVENTIL HFA) 108 (90 Base) MCG/ACT inhaler, Inhale 2 puffs into the lungs every 6 hours as needed for Wheezing, Disp: 1 Inhaler, Rfl: 0    Phenylephrine-DM-GG (MUCINEX FAST-MAX CONGEST COUGH) 5- MG TABS, Take as directed on the package for cough or congestion, Disp: 20 tablet, Rfl: 0    rOPINIRole (REQUIP) 1 MG tablet, Take 1 tablet by mouth nightly, Disp: 90 tablet, Rfl: 1    esomeprazole (NEXIUM) 40 MG delayed release capsule, Take 1 capsule by mouth every morning (before breakfast), Disp: 90 capsule, Rfl: 1  Allergies   Allergen Reactions    Ceclor [Cefaclor] Anaphylaxis    Diprivan [Propofol] Hives     Swelling of face    Naproxen Anaphylaxis     Heart races    Adhesive Tape Hives    Blueberry Flavor     Ibuprofen Hives    Mirapex [Pramipexole]     Oxycodone-Acetaminophen     Shellfish-Derived Products Hives    Tramadol     Vaccinium Angustifolium     Blueberry Fruit Extract Hives    Ceclor [Cefaclor] Hives    Iodides Hives    Iodine Hives and Rash     Contrast    Iv Dye [Iodides] Rash    Naprosyn [Naproxen] Palpitations    Percocet [Oxycodone-Acetaminophen] Rash    Phenergan [Promethazine Hcl] Nausea And Vomiting    Promethazine Nausea And Vomiting    Sulfa Antibiotics Hives    Tape [Adhesive Tape] Rash    Tetanus Toxoids Swelling and Rash     Social History     Socioeconomic History    Marital status:      Spouse name: mattie    Number of children: 5    Years of education: 12    Highest education level: Not on file   Occupational History    Not on file   Tobacco Use    Smoking status: Current Every Day Smoker memory loss, (-) LOC. Cardiovascular: (-) Chest pain, (-) swelling in legs/feet, (-) SOB, (-) cramping in legs/feet with walking. Respiratory: (-) SOB, (-) Coughing, (-) night sweats. GI: (-) nausea, (-) vomiting, (-) diarrhea, (-) blood in stool, (-) gastric ulcer. Psychiatric: (-) Depression, (-) Anxiety, (-) bipolar disease, (-) Alzheimer's Disease  Allergic/Immunologic: (-) allergies latex, (-) allergies metal, (-) skin sensitivity. Hematlogic: (-) anemia, (-) blood transfusion, (-) DVT/PE, (-) Clotting disorders    Subjective:    Constitution:  Ht 5' (1.524 m)   Wt 210 lb (95.3 kg)   LMP  (LMP Unknown)   BMI 41.01 kg/m²     Psycihatric:  The patient is alert and oriented x 3, appears to be stated age and in no distress. Respiratory:  Respiratory effort is not labored. Patient is not gasping. Palpation of the chest reveals no tactile fremitus. Skin:  Upon inspection: the skin appears warm, dry and intact. There is  a previous scar over the affected area. There is any cellulitis, lymphedema or cutaneous lesions noted in the lower extremities. Upon palpation there is no induration noted. Neurologic:  Gait: antalgic; Motor exam of the lower extremities show ; quadriceps, hamstrings, foot dorsi and plantar flexors intact R.  5/5 and L. 5/5. Deep tendon reflexes are 2/4 at the knees and 2/4 at the ankles with strong extensor hallicus longus motor strength bilaterally. Sensory to both feet is intact to all sensory roots. Cardiovascular: The vascular exam is normal and is well perfused to distal extremities. Distal pulses DP/PT: R. 2+; L. 2+. There is cap refill noted less than two seconds in all digits. There is not edema of the bilateral lower extremities. There is not varicosities noted in the distal extremities. Lymph:  Upon palpation,  there is no lymphadenopathy noted in bilateral lower extremities.       Musculoskeletal:  Gait: antalgic; examination of the nails and digits reveal no cyanosis or clubbing. Lumbar exam:  On visual inspection, there is not deformity of the spine. full range of motion, no tenderness, palpable spasm or pain on motion. Special tests: Straight Leg Raise negative, Haley test negative. Hip exam:   Upon inspection, there is not deformity noted. Upon palpation there is not tenderness. ROM: is  full and symmetrical.   Strength: Hip Flexors 5/5; Hip Abductors 5/5; Hip Adduction 5/5. Knee exam:  Right knee exam shows;  range of motion of R. Knee is 0 to 120, and L. Knee is 0 to 120. The patient does not have  pain on motion, effusion is none, there is tenderness over the  medial, lateral, anterior region, there are not any masses, there is not ligamentous instability, there is not  deformity noted. Knee exam: neither positive for moderate crepitations, some mild tenderness laxity is not noted with  stress. There is not a popliteal cyst.+ startup pain    R. Knee:  Lachman's negative, Anterior Drawer negative, Posterior Drawer negative  Meaghan's negative, Thallasy  negative,   PF grind test negative, Apprehension test negative, Patellar J sign  negative  L. Knee:  Lachman's negative, Anterior Drawer negative, Posterior Drawer negative  Meaghan's negative, Thallasy  negative,   PF grind test negative, Apprehension test negative,  Patellar J sign  negative    Xray Exam:  S/p knee arthroplasty  Radiographic findings reviewed with patient    Assessment:  Encounter Diagnoses   Name Primary?  Status post total right knee replacement Yes       Plan:  Natural history and expected course discussed. Questions answered. Educational materials distributed. Rest, ice, compression, and elevation (RICE) therapy. Reduction in offending activity.   Bone scan to rule out loosening

## 2021-08-11 ENCOUNTER — OFFICE VISIT (OUTPATIENT)
Dept: FAMILY MEDICINE CLINIC | Age: 66
End: 2021-08-11
Payer: MEDICARE

## 2021-08-11 VITALS
BODY MASS INDEX: 44.76 KG/M2 | SYSTOLIC BLOOD PRESSURE: 136 MMHG | WEIGHT: 228 LBS | HEART RATE: 77 BPM | TEMPERATURE: 97.7 F | HEIGHT: 60 IN | DIASTOLIC BLOOD PRESSURE: 82 MMHG | OXYGEN SATURATION: 95 %

## 2021-08-11 DIAGNOSIS — R79.9 ABNORMAL FINDING OF BLOOD CHEMISTRY, UNSPECIFIED: ICD-10-CM

## 2021-08-11 DIAGNOSIS — J44.9 ASTHMA WITH COPD (HCC): ICD-10-CM

## 2021-08-11 DIAGNOSIS — K21.9 GASTROESOPHAGEAL REFLUX DISEASE WITHOUT ESOPHAGITIS: Chronic | ICD-10-CM

## 2021-08-11 DIAGNOSIS — E55.9 VITAMIN D DEFICIENCY: ICD-10-CM

## 2021-08-11 DIAGNOSIS — F33.9 RECURRENT DEPRESSION (HCC): ICD-10-CM

## 2021-08-11 DIAGNOSIS — Z00.00 ROUTINE GENERAL MEDICAL EXAMINATION AT A HEALTH CARE FACILITY: Primary | ICD-10-CM

## 2021-08-11 DIAGNOSIS — E55.9 VITAMIN D DEFICIENCY, UNSPECIFIED: ICD-10-CM

## 2021-08-11 DIAGNOSIS — R73.03 PREDIABETES: ICD-10-CM

## 2021-08-11 DIAGNOSIS — G25.81 RLS (RESTLESS LEGS SYNDROME): ICD-10-CM

## 2021-08-11 PROCEDURE — G0438 PPPS, INITIAL VISIT: HCPCS | Performed by: FAMILY MEDICINE

## 2021-08-11 RX ORDER — ERGOCALCIFEROL 1.25 MG/1
50000 CAPSULE ORAL WEEKLY
Qty: 12 CAPSULE | Refills: 1 | Status: SHIPPED
Start: 2021-08-11 | End: 2022-02-14 | Stop reason: SDUPTHER

## 2021-08-11 RX ORDER — VENLAFAXINE HYDROCHLORIDE 37.5 MG/1
37.5 CAPSULE, EXTENDED RELEASE ORAL DAILY
Qty: 90 CAPSULE | Refills: 1 | Status: SHIPPED
Start: 2021-08-11 | End: 2022-02-14 | Stop reason: SDUPTHER

## 2021-08-11 ASSESSMENT — PATIENT HEALTH QUESTIONNAIRE - PHQ9
6. FEELING BAD ABOUT YOURSELF - OR THAT YOU ARE A FAILURE OR HAVE LET YOURSELF OR YOUR FAMILY DOWN: 3
8. MOVING OR SPEAKING SO SLOWLY THAT OTHER PEOPLE COULD HAVE NOTICED. OR THE OPPOSITE, BEING SO FIGETY OR RESTLESS THAT YOU HAVE BEEN MOVING AROUND A LOT MORE THAN USUAL: 3
7. TROUBLE CONCENTRATING ON THINGS, SUCH AS READING THE NEWSPAPER OR WATCHING TELEVISION: 3
SUM OF ALL RESPONSES TO PHQ QUESTIONS 1-9: 24
9. THOUGHTS THAT YOU WOULD BE BETTER OFF DEAD, OR OF HURTING YOURSELF: 0
3. TROUBLE FALLING OR STAYING ASLEEP: 3
4. FEELING TIRED OR HAVING LITTLE ENERGY: 3
SUM OF ALL RESPONSES TO PHQ QUESTIONS 1-9: 24
SUM OF ALL RESPONSES TO PHQ9 QUESTIONS 1 & 2: 6
1. LITTLE INTEREST OR PLEASURE IN DOING THINGS: 3
5. POOR APPETITE OR OVEREATING: 3
2. FEELING DOWN, DEPRESSED OR HOPELESS: 3
10. IF YOU CHECKED OFF ANY PROBLEMS, HOW DIFFICULT HAVE THESE PROBLEMS MADE IT FOR YOU TO DO YOUR WORK, TAKE CARE OF THINGS AT HOME, OR GET ALONG WITH OTHER PEOPLE: 2
SUM OF ALL RESPONSES TO PHQ QUESTIONS 1-9: 24

## 2021-08-11 ASSESSMENT — LIFESTYLE VARIABLES
HAS A RELATIVE, FRIEND, DOCTOR, OR ANOTHER HEALTH PROFESSIONAL EXPRESSED CONCERN ABOUT YOUR DRINKING OR SUGGESTED YOU CUT DOWN: 0
HOW OFTEN DURING THE LAST YEAR HAVE YOU NEEDED AN ALCOHOLIC DRINK FIRST THING IN THE MORNING TO GET YOURSELF GOING AFTER A NIGHT OF HEAVY DRINKING: 0
HOW OFTEN DURING THE LAST YEAR HAVE YOU FOUND THAT YOU WERE NOT ABLE TO STOP DRINKING ONCE YOU HAD STARTED: 0
HOW OFTEN DURING THE LAST YEAR HAVE YOU HAD A FEELING OF GUILT OR REMORSE AFTER DRINKING: 0
HOW OFTEN DURING THE LAST YEAR HAVE YOU FAILED TO DO WHAT WAS NORMALLY EXPECTED FROM YOU BECAUSE OF DRINKING: 0
HOW MANY STANDARD DRINKS CONTAINING ALCOHOL DO YOU HAVE ON A TYPICAL DAY: 0
HOW OFTEN DO YOU HAVE SIX OR MORE DRINKS ON ONE OCCASION: 0
HOW OFTEN DO YOU HAVE A DRINK CONTAINING ALCOHOL: 3
AUDIT TOTAL SCORE: 3
AUDIT-C TOTAL SCORE: 3
HAVE YOU OR SOMEONE ELSE BEEN INJURED AS A RESULT OF YOUR DRINKING: 0
HOW OFTEN DURING THE LAST YEAR HAVE YOU BEEN UNABLE TO REMEMBER WHAT HAPPENED THE NIGHT BEFORE BECAUSE YOU HAD BEEN DRINKING: 0

## 2021-08-11 ASSESSMENT — COLUMBIA-SUICIDE SEVERITY RATING SCALE - C-SSRS
2. HAVE YOU ACTUALLY HAD ANY THOUGHTS OF KILLING YOURSELF?: NO
6. HAVE YOU EVER DONE ANYTHING, STARTED TO DO ANYTHING, OR PREPARED TO DO ANYTHING TO END YOUR LIFE?: NO
1. WITHIN THE PAST MONTH, HAVE YOU WISHED YOU WERE DEAD OR WISHED YOU COULD GO TO SLEEP AND NOT WAKE UP?: NO

## 2021-08-11 NOTE — PATIENT INSTRUCTIONS
Personalized Preventive Plan for Owen Mantilla - 8/11/2021  Medicare offers a range of preventive health benefits. Some of the tests and screenings are paid in full while other may be subject to a deductible, co-insurance, and/or copay. Some of these benefits include a comprehensive review of your medical history including lifestyle, illnesses that may run in your family, and various assessments and screenings as appropriate. After reviewing your medical record and screening and assessments performed today your provider may have ordered immunizations, labs, imaging, and/or referrals for you. A list of these orders (if applicable) as well as your Preventive Care list are included within your After Visit Summary for your review. Other Preventive Recommendations:    · A preventive eye exam performed by an eye specialist is recommended every 1-2 years to screen for glaucoma; cataracts, macular degeneration, and other eye disorders. · A preventive dental visit is recommended every 6 months. · Try to get at least 150 minutes of exercise per week or 10,000 steps per day on a pedometer . · Order or download the FREE \"Exercise & Physical Activity: Your Everyday Guide\" from [employer]. Call 3-186.287.3295 or search [employer] online. · You need 3584-7806 mg of calcium and 6717-7759 IU of vitamin D per day. It is possible to meet your calcium requirement with diet alone, but a vitamin D supplement is usually necessary to meet this goal.  · When exposed to the sun, use a sunscreen that protects against both UVA and UVB radiation with an SPF of 30 or greater. Reapply every 2 to 3 hours or after sweating, drying off with a towel, or swimming. · Always wear a seat belt when traveling in a car. Always wear a helmet when riding a bicycle or motorcycle.

## 2021-08-11 NOTE — PROGRESS NOTES
Medicare Annual Wellness Visit  Name: Magalis Ortega Date: 2021   MRN: [id number] Sex: Female   Age: 74 y.o. Ethnicity: Non- / Non    : 1955 Race: White (non-)      Jamir Stoll is here for Medicare AWV    Screenings for behavioral, psychosocial and functional/safety risks, and cognitive dysfunction are all negative except as indicated below. These results, as well as other patient data from the [address] form, are documented in Flowsheets linked to this Encounter.     She also follows with GS, ENT, ortho, and PMR    Depression and anxiety  Current treatment: none  Previous treatment included (effexor) which had been effective and trazodone PRN for sleep  She previously noted side effects (sleep disturbance) after increasing venlafaxine from 37.5 to 75 mg daily    RLS  Current treatment: Requip 1 mg nightly PRN  Recent medication changes: None    GERD  Current treatment: Nexium 40 mg daily  Recent medication changes: None    COPD/asthma  Previous treatment included dulera PRN  Former smoker  Breathing stable     Vitamin D deficiency   Recently off supplementation    Prediabetes:  Lab Results   Component Value Date    LABA1C 6.2 (H) 2021    LABA1C 5.6 2018    LABA1C 5.9 2015     Lab Results   Component Value Date    LABMICR 75.4 (H) 2014    CREATININE 0.8 2021     Lab Results   Component Value Date    ALT 18 02/10/2021    AST 14 02/10/2021     Lab Results   Component Value Date    CHOL 105 2018    TRIG 96 2018    HDL 45 2018    LDLCALC 41 2018          Allergies   Allergen Reactions    Ceclor [Cefaclor] Anaphylaxis    Diprivan [Propofol] Hives     Swelling of face    Naproxen Anaphylaxis     Heart races    Adhesive Tape Hives    Blueberry Flavor     Ibuprofen Hives    Mirapex [Pramipexole]     Oxycodone-Acetaminophen     Shellfish-Derived Products Hives    Tramadol     Vaccinium Angustifolium     Blueberry Fruit Extract Hives    Ceclor [Cefaclor] Hives    Iodides Hives    Iodine Hives and Rash     Contrast    Iv Dye [Iodides] Rash    Naprosyn [Naproxen] Palpitations    Percocet [Oxycodone-Acetaminophen] Rash    Phenergan [Promethazine Hcl] Nausea And Vomiting    Promethazine Nausea And Vomiting    Sulfa Antibiotics Hives    Tape [Adhesive Tape] Rash    Tetanus Toxoids Swelling and Rash         Prior to Visit Medications    Medication Sig Taking?  Authorizing Provider   albuterol sulfate HFA (PROVENTIL HFA) 108 (90 Base) MCG/ACT inhaler Inhale 2 puffs into the lungs every 6 hours as needed for Wheezing Yes Ivis Aquino, APRN - CNP   rOPINIRole (REQUIP) 1 MG tablet Take 1 tablet by mouth nightly Yes Margaret Leon DO   esomeprazole ([address]) 40 MG delayed release capsule Take 1 capsule by mouth every morning (before breakfast) Yes Diego Pritchard DO   vitamin D (ERGOCALCIFEROL) 1.25 MG (93396 UT) CAPS capsule Take 1 capsule by mouth once a week  Patient not taking: Reported on 8/11/2021  Margaret Leon DO       Past Medical History:   Diagnosis Date    Anastomotic ulcer 04/30/2012    Anemia     Anxiety     Asthma     Biceps tendon tear 01/02/2015    Closed fracture of multiple ribs of right side     Closed fracture of nasal bone     Collapsed lung 1986    COPD (chronic obstructive pulmonary disease) ([facility]     DDD (degenerative disc disease), lumbar     Degenerative disc disease at L5-S1 level     Depression     Distal radius fracture 05/11/2015    Fracture of left olecranon process 01/10/2017    GERD (gastroesophageal reflux disease)     History of blood transfusion     Hypertension     Impingement syndrome of shoulder 12/09/2014    Intractable abdominal pain 11/18/2018    Left carpal tunnel syndrome 01/30/2017    Nasal bones, closed fracture, closed, initial encounter 12/31/2015    Osteoarthritis     Peptic ulcer, unspecified site, unspecified as acute or chronic, without mention of hemorrhage or perforation     RLS (restless legs syndrome)     Rotator cuff tear 2014    Se[facility]ure ([facility]     Sepsis ([facility]     Spinal stenosis        Past Surgical History:   Procedure Laterality Date    ABDOMEN SURGERY      ABSCESS DRAINAGE Left 2016    left thigh seroma    APPENDECTOMY      CARPAL TUNNEL RELEASE  1998    right hand    CARPAL TUNNEL RELEASE Left 2017     SECTION  3/9/1984    CHOLECYSTECTOMY  1986    CHOLECYSTECTOMY      COLONOSCOPY  3/2011    DILATATION, ESOPHAGUS      DILATION AND CURETTAGE OF UTERUS      x4    ENDOSCOPY, COLON, DIAGNOSTIC      JOINT REPLACEMENT  2003    right    JOINT REPLACEMENT  2003    left knee    JOINT REPLACEMENT      bilat knee replacement    KNEE ARTHROSCOPY  2002    bilateral    OTHER SURGICAL HISTORY  16    closed reduction nasal bone fracture    OTHER SURGICAL HISTORY Left 6 2 16    seroma incision and drainage thigh    MS LAP,DIAGNOSTIC ABDOMEN N/A 2018    DIAGNOSTIC LAPAROSCOPY, LYSIS OF ADHESIONS performed by Geovanny Moulton MD at [address] [place] [address]  2005    Dr. Olman Cazares ARTHROSCOPY Right 1 2 15    rotator cuff repair, subacromial decompression, debridement    SHOULDER SURGERY      left    TONSILLECTOMY      UPPER GASTROINTESTINAL ENDOSCOPY  10/2004    UPPER GASTROINTESTINAL ENDOSCOPY  12    F    UPPER GASTROINTESTINAL ENDOSCOPY N/A 2018    EGD BIOPSY performed by Andrey Rod MD at [facility] N/A 2019    EGD ESOPHAGOGASTRODUODENOSCOPY performed by Geovanny Moulton MD at [address] [facility] ARTHROSCOPY Right 2015    DEBRIDEMENT ASPIRATION; RIGHT DEQUARVAINES RELEASE    WRIST SURGERY Right 12/2/15       Family History   Problem Relation Age of Onset    Cancer Mother     Heart Disease Sister     Emphysema Father        CareTeam (Including outside providers/suppliers regularly involved in providing care):   Patient Care Team:  Shanelle Rivera DO as PCP - General (Family Medicine)  Shanelle Rivera DO as PCP - [facility] Empaneled Provider  Evi Zambrano,  as Surgeon (Orthopedic Surgery)  Priyank Mejia DO as Consulting Physician (Otolaryngology)    Wt Readings from Last 3 Encounters:   08/11/21 228 lb (103.4 kg)   08/09/21 210 lb (95.3 kg)   07/20/21 217 lb (98.4 kg)     Vitals:    08/11/21 1305   BP: 136/82   Pulse: 77   Temp: 97.7 °F (36.5 °C)   TempSrc: Temporal   SpO2: 95%   Weight: 228 lb (103.4 kg)   Height: 5' (1.524 m)     Body mass index is 44.53 kg/m². Based upon direct observation of the patient, evaluation of cognition reveals recent and remote memory intact. Patient's complete Health Risk Assessment and screening values have been reviewed and are found in Flowsheets. The following problems were reviewed today and where indicated follow up appointments were made and/or referrals ordered.     Positive Risk Factor Screenings with Interventions:     Fall Risk:  2 or more falls in past year?: (!) yes  Fall with injury in past year?: no  Fall Risk Interventions:    · Home safety tips provided     Depression:  PHQ-2 Score: 6  PHQ-9 Total Score: 24    Severity:1-4 = minimal depression, 5-9 = mild depression, 10-14 = moderate depression, 15-19 = moderately severe depression, 20-27 = severe depression  Depression Interventions:  · Medication prescribed - venlafaxine     Substance History:  Social History     Tobacco History     Smoking Status  Current Every Day Smoker Smoking Start Date  10/23/2004 Smoking Frequency  1 pack/day for 14 years (14 pk yrs) Smoking Tobacco Type  Cigarettes    Smokeless Tobacco Use  Never Used    Tobacco Comment  only smokes about 4 cigs a day          Alcohol History     Alcohol Use Status  No Comment  occ          Drug Use     Drug Use Status  No          Sexual Activity     Sexually Active  Never               Alcohol Screening: Audit-C Score: 3  Total Score: 3    A score of 8 or more is associated with harmful or hazardous drinking. A score of 13 or more in women, and 15 or more in men, is likely to indicate alcohol dependence. Substance Abuse Interventions:  · Tobacco abuse:  tobacco cessation tips and resources provided    General Health and ACP:  General  In general, how would you say your health is?: Good  In the past 7 days, have you experienced any of the following?  New or Increased Pain, New or Increased Fatigue, Loneliness, Social Isolation, Stress or Anger?: (!) New or Increased Pain, Stress, Anger  Do you get the social and emotional support that you need?: (!) No  Do you have a Living Will?: (!) No  Advance Directives     Power of  Living Will ACP-Advance Directive ACP-Power of     Not on File Filed on 10/06/16 Filed Not on File      General Health Risk Interventions:  · Pain issues: following with orthopedics, bone scan pending    Health Habits/Nutrition:  Health Habits/Nutrition  Do you exercise for at least 20 minutes 2-3 times per week?: (!) No  Have you lost any weight without trying in the past 3 months?: No  Do you eat only one meal per day?: No  Have you seen the dentist within the past year?: (!) No  Body mass index: (!) 44.52  Health Habits/Nutrition Interventions:  · Dental exam overdue:  patient encouraged to make appointment with his/her dentist    Hearing/Vision:  No exam data present  Hearing/Vision  Do you or your family notice any trouble with your hearing that hasn't been managed with hearing aids?: No  Do you have difficulty driving, watching TV, or doing any of your daily activities because of your eyesight?: (!) Yes  Have you had an eye exam within the past year?: Yes  Hearing/Vision Interventions:  · Vision concerns:  patient encouraged to make appointment with his/her eye specialist      Personalized Preventive Plan   Current Health Maintenance Status  Immunization History Administered Date(s) Administered    COVID-19, Moderna, PF, 100mcg/0.5mL 03/02/2021, 03/30/2021    Influenza Virus Vaccine 10/31/2014    Influenza, High Dose (Fluzone 65 yrs and older) 11/04/2020    Influenza, High-dose, Quadv, 65 yrs +, IM (Fluzone) 11/04/2020    Influenza, Quadv, 6 mo and older, IM, PF (Flulaval, Fluarix) 11/19/2018    Influenza, Triv, inactivated, subunit, adjuvanted, IM (Fluad 65 yrs and older) 12/16/2019    Pneumococcal Conjugate 13-valent ([id number]) 11/27/2018    Pneumococcal Polysaccharide ([id number]) 11/02/2014, 12/16/2019        Health Maintenance   Topic Date Due    Shingles Vaccine (1 of 2) Never done    DEXA (modify frequency per FRAX score)  Never done   [employer] Visit (AWV)  Never done    Flu vaccine (1) 09/01/2021    A1C test (Diabetic or Prediabetic)  08/06/2022    Potassium monitoring  08/06/2022    Creatinine monitoring  08/06/2022    Breast cancer screen  03/05/2023    Lipid screen  11/19/2023    Colon cancer screen colonoscopy  05/30/2028    Pneumococcal 65+ years Vaccine  Completed    COVID-19 Vaccine  Completed    Hepatitis C screen  Completed    Hepatitis A vaccine  Aged Out    Hepatitis B vaccine  Aged Out    Hib vaccine  Aged Out    Meningococcal (ACWY) vaccine  Aged Out     Recommendations for [place] Due: see orders and patient instructions/AVS.  . Recommended screening schedule for the next 5-10 years is provided to the patient in written form: see Patient Instructions/AVS.    Wyatt Collet was seen today for medicare awv. Diagnoses and all orders for this visit:    Routine general medical examination at a health care facility    Recurrent depression ([facility]  -     venlafaxine (EFFEXOR XR) 37.5 MG extended release capsule;  Take 1 capsule by mouth daily    Gastroesophageal reflux disease without esophagitis  -     CBC Auto Differential; Future    RLS (restless legs syndrome)    Vitamin D deficiency  -     Vitamin D 25 Hydroxy; Future    Prediabetes  -     Comprehensive Metabolic Panel; Future  -     HEMOGLOBIN A1C; Future  -     LIPID PANEL; Future    Asthma with COPD ([facility]    BMI 40.0-44.9, adult (HCC)    Vitamin D deficiency, unspecified   -     vitamin D (ERGOCALCIFEROL) 1.25 MG (19667 UT) CAPS capsule; Take 1 capsule by mouth once a week    Abnormal finding of blood chemistry, unspecified   -     LIPID PANEL; Future    As above. Restart venlafaxine at 37.5 mg daily. Restart vitamin D deficiency. Encourage a healthy, low-carb diet, exercise and weight loss.   RTO in 6 months with labs drawn 1 week prior for depression, prediabetes, vitamin D deficiency follow-up or sooner if needed

## 2021-11-03 ENCOUNTER — HOSPITAL ENCOUNTER (EMERGENCY)
Age: 66
Discharge: HOME | End: 2021-11-04
Attending: STUDENT IN AN ORGANIZED HEALTH CARE EDUCATION/TRAINING PROGRAM
Payer: MEDICARE

## 2021-11-03 ENCOUNTER — APPOINTMENT (OUTPATIENT)
Dept: CT IMAGING | Age: 66
End: 2021-11-03
Payer: MEDICARE

## 2021-11-03 ENCOUNTER — APPOINTMENT (OUTPATIENT)
Dept: GENERAL RADIOLOGY | Age: 66
End: 2021-11-03
Payer: MEDICARE

## 2021-11-03 VITALS
HEART RATE: 82 BPM | RESPIRATION RATE: 18 BRPM | HEIGHT: 60 IN | BODY MASS INDEX: 41.23 KG/M2 | WEIGHT: 210 LBS | TEMPERATURE: 98.1 F | OXYGEN SATURATION: 93 % | SYSTOLIC BLOOD PRESSURE: 125 MMHG | DIASTOLIC BLOOD PRESSURE: 89 MMHG

## 2021-11-03 DIAGNOSIS — R91.1 PULMONARY NODULE: ICD-10-CM

## 2021-11-03 DIAGNOSIS — I34.81 MITRAL VALVE ANNULAR CALCIFICATION: ICD-10-CM

## 2021-11-03 DIAGNOSIS — R07.89 ATYPICAL CHEST PAIN: Primary | ICD-10-CM

## 2021-11-03 LAB
ALBUMIN SERPL-MCNC: 4.2 G/DL (ref 3.5–5.2)
ALP BLD-CCNC: 85 U/L (ref 35–104)
ALT SERPL-CCNC: 16 U/L (ref 0–32)
ANION GAP SERPL CALCULATED.3IONS-SCNC: 10 MMOL/L (ref 7–16)
APTT: 30.1 SEC (ref 24.5–35.1)
AST SERPL-CCNC: 15 U/L (ref 0–31)
BASOPHILS ABSOLUTE: 0.07 E9/L (ref 0–0.2)
BASOPHILS RELATIVE PERCENT: 0.6 % (ref 0–2)
BILIRUB SERPL-MCNC: 0.2 MG/DL (ref 0–1.2)
BUN BLDV-MCNC: 14 MG/DL (ref 6–23)
CALCIUM SERPL-MCNC: 9.2 MG/DL (ref 8.6–10.2)
CHLORIDE BLD-SCNC: 101 MMOL/L (ref 98–107)
CO2: 27 MMOL/L (ref 22–29)
CREAT SERPL-MCNC: 0.7 MG/DL (ref 0.5–1)
EOSINOPHILS ABSOLUTE: 0.12 E9/L (ref 0.05–0.5)
EOSINOPHILS RELATIVE PERCENT: 1.1 % (ref 0–6)
GFR AFRICAN AMERICAN: >60
GFR NON-AFRICAN AMERICAN: >60 ML/MIN/1.73
GLUCOSE BLD-MCNC: 134 MG/DL (ref 74–99)
HCT VFR BLD CALC: 42.7 % (ref 34–48)
HEMOGLOBIN: 13.9 G/DL (ref 11.5–15.5)
IMMATURE GRANULOCYTES #: 0.06 E9/L
IMMATURE GRANULOCYTES %: 0.5 % (ref 0–5)
INR BLD: 1
LYMPHOCYTES ABSOLUTE: 2.33 E9/L (ref 1.5–4)
LYMPHOCYTES RELATIVE PERCENT: 20.7 % (ref 20–42)
MCH RBC QN AUTO: 31.2 PG (ref 26–35)
MCHC RBC AUTO-ENTMCNC: 32.6 % (ref 32–34.5)
MCV RBC AUTO: 96 FL (ref 80–99.9)
MONOCYTES ABSOLUTE: 0.89 E9/L (ref 0.1–0.95)
MONOCYTES RELATIVE PERCENT: 7.9 % (ref 2–12)
NEUTROPHILS ABSOLUTE: 7.76 E9/L (ref 1.8–7.3)
NEUTROPHILS RELATIVE PERCENT: 69.2 % (ref 43–80)
PDW BLD-RTO: 13.1 FL (ref 11.5–15)
PLATELET # BLD: 322 E9/L (ref 130–450)
PMV BLD AUTO: 9.8 FL (ref 7–12)
POTASSIUM REFLEX MAGNESIUM: 4.1 MMOL/L (ref 3.5–5)
PROTHROMBIN TIME: 11.9 SEC (ref 9.3–12.4)
RBC # BLD: 4.45 E12/L (ref 3.5–5.5)
SODIUM BLD-SCNC: 138 MMOL/L (ref 132–146)
TOTAL PROTEIN: 7.2 G/DL (ref 6.4–8.3)
TROPONIN, HIGH SENSITIVITY: <6 NG/L (ref 0–9)
TROPONIN, HIGH SENSITIVITY: <6 NG/L (ref 0–9)
WBC # BLD: 11.2 E9/L (ref 4.5–11.5)

## 2021-11-03 PROCEDURE — 6370000000 HC RX 637 (ALT 250 FOR IP): Performed by: STUDENT IN AN ORGANIZED HEALTH CARE EDUCATION/TRAINING PROGRAM

## 2021-11-03 PROCEDURE — 96374 THER/PROPH/DIAG INJ IV PUSH: CPT

## 2021-11-03 PROCEDURE — 71046 X-RAY EXAM CHEST 2 VIEWS: CPT

## 2021-11-03 PROCEDURE — 96372 THER/PROPH/DIAG INJ SC/IM: CPT

## 2021-11-03 PROCEDURE — 85025 COMPLETE CBC W/AUTO DIFF WBC: CPT

## 2021-11-03 PROCEDURE — 6370000000 HC RX 637 (ALT 250 FOR IP): Performed by: PHYSICIAN ASSISTANT

## 2021-11-03 PROCEDURE — 71275 CT ANGIOGRAPHY CHEST: CPT

## 2021-11-03 PROCEDURE — 80053 COMPREHEN METABOLIC PANEL: CPT

## 2021-11-03 PROCEDURE — 6360000002 HC RX W HCPCS: Performed by: STUDENT IN AN ORGANIZED HEALTH CARE EDUCATION/TRAINING PROGRAM

## 2021-11-03 PROCEDURE — 96375 TX/PRO/DX INJ NEW DRUG ADDON: CPT

## 2021-11-03 PROCEDURE — 93005 ELECTROCARDIOGRAM TRACING: CPT | Performed by: PHYSICIAN ASSISTANT

## 2021-11-03 PROCEDURE — 99285 EMERGENCY DEPT VISIT HI MDM: CPT

## 2021-11-03 PROCEDURE — 85730 THROMBOPLASTIN TIME PARTIAL: CPT

## 2021-11-03 PROCEDURE — 96376 TX/PRO/DX INJ SAME DRUG ADON: CPT

## 2021-11-03 PROCEDURE — 6360000004 HC RX CONTRAST MEDICATION: Performed by: RADIOLOGY

## 2021-11-03 PROCEDURE — 84484 ASSAY OF TROPONIN QUANT: CPT

## 2021-11-03 PROCEDURE — 85610 PROTHROMBIN TIME: CPT

## 2021-11-03 RX ORDER — FENTANYL CITRATE 50 UG/ML
25 INJECTION, SOLUTION INTRAMUSCULAR; INTRAVENOUS ONCE
Status: COMPLETED | OUTPATIENT
Start: 2021-11-03 | End: 2021-11-03

## 2021-11-03 RX ORDER — ASPIRIN 325 MG
325 TABLET ORAL ONCE
Status: COMPLETED | OUTPATIENT
Start: 2021-11-03 | End: 2021-11-03

## 2021-11-03 RX ORDER — ORPHENADRINE CITRATE 30 MG/ML
60 INJECTION INTRAMUSCULAR; INTRAVENOUS ONCE
Status: COMPLETED | OUTPATIENT
Start: 2021-11-03 | End: 2021-11-03

## 2021-11-03 RX ORDER — DEXAMETHASONE 4 MG/1
4 TABLET ORAL
Qty: 5 TABLET | Refills: 0 | Status: SHIPPED | OUTPATIENT
Start: 2021-11-03 | End: 2021-11-08

## 2021-11-03 RX ORDER — DIPHENHYDRAMINE HYDROCHLORIDE 50 MG/ML
50 INJECTION INTRAMUSCULAR; INTRAVENOUS ONCE
Status: COMPLETED | OUTPATIENT
Start: 2021-11-03 | End: 2021-11-03

## 2021-11-03 RX ORDER — DEXAMETHASONE SODIUM PHOSPHATE 10 MG/ML
6 INJECTION INTRAMUSCULAR; INTRAVENOUS ONCE
Status: COMPLETED | OUTPATIENT
Start: 2021-11-03 | End: 2021-11-03

## 2021-11-03 RX ORDER — METHYLPREDNISOLONE SODIUM SUCCINATE 125 MG/2ML
125 INJECTION, POWDER, LYOPHILIZED, FOR SOLUTION INTRAMUSCULAR; INTRAVENOUS ONCE
Status: COMPLETED | OUTPATIENT
Start: 2021-11-03 | End: 2021-11-03

## 2021-11-03 RX ORDER — CYCLOBENZAPRINE HCL 10 MG
10 TABLET ORAL 3 TIMES DAILY PRN
Qty: 15 TABLET | Refills: 0 | Status: SHIPPED | OUTPATIENT
Start: 2021-11-03 | End: 2021-11-08

## 2021-11-03 RX ADMIN — LIDOCAINE HYDROCHLORIDE: 20 SOLUTION ORAL; TOPICAL at 21:18

## 2021-11-03 RX ADMIN — ORPHENADRINE CITRATE 60 MG: 30 INJECTION INTRAMUSCULAR; INTRAVENOUS at 23:33

## 2021-11-03 RX ADMIN — DEXAMETHASONE SODIUM PHOSPHATE 6 MG: 10 INJECTION INTRAMUSCULAR; INTRAVENOUS at 23:33

## 2021-11-03 RX ADMIN — DIPHENHYDRAMINE HYDROCHLORIDE 50 MG: 50 INJECTION, SOLUTION INTRAMUSCULAR; INTRAVENOUS at 21:20

## 2021-11-03 RX ADMIN — IOPAMIDOL 75 ML: 755 INJECTION, SOLUTION INTRAVENOUS at 22:42

## 2021-11-03 RX ADMIN — METHYLPREDNISOLONE SODIUM SUCCINATE 125 MG: 125 INJECTION, POWDER, FOR SOLUTION INTRAMUSCULAR; INTRAVENOUS at 21:48

## 2021-11-03 RX ADMIN — ASPIRIN 325 MG ORAL TABLET 325 MG: 325 PILL ORAL at 19:55

## 2021-11-03 RX ADMIN — FENTANYL CITRATE 25 MCG: 0.05 INJECTION, SOLUTION INTRAMUSCULAR; INTRAVENOUS at 21:20

## 2021-11-03 RX ADMIN — FENTANYL CITRATE 25 MCG: 0.05 INJECTION, SOLUTION INTRAMUSCULAR; INTRAVENOUS at 23:33

## 2021-11-03 ASSESSMENT — PAIN SCALES - GENERAL
PAINLEVEL_OUTOF10: 8
PAINLEVEL_OUTOF10: 8
PAINLEVEL_OUTOF10: 9
PAINLEVEL_OUTOF10: 10

## 2021-11-03 ASSESSMENT — PAIN DESCRIPTION - PAIN TYPE: TYPE: ACUTE PAIN

## 2021-11-03 ASSESSMENT — PAIN DESCRIPTION - LOCATION
LOCATION: CHEST;BACK
LOCATION: CHEST
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
## 2021-11-04 LAB
EKG ATRIAL RATE: 97 BPM
EKG P AXIS: 55 DEGREES
EKG P-R INTERVAL: 146 MS
EKG Q-T INTERVAL: 394 MS
EKG QRS DURATION: 82 MS
EKG QTC CALCULATION (BAZETT): 500 MS
EKG R AXIS: -38 DEGREES
EKG T AXIS: 69 DEGREES
EKG VENTRICULAR RATE: 97 BPM

## 2021-11-04 PROCEDURE — 93010 ELECTROCARDIOGRAM REPORT: CPT | Performed by: INTERNAL MEDICINE

## 2021-11-04 NOTE — ED NOTES
Patient ambulated with pulse ox. Pulse ox 94-95% on room air with ambulation.      Shena Matson RN  11/04/21 0021

## 2021-11-04 NOTE — ED PROVIDER NOTES
Alexandria Stewart is a 78-year-old female with past medical history of hypertension who presented to emergency department concern for substernal chest pain radiating to the back. Patient's pain started this morning and is worsening. Patient tried over-the-counter medication for pain without improvement of symptoms. Patient symptoms are pleuritic and reproducible. Nothing makes symptoms better. Symptoms are moderate in severity and constant. Patient denies any history of VTE. Patient has not had fever, chills, nausea, vomiting. Patient denies any history of any aortic disease. Patient does use tobacco denies any alcohol or drug use. The history is provided by the patient and medical records. Review of Systems   Constitutional: Negative for chills, diaphoresis, fatigue and fever. Eyes: Negative for photophobia and visual disturbance. Respiratory: Negative for cough, chest tightness and shortness of breath. Cardiovascular: Positive for chest pain. Negative for palpitations and leg swelling. Gastrointestinal: Negative for abdominal distention, abdominal pain, diarrhea, nausea and vomiting. Genitourinary: Negative for dysuria. Musculoskeletal: Negative for neck pain and neck stiffness. Skin: Negative for pallor and rash. Neurological: Negative for headaches. Psychiatric/Behavioral: Negative for confusion. Physical Exam  Vitals and nursing note reviewed. Constitutional:       General: She is in acute distress. Appearance: She is not ill-appearing. Comments: Patient moderate distress due to pain   HENT:      Head: Normocephalic and atraumatic. Eyes:      General: No scleral icterus. Conjunctiva/sclera: Conjunctivae normal.      Pupils: Pupils are equal, round, and reactive to light. Cardiovascular:      Rate and Rhythm: Normal rate and regular rhythm. Pulmonary:      Effort: Pulmonary effort is normal.      Breath sounds: Normal breath sounds.    Abdominal: General: Bowel sounds are normal. There is no distension. Palpations: Abdomen is soft. Tenderness: There is no abdominal tenderness. There is no guarding or rebound. Musculoskeletal:      Cervical back: Normal range of motion and neck supple. No rigidity. No muscular tenderness. Right lower leg: No edema. Left lower leg: No edema. Skin:     General: Skin is warm and dry. Capillary Refill: Capillary refill takes less than 2 seconds. Coloration: Skin is not pale. Findings: No erythema or rash. Neurological:      Mental Status: She is alert and oriented to person, place, and time. Psychiatric:         Mood and Affect: Mood normal.          Procedures     MDM  Number of Diagnoses or Management Options  Atypical chest pain  Mitral valve annular calcification  Pulmonary nodule  Diagnosis management comments: Edileana Mccoy is a 53-year-old female presented to emergency department concern for substernal chest pain rating to the back. Patient does have risk factors for aortic disease and for VTE. CTA was unremarkable for acute process patient was found to have incidental findings including pulmonary nodules. Patient was afebrile nontoxic well-appearing with stable vitals at time of reevaluation. Patient was having reproducible chest pain however patient does have cardiac risk factors. Patient has HEART score of 3. Patient be treated for atypical chest pain emergency department and was provided with referral to cardiology. Incidental findings of mitral valve calcification pulmonary nodules were discussed with patient. Discussed results and plan with patient along with indications return to emergency department patient is agreeable to plan. Patient is afebrile and does not have a leukocytosis, patient is not found to have any findings of pneumonia on CT scan unlikely patient symptoms are due to acute infectious process. EKG:   This EKG is signed and interpreted by me. Rate: 97  Rhythm: Sinus  Interpretation: non-specific EKG, no ST elevation or depression, left axis deviation, prolonged QT at 500  Comparison: stable as compared to patient's most recent EKG      --------------------------------------------- PAST HISTORY ---------------------------------------------  Past Medical History:  has a past medical history of Anastomotic ulcer, Anemia, Anxiety, Asthma, Biceps tendon tear, Closed fracture of multiple ribs of right side, Closed fracture of nasal bone, Collapsed lung, COPD (chronic obstructive pulmonary disease) (HCC), DDD (degenerative disc disease), lumbar, Degenerative disc disease at L5-S1 level, Depression, Distal radius fracture, Fracture of left olecranon process, GERD (gastroesophageal reflux disease), History of blood transfusion, Hypertension, Impingement syndrome of shoulder, Intractable abdominal pain, Left carpal tunnel syndrome, Nasal bones, closed fracture, closed, initial encounter, Osteoarthritis, Peptic ulcer, unspecified site, unspecified as acute or chronic, without mention of hemorrhage or perforation, RLS (restless legs syndrome), Rotator cuff tear, Seizure ([address] Sepsis ([address] and Spinal stenosis. Past Surgical History:  has a past surgical history that includes Tonsillectomy (); Carpal tunnel release (); shoulder surgery (); Knee arthroscopy (); Dilation and curettage of uterus; Macario-en-Y Gastric Bypass (2005);  section (3/9/1984); Cholecystectomy (); Colonoscopy (3/2011); Upper gastrointestinal endoscopy (10/2004); Upper gastrointestinal endoscopy (12); Appendectomy; Endoscopy, colon, diagnostic; Abdomen surgery; Shoulder arthroscopy (Right, 1 2 15); Wrist arthroscopy (Right, 2015); Cholecystectomy; Dilatation, esophagus; Wrist surgery (Right, 12/2/15); other surgical history (16); other surgical history (Left, 6 2 16); Abscess Drainage (Left, 2016);  Carpal tunnel release (Left, 02/28/2017); Upper gastrointestinal endoscopy (N/A, 11/20/2018); pr lap,diagnostic abdomen (N/A, 11/21/2018); joint replacement (1/8/2003); joint replacement (6/5/2003); joint replacement; and Upper gastrointestinal endoscopy (N/A, 1/2/2019). Social History:  reports that she has been smoking cigarettes. She started smoking about 17 years ago. She has a 14.00 pack-year smoking history. She has never used smokeless tobacco. She reports that she does not drink alcohol and does not use drugs. Family History: family history includes Cancer in her mother; Emphysema in her father; Heart Disease in her sister. The patients home medications have been reviewed.     Allergies: Ceclor [cefaclor], Diprivan [propofol], Naproxen, Adhesive tape, Blueberry flavor, Ibuprofen, Mirapex [pramipexole], Oxycodone-acetaminophen, Shellfish-derived products, Tramadol, Vaccinium angustifolium, Blueberry fruit extract, Ceclor [cefaclor], Iodides, Iodine, Iv dye [iodides], Naprosyn [naproxen], Percocet [oxycodone-acetaminophen], Phenergan [promethazine hcl], Promethazine, Sulfa antibiotics, Tape [adhesive tape], and Tetanus toxoids    -------------------------------------------------- RESULTS -------------------------------------------------  Labs:  Results for orders placed or performed during the hospital encounter of 11/03/21   CBC Auto Differential   Result Value Ref Range    WBC 11.2 4.5 - 11.5 E9/L    RBC 4.45 3.50 - 5.50 E12/L    Hemoglobin 13.9 11.5 - 15.5 g/dL    Hematocrit 42.7 34.0 - 48.0 %    MCV 96.0 80.0 - 99.9 fL    MCH 31.2 26.0 - 35.0 pg    MCHC 32.6 32.0 - 34.5 %    RDW 13.1 11.5 - 15.0 fL    Platelets 005 518 - 751 E9/L    MPV 9.8 7.0 - 12.0 fL    Neutrophils % 69.2 43.0 - 80.0 %    Immature Granulocytes % 0.5 0.0 - 5.0 %    Lymphocytes % 20.7 20.0 - 42.0 %    Monocytes % 7.9 2.0 - 12.0 %    Eosinophils % 1.1 0.0 - 6.0 %    Basophils % 0.6 0.0 - 2.0 %    Neutrophils Absolute 7.76 (H) 1.80 - 7.30 E9/L Immature Granulocytes # 0.06 E9/L    Lymphocytes Absolute 2.33 1.50 - 4.00 E9/L    Monocytes Absolute 0.89 0.10 - 0.95 E9/L    Eosinophils Absolute 0.12 0.05 - 0.50 E9/L    Basophils Absolute 0.07 0.00 - 0.20 E9/L   Comprehensive Metabolic Panel w/ Reflex to MG   Result Value Ref Range    Sodium 138 132 - 146 mmol/L    Potassium reflex Magnesium 4.1 3.5 - 5.0 mmol/L    Chloride 101 98 - 107 mmol/L    CO2 27 22 - 29 mmol/L    Anion Gap 10 7 - 16 mmol/L    Glucose 134 (H) 74 - 99 mg/dL    BUN 14 6 - 23 mg/dL    CREATININE 0.7 0.5 - 1.0 mg/dL    GFR Non-African American >60 >=60 mL/min/1.73    GFR African American >60     Calcium 9.2 8.6 - 10.2 mg/dL    Total Protein 7.2 6.4 - 8.3 g/dL    Albumin 4.2 3.5 - 5.2 g/dL    Total Bilirubin 0.2 0.0 - 1.2 mg/dL    Alkaline Phosphatase 85 35 - 104 U/L    ALT 16 0 - 32 U/L    AST 15 0 - 31 U/L   Troponin   Result Value Ref Range    Troponin, High Sensitivity <6 0 - 9 ng/L   Protime-INR   Result Value Ref Range    Protime 11.9 9.3 - 12.4 sec    INR 1.0    APTT   Result Value Ref Range    aPTT 30.1 24.5 - 35.1 sec   Troponin   Result Value Ref Range    Troponin, High Sensitivity <6 0 - 9 ng/L   EKG 12 Lead   Result Value Ref Range    Ventricular Rate 97 BPM    Atrial Rate 97 BPM    P-R Interval 146 ms    QRS Duration 82 ms    Q-T Interval 394 ms    QTc Calculation (Bazett) 500 ms    P Axis 55 degrees    R Axis -38 degrees    T Axis 69 degrees       Radiology:  CTA CHEST W CONTRAST   Final Result   No evidence of pulmonary embolism or acute pulmonary abnormality. Tiny left upper lobe pulmonary nodules as described. Per Fleischner Society   guidelines, these require no surveillance, in the absence of risk factors for   bronchogenic carcinoma, such as smoking. In the presence of such, low-dose   protocol chest CT could be performed in 12 months, if no interval chest CT. Relatively dense mitral annular calcification noted.       Postsurgical and other chronic/nonemergent findings, as described. XR CHEST (2 VW)   Final Result   No acute process. ------------------------- NURSING NOTES AND VITALS REVIEWED ---------------------------  Date / Time Roomed:  11/3/2021  7:54 PM  ED Bed Assignment:  05/05    The nursing notes within the ED encounter and vital signs as below have been reviewed. /89   Pulse 82   Temp 98.1 °F (36.7 °C) (Oral)   Resp 18   Ht 5' (1.524 m)   Wt 210 lb (95.3 kg)   LMP  (LMP Unknown)   SpO2 93%   BMI 41.01 kg/m²   Oxygen Saturation Interpretation: Normal      ------------------------------------------ PROGRESS NOTES ------------------------------------------  6:26 AM EDT  I have spoken with the patient and discussed todays results, in addition to providing specific details for the plan of care and counseling regarding the diagnosis and prognosis. Their questions are answered at this time and they are agreeable with the plan. I discussed at length with them reasons for immediate return here for re evaluation. They will followup with their primary care physician by calling their office tomorrow. --------------------------------- ADDITIONAL PROVIDER NOTES ---------------------------------  At this time the patient is without objective evidence of an acute process requiring hospitalization or inpatient management. They have remained hemodynamically stable throughout their entire ED visit and are stable for discharge with outpatient follow-up. The plan has been discussed in detail and they are aware of the specific conditions for emergent return, as well as the importance of follow-up.       Discharge Medication List as of 11/4/2021 12:00 AM      START taking these medications    Details   dexamethasone (DECADRON) 4 MG tablet Take 1 tablet by mouth daily (with breakfast) for 5 days, Disp-5 tablet, R-0Print      cyclobenzaprine (FLEXERIL) 10 MG tablet Take 1 tablet by mouth 3 times daily as needed for Muscle spasms, Disp-15 tablet, R-0Print             Diagnosis:  1. Atypical chest pain    2. Mitral valve annular calcification    3. Pulmonary nodule        Disposition:  Patient's disposition: Discharge to home  Patient's condition is stable.              Diana Sanchez MD  11/05/21 1772

## 2021-11-05 ASSESSMENT — ENCOUNTER SYMPTOMS
ABDOMINAL PAIN: 0
DIARRHEA: 0
PHOTOPHOBIA: 0
ABDOMINAL DISTENTION: 0
SHORTNESS OF BREATH: 0
VOMITING: 0
NAUSEA: 0
COUGH: 0
CHEST TIGHTNESS: 0

## 2021-11-18 ENCOUNTER — APPOINTMENT (OUTPATIENT)
Dept: GENERAL RADIOLOGY | Age: 66
End: 2021-11-18
Payer: MEDICARE

## 2021-11-18 ENCOUNTER — HOSPITAL ENCOUNTER (EMERGENCY)
Age: 66
Discharge: LEFT BEFORE DISCHARGE | End: 2021-11-18
Payer: MEDICARE

## 2021-11-18 VITALS
WEIGHT: 210 LBS | TEMPERATURE: 96.5 F | DIASTOLIC BLOOD PRESSURE: 84 MMHG | BODY MASS INDEX: 41.01 KG/M2 | OXYGEN SATURATION: 96 % | SYSTOLIC BLOOD PRESSURE: 167 MMHG | HEART RATE: 102 BPM | RESPIRATION RATE: 20 BRPM

## 2021-11-18 LAB
ALBUMIN SERPL-MCNC: 4.4 G/DL (ref 3.5–5.2)
ALP BLD-CCNC: 76 U/L (ref 35–104)
ALT SERPL-CCNC: 21 U/L (ref 0–32)
ANION GAP SERPL CALCULATED.3IONS-SCNC: 12 MMOL/L (ref 7–16)
AST SERPL-CCNC: 19 U/L (ref 0–31)
BASOPHILS ABSOLUTE: 0.04 E9/L (ref 0–0.2)
BASOPHILS RELATIVE PERCENT: 0.3 % (ref 0–2)
BILIRUB SERPL-MCNC: 0.2 MG/DL (ref 0–1.2)
BUN BLDV-MCNC: 11 MG/DL (ref 6–23)
CALCIUM SERPL-MCNC: 9.2 MG/DL (ref 8.6–10.2)
CHLORIDE BLD-SCNC: 103 MMOL/L (ref 98–107)
CO2: 25 MMOL/L (ref 22–29)
CREAT SERPL-MCNC: 0.8 MG/DL (ref 0.5–1)
EOSINOPHILS ABSOLUTE: 0.02 E9/L (ref 0.05–0.5)
EOSINOPHILS RELATIVE PERCENT: 0.1 % (ref 0–6)
GFR AFRICAN AMERICAN: >60
GFR NON-AFRICAN AMERICAN: >60 ML/MIN/1.73
GLUCOSE BLD-MCNC: 143 MG/DL (ref 74–99)
HCT VFR BLD CALC: 44.3 % (ref 34–48)
HEMOGLOBIN: 14.4 G/DL (ref 11.5–15.5)
IMMATURE GRANULOCYTES #: 0.05 E9/L
IMMATURE GRANULOCYTES %: 0.4 % (ref 0–5)
LYMPHOCYTES ABSOLUTE: 0.63 E9/L (ref 1.5–4)
LYMPHOCYTES RELATIVE PERCENT: 4.6 % (ref 20–42)
MCH RBC QN AUTO: 30.7 PG (ref 26–35)
MCHC RBC AUTO-ENTMCNC: 32.5 % (ref 32–34.5)
MCV RBC AUTO: 94.5 FL (ref 80–99.9)
MONOCYTES ABSOLUTE: 0.29 E9/L (ref 0.1–0.95)
MONOCYTES RELATIVE PERCENT: 2.1 % (ref 2–12)
NEUTROPHILS ABSOLUTE: 12.71 E9/L (ref 1.8–7.3)
NEUTROPHILS RELATIVE PERCENT: 92.5 % (ref 43–80)
PDW BLD-RTO: 13 FL (ref 11.5–15)
PLATELET # BLD: 313 E9/L (ref 130–450)
PMV BLD AUTO: 9.8 FL (ref 7–12)
POTASSIUM SERPL-SCNC: 4.6 MMOL/L (ref 3.5–5)
PRO-BNP: 109 PG/ML (ref 0–125)
RBC # BLD: 4.69 E12/L (ref 3.5–5.5)
SODIUM BLD-SCNC: 140 MMOL/L (ref 132–146)
TOTAL PROTEIN: 7 G/DL (ref 6.4–8.3)
TROPONIN, HIGH SENSITIVITY: <6 NG/L (ref 0–9)
WBC # BLD: 13.7 E9/L (ref 4.5–11.5)

## 2021-11-18 PROCEDURE — 83880 ASSAY OF NATRIURETIC PEPTIDE: CPT

## 2021-11-18 PROCEDURE — 84484 ASSAY OF TROPONIN QUANT: CPT

## 2021-11-18 PROCEDURE — 93005 ELECTROCARDIOGRAM TRACING: CPT | Performed by: NURSE PRACTITIONER

## 2021-11-18 PROCEDURE — 4500000002 HC ER NO CHARGE

## 2021-11-18 PROCEDURE — 85025 COMPLETE CBC W/AUTO DIFF WBC: CPT

## 2021-11-18 PROCEDURE — 80053 COMPREHEN METABOLIC PANEL: CPT

## 2021-11-18 PROCEDURE — 71046 X-RAY EXAM CHEST 2 VIEWS: CPT

## 2021-11-18 NOTE — ED NOTES
FIRST PROVIDER CONTACT ASSESSMENT NOTE      Department of Emergency Medicine   21  6:37 PM EST    Chief Complaint: Shortness of Breath (started last night, HX mi 2 wks ago per dr Daniel Ashby) and Urticaria      History of Present Illness:   Selin Mireles is a 74 y.o. female who presents to the ED for    Medical History:  has a past medical history of Anastomotic ulcer, Anemia, Anxiety, Asthma, Biceps tendon tear, Closed fracture of multiple ribs of right side, Closed fracture of nasal bone, Collapsed lung, COPD (chronic obstructive pulmonary disease) ([address] DDD (degenerative disc disease), lumbar, Degenerative disc disease at L5-S1 level, Depression, Distal radius fracture, Fracture of left olecranon process, GERD (gastroesophageal reflux disease), History of blood transfusion, Hypertension, Impingement syndrome of shoulder, Intractable abdominal pain, Left carpal tunnel syndrome, Nasal bones, closed fracture, closed, initial encounter, Osteoarthritis, Peptic ulcer, unspecified site, unspecified as acute or chronic, without mention of hemorrhage or perforation, RLS (restless legs syndrome), Rotator cuff tear, Seizure ([address] Sepsis ([address] and Spinal stenosis. Surgical History:  has a past surgical history that includes Tonsillectomy (); Carpal tunnel release (); shoulder surgery (); Knee arthroscopy (); Dilation and curettage of uterus; Macario-en-Y Gastric Bypass (2005);  section (3/9/1984); Cholecystectomy (); Colonoscopy (3/2011); Upper gastrointestinal endoscopy (10/2004); Upper gastrointestinal endoscopy (12); Appendectomy; Endoscopy, colon, diagnostic; Abdomen surgery; Shoulder arthroscopy (Right, 1 2 15); Wrist arthroscopy (Right, 2015); Cholecystectomy; Dilatation, esophagus; Wrist surgery (Right, 12/2/15); other surgical history (16); other surgical history (Left, 16); Abscess Drainage (Left, 2016);  Carpal tunnel release (Left, 2017); Upper gastrointestinal endoscopy (N/A, 11/20/2018); pr lap,diagnostic abdomen (N/A, 11/21/2018); joint replacement (1/8/2003); joint replacement (6/5/2003); joint replacement; and Upper gastrointestinal endoscopy (N/A, 1/2/2019). Social History:  reports that she has been smoking cigarettes. She started smoking about 17 years ago. She has a 14.00 pack-year smoking history. She has never used smokeless tobacco. She reports that she does not drink alcohol and does not use drugs. Family History: family history includes Cancer in her mother; Emphysema in her father; Heart Disease in her sister.     *ALLERGIES*     Ceclor [cefaclor], Diprivan [propofol], Naproxen, Adhesive tape, Blueberry flavor, Ibuprofen, Mirapex [pramipexole], Oxycodone-acetaminophen, Shellfish-derived products, Tramadol, Vaccinium angustifolium, Blueberry fruit extract, Ceclor [cefaclor], Iodides, Iodine, Iv dye [iodides], Naprosyn [naproxen], Percocet [oxycodone-acetaminophen], Phenergan [promethazine hcl], Promethazine, Sulfa antibiotics, Tape [adhesive tape], and Tetanus toxoids     Physical Exam:      VS:  BP (!) 167/84   Pulse 102   Temp 96.5 °F (35.8 °C) (Temporal)   Resp 20   LMP  (LMP Unknown)   SpO2 96%      Initial Plan of Care:  Initiate Treatment-Testing, Proceed toTreatment Area When Bed Available for ED Attending/MLP to Continue Care    -----------------END OF FIRST PROVIDER CONTACT ASSESSMENT NOTE--------------  Electronically signed by KRYSTA Rodriguez CNP   DD: 11/18/21             KRYSTA Watts CNP  11/18/21 9085

## 2021-11-19 ENCOUNTER — OFFICE VISIT (OUTPATIENT)
Dept: FAMILY MEDICINE CLINIC | Age: 66
End: 2021-11-19
Payer: MEDICARE

## 2021-11-19 VITALS
OXYGEN SATURATION: 97 % | DIASTOLIC BLOOD PRESSURE: 75 MMHG | BODY MASS INDEX: 41.23 KG/M2 | SYSTOLIC BLOOD PRESSURE: 151 MMHG | TEMPERATURE: 97.4 F | HEART RATE: 93 BPM | HEIGHT: 60 IN | WEIGHT: 210 LBS | RESPIRATION RATE: 17 BRPM

## 2021-11-19 DIAGNOSIS — L50.9 URTICARIA: ICD-10-CM

## 2021-11-19 DIAGNOSIS — T78.40XA ALLERGIC REACTION, INITIAL ENCOUNTER: Primary | ICD-10-CM

## 2021-11-19 LAB
EKG ATRIAL RATE: 96 BPM
EKG P AXIS: 27 DEGREES
EKG P-R INTERVAL: 120 MS
EKG Q-T INTERVAL: 390 MS
EKG QRS DURATION: 86 MS
EKG QTC CALCULATION (BAZETT): 492 MS
EKG R AXIS: -50 DEGREES
EKG T AXIS: 91 DEGREES
EKG VENTRICULAR RATE: 96 BPM

## 2021-11-19 PROCEDURE — 99213 OFFICE O/P EST LOW 20 MIN: CPT | Performed by: NURSE PRACTITIONER

## 2021-11-19 PROCEDURE — 96372 THER/PROPH/DIAG INJ SC/IM: CPT | Performed by: NURSE PRACTITIONER

## 2021-11-19 RX ORDER — TRIAMCINOLONE ACETONIDE 40 MG/ML
40 INJECTION, SUSPENSION INTRA-ARTICULAR; INTRAMUSCULAR ONCE
Status: COMPLETED | OUTPATIENT
Start: 2021-11-19 | End: 2021-11-19

## 2021-11-19 RX ORDER — ASPIRIN 81 MG/1
81 TABLET ORAL DAILY
COMMUNITY
End: 2022-02-14

## 2021-11-19 RX ORDER — CETIRIZINE HYDROCHLORIDE 10 MG/1
10 TABLET ORAL DAILY
Qty: 30 TABLET | Refills: 0 | Status: SHIPPED
Start: 2021-11-19 | End: 2022-02-14

## 2021-11-19 RX ORDER — METHYLPREDNISOLONE 4 MG/1
TABLET ORAL
Qty: 1 KIT | Refills: 0 | Status: SHIPPED
Start: 2021-11-19 | End: 2022-02-14

## 2021-11-19 RX ADMIN — TRIAMCINOLONE ACETONIDE 40 MG: 40 INJECTION, SUSPENSION INTRA-ARTICULAR; INTRAMUSCULAR at 10:52

## 2021-11-19 NOTE — PROGRESS NOTES
21  Nessa Fletcher : 1955 Sex: female  Age 74 y.o. Subjective:  Chief Complaint   Patient presents with    Urticaria     they are all over started three days ago        HPI:   Nessa Fletcher , 74 y.o. female presents to the clinic for evaluation of scattered rash to extremities and trunk x 3 days. The patient also reports pruritus. The patient has taken benadryl and a few old prednisone tabs for symptoms. The patient reports unchanged symptoms over time. Patient denies any known cause for the reaction. The patient reports she did start baby aspirin daily, 4 days prior to symptoms. The patient denies orbital edema, angioedema, dizziness, wheezing, shortness of breath, and throat tightness. The patient also denies fever, headache, chest pain, abdominal pain, and nausea / vomiting / diarrhea. ROS:   Unless otherwise stated in this report the patient's positive and negative responses for review of systems for constitutional, eyes, ENT, cardiovascular, respiratory, gastrointestinal, neurological, , musculoskeletal, and integument systems and related systems to the presenting problem are either stated in the history of present illness or were not pertinent or were negative for the symptoms and/or complaints related to the presenting medical problem. Positives and pertinent negatives as per HPI. All others reviewed and are negative.       PMH:     Past Medical History:   Diagnosis Date    Anastomotic ulcer 2012    Anemia     Anxiety     Asthma     Biceps tendon tear 2015    Closed fracture of multiple ribs of right side     Closed fracture of nasal bone     Collapsed lung     [facility]     DDD (degenerative disc disease), lumbar     Degenerative disc disease at L5-S1 level     Depression     Distal radius fracture 2015    Fracture of left olecranon process 01/10/2017    GERD (gastroesophageal reflux disease) performed by Christopher Dash MD at [facility] ARTHROSCOPY Right 12/02/2015    DEBRIDEMENT ASPIRATION; RIGHT DEQUARVAINES RELEASE    WRIST SURGERY Right 12/2/15       Family History   Problem Relation Age of Onset    Cancer Mother     Heart Disease Sister     Emphysema Father        Medications:     Current Outpatient Medications:     aspirin 81 MG EC tablet, Take 81 mg by mouth daily, Disp: , Rfl:     cetirizine (ZYRTEC ALLERGY) 10 MG tablet, Take 1 tablet by mouth daily, Disp: 30 tablet, Rfl: 0    methylPREDNISolone (MEDROL DOSEPACK) 4 MG tablet, Take by mouth., Disp: 1 kit, Rfl: 0    venlafaxine (EFFEXOR XR) 37.5 MG extended release capsule, Take 1 capsule by mouth daily, Disp: 90 capsule, Rfl: 1    vitamin D (ERGOCALCIFEROL) 1.25 MG (12441 UT) CAPS capsule, Take 1 capsule by mouth once a week, Disp: 12 capsule, Rfl: 1    albuterol sulfate HFA (PROVENTIL HFA) 108 (90 Base) MCG/ACT inhaler, Inhale 2 puffs into the lungs every 6 hours as needed for Wheezing, Disp: 1 Inhaler, Rfl: 0    rOPINIRole (REQUIP) 1 MG tablet, Take 1 tablet by mouth nightly, Disp: 90 tablet, Rfl: 1    esomeprazole (NEXIUM) 40 MG delayed release capsule, Take 1 capsule by mouth every morning (before breakfast), Disp: 90 capsule, Rfl: 1    Allergies:      Allergies   Allergen Reactions    Ceclor [Cefaclor] Anaphylaxis    Diprivan [Propofol] Hives     Swelling of face    Naproxen Anaphylaxis     Heart races    Adhesive Tape Hives    Blueberry Flavor     Ibuprofen Hives    Mirapex [Pramipexole]     Oxycodone-Acetaminophen     Shellfish-Derived Products Hives    Tramadol     Vaccinium Angustifolium     Blueberry Fruit Extract Hives    Ceclor [Cefaclor] Hives    Iodides Hives    Iodine Hives and Rash     Contrast    Iv Dye [Iodides] Rash    Naprosyn [Naproxen] Palpitations    Percocet [Oxycodone-Acetaminophen] Rash    Phenergan [Promethazine Hcl] Nausea And Vomiting    Promethazine Nausea And Vomiting  Sulfa Antibiotics Hives    Tape Ricke Guard Tape] Rash    Tetanus Toxoids Swelling and Rash       Social History:     Social History     Tobacco Use    Smoking status: Current Every Day Smoker     Packs/day: 1.00     Years: 14.00     Pack years: 14.00     Types: Cigarettes     Start date: 10/23/2004    Smokeless tobacco: Never Used    Tobacco comment: only smokes about 4 cigs a day   Vaping Use    Vaping Use: Never used   Substance Use Topics    Alcohol use: No     Comment: occ    Drug use: No       Patient lives at home. Physical Exam:     Vitals:    11/19/21 0947 11/19/21 0948   BP: (!) 175/89 (!) 151/75   Site: Left Upper Arm    Position: Sitting    Cuff Size: Large Adult    Pulse: 93    Resp: 17    Temp: 97.4 °F (36.3 °C)    TempSrc: Temporal    SpO2: 97%    Weight: 210 lb (95.3 kg)    Height: 5' (1.524 m)        Physical Exam (PE)  Constitutional: Alert, development consistent with age. HENT:      Head: Normocephalic. Right Ear: External ear normal.      Left Ear: External ear normal.      Nose: Normal.      Mouth/Throat:     Mouth: Mucous membranes are moist.      Pharynx: Oropharynx is clear. Eyes: Pupils: Pupils are equal, round, and reactive to light. Neck: Normal ROM. Supple. Cardiovascular: Heart RRR without pathologic murmurs or gallops. Pulmonary: Respiratory effort normal.  Normal breath sounds. Abdomen: Soft, nontender, normal bowel sounds. Lymphadenopathy: Cervical: No cervical adenopathy. Skin:     General: Skin is warm and dry. Capillary Refill: Capillary refill takes less than 2 seconds. Comments: Erythematous whealed rash noted to the trunk and extremities. Area blanches to palpation. No papules, blisters, bullae, or lymphangitic streaking noted. Neurological: General: No focal deficit present. Mental Status: She is alert and oriented to person, place, and time.   Psychiatric: Mood and Affect: Mood normal. Behavior: Behavior normal.     Testing:   (All laboratory and radiology results have been personally reviewed by myself)  Labs:  No results found for this visit on 11/19/21. Imaging: All Radiology results interpreted by Radiologist unless otherwise noted. No orders to display       Assessment / Plan:   The patient's vitals, allergies, medications, and past medical history have been reviewed. Axel Salas was seen today for urticaria. Diagnoses and all orders for this visit:    Allergic reaction, initial encounter  -     cetirizine (ZYRTEC ALLERGY) 10 MG tablet; Take 1 tablet by mouth daily  -     methylPREDNISolone (MEDROL DOSEPACK) 4 MG tablet; Take by mouth. Urticaria  -     triamcinolone acetonide (KENALOG-40) injection 40 mg  -     cetirizine (ZYRTEC ALLERGY) 10 MG tablet; Take 1 tablet by mouth daily  -     methylPREDNISolone (MEDROL DOSEPACK) 4 MG tablet; Take by mouth.        - Disposition: Home    - Educational material printed for patient's review and were included in patient instructions. After Visit Summary and given to patient at the end of visit. - Patient is to notify PCP about possible allergy to baby aspirin today. - Discussed symptomatic treatments with patient today. The patient is to call for any concerns or return if any changing symptoms. The patient is to follow-up with PCP in the next 2-3 days for repeat evaluation. Red flags were discussed with the patient today. Patient is directed to go to the ED for worsening or red flag symptoms discussed today. Pt verbalizes understanding and is in agreement with plan of care. All questions answered. SIGNATURE: KRYSTA Simmons-CNP    *NOTE: This report was transcribed using voice recognition software. Every effort was made to ensure accuracy; however, inadvertent computerized transcription errors may be present.

## 2021-11-19 NOTE — PATIENT INSTRUCTIONS
Patient Education        Hives: Care Instructions  Your Care Instructions  Hives are raised, red, itchy patches of skin. They are also called wheals or welts. They usually have red borders and pale centers. Hives range in size from ¼ inch to 3 inches or more across. They may seem to move from place to place on the skin. Several hives may form a large area of raised, red skin. You can get hives after an insect sting, after taking medicine or eating certain foods, or because of infection or stress. Other causes include plants, things you breathe in, makeup, heat, cold, sunlight, and latex. You cannot spread hives to other people. Hives may last a few minutes or a few days, but a single spot may last less than 36 hours. Follow-up care is a key part of your treatment and safety. Be sure to make and go to all appointments, and call your doctor if you are having problems. It's also a good idea to know your test results and keep a list of the medicines you take. How can you care for yourself at home? · Avoid whatever you think may have caused your hives, such as a certain food or medicine. However, you may not know the cause. · Put a cool, wet towel on the area to relieve itching. · Take an over-the-counter antihistamine, such as diphenhydramine (Benadryl), cetirizine (Zyrtec), or loratadine (Claritin), to help stop the hives and calm the itching. Read and follow directions on the label. These medicines can make you feel sleepy. Do not drive while using them. · Stay away from strong soaps, detergents, and chemicals. These can make itching worse. When should you call for help? Call 911 anytime you think you may need emergency care. For example, call if:    · You have symptoms of a severe allergic reaction. These may include:  ? Sudden raised, red areas (hives) all over your body. ? Swelling of the throat, mouth, lips, or tongue. ? Trouble breathing. ? Passing out (losing consciousness).  Or you may feel very include:  ? Sudden raised, red areas (hives) all over your body. ? Swelling of the throat, mouth, lips, or tongue. ? Trouble breathing. ? Passing out (losing consciousness). Or you may feel very lightheaded or suddenly feel weak, confused, or restless.     · You have been given an epinephrine shot, even if you feel better. Call your doctor now or seek immediate medical care if:    · You have symptoms of an allergic reaction, such as:  ? A rash or hives (raised, red areas on the skin). ? Itching. ? Swelling. ? Belly pain, nausea, or vomiting. Watch closely for changes in your health, and be sure to contact your doctor if:    · You do not get better as expected. Where can you learn more? Go to https://[employer]. org and sign in to your [employer] account. Enter L427 in the "[employer] box to learn more about \"Allergic Reaction: Care Instructions. \"     If you do not have an account, please click on the \"Sign Up Now\" link. Current as of: February 10, 2021               Content Version: 13.0  © 7348-6218 Healthwise, Incorporated. Care instructions adapted under license by [facility] ([facility]). If you have questions about a medical condition or this instruction, always ask your healthcare professional. [address] any warranty or liability for your use of this information.

## 2022-02-11 ENCOUNTER — HOSPITAL ENCOUNTER (OUTPATIENT)
Age: 67
Discharge: HOME | End: 2022-02-11
Payer: MEDICARE

## 2022-02-11 DIAGNOSIS — R73.03 PREDIABETES: ICD-10-CM

## 2022-02-11 DIAGNOSIS — R79.9 ABNORMAL FINDING OF BLOOD CHEMISTRY, UNSPECIFIED: ICD-10-CM

## 2022-02-11 DIAGNOSIS — E55.9 VITAMIN D DEFICIENCY: ICD-10-CM

## 2022-02-11 DIAGNOSIS — K21.9 GASTROESOPHAGEAL REFLUX DISEASE WITHOUT ESOPHAGITIS: Chronic | ICD-10-CM

## 2022-02-11 LAB
ALBUMIN SERPL-MCNC: 4.5 G/DL (ref 3.5–5.2)
ALP BLD-CCNC: 89 U/L (ref 35–104)
ALT SERPL-CCNC: 23 U/L (ref 0–32)
ANION GAP SERPL CALCULATED.3IONS-SCNC: 12 MMOL/L (ref 7–16)
AST SERPL-CCNC: 21 U/L (ref 0–31)
BASOPHILS ABSOLUTE: 0.07 E9/L (ref 0–0.2)
BASOPHILS RELATIVE PERCENT: 0.7 % (ref 0–2)
BILIRUB SERPL-MCNC: 0.3 MG/DL (ref 0–1.2)
BUN BLDV-MCNC: 14 MG/DL (ref 6–23)
CALCIUM SERPL-MCNC: 9.3 MG/DL (ref 8.6–10.2)
CHLORIDE BLD-SCNC: 99 MMOL/L (ref 98–107)
CHOLESTEROL, TOTAL: 177 MG/DL (ref 0–199)
CO2: 28 MMOL/L (ref 22–29)
CREAT SERPL-MCNC: 0.7 MG/DL (ref 0.5–1)
EOSINOPHILS ABSOLUTE: 0.11 E9/L (ref 0.05–0.5)
EOSINOPHILS RELATIVE PERCENT: 1.1 % (ref 0–6)
GFR AFRICAN AMERICAN: >60
GFR NON-AFRICAN AMERICAN: >60 ML/MIN/1.73
GLUCOSE BLD-MCNC: 101 MG/DL (ref 74–99)
HBA1C MFR BLD: 6.5 % (ref 4–5.6)
HCT VFR BLD CALC: 45.6 % (ref 34–48)
HDLC SERPL-MCNC: 58 MG/DL
HEMOGLOBIN: 14.6 G/DL (ref 11.5–15.5)
IMMATURE GRANULOCYTES #: 0.03 E9/L
IMMATURE GRANULOCYTES %: 0.3 % (ref 0–5)
LDL CHOLESTEROL CALCULATED: 90 MG/DL (ref 0–99)
LYMPHOCYTES ABSOLUTE: 1.98 E9/L (ref 1.5–4)
LYMPHOCYTES RELATIVE PERCENT: 20.2 % (ref 20–42)
MCH RBC QN AUTO: 30.7 PG (ref 26–35)
MCHC RBC AUTO-ENTMCNC: 32 % (ref 32–34.5)
MCV RBC AUTO: 95.8 FL (ref 80–99.9)
MONOCYTES ABSOLUTE: 0.86 E9/L (ref 0.1–0.95)
MONOCYTES RELATIVE PERCENT: 8.8 % (ref 2–12)
NEUTROPHILS ABSOLUTE: 6.75 E9/L (ref 1.8–7.3)
NEUTROPHILS RELATIVE PERCENT: 68.9 % (ref 43–80)
PDW BLD-RTO: 13.9 FL (ref 11.5–15)
PLATELET # BLD: 302 E9/L (ref 130–450)
PMV BLD AUTO: 10.2 FL (ref 7–12)
POTASSIUM SERPL-SCNC: 4.4 MMOL/L (ref 3.5–5)
RBC # BLD: 4.76 E12/L (ref 3.5–5.5)
SODIUM BLD-SCNC: 139 MMOL/L (ref 132–146)
TOTAL PROTEIN: 7.6 G/DL (ref 6.4–8.3)
TRIGL SERPL-MCNC: 145 MG/DL (ref 0–149)
VITAMIN D 25-HYDROXY: 20 NG/ML (ref 30–100)
VLDLC SERPL CALC-MCNC: 29 MG/DL
WBC # BLD: 9.8 E9/L (ref 4.5–11.5)

## 2022-02-11 PROCEDURE — 83036 HEMOGLOBIN GLYCOSYLATED A1C: CPT

## 2022-02-11 PROCEDURE — 36415 COLL VENOUS BLD VENIPUNCTURE: CPT

## 2022-02-11 PROCEDURE — 85025 COMPLETE CBC W/AUTO DIFF WBC: CPT

## 2022-02-11 PROCEDURE — 80061 LIPID PANEL: CPT

## 2022-02-11 PROCEDURE — 82306 VITAMIN D 25 HYDROXY: CPT

## 2022-02-11 PROCEDURE — 80053 COMPREHEN METABOLIC PANEL: CPT

## 2022-02-14 ENCOUNTER — OFFICE VISIT (OUTPATIENT)
Dept: FAMILY MEDICINE CLINIC | Age: 67
End: 2022-02-14
Payer: MEDICARE

## 2022-02-14 VITALS
HEART RATE: 95 BPM | TEMPERATURE: 97.7 F | DIASTOLIC BLOOD PRESSURE: 82 MMHG | WEIGHT: 231.9 LBS | OXYGEN SATURATION: 96 % | HEIGHT: 60 IN | BODY MASS INDEX: 45.53 KG/M2 | SYSTOLIC BLOOD PRESSURE: 140 MMHG | RESPIRATION RATE: 16 BRPM

## 2022-02-14 DIAGNOSIS — F33.9 RECURRENT DEPRESSION (HCC): Primary | ICD-10-CM

## 2022-02-14 DIAGNOSIS — J44.9 ASTHMA WITH COPD (HCC): ICD-10-CM

## 2022-02-14 DIAGNOSIS — E55.9 VITAMIN D DEFICIENCY, UNSPECIFIED: ICD-10-CM

## 2022-02-14 DIAGNOSIS — F51.02 INSOMNIA DUE TO STRESS: ICD-10-CM

## 2022-02-14 DIAGNOSIS — G25.81 RLS (RESTLESS LEGS SYNDROME): ICD-10-CM

## 2022-02-14 PROCEDURE — 99214 OFFICE O/P EST MOD 30 MIN: CPT | Performed by: FAMILY MEDICINE

## 2022-02-14 RX ORDER — VENLAFAXINE HYDROCHLORIDE 37.5 MG/1
37.5 CAPSULE, EXTENDED RELEASE ORAL DAILY
Qty: 30 CAPSULE | Refills: 2 | Status: SHIPPED | OUTPATIENT
Start: 2022-02-14

## 2022-02-14 RX ORDER — ALBUTEROL SULFATE 90 UG/1
2 AEROSOL, METERED RESPIRATORY (INHALATION) EVERY 6 HOURS PRN
Qty: 1 EACH | Refills: 1 | Status: SHIPPED | OUTPATIENT
Start: 2022-02-14 | End: 2023-02-14

## 2022-02-14 RX ORDER — ERGOCALCIFEROL 1.25 MG/1
50000 CAPSULE ORAL WEEKLY
Qty: 12 CAPSULE | Refills: 1 | Status: SHIPPED | OUTPATIENT
Start: 2022-02-14

## 2022-02-14 RX ORDER — ROPINIROLE 1 MG/1
1 TABLET, FILM COATED ORAL NIGHTLY
Qty: 30 TABLET | Refills: 2 | Status: SHIPPED
Start: 2022-02-14 | End: 2022-10-05 | Stop reason: SDUPTHER

## 2022-02-14 RX ORDER — TRAZODONE HYDROCHLORIDE 50 MG/1
50 TABLET ORAL NIGHTLY PRN
Qty: 30 TABLET | Refills: 0 | Status: SHIPPED | OUTPATIENT
Start: 2022-02-14

## 2022-02-14 NOTE — PATIENT INSTRUCTIONS

## 2022-02-14 NOTE — PROGRESS NOTES
Roxie Goldberg (:  1955) is a 69 y.o. female,Established patient, here for evaluation of the following chief complaint(s):  Depression, Blood Sugar Problem, and Medication Refill (RLS)      ASSESSMENT/PLAN:  1. Recurrent depression (HCC)  -     venlafaxine (EFFEXOR XR) 37.5 MG extended release capsule; Take 1 capsule by mouth daily, Disp-30 capsule, R-2Normal  2. RLS (restless legs syndrome)  -     rOPINIRole (REQUIP) 1 MG tablet; Take 1 tablet by mouth nightly, Disp-30 tablet, R-2Normal  3. Insomnia due to stress  -     traZODone (DESYREL) 50 MG tablet; Take 1 tablet by mouth nightly as needed for Sleep, Disp-30 tablet, R-0Normal  4. Asthma with COPD ([facility]  -     albuterol sulfate HFA (PROVENTIL HFA) 108 (90 Base) MCG/ACT inhaler; Inhale 2 puffs into the lungs every 6 hours as needed for Wheezing, Disp-1 each, R-1Normal  5. Vitamin D deficiency, unspecified   -     vitamin D (ERGOCALCIFEROL) 1.25 MG (35381 UT) CAPS capsule; Take 1 capsule by mouth once a week, Disp-12 capsule, R-1Normal  6. Body mass index (BMI) 45.0-49.9, adult ([facility]    As above. Restart venlafaxine and trazodone for sleep. Strongly encourage follow up with behavioral health - referral to Psychiatry declined at this time    HbA1c discussed, declining initiation of metformin - monitor for now    Continue follow up with specialists as directed    Return in about 3 months (around 2022) for Diabetes, Depression.     SUBJECTIVE/OBJECTIVE:  HPI  She also follows with Cardiology (Shannon) GS, ENT, ortho, PMR     Depression and anxiety  Current treatment: none  Previous treatment included (effexor) which had been effective and trazodone PRN for sleep  She previously noted side effects (sleep disturbance) after increasing venlafaxine from 37.5 to 75 mg daily  Symptoms are poorly controlled, continue to be exacerbated by marital issues     RLS  Current treatment: Requip 1 mg nightly PRN  Recent medication changes: None  Stable    Diabetes Mellitus Type 2   Current treatment: none  Recent medication changes: N/A  Last HbA1c: 6.5  Not willing to start medication    Lab Results   Component Value Date    LABA1C 6.5 (H) 02/11/2022    LABA1C 6.2 (H) 08/06/2021    LABA1C 5.6 11/19/2018     Lab Results   Component Value Date    LABMICR 75.4 (H) 09/03/2014    CREATININE 0.7 02/11/2022     Lab Results   Component Value Date    ALT 23 02/11/2022    AST 21 02/11/2022     Lab Results   Component Value Date    CHOL 177 02/11/2022    TRIG 145 02/11/2022    HDL 58 02/11/2022    [address] 90 02/11/2022        History of asthma / tobacco use  Current treatment: Albuterol as needed  Previous treatment includes Dulera  Breathing is stable    Review of Systems   Constitutional: Negative for chills and fever. Psychiatric/Behavioral: Positive for agitation, dysphoric mood and sleep disturbance. Negative for self-injury and suicidal ideas. The patient is nervous/anxious. Vitals:    02/14/22 1253 02/14/22 1257   BP: (!) 150/90 (!) 140/82   Site: Left Upper Arm    Position: Sitting    Cuff Size: Large Adult    Pulse: 95    Resp: 16    Temp: 97.7 °F (36.5 °C)    TempSrc: Temporal    SpO2: 96%    Weight: 231 lb 14.4 oz (105.2 kg)    Height: 5' (1.524 m)      Estimated body mass index is 45.29 kg/m² as calculated from the following:    Height as of this encounter: 5' (1.524 m). Weight as of this encounter: 231 lb 14.4 oz (105.2 kg). Physical Exam  Constitutional:       General: She is not in acute distress. HENT:      Head: Normocephalic and atraumatic. Eyes:      Extraocular Movements: Extraocular movements intact. Conjunctiva/sclera: Conjunctivae normal.   Cardiovascular:      Rate and Rhythm: Normal rate. Pulmonary:      Effort: Pulmonary effort is normal. No respiratory distress. Neurological:      Mental Status: She is alert. Psychiatric:         Mood and Affect: Mood is anxious and depressed.  Affect is labile, angry and tearful. Prior to Visit Medications    Medication Sig Taking? Authorizing Provider   albuterol sulfate HFA (PROVENTIL HFA) 108 (90 Base) MCG/ACT inhaler Inhale 2 puffs into the lungs every 6 hours as needed for Wheezing Yes Diego Pritchard DO   rOPINIRole (REQUIP) 1 MG tablet Take 1 tablet by mouth nightly Yes Diego Pritchard DO   vitamin D (ERGOCALCIFEROL) 1.25 MG (81001 UT) CAPS capsule Take 1 capsule by mouth once a week Yes Diego Pritchard DO   venlafaxine (EFFEXOR XR) 37.5 MG extended release capsule Take 1 capsule by mouth daily Yes Diego Pritchard DO   traZODone (DESYREL) 50 MG tablet Take 1 tablet by mouth nightly as needed for Sleep Yes Diego Pritchard DO   esomeprazole (NEXIUM) 40 MG delayed release capsule Take 1 capsule by mouth every morning (before breakfast) Yes Sara Madison DO        An electronic signature was used to authenticate this note.     --Sara Madison DO

## 2022-03-16 ENCOUNTER — HOSPITAL ENCOUNTER (INPATIENT)
Age: 67
LOS: 1 days | Discharge: HOME | DRG: 066 | End: 2022-03-17
Attending: EMERGENCY MEDICINE | Admitting: INTERNAL MEDICINE
Payer: MEDICARE

## 2022-03-16 ENCOUNTER — APPOINTMENT (OUTPATIENT)
Dept: GENERAL RADIOLOGY | Age: 67
DRG: 066 | End: 2022-03-16
Payer: MEDICARE

## 2022-03-16 ENCOUNTER — APPOINTMENT (OUTPATIENT)
Dept: CT IMAGING | Age: 67
DRG: 066 | End: 2022-03-16
Payer: MEDICARE

## 2022-03-16 DIAGNOSIS — I63.9 CEREBROVASCULAR ACCIDENT (CVA), UNSPECIFIED MECHANISM (HCC): Primary | ICD-10-CM

## 2022-03-16 LAB
ALBUMIN SERPL-MCNC: 4.8 G/DL (ref 3.5–5.2)
ALP BLD-CCNC: 125 U/L (ref 35–104)
ALT SERPL-CCNC: 25 U/L (ref 0–32)
ANION GAP SERPL CALCULATED.3IONS-SCNC: 14 MMOL/L (ref 7–16)
APTT: 34.2 SEC (ref 24.5–35.1)
AST SERPL-CCNC: 24 U/L (ref 0–31)
BASOPHILS ABSOLUTE: 0.08 E9/L (ref 0–0.2)
BASOPHILS RELATIVE PERCENT: 0.7 % (ref 0–2)
BILIRUB SERPL-MCNC: 0.3 MG/DL (ref 0–1.2)
BUN BLDV-MCNC: 16 MG/DL (ref 6–23)
CALCIUM SERPL-MCNC: 9.9 MG/DL (ref 8.6–10.2)
CHLORIDE BLD-SCNC: 100 MMOL/L (ref 98–107)
CO2: 24 MMOL/L (ref 22–29)
CREAT SERPL-MCNC: 0.6 MG/DL (ref 0.5–1)
EOSINOPHILS ABSOLUTE: 0.11 E9/L (ref 0.05–0.5)
EOSINOPHILS RELATIVE PERCENT: 1 % (ref 0–6)
GFR AFRICAN AMERICAN: >60
GFR NON-AFRICAN AMERICAN: >60 ML/MIN/1.73
GLUCOSE BLD-MCNC: 99 MG/DL (ref 74–99)
HCT VFR BLD CALC: 50.9 % (ref 34–48)
HEMOGLOBIN: 16.1 G/DL (ref 11.5–15.5)
IMMATURE GRANULOCYTES #: 0.04 E9/L
IMMATURE GRANULOCYTES %: 0.3 % (ref 0–5)
INR BLD: 1
LYMPHOCYTES ABSOLUTE: 2.62 E9/L (ref 1.5–4)
LYMPHOCYTES RELATIVE PERCENT: 22.8 % (ref 20–42)
MCH RBC QN AUTO: 30.1 PG (ref 26–35)
MCHC RBC AUTO-ENTMCNC: 31.6 % (ref 32–34.5)
MCV RBC AUTO: 95.3 FL (ref 80–99.9)
MONOCYTES ABSOLUTE: 0.8 E9/L (ref 0.1–0.95)
MONOCYTES RELATIVE PERCENT: 7 % (ref 2–12)
NEUTROPHILS ABSOLUTE: 7.83 E9/L (ref 1.8–7.3)
NEUTROPHILS RELATIVE PERCENT: 68.2 % (ref 43–80)
PDW BLD-RTO: 13.5 FL (ref 11.5–15)
PLATELET # BLD: 357 E9/L (ref 130–450)
PMV BLD AUTO: 10.8 FL (ref 7–12)
POTASSIUM REFLEX MAGNESIUM: 3.8 MMOL/L (ref 3.5–5)
PROTHROMBIN TIME: 11.4 SEC (ref 9.3–12.4)
RBC # BLD: 5.34 E12/L (ref 3.5–5.5)
SODIUM BLD-SCNC: 138 MMOL/L (ref 132–146)
TOTAL PROTEIN: 8.4 G/DL (ref 6.4–8.3)
TROPONIN, HIGH SENSITIVITY: <6 NG/L (ref 0–9)
WBC # BLD: 11.5 E9/L (ref 4.5–11.5)

## 2022-03-16 PROCEDURE — 80053 COMPREHEN METABOLIC PANEL: CPT

## 2022-03-16 PROCEDURE — 99284 EMERGENCY DEPT VISIT MOD MDM: CPT

## 2022-03-16 PROCEDURE — G0378 HOSPITAL OBSERVATION PER HR: HCPCS

## 2022-03-16 PROCEDURE — 6360000002 HC RX W HCPCS: Performed by: EMERGENCY MEDICINE

## 2022-03-16 PROCEDURE — 1200000000 HC SEMI PRIVATE

## 2022-03-16 PROCEDURE — 70450 CT HEAD/BRAIN W/O DYE: CPT

## 2022-03-16 PROCEDURE — 96375 TX/PRO/DX INJ NEW DRUG ADDON: CPT

## 2022-03-16 PROCEDURE — 6370000000 HC RX 637 (ALT 250 FOR IP): Performed by: EMERGENCY MEDICINE

## 2022-03-16 PROCEDURE — 36415 COLL VENOUS BLD VENIPUNCTURE: CPT

## 2022-03-16 PROCEDURE — 70496 CT ANGIOGRAPHY HEAD: CPT

## 2022-03-16 PROCEDURE — 96374 THER/PROPH/DIAG INJ IV PUSH: CPT

## 2022-03-16 PROCEDURE — 85730 THROMBOPLASTIN TIME PARTIAL: CPT

## 2022-03-16 PROCEDURE — 85610 PROTHROMBIN TIME: CPT

## 2022-03-16 PROCEDURE — 71045 X-RAY EXAM CHEST 1 VIEW: CPT

## 2022-03-16 PROCEDURE — 84484 ASSAY OF TROPONIN QUANT: CPT

## 2022-03-16 PROCEDURE — 0042T CT BRAIN PERFUSION: CPT

## 2022-03-16 PROCEDURE — 70498 CT ANGIOGRAPHY NECK: CPT

## 2022-03-16 PROCEDURE — 93005 ELECTROCARDIOGRAM TRACING: CPT | Performed by: EMERGENCY MEDICINE

## 2022-03-16 PROCEDURE — 2580000003 HC RX 258: Performed by: INTERNAL MEDICINE

## 2022-03-16 PROCEDURE — 85025 COMPLETE CBC W/AUTO DIFF WBC: CPT

## 2022-03-16 PROCEDURE — 6360000004 HC RX CONTRAST MEDICATION: Performed by: RADIOLOGY

## 2022-03-16 RX ORDER — METOCLOPRAMIDE HYDROCHLORIDE 5 MG/ML
10 INJECTION INTRAMUSCULAR; INTRAVENOUS ONCE
Status: COMPLETED | OUTPATIENT
Start: 2022-03-16 | End: 2022-03-16

## 2022-03-16 RX ORDER — CLOPIDOGREL 300 MG/1
300 TABLET, FILM COATED ORAL ONCE
Status: COMPLETED | OUTPATIENT
Start: 2022-03-16 | End: 2022-03-16

## 2022-03-16 RX ORDER — DIPHENHYDRAMINE HYDROCHLORIDE 50 MG/ML
25 INJECTION INTRAMUSCULAR; INTRAVENOUS ONCE
Status: COMPLETED | OUTPATIENT
Start: 2022-03-16 | End: 2022-03-16

## 2022-03-16 RX ORDER — ACETAMINOPHEN 500 MG
1000 TABLET ORAL ONCE
Status: COMPLETED | OUTPATIENT
Start: 2022-03-16 | End: 2022-03-16

## 2022-03-16 RX ORDER — 0.9 % SODIUM CHLORIDE 0.9 %
1000 INTRAVENOUS SOLUTION INTRAVENOUS ONCE
Status: COMPLETED | OUTPATIENT
Start: 2022-03-16 | End: 2022-03-17

## 2022-03-16 RX ADMIN — DIPHENHYDRAMINE HYDROCHLORIDE 25 MG: 50 INJECTION, SOLUTION INTRAMUSCULAR; INTRAVENOUS at 20:07

## 2022-03-16 RX ADMIN — METOCLOPRAMIDE 10 MG: 5 INJECTION, SOLUTION INTRAMUSCULAR; INTRAVENOUS at 21:35

## 2022-03-16 RX ADMIN — CLOPIDOGREL BISULFATE 300 MG: 300 TABLET, FILM COATED ORAL at 23:20

## 2022-03-16 RX ADMIN — ACETAMINOPHEN 1000 MG: 500 TABLET ORAL at 21:35

## 2022-03-16 RX ADMIN — IOPAMIDOL 140 ML: 755 INJECTION, SOLUTION INTRAVENOUS at 20:21

## 2022-03-16 RX ADMIN — SODIUM CHLORIDE 1000 ML: 9 INJECTION, SOLUTION INTRAVENOUS at 23:20

## 2022-03-16 ASSESSMENT — PAIN SCALES - GENERAL: PAINLEVEL_OUTOF10: 7

## 2022-03-16 ASSESSMENT — ENCOUNTER SYMPTOMS
NAUSEA: 1
COUGH: 0
VOMITING: 0
DIARRHEA: 0
BACK PAIN: 0
SHORTNESS OF BREATH: 0
ABDOMINAL PAIN: 0
RHINORRHEA: 0

## 2022-03-16 NOTE — ED PROVIDER NOTES
Patient presents to the ED for evaluation of strokelike symptoms. Patient states that she was out to eat with her  around 2:00 this afternoon. During that time she started having a headache and then felt dizzy. No syncope but thought like she was going to pass out. She also noticed that she was having some difficulty walking at that time. She did not notice any focal weakness on either side. She now no swallows examining her that she is more weaker on the right. Right upper and lower extremity. Slightly worse in the lower extremity. Patient also reports having numbness on the right side of her face which she thinks has been ongoing for several hours. She is very vague on onset a lot of her symptoms. No prior history of stroke. Symptoms have been persistent. Mild to moderate in severity. Patient also reports some blurred vision. She has not noted anything to make her symptoms better or worse. Patient's medical history is significant for anemia with history of blood transfusion. She also has a history of seizures. No seizure activity today. She has COPD as well as hypertension. Patient also has history of CAD. Patient is not on any anticoagulants. Review of Systems   Constitutional: Negative for chills, diaphoresis, fatigue and fever. HENT: Negative for congestion and rhinorrhea. Respiratory: Negative for cough and shortness of breath. Cardiovascular: Negative for chest pain, palpitations and leg swelling. Gastrointestinal: Positive for nausea. Negative for abdominal pain, diarrhea and vomiting. Genitourinary: Negative for decreased urine volume, difficulty urinating, hematuria and urgency. Musculoskeletal: Positive for gait problem. Negative for back pain, myalgias, neck pain and neck stiffness. Skin: Negative for pallor and wound. Neurological: Positive for dizziness, weakness, light-headedness, numbness and headaches.  Negative for seizures, syncope, facial asymmetry and speech difficulty. Hematological: Does not bruise/bleed easily. Psychiatric/Behavioral: Positive for confusion. All other systems reviewed and are negative. Physical Exam  Vitals and nursing note reviewed. Constitutional:       General: She is not in acute distress. Appearance: She is well-developed and normal weight. She is not ill-appearing or diaphoretic. HENT:      Head: Normocephalic and atraumatic. Eyes:      Extraocular Movements: Extraocular movements intact. Conjunctiva/sclera: Conjunctivae normal.   Cardiovascular:      Rate and Rhythm: Normal rate and regular rhythm. Heart sounds: Normal heart sounds. No murmur heard. Pulmonary:      Effort: Pulmonary effort is normal. No respiratory distress. Breath sounds: Normal breath sounds. No wheezing or rales. Abdominal:      General: Bowel sounds are normal.      Palpations: Abdomen is soft. Tenderness: There is no abdominal tenderness. There is no guarding or rebound. Musculoskeletal:      Cervical back: Normal range of motion and neck supple. No rigidity ( No signs of meningismus. ). Comments: There is no pretibial edema nor calf tenderness bilaterally      Skin:     General: Skin is warm and dry. Neurological:      Mental Status: She is alert and oriented to person, place, and time. Comments: Refer to NIH stroke scale below. Procedures     MDM   Patient presents to the ED for evaluation of strokelike symptoms. Symptoms started approximately around 2 in the afternoon. Symptoms have varied from headache to right-sided facial numbness. She is also developed right arm and leg weakness and difficulty ambulating. No change in her speech. Patient had a CTA head and neck as well as a Noncon CT and a CT perfusion when she arrived. The studies were unremarkable.   I did discuss the studies with neurology as well as the radiologist.  Neurology would like the patient to get aspirin and Plavix. She states she has an allergy to the aspirin and was given 300 mg of Plavix. Additional labs and imaging were obtained. Chest x-ray showed no acute cardiopulmonary disease. CBC showed no evidence of leukocytosis or anemia. Patient's troponin was less than 6. CMP showed no electrolyte abnormalities or evidence of renal insufficiency. Normal liver function. Patient's coags were unremarkable. EKG Interpretation    Interpreted by emergency department physician    Rhythm: normal sinus   Rate: normal  Axis: normal  Ectopy: none  Conduction: Possible left atrial enlargement. ST Segments: no acute change  T Waves: no acute change  Q Waves: none    Clinical Impression: no acute changes    Yeyo Fatima DO    NIH Stroke Scale/Score at time of initial evaluation:  1A: Level of Consciousness 0 - alert; keenly responsive   1B: Ask Month and Age 0 - answers both questions correctly   1C:  Tell Patient To Open and Close Eyes, then Hand  Squeeze 0 - performs both tasks correctly   2: Test Horizontal Extraocular Movements 0 - normal   3: Test Visual Fields 0 - no visual loss   4: Test Facial Palsy 0 - normal symmetric movement   5A: Test Left Arm Motor Drift 0 - no drift, limb holds 90 (or 45) degrees for full 10 seconds   5B: Test Right Arm Motor Drift 0 - no drift, limb holds 90 (or 45) degrees for full 10 seconds   6A: Test Left Leg Motor Drift 0 - no drift; leg holds 30 degree position for full 5 seconds   6B: Test Right Leg Motor Drift 1 - drift; leg falls by the end of the 5 second period but does not hit bed   7: Test Limb Ataxia   (FNF/Heel-Shin) 0 - absent   8: Test Sensation 1 - mild to moderate sensory loss; patient feels pinprick is less sharp or is dull on the affected side; there is a loss of superficial pain with pinprick but patient is aware of being touched    9: Test Language/Aphasia 0 - no aphasia, normal   10: Test Dysarthria 0 - normal   11: Test Extinction/Inattention 0 - no abnormality   Total 2       ED Course as of 03/16/22 2332   Wed Mar 16, 2022   2006 Spoke with Dr. Ally Bolanos (Neurology). Discussed case. He states that the patient is not a TPA candidate. If the Noncon CT is normal he recommends loading her with aspirin and Plavix. [MS]   2115 Results of labs and imaging with patient thus far. Patient complaining of headache. I have ordered Tylenol and Reglan and will reassess. [MS]   8835 Seen in bed no distress. Symptoms are unchanged. States the headache has improved since being treated but she still has the same weakness and numbness as prior. She is agreeable to admission for further treatment evaluation. [MS]      ED Course User Index  [MS] Patricia Oakespool, DO       --------------------------------------------- PAST HISTORY ---------------------------------------------  Past Medical History:  has a past medical history of Anastomotic ulcer, Anemia, Anxiety, Asthma, Biceps tendon tear, Closed fracture of multiple ribs of right side, Closed fracture of nasal bone, Collapsed lung, COPD (chronic obstructive pulmonary disease) ([address] DDD (degenerative disc disease), lumbar, Degenerative disc disease at L5-S1 level, Depression, Distal radius fracture, Fracture of left olecranon process, GERD (gastroesophageal reflux disease), Heart attack ([address] History of blood transfusion, Hypertension, Impingement syndrome of shoulder, Intractable abdominal pain, Left carpal tunnel syndrome, Nasal bones, closed fracture, closed, initial encounter, Osteoarthritis, Peptic ulcer, unspecified site, unspecified as acute or chronic, without mention of hemorrhage or perforation, RLS (restless legs syndrome), Rotator cuff tear, Seizure ([address] Sepsis ([address] and Spinal stenosis. Past Surgical History:  has a past surgical history that includes Tonsillectomy (1959); Carpal tunnel release (1998); shoulder surgery (1999); Knee arthroscopy (2002);  Dilation and curettage of uterus; Macario-en-Y Gastric Bypass (2005);  section (3/9/1984); Cholecystectomy (); Colonoscopy (3/2011); Upper gastrointestinal endoscopy (10/2004); Upper gastrointestinal endoscopy (12); Appendectomy; Endoscopy, colon, diagnostic; Abdomen surgery; Shoulder arthroscopy (Right, 1 2 15); Wrist arthroscopy (Right, 2015); Cholecystectomy; Dilatation, esophagus; Wrist surgery (Right, 12/2/15); other surgical history (16); other surgical history (Left, 16); Abscess Drainage (Left, 2016); Carpal tunnel release (Left, 2017); Upper gastrointestinal endoscopy (N/A, 2018); pr lap,diagnostic abdomen (N/A, 2018); joint replacement (2003); joint replacement (2003); joint replacement; and Upper gastrointestinal endoscopy (N/A, 2019). Social History:  reports that she has been smoking cigarettes. She started smoking about 17 years ago. She has a 14.00 pack-year smoking history. She has never used smokeless tobacco. She reports that she does not drink alcohol and does not use drugs. Family History: family history includes Cancer in her mother; Emphysema in her father; Heart Disease in her sister. The patients home medications have been reviewed.     Allergies: Ceclor [cefaclor], Diprivan [propofol], Naproxen, Adhesive tape, Blueberry flavor, Ibuprofen, Mirapex [pramipexole], Oxycodone-acetaminophen, Shellfish-derived products, Tramadol, Vaccinium angustifolium, Blueberry fruit extract, Ceclor [cefaclor], Iodides, Iodine, Iv dye [iodides], Naprosyn [naproxen], Percocet [oxycodone-acetaminophen], Phenergan [promethazine hcl], Promethazine, Sulfa antibiotics, Tape [adhesive tape], and Tetanus toxoids    -------------------------------------------------- RESULTS -------------------------------------------------    LABS:  Results for orders placed or performed during the hospital encounter of 22   CBC with Auto Differential   Result Value Ref Range    WBC 11.5 4.5 - 11.5 E9/L    RBC 5.34 3.50 - 5.50 E12/L    Hemoglobin 16.1 (H) 11.5 - 15.5 g/dL    Hematocrit 50.9 (H) 34.0 - 48.0 %    MCV 95.3 80.0 - 99.9 fL    MCH 30.1 26.0 - 35.0 pg    MCHC 31.6 (L) 32.0 - 34.5 %    RDW 13.5 11.5 - 15.0 fL    Platelets 051 239 - 410 E9/L    MPV 10.8 7.0 - 12.0 fL    Neutrophils % 68.2 43.0 - 80.0 %    Immature Granulocytes % 0.3 0.0 - 5.0 %    Lymphocytes % 22.8 20.0 - 42.0 %    Monocytes % 7.0 2.0 - 12.0 %    Eosinophils % 1.0 0.0 - 6.0 %    Basophils % 0.7 0.0 - 2.0 %    Neutrophils Absolute 7.83 (H) 1.80 - 7.30 E9/L    Immature Granulocytes # 0.04 E9/L    Lymphocytes Absolute 2.62 1.50 - 4.00 E9/L    Monocytes Absolute 0.80 0.10 - 0.95 E9/L    Eosinophils Absolute 0.11 0.05 - 0.50 E9/L    Basophils Absolute 0.08 0.00 - 0.20 E9/L   Troponin   Result Value Ref Range    Troponin, High Sensitivity <6 0 - 9 ng/L   Comprehensive Metabolic Panel w/ Reflex to MG   Result Value Ref Range    Sodium 138 132 - 146 mmol/L    Potassium reflex Magnesium 3.8 3.5 - 5.0 mmol/L    Chloride 100 98 - 107 mmol/L    CO2 24 22 - 29 mmol/L    Anion Gap 14 7 - 16 mmol/L    Glucose 99 74 - 99 mg/dL    BUN 16 6 - 23 mg/dL    CREATININE 0.6 0.5 - 1.0 mg/dL    GFR Non-African American >60 >=60 mL/min/1.73    GFR African American >60     Calcium 9.9 8.6 - 10.2 mg/dL    Total Protein 8.4 (H) 6.4 - 8.3 g/dL    Albumin 4.8 3.5 - 5.2 g/dL    Total Bilirubin 0.3 0.0 - 1.2 mg/dL    Alkaline Phosphatase 125 (H) 35 - 104 U/L    ALT 25 0 - 32 U/L    AST 24 0 - 31 U/L   Protime-INR   Result Value Ref Range    Protime 11.4 9.3 - 12.4 sec    INR 1.0    APTT   Result Value Ref Range    aPTT 34.2 24.5 - 35.1 sec   EKG 12 Lead   Result Value Ref Range    Ventricular Rate 87 BPM    Atrial Rate 87 BPM    P-R Interval 132 ms    QRS Duration 90 ms    Q-T Interval 398 ms    QTc Calculation (Bazett) 478 ms    P Axis 72 degrees    R Axis 46 degrees    T Axis 60 degrees       RADIOLOGY:  XR CHEST PORTABLE   Final Result   No acute process. CT HEAD WO CONTRAST   Final Result   NONCONTRAST CT OF THE HEAD:      1.  No acute intracranial abnormality. 2. Findings were conveyed to Dr. Ngozi Randhawa at 9:15 p.m. on 03/16/2022. CT PERFUSION OF THE HEAD:      1. Limited study due to technical failure. 2. No gross asymmetry in blood flow, blood volume or mean transit time noted. CTA OF THE HEAD:      1. Unremarkable CTA of the head. RECOMMENDATIONS:   Unavailable         CT BRAIN PERFUSION   Final Result   NONCONTRAST CT OF THE HEAD:      1.  No acute intracranial abnormality. 2. Findings were conveyed to Dr. Ngozi Randhawa at 9:15 p.m. on 03/16/2022. CT PERFUSION OF THE HEAD:      1. Limited study due to technical failure. 2. No gross asymmetry in blood flow, blood volume or mean transit time noted. CTA OF THE HEAD:      1. Unremarkable CTA of the head. RECOMMENDATIONS:   Unavailable         CTA NECK W CONTRAST   Final Result   Unremarkable CTA of the neck. CTA HEAD W CONTRAST   Final Result   NONCONTRAST CT OF THE HEAD:      1.  No acute intracranial abnormality. 2. Findings were conveyed to Dr. Ngozi Randhawa at 9:15 p.m. on 03/16/2022. CT PERFUSION OF THE HEAD:      1. Limited study due to technical failure. 2. No gross asymmetry in blood flow, blood volume or mean transit time noted. CTA OF THE HEAD:      1. Unremarkable CTA of the head. RECOMMENDATIONS:   Unavailable                   ------------------------- NURSING NOTES AND VITALS REVIEWED ---------------------------  Date / Time Roomed:  3/16/2022  7:41 PM  ED Bed Assignment:  02/02    The nursing notes within the ED encounter and vital signs as below have been reviewed.      Patient Vitals for the past 24 hrs:   BP Temp Temp src Pulse Resp SpO2   03/16/22 2322 127/64 -- -- 82 20 95 %   03/16/22 2211 111/81 -- -- 61 18 95 %   03/16/22 2107 137/82 -- -- 77 20 95 %   03/16/22 2037 (!) 149/77 -- -- 82 20 96 %   03/16/22 2009 139/78 -- -- 84 20 96 %   03/16/22 1956 (!) 163/107 -- -- 90 -- --   03/16/22 1940 (!) 194/91 -- -- 98 20 95 %   03/16/22 1930 -- 96.7 °F (35.9 °C) Temporal 101 -- 97 %       Oxygen Saturation Interpretation: Normal    ------------------------------------------ PROGRESS NOTES ------------------------------------------  Re-evaluation(s):  Refer to ED course above. Counseling:  I have spoken with the patient and discussed todays results, in addition to providing specific details for the plan of care and counseling regarding the diagnosis and prognosis. Their questions are answered at this time and they are agreeable with the plan of admission.    --------------------------------- ADDITIONAL PROVIDER NOTES ---------------------------------  Consultations   Spoke with Dr. Shilpa Thompson. Discussed case. He will admit the patient. This patient's ED course included: a personal history and physicial examination, re-evaluation prior to disposition, multiple bedside re-evaluations, IV medications, cardiac monitoring, continuous pulse oximetry and complex medical decision making and emergency management    Critical care:  Please note that the withdrawal or failure to initiate urgent interventions for this patient would likely result in a life threatening deterioration or permanent disability. Systems at risk for deterioration include: CVA. Consult with telestroke. Consideration for TPA/nonalert. Admission for further treatment evaluation. Accordingly this patient received 31 minutes of critical care time, excluding separately billable procedures. This patient has remained hemodynamically stable during their ED course. Diagnosis:  1. Cerebrovascular accident (CVA), unspecified mechanism ([facility]        Disposition:  Patient's disposition: Admit to telemetry  Patient's condition is fair.            Negro Phan DO  03/16/22 7876

## 2022-03-17 ENCOUNTER — APPOINTMENT (OUTPATIENT)
Dept: MRI IMAGING | Age: 67
DRG: 066 | End: 2022-03-17
Payer: MEDICARE

## 2022-03-17 VITALS
HEART RATE: 93 BPM | SYSTOLIC BLOOD PRESSURE: 147 MMHG | RESPIRATION RATE: 18 BRPM | DIASTOLIC BLOOD PRESSURE: 70 MMHG | OXYGEN SATURATION: 94 % | TEMPERATURE: 97.4 F

## 2022-03-17 PROBLEM — I63.9 ACUTE CVA (CEREBROVASCULAR ACCIDENT) (HCC): Status: ACTIVE | Noted: 2022-03-17

## 2022-03-17 PROBLEM — R55 NEAR SYNCOPE: Status: ACTIVE | Noted: 2022-03-17

## 2022-03-17 LAB
ALBUMIN SERPL-MCNC: 3.6 G/DL (ref 3.5–5.2)
ALP BLD-CCNC: 72 U/L (ref 35–104)
ALT SERPL-CCNC: 18 U/L (ref 0–32)
ANION GAP SERPL CALCULATED.3IONS-SCNC: 12 MMOL/L (ref 7–16)
AST SERPL-CCNC: 15 U/L (ref 0–31)
BASOPHILS ABSOLUTE: 0.07 E9/L (ref 0–0.2)
BASOPHILS RELATIVE PERCENT: 1 % (ref 0–2)
BILIRUB SERPL-MCNC: 0.3 MG/DL (ref 0–1.2)
BUN BLDV-MCNC: 13 MG/DL (ref 6–23)
CALCIUM SERPL-MCNC: 8.5 MG/DL (ref 8.6–10.2)
CHLORIDE BLD-SCNC: 105 MMOL/L (ref 98–107)
CHOLESTEROL, TOTAL: 123 MG/DL (ref 0–199)
CO2: 22 MMOL/L (ref 22–29)
CREAT SERPL-MCNC: 0.5 MG/DL (ref 0.5–1)
EOSINOPHILS ABSOLUTE: 0.12 E9/L (ref 0.05–0.5)
EOSINOPHILS RELATIVE PERCENT: 1.6 % (ref 0–6)
GFR AFRICAN AMERICAN: >60
GFR NON-AFRICAN AMERICAN: >60 ML/MIN/1.73
GLUCOSE BLD-MCNC: 111 MG/DL (ref 74–99)
HBA1C MFR BLD: 6.1 % (ref 4–5.6)
HCT VFR BLD CALC: 41.6 % (ref 34–48)
HDLC SERPL-MCNC: 41 MG/DL
HEMOGLOBIN: 13.3 G/DL (ref 11.5–15.5)
IMMATURE GRANULOCYTES #: 0.03 E9/L
IMMATURE GRANULOCYTES %: 0.4 % (ref 0–5)
LDL CHOLESTEROL CALCULATED: 64 MG/DL (ref 0–99)
LV EF: 74 %
LVEF MODALITY: NORMAL
LYMPHOCYTES ABSOLUTE: 1.75 E9/L (ref 1.5–4)
LYMPHOCYTES RELATIVE PERCENT: 23.8 % (ref 20–42)
MAGNESIUM: 2.1 MG/DL (ref 1.6–2.6)
MCH RBC QN AUTO: 30.9 PG (ref 26–35)
MCHC RBC AUTO-ENTMCNC: 32 % (ref 32–34.5)
MCV RBC AUTO: 96.7 FL (ref 80–99.9)
MONOCYTES ABSOLUTE: 0.71 E9/L (ref 0.1–0.95)
MONOCYTES RELATIVE PERCENT: 9.6 % (ref 2–12)
NEUTROPHILS ABSOLUTE: 4.68 E9/L (ref 1.8–7.3)
NEUTROPHILS RELATIVE PERCENT: 63.6 % (ref 43–80)
PDW BLD-RTO: 13.6 FL (ref 11.5–15)
PHOSPHORUS: 3.8 MG/DL (ref 2.5–4.5)
PLATELET # BLD: 267 E9/L (ref 130–450)
PMV BLD AUTO: 10.7 FL (ref 7–12)
POTASSIUM SERPL-SCNC: 3.7 MMOL/L (ref 3.5–5)
PROCALCITONIN: <0.02 NG/ML (ref 0–0.08)
RBC # BLD: 4.3 E12/L (ref 3.5–5.5)
SODIUM BLD-SCNC: 139 MMOL/L (ref 132–146)
T4 FREE: 0.96 NG/DL (ref 0.93–1.7)
TOTAL PROTEIN: 6.4 G/DL (ref 6.4–8.3)
TRIGL SERPL-MCNC: 92 MG/DL (ref 0–149)
TSH SERPL DL<=0.05 MIU/L-ACNC: 2.12 UIU/ML (ref 0.27–4.2)
VLDLC SERPL CALC-MCNC: 18 MG/DL
WBC # BLD: 7.4 E9/L (ref 4.5–11.5)

## 2022-03-17 PROCEDURE — 84145 PROCALCITONIN (PCT): CPT

## 2022-03-17 PROCEDURE — 6360000002 HC RX W HCPCS: Performed by: NURSE PRACTITIONER

## 2022-03-17 PROCEDURE — G0378 HOSPITAL OBSERVATION PER HR: HCPCS

## 2022-03-17 PROCEDURE — 84443 ASSAY THYROID STIM HORMONE: CPT

## 2022-03-17 PROCEDURE — 6360000002 HC RX W HCPCS: Performed by: INTERNAL MEDICINE

## 2022-03-17 PROCEDURE — 96376 TX/PRO/DX INJ SAME DRUG ADON: CPT

## 2022-03-17 PROCEDURE — 92610 EVALUATE SWALLOWING FUNCTION: CPT | Performed by: SPEECH-LANGUAGE PATHOLOGIST

## 2022-03-17 PROCEDURE — 85025 COMPLETE CBC W/AUTO DIFF WBC: CPT

## 2022-03-17 PROCEDURE — 70551 MRI BRAIN STEM W/O DYE: CPT

## 2022-03-17 PROCEDURE — 83735 ASSAY OF MAGNESIUM: CPT

## 2022-03-17 PROCEDURE — 2580000003 HC RX 258: Performed by: INTERNAL MEDICINE

## 2022-03-17 PROCEDURE — 96375 TX/PRO/DX INJ NEW DRUG ADDON: CPT

## 2022-03-17 PROCEDURE — 6370000000 HC RX 637 (ALT 250 FOR IP): Performed by: INTERNAL MEDICINE

## 2022-03-17 PROCEDURE — 92526 ORAL FUNCTION THERAPY: CPT | Performed by: SPEECH-LANGUAGE PATHOLOGIST

## 2022-03-17 PROCEDURE — 80061 LIPID PANEL: CPT

## 2022-03-17 PROCEDURE — 84439 ASSAY OF FREE THYROXINE: CPT

## 2022-03-17 PROCEDURE — 84100 ASSAY OF PHOSPHORUS: CPT

## 2022-03-17 PROCEDURE — 83036 HEMOGLOBIN GLYCOSYLATED A1C: CPT

## 2022-03-17 PROCEDURE — 96372 THER/PROPH/DIAG INJ SC/IM: CPT

## 2022-03-17 PROCEDURE — 97530 THERAPEUTIC ACTIVITIES: CPT | Performed by: PHYSICAL THERAPIST

## 2022-03-17 PROCEDURE — 97161 PT EVAL LOW COMPLEX 20 MIN: CPT | Performed by: PHYSICAL THERAPIST

## 2022-03-17 PROCEDURE — 80053 COMPREHEN METABOLIC PANEL: CPT

## 2022-03-17 PROCEDURE — 36415 COLL VENOUS BLD VENIPUNCTURE: CPT

## 2022-03-17 PROCEDURE — 93306 TTE W/DOPPLER COMPLETE: CPT

## 2022-03-17 RX ORDER — DEXTROSE MONOHYDRATE 50 MG/ML
100 INJECTION, SOLUTION INTRAVENOUS PRN
Status: DISCONTINUED | OUTPATIENT
Start: 2022-03-17 | End: 2022-03-18 | Stop reason: HOSPADM

## 2022-03-17 RX ORDER — VENLAFAXINE HYDROCHLORIDE 37.5 MG/1
37.5 CAPSULE, EXTENDED RELEASE ORAL DAILY
Status: DISCONTINUED | OUTPATIENT
Start: 2022-03-17 | End: 2022-03-18 | Stop reason: HOSPADM

## 2022-03-17 RX ORDER — ASPIRIN 81 MG/1
81 TABLET, CHEWABLE ORAL DAILY
Qty: 30 TABLET | Refills: 3 | Status: SHIPPED | OUTPATIENT
Start: 2022-03-18

## 2022-03-17 RX ORDER — LORAZEPAM 2 MG/ML
1 INJECTION INTRAMUSCULAR
Status: COMPLETED | OUTPATIENT
Start: 2022-03-17 | End: 2022-03-17

## 2022-03-17 RX ORDER — ACETAMINOPHEN 325 MG/1
650 TABLET ORAL EVERY 6 HOURS PRN
Status: DISCONTINUED | OUTPATIENT
Start: 2022-03-17 | End: 2022-03-18 | Stop reason: HOSPADM

## 2022-03-17 RX ORDER — LORAZEPAM 2 MG/ML
2 INJECTION INTRAMUSCULAR ONCE
Status: COMPLETED | OUTPATIENT
Start: 2022-03-17 | End: 2022-03-17

## 2022-03-17 RX ORDER — ATORVASTATIN CALCIUM 40 MG/1
40 TABLET, FILM COATED ORAL NIGHTLY
Status: DISCONTINUED | OUTPATIENT
Start: 2022-03-17 | End: 2022-03-18 | Stop reason: HOSPADM

## 2022-03-17 RX ORDER — SODIUM CHLORIDE 9 MG/ML
25 INJECTION, SOLUTION INTRAVENOUS PRN
Status: DISCONTINUED | OUTPATIENT
Start: 2022-03-17 | End: 2022-03-18 | Stop reason: HOSPADM

## 2022-03-17 RX ORDER — DEXTROSE MONOHYDRATE 25 G/50ML
12.5 INJECTION, SOLUTION INTRAVENOUS PRN
Status: DISCONTINUED | OUTPATIENT
Start: 2022-03-17 | End: 2022-03-17 | Stop reason: CLARIF

## 2022-03-17 RX ORDER — CLOPIDOGREL BISULFATE 75 MG/1
75 TABLET ORAL DAILY
Qty: 21 TABLET | Refills: 0 | Status: SHIPPED | OUTPATIENT
Start: 2022-03-18 | End: 2022-03-17 | Stop reason: HOSPADM

## 2022-03-17 RX ORDER — ACETAMINOPHEN 650 MG/1
650 SUPPOSITORY RECTAL EVERY 6 HOURS PRN
Status: DISCONTINUED | OUTPATIENT
Start: 2022-03-17 | End: 2022-03-18 | Stop reason: HOSPADM

## 2022-03-17 RX ORDER — SODIUM CHLORIDE 0.9 % (FLUSH) 0.9 %
5-40 SYRINGE (ML) INJECTION PRN
Status: DISCONTINUED | OUTPATIENT
Start: 2022-03-17 | End: 2022-03-18 | Stop reason: HOSPADM

## 2022-03-17 RX ORDER — ASPIRIN 81 MG/1
81 TABLET, CHEWABLE ORAL DAILY
Status: DISCONTINUED | OUTPATIENT
Start: 2022-03-17 | End: 2022-03-18 | Stop reason: HOSPADM

## 2022-03-17 RX ORDER — TRAZODONE HYDROCHLORIDE 50 MG/1
50 TABLET ORAL NIGHTLY PRN
Status: DISCONTINUED | OUTPATIENT
Start: 2022-03-17 | End: 2022-03-18 | Stop reason: HOSPADM

## 2022-03-17 RX ORDER — ATORVASTATIN CALCIUM 40 MG/1
40 TABLET, FILM COATED ORAL NIGHTLY
Qty: 30 TABLET | Refills: 3 | Status: SHIPPED | OUTPATIENT
Start: 2022-03-17

## 2022-03-17 RX ORDER — NICOTINE POLACRILEX 4 MG
15 LOZENGE BUCCAL PRN
Status: DISCONTINUED | OUTPATIENT
Start: 2022-03-17 | End: 2022-03-18 | Stop reason: HOSPADM

## 2022-03-17 RX ORDER — SODIUM CHLORIDE 0.9 % (FLUSH) 0.9 %
5-40 SYRINGE (ML) INJECTION EVERY 12 HOURS SCHEDULED
Status: DISCONTINUED | OUTPATIENT
Start: 2022-03-17 | End: 2022-03-18 | Stop reason: HOSPADM

## 2022-03-17 RX ORDER — POLYETHYLENE GLYCOL 3350 17 G/17G
17 POWDER, FOR SOLUTION ORAL DAILY PRN
Status: DISCONTINUED | OUTPATIENT
Start: 2022-03-17 | End: 2022-03-18 | Stop reason: HOSPADM

## 2022-03-17 RX ORDER — ROPINIROLE 1 MG/1
1 TABLET, FILM COATED ORAL NIGHTLY
Status: DISCONTINUED | OUTPATIENT
Start: 2022-03-17 | End: 2022-03-18 | Stop reason: HOSPADM

## 2022-03-17 RX ORDER — PANTOPRAZOLE SODIUM 40 MG/1
40 TABLET, DELAYED RELEASE ORAL
Status: DISCONTINUED | OUTPATIENT
Start: 2022-03-17 | End: 2022-03-18 | Stop reason: HOSPADM

## 2022-03-17 RX ORDER — CLOPIDOGREL BISULFATE 75 MG/1
75 TABLET ORAL DAILY
Status: DISCONTINUED | OUTPATIENT
Start: 2022-03-17 | End: 2022-03-18 | Stop reason: HOSPADM

## 2022-03-17 RX ADMIN — LORAZEPAM 2 MG: 2 INJECTION INTRAMUSCULAR; INTRAVENOUS at 11:20

## 2022-03-17 RX ADMIN — ASPIRIN 81 MG 81 MG: 81 TABLET ORAL at 08:04

## 2022-03-17 RX ADMIN — ENOXAPARIN SODIUM 40 MG: 100 INJECTION SUBCUTANEOUS at 08:04

## 2022-03-17 RX ADMIN — LORAZEPAM 1 MG: 2 INJECTION INTRAMUSCULAR; INTRAVENOUS at 09:56

## 2022-03-17 RX ADMIN — CLOPIDOGREL BISULFATE 75 MG: 75 TABLET ORAL at 08:03

## 2022-03-17 RX ADMIN — PANTOPRAZOLE SODIUM 40 MG: 40 TABLET, DELAYED RELEASE ORAL at 06:17

## 2022-03-17 RX ADMIN — VENLAFAXINE HYDROCHLORIDE 37.5 MG: 37.5 CAPSULE, EXTENDED RELEASE ORAL at 08:04

## 2022-03-17 RX ADMIN — SODIUM CHLORIDE, PRESERVATIVE FREE 10 ML: 5 INJECTION INTRAVENOUS at 09:57

## 2022-03-17 ASSESSMENT — PAIN DESCRIPTION - PROGRESSION: CLINICAL_PROGRESSION: GRADUALLY IMPROVING

## 2022-03-17 ASSESSMENT — PAIN SCALES - GENERAL
PAINLEVEL_OUTOF10: 5
PAINLEVEL_OUTOF10: 4

## 2022-03-17 ASSESSMENT — PAIN DESCRIPTION - LOCATION
LOCATION: HEAD
LOCATION: HEAD

## 2022-03-17 ASSESSMENT — PAIN DESCRIPTION - ORIENTATION: ORIENTATION: RIGHT

## 2022-03-17 ASSESSMENT — PAIN DESCRIPTION - PAIN TYPE: TYPE: ACUTE PAIN

## 2022-03-17 ASSESSMENT — PAIN DESCRIPTION - ONSET: ONSET: ON-GOING

## 2022-03-17 ASSESSMENT — PAIN DESCRIPTION - FREQUENCY: FREQUENCY: CONTINUOUS

## 2022-03-17 NOTE — ED NOTES
Pt able to take PO medication, swallow screen performed by RN. Pt able to swallow water w/out choking/coughing/change in voice or tone. Pt tolerated PO well.       Marika Brown, KISHOR  03/16/22 7130

## 2022-03-17 NOTE — CONSULTS
History Of Present Illness:    Patient presents to the ED for evaluation of strokelike symptoms. Patient states that she was out to eat with her  around 2:00 this afternoon. During that time she started having a headache and then felt dizzy. No syncope but thought like she was going to pass out. She also noticed that she was having some difficulty walking at that time. She did not notice any focal weakness on either side. She now no swallows examining her that she is more weaker on the right. Right upper and lower extremity. Slightly worse in the lower extremity. Patient also reports having numbness on the right side of her face which she thinks has been ongoing for several hours. She is very vague on onset a lot of her symptoms. No prior history of stroke. Symptoms have been persistent. Mild to moderate in severity. Patient also reports some blurred vision. She has not noted anything to make her symptoms better or worse. Patient's medical history is significant for anemia with history of blood transfusion. She also has a history of seizures. No seizure activity today. She has COPD as well as hypertension. Patient also has history of CAD. Patient is not on any anticoagulants. As above per ed staff. Patient interviewed and reports dining with  and while sitting, felt as though she might pass out and was unable to stand up for several minutes. The patient is a 69 y.o. female with significant past medical history of see below who presents with above.       The patient has the following symptoms:    Change in level of consciousness: alert    New Weakness: no    Numbness or Tingling: no    Difficulty Swallowing: no    Current Medications:   Scheduled Meds:   sodium chloride flush  5-40 mL IntraVENous 2 times per day    enoxaparin  40 mg SubCUTAneous Daily    rOPINIRole  1 mg Oral Nightly    venlafaxine  37.5 mg Oral Daily    clopidogrel  75 mg Oral Daily    pantoprazole  40 mg Oral QAM AC    atorvastatin  40 mg Oral Nightly    aspirin  81 mg Oral Daily     Continuous Infusions:   dextrose      sodium chloride       PRN Meds:glucose, glucagon (rDNA), dextrose, sodium chloride flush, sodium chloride, polyethylene glycol, acetaminophen **OR** acetaminophen, traZODone, perflutren lipid microspheres, dextrose bolus (hypoglycemia) **OR** dextrose bolus (hypoglycemia)    Allergies:  Ceclor [cefaclor], Diprivan [propofol], Naproxen, Adhesive tape, Blueberry flavor, Ibuprofen, Mirapex [pramipexole], Oxycodone-acetaminophen, Shellfish-derived products, Tramadol, Vaccinium angustifolium, Blueberry fruit extract, Ceclor [cefaclor], Iodides, Iodine, Iv dye [iodides], Naprosyn [naproxen], Percocet [oxycodone-acetaminophen], Phenergan [promethazine hcl], Promethazine, Sulfa antibiotics, Tape [adhesive tape], and Tetanus toxoids    Social History:   TOBACCO:   reports that she has been smoking cigarettes. She started smoking about 17 years ago. She has a 14.00 pack-year smoking history. She has never used smokeless tobacco.  ETOH:   reports no history of alcohol use.     Past Medical History:        Diagnosis Date    Anastomotic ulcer 04/30/2012    Anemia     Anxiety     Asthma     Biceps tendon tear 01/02/2015    Closed fracture of multiple ribs of right side     Closed fracture of nasal bone     Collapsed lung 1986    [facility]     DDD (degenerative disc disease), lumbar     Degenerative disc disease at L5-S1 level     Depression     Distal radius fracture 05/11/2015    Fracture of left olecranon process 01/10/2017    GERD (gastroesophageal reflux disease)     Heart attack ([facility] [address]     History of blood transfusion     Hypertension     Impingement syndrome of shoulder 12/09/2014    Intractable abdominal pain 11/18/2018    Left carpal tunnel syndrome 01/30/2017    Nasal bones, closed fracture, closed, initial encounter 12/31/2015    Osteoarthritis     Peptic ulcer, unspecified site, unspecified as acute or chronic, without mention of hemorrhage or perforation     RLS (restless legs syndrome)     Rotator cuff tear 2014    Se[facility]ure [facility] [address]     Sepsis ([facility] [address]     Spinal stenosis        Past Surgical History:        Procedure Laterality Date    ABDOMEN SURGERY      ABSCESS DRAINAGE Left 2016    left thigh seroma    APPENDECTOMY      CARPAL TUNNEL RELEASE  1998    right hand    CARPAL TUNNEL RELEASE Left 2017     SECTION  3/9/1984    CHOLECYSTECTOMY      CHOLECYSTECTOMY      COLONOSCOPY  3/2011    DILATATION, ESOPHAGUS      DILATION AND CURETTAGE OF UTERUS      x4    ENDOSCOPY, COLON, DIAGNOSTIC      JOINT REPLACEMENT  2003    right    JOINT REPLACEMENT  2003    left knee    JOINT REPLACEMENT      bilat knee replacement    KNEE ARTHROSCOPY      bilateral    OTHER SURGICAL HISTORY  16    closed reduction nasal bone fracture    OTHER SURGICAL HISTORY Left 6 2 16    seroma incision and drainage thigh    [place] LAP,DIAGNOSTIC ABDOMEN N/A 2018    DIAGNOSTIC LAPAROSCOPY, LYSIS OF ADHESIONS performed by Rose Mary Diggs MD at [address] [place] [address]  2005    Dr. Raoul Montenegrohead ARTHROSCOPY Right 1 2 15    rotator cuff repair, subacromial decompression, debridement    SHOULDER SURGERY      left    TONSILLECTOMY      UPPER GASTROINTESTINAL ENDOSCOPY  10/2004    UPPER [facility]STR[facility]INTESTINAL ENDOSCOPY  12    [facility]    UPPER GASTROINTESTINAL ENDOSCOPY N/A 2018    EGD BIOPSY performed by Martin aLo MD at [facility] N/A 2019    EGD ESOPHAG[facility]STR[facility]ENOSCOPY [facility]    DEBRIDEMENT ASPIRATION; RIGHT DEQUARVAINES RELEASE    WRIST SURGERY Right 12/2/15         Outside reports reviewed: ER records, historical medical records, lab reports and radiology reports. Patient's medications, allergies, past medical, surgical, social and family histories were reviewed and updated as appropriate. Review of Systems  A comprehensive review of systems was negative except for:       Objective:     Neuro exam 134/62 p 76 t 98  General: normal orientation and alertness. Cranial nerve testing was normal.  Funduscopic eye exam revealed not testable. Motor exam: 5-/5. Deep tendon reflexes were 1+ bilaterally. Plantar responses were flexor bilaterally. Cerebellar exam noted finger to nose without dysmetria. Sensation was normal to joint position sense, light touch and a pin prick . Toni Klein       Assessment:   Near syncope with stroke like symptoms  CTA/P  Carotids/head negative      Plan:   Suggest trial of stopping both trazodone and requip both of which can facilitate syncope  Recommend cardiology evaluation of near syncope--consider loop monitor    MRI pending  Increase activity- uses cane and walker at baseline  Thanks for consult

## 2022-03-17 NOTE — ED NOTES
Pt c/o severe headache, blurred vision, numbness and tingling to right upper and lower extremeties as well as trouble walking. LKW of 1400 today. Denies use of thinners, denies falling/hitting head recently. No dysarthria noted, no facial droop. Pt is A&OX4. NIH of 5 noted upon RN assessment.      Alina Huizar RN  03/16/22 2005

## 2022-03-17 NOTE — PROGRESS NOTES
Internal Medicine Progress Note    [facility]    Les Baird. Jackelin Abebe, SARAOCRIS Mcgovern D.O., SACHI Blanco, MSN, APRN, NP-C  Homa Mata. Inga Arellano, MSN, APRN-CNP     Primary Care Physician: Marcelino Zapata DO   Admitting Physician:  Adele Polanco DO  Admission date and time: 3/16/2022  7:41 PM    Room:  [address] [place], [place] [id number]  Admitting diagnosis: Stroke determined by clinical assessment [facility]) [I63.9]  Cerebrovascular accident (CVA), unspecified mechanism [facility]) [I63.9]    Patient Name: Austen Ga  MRN: [id number]    Date of Service: 3/17/2022     Subjective:  Cristofer Calvillo is a 69 y.o. female who was seen and examined today,3/17/2022, at the bedside. Cristofer Calvillo presented to [address] [place], [place] [address] emergency department last evening for the evaluation of strokelike symptomatology that occurred while eating a late lunch with her . Her symptoms included right-sided numbness and tingling involving upper and lower extremity. Since that time, her upper extremity numbness and tingling have improved. Unfortunately, she continues to have numbness and tingling along with weakness of the right lower extremity. Complete strokelike work-up is being undertaken at this point. Review of System:   Constitutional:   Denies fever or chills, weight loss or gain, fatigue or malaise. HEENT:   Denies ear pain, sore throat, sinus or eye problems. Cardiovascular:   Denies any chest pain, irregular heartbeats, or palpitations. Respiratory:   Denies shortness of breath, coughing, sputum production, hemoptysis, or wheezing. Gastrointestinal:   Denies nausea, vomiting, diarrhea, or constipation. Denies any abdominal pain. Genitourinary:    Denies any urgency, frequency, hematuria. Voiding  without difficulty. Extremities:   Denies lower extremity swelling, edema or cyanosis.    Neurology:    Admits to persistent numbness and tingling to the right lower extremity with weakness  Psch:   Denies being anxious or depressed. Musculoskeletal:    Denies  myalgias, joint complaints or back pain. Integumentary:   Denies any rashes, ulcers, or excoriations. Denies bruising. Hematologic/Lymphatic:  Denies bruising or bleeding. Physical Exam:  No intake/output data recorded. No intake or output data in the 24 hours ending 03/17/22 0804No intake/output data recorded. No data found. Vital Signs:   Blood pressure 135/62, pulse 77, temperature 98.3 °F (36.8 °C), temperature source Oral, resp. rate 18, SpO2 94 %, not currently breastfeeding. General appearance:  Alert, responsive, oriented to person, place, and time. Well preserved, alert, no distress. Head:  Normocephalic. No masses, lesions or tenderness. Eyes:  PERRLA. EOMI. Sclera clear. Buccal mucosa moist.  ENT:  Ears normal. Mucosa normal.  Neck:    Supple. Trachea midline. No thyromegaly. No JVD. No bruits. Heart:    Rhythm regular. Rate controlled. No murmurs. Lungs:    Symmetrical. Clear to auscultation bilaterally. No wheezes. No rhonchi. No rales. Abdomen:   Soft. Non-tender. Non-distended. Bowel sounds positive. No organomegaly or masses. No pain on palpation. Extremities:    Peripheral pulses present. No peripheral edema. No ulcers. No cyanosis. No clubbing. Neurologic:    Alert x 3. No facial drooping. No slurred speech. She has intact sensation of the upper extremities. She has intact strength in her upper extremities. She does have weakness to the right lower extremity with decreased sensation. Psych:   Behavior is normal. Mood appears normal. Speech is not rapid and/or pressured. Musculoskeletal:   Spine ROM normal. Muscular strength intact. Gait not assessed. Integumentary:  No rashes  Skin normal color and texture.   Genitalia/Breast:  Deferred    Medication:  Scheduled Meds:   sodium chloride flush  5-40 mL IntraVENous 2 times per day    enoxaparin  40 mg SubCUTAneous Daily  rOPINIRole  1 mg Oral Nightly    venlafaxine  37.5 mg Oral Daily    clopidogrel  75 mg Oral Daily    pantoprazole  40 mg Oral QAM AC    atorvastatin  40 mg Oral Nightly    aspirin  81 mg Oral Daily     Continuous Infusions:   dextrose      sodium chloride         Objective Data:  CBC with Differential:    Lab Results   Component Value Date    WBC 7.4 03/17/2022    RBC 4.30 03/17/2022    HGB 13.3 03/17/2022    HCT 41.6 03/17/2022     03/17/2022    MCV 96.7 03/17/2022    MCH 30.9 03/17/2022    MCHC 32.0 03/17/2022    RDW 13.6 03/17/2022    SEGSPCT 82 03/02/2014    LYMPHOPCT 23.8 03/17/2022    MONOPCT 9.6 03/17/2022    BASOPCT 1.0 03/17/2022    MONOSABS 0.71 03/17/2022    LYMPHSABS 1.75 03/17/2022    EOSABS 0.12 03/17/2022    BASOSABS 0.07 03/17/2022     BMP:    Lab Results   Component Value Date     03/16/2022    K 3.8 03/16/2022     03/16/2022    CO2 24 03/16/2022    BUN 16 03/16/2022    LABALBU 4.8 03/16/2022    LABALBU 4.6 03/06/2012    CREATININE 0.6 03/16/2022    CALCIUM 9.9 03/16/2022    GFRAA >60 03/16/2022    LABGLOM >60 03/16/2022    GLUCOSE 99 03/16/2022    GLUCOSE 92 03/06/2012       Assessment:  1. Acute CVA  2. History of coronary artery disease  3. Not oxygen dependent COPD  4. Obstructive sleep apnea with inability to use CPAP secondary to claustrophobia  5. History of seizure disorder  6. History of peptic ulcer disease  7. Degenerative disc disease involving lumbar spine  8. Essential hypertension with outpatient noncompliance  9. Depression and anxiety  10. Current tobacco abuse    Plan:   The patient appears to have suffered an acute CVA. Aspirin and Plavix will be employed along with statin agent. MRI will be obtained today along with bubble study. The patient is fearful that she will not be able to tolerate MRI evaluation and we will provide IV Ativan prior to the procedure.   We discussed the absolute need for tobacco cessation and better compliance with medications at home. She will work with the therapy teams today to assess her deficits further. Continue current therapy. See orders for further plan of care. More than 50% of my  time was spent at the bedside counseling/coordinating care with the patient and/or family with face to face contact. This time was spent reviewing notes and laboratory data as well as instructing and counseling the patient. Time I spent with the family or surrogate(s) is included only if the patient was incapable of providing the necessary information or participating in medical decisions. I also discussed the differential diagnosis and all of the proposed management plans with the patient and individuals accompanying the patient. Scott Melendez requires this high level of physician care and nursing on the IMC/Telemetry unit due the complexity of decision management and chance of rapid decline or death. Continued cardiac monitoring and higher level of nursing are required. I am readily available for any further decision-making and intervention. Sandy Celis DO, F.A.C.O.I.   3/17/2022  8:04 AM

## 2022-03-17 NOTE — ED NOTES
Pt has been taken to all imaging by RN and pre-medicated w/benadryl for allergy to contrast dye. Pt states she breaks out in hives, denies any anaphylactic reaction. Spouse has been brought to bedside.       Himanshu Dexter RN  03/16/22 2036

## 2022-03-17 NOTE — PROGRESS NOTES
Speech Language Pathology      NAME:  Patrick Ventura  :  1955  DATE: 3/17/2022  ROOM:  8535/8118-65  SLP swallowing-dysphagia (IP) Start: 22, End: 22, ONE TIME, Standing Count: 1 Occurrences, R    Clarita Mar, DO    Order received. Chart reviewed. Pt unavailable at this time due to:  [] HOLD per RN, Pt   [x] Off unit for testing/ procedure MRI and per nursing documentation Ativan given for test    [] With medical staff   [] Declined intervention  [] Sleeping/ Lethargic   [] Other:     Will re-attempt later this date as able. Thank you.        Stroke determined by clinical assessment [facility]) [I63.9]  Cerebrovascular accident (CVA), unspecified mechanism ([facility] [address] [I63.9]        Yovany Cole MSCCC/SLP  Speech Language Pathologist  [id number]

## 2022-03-17 NOTE — PROGRESS NOTES
Physical Therapy Initial Evaluation/Plan of Care    Room #:  3248/4808-22  Patient Name: Michelle Altman  YOB: 1955  MRN: [id number]    Date of Service: 3/17/2022     Tentative placement recommendation: Inpatient Rehab  Equipment recommendation: To be determined      Evaluating Physical Therapist: Anastasiya Calhoun, PT #[id number]      Specific Provider Orders/Date/Referring Provider :  03/17/22 0100   PT eval and treat Start: 03/17/22 0100, End: 03/17/22 0100, ONE TIME, Standing Count: 1 Occurrences, R    Marie Caro DO      Admitting Diagnosis:   Stroke determined by clinical assessment [facility]) [I63.9]  Cerebrovascular accident (CVA), unspecified mechanism ([address] [I63.9]     and then felt dizzy. No syncope but thought like she was going to pass out. She also noticed that she was having some difficulty walking at that time  Right upper and lower extremity weakness. Surgery: none  Visit Diagnoses       Codes    Cerebrovascular accident (CVA), unspecified mechanism ([address]    -  Primary I63.9          Patient Active Problem List   Diagnosis    GERD (gastroesophageal reflux disease)    History of tobacco abuse    History of bariatric surgery    Asthma with COPD ([address]    Personal history of gastric bypass    Uncomplicated asthma    RLS (restless legs syndrome)    Spinal stenosis of lumbosacral region    Recurrent depression ([address]    Essential hypertension    Stroke determined by clinical assessment ([address]    Acute CVA (cerebrovascular accident) ([address]        ASSESSMENT of Current Deficits Patient exhibits decreased strength, balance, endurance and pain headache impairing functional mobility, transfers, gait , gait distance and tolerance to activity right hemiparesis, right knee buckling in stance phase of gait limiting gait distance. Decreased rapid alternating movement and strength right hemibody.  Patient requires continued skilled physical therapy to address concerns listed above for increased safety and function at discharge. PHYSICAL THERAPY  PLAN OF CARE       Physical therapy plan of care is established based on physician order,  patient diagnosis and clinical assessment    Current Treatment Recommendations:    -Bed Mobility: Lower extremity exercises   -Sitting Balance: Incorporate reaching activities to activate trunk muscles   -Standing Balance: Perform strengthening exercises in standing to promote motor control with or without upper extremity support   -Transfers: Provide instruction on proper hand and foot position for adequate transfer of weight onto lower extremities and use of gait device if needed and Cues for hand placement, technique and safety. Provide stabilization to prevent fall   -Gait: Gait training, Standing activities to improve: base of support, weight shift, weight bearing  and Pregait training to emphasize: Sequencing , Base of support, Weight shift, Device control, Upright, right knee extension in stance phase of gait and Safety   -Endurance: Utilize Supervised activities to increase level of endurance to allow for safe functional mobility including transfers and gait     PT long term treatment goals are located in below grid    Patient and or family understand(s) diagnosis, prognosis, and plan of care. Frequency of treatments: Patient will be seen  twice daily.          Prior Level of Function: Patient ambulated independently drives, recently began using spouse's arm  Rehab Potential: fair  + for baseline    Past medical history:   Past Medical History:   Diagnosis Date    Anastomotic ulcer 04/30/2012    Anemia     Anxiety     Asthma     Biceps tendon tear 01/02/2015    Closed fracture of multiple ribs of right side     Closed fracture of nasal bone     Collapsed lung 1986    COPD (chronic obstructive pulmonary disease) (HCC)     DDD (degenerative disc disease), lumbar     Degenerative disc disease at L5-S1 level     Depression     Distal radius fracture 2015    Fracture of left olecranon process 01/10/2017    GERD (gastroesophageal reflux disease)     [facility]     History of blood transfusion     Hypertension     Impingement syndrome of shoulder 2014    Intractable abdominal pain 2018    Left carpal tunnel syndrome 2017    Nasal bones, closed fracture, closed, initial encounter 2015    Osteoarthritis     Peptic ulcer, unspecified site, unspecified as acute or chronic, without mention of hemorrhage or perforation     RLS (restless legs syndrome)     Rotator cuff tear 2014    Se[facility]ure [facility]     Sepsis ([facility]     Spinal stenosis      Past Surgical History:   Procedure Laterality Date    ABDOMEN SURGERY      ABSCESS DRAINAGE Left 2016    left thigh seroma    APPENDECTOMY     [facility]    right hand    CARPAL TUNNEL RELEASE Left 2017     SECTION  3/9/1984    CHOLECYSTECTOMY      CHOLECYSTECTOMY      COLONOSCOPY  3/2011    DILATATION, ESOPHAGUS      DILATION AND CURETTAGE OF UTERUS      x4    ENDOSCOPY, COLON, DIAGNOSTIC      JOINT REPLACEMENT  2003    right    JOINT REPLACEMENT  2003    left knee    JOINT REPLACEMENT      bilat knee replacement    KNEE ARTHROSCOPY      bilateral    OTHER SURGICAL HISTORY  16    closed reduction nasal bone fracture    OTHER SURGICAL HISTORY Left 6 2 16    seroma incision and drainage thigh    [place] LAP,DIAGNOSTIC ABDOMEN N/A 2018    DIAGNOSTIC LAPAROSCOPY, LYSIS OF ADHESIONS performed by Mateo Leon MD at [address] [place] [address]  2005    Dr. Reji Bernal ARTHROSCOPY Right 1 2 15    rotator cuff repair, subacromial decompression, debridement    SHOULDER SURGERY      left    TONSILLECTOMY      UPPER GASTROINTESTINAL ENDOSCOPY  10/2004    UPP[facility]OSCOPY  12    [facility]    UPPER GASTROINTESTINAL ENDOSCOPY N/A 2018    EGD Sit to stand: Supervision      Ambulation    1 x 4 side steps, 1 x 8 feet using  wheeled walker with Moderate assist of 1   for walker control and balance and cues for sequencing, right knee extension in stance phase of gait and safety right knee buckling in stance phase of gait    20 feet using  wheeled walker with Minimal assist of 1    Stair negotiation: ascended and descended   Not assessed     3 steps 2 rails with min/mod assist   ROM Within functional limits    Increase range of motion 10% of affected joints    Strength BUE:  refer to OT eval  RLE:  3-/5  LLE:  4-/5  Increase strength in affected mm groups by 1/3 grade   Balance Sitting EOB:  fair +  Dynamic Standing:  fair -wheeled walker   Sitting EOB:  good    Dynamic Standing: fair +wheeled walker      Patient is Alert & Oriented x person, place, time and situation and follows directions    Sensation:  Patient  reports numbness/tingling and neuropathy right lower extremity     Edema:  yes bilateral lower extremities   Endurance: fair       Vitals: room air   Blood Pressure at rest  Blood Pressure during session    Heart Rate at rest 75 Heart Rate during session 80   SPO2 at rest 95%  SPO2 during session 94%     Patient education  Patient educated on role of Physical Therapy, risks of immobility, safety and plan of care,  importance of mobility while in hospital , ankle pumps, quad set and glut set for edema control, blood clot prevention and safety      Patient response to education:   Pt verbalized understanding Pt demonstrated skill Pt requires further education in this area   Yes Partial Yes      Treatment:  Patient practiced and was instructed/facilitated in the following treatment: Patient assisted to edge of bed,  Sat edge of bed 15 minutes with Minimal assist of 1 to increase dynamic sitting balance and activity tolerance. weight shifting, performed exercise. Stood with steps to Head of bed returned to bed.  Transport arrived, assisted to edge of bed, sat additional 3 minutes, ambulated in room, assisted to cart. Therapeutic Exercises:  ankle pumps, long arc quad and seated weight shft  x 10-15 reps. At end of session, patient in care of transport with nursing notified call light and phone within reach,  all lines and tubes intact, nursing notified. Patient would benefit from continued skilled Physical Therapy to improve functional independence and quality of life. Patient's/ family goals   none stated    Time in  1019  Time out  1051    Total Treatment Time  12 minutes    Evaluation time includes thorough review of current medical information, gathering information on past medical history/social history and prior level of function, completion of standardized testing/informal observation of tasks, assessment of data, and development of Plan of care and goals.      CPT codes:  Low Complexity PT evaluation (90970)  Therapeutic activities (43495)   12 minutes  1 unit(s)    Wilfredo Stone PT

## 2022-03-17 NOTE — ED NOTES
Pt taken to floor at this time. Pt is A&OX4 , has all belongings and is in stable condition upon leaving ED.  has gone home and has been relayed visiting hours.       Himanshu Dexter RN  03/17/22 0000

## 2022-03-17 NOTE — H&P
Department of Internal Medicine  History and Physical    PCP: Michael Santiago DO  Admitting Physician: Dr. Saldaña Pac  Consultants:   Date of Service: 3/16/2022    CHIEF COMPLAINT: Lightheadedness and dizziness and right-sided numbness and weakness    HISTORY OF PRESENT ILLNESS:    Patient is a 61-year-old female presented to the ED due to lightheadedness or dizziness. Patient also has been experiencing pain right-sided numbness and tingling. Patient symptoms began earlier today while eating lunch at around 2 p.m. She developed lightheadedness and dizziness. She also developed right-sided numbness and tingling as well as weakness. She denies any history of previous stroke. States that symptoms have improved but t have not completely resolved. She denies any fever chills. Patient denies any nausea emesis. Patient has chronic diarrhea.     PAST MEDICAL Hx:  Past Medical History:   Diagnosis Date    Anastomotic ulcer 04/30/2012    Anemia     Anxiety     Asthma     Biceps tendon tear 01/02/2015    Closed fracture of multiple ribs of right side     Closed fracture of nasal bone     Collapsed lung 1986    COPD [facility])     DDD (degenerative disc disease), lumbar     Degenerative disc disease at L5-S1 level     Depression     Distal radius fracture 05/11/2015    Fracture of left olecranon process 01/10/2017    GERD (gastroesophageal reflux disease)     Heart attack ([address]     History of blood transfusion     Hypertension     Impingement syndrome of shoulder 12/09/2014    Intractable abdominal pain 11/18/2018    Left carpal tunnel syndrome 01/30/2017    Nasal bones, closed fracture, closed, initial encounter 12/31/2015    Osteoarthritis     Peptic ulcer, unspecified site, unspecified as acute or chronic, without mention of hemorrhage or perforation     RLS (restless legs syndrome)     Rotator cuff tear 12/09/2014    Seizure ([address]     Sepsis ([address]  Spinal stenosis        PAST SURGICAL Hx:   Past Surgical History:   Procedure Laterality Date    ABDOMEN SURGERY      ABSCESS DRAINAGE Left 2016    left thigh seroma    APPENDECTOMY      CARPAL TUNNEL RELEASE  1998    right hand    CARPAL TUNNEL RELEASE Left 2017     SECTION  3/9/1984    CHOLECYSTECTOMY  1986    CHOLECYSTECTOMY      COLONOSCOPY  3/2011    DILATATION, ESOPHAGUS      DILATION AND CURETTAGE OF UTERUS      x4    ENDOSCOPY, COLON, DIAGNOSTIC      JOINT REPLACEMENT  2003    right    JOINT REPLACEMENT  2003    left knee    JOINT REPLACEMENT      bilat knee replacement    KNEE ARTHROSCOPY      bilateral    OTHER SURGICAL HISTORY  16    closed reduction nasal bone fracture    OTHER SURGICAL HISTORY Left 6 2 16    seroma incision and drainage thigh    [place] LAP,DIAGNOSTIC ABDOMEN N/A 2018    DIAGNOSTIC LAPAROSCOPY, LYSIS OF ADHESIONS performed by Isabel Delgadillo MD at [address] [place] [address]  2005    Dr. Snow Mar ARTHROSCOPY Right 1 2 15    rotator cuff repair, subacromial decompression, debridement    SHOULDER SURGERY      left    TONSILLECTOMY      UPPER GASTROINTESTINAL ENDOSCOPY  10/2004    UPP[facility]AL [facility]OSCOPY  12    [facility]    UPPER GASTROINTESTINAL ENDOSCOPY N/A 2018    EGD BIOPSY performed by Mary Anne Nation MD at [facility] N/A 2019    EGD ESOPHAGOGASTRODUODENOSCOPY performed by Isabel Delgadillo MD at [address] [place] [address] ARTHROSCOPY Right 2015    DEBRIDEMENT ASPIRATION; RIGHT DEQUARVAINES RELEASE    WRIST SURGERY Right 12/2/15       FAMILY Hx:  Family History   Problem Relation Age of Onset    Cancer Mother     Heart Disease Sister     Emphysema Father        HOME MEDICATIONS:  Prior to Admission medications    Medication Sig Start Date End Date Taking?  Authorizing Provider   albuterol sulfate HFA (PROVENTIL HFA) 108 (90 Base) MCG/ACT inhaler Inhale 2 puffs into the lungs every 6 hours as needed for Wheezing 2/14/22 2/14/23  Charan Arnold,    rOPINIRole (REQUIP) 1 MG tablet Take 1 tablet by mouth nightly 2/14/22   Charan Arnold,    vitamin D (ERGOCALCIFEROL) 1.25 MG (83972 UT) CAPS capsule Take 1 capsule by mouth once a week 2/14/22   Charan Arnold,    venlafaxine (EFFEXOR XR) 37.5 MG extended release capsule Take 1 capsule by mouth daily 2/14/22   Charan Arnold, DO   traZODone (DESYREL) 50 MG tablet Take 1 tablet by mouth nightly as needed for Sleep 2/14/22   Charan Arnold, DO   esomeprazole (NEXIUM) 40 MG delayed release capsule Take 1 capsule by mouth every morning (before breakfast) 2/10/21   Charan Arnold,        ALLERGIES:  Ceclor [cefaclor], Diprivan [propofol], Naproxen, Adhesive tape, Blueberry flavor, Ibuprofen, Mirapex [pramipexole], Oxycodone-acetaminophen, Shellfish-derived products, Tramadol, Vaccinium angustifolium, Blueberry fruit extract, Ceclor [cefaclor], Iodides, Iodine, Iv dye [iodides], Naprosyn [naproxen], Percocet [oxycodone-acetaminophen], Phenergan [promethazine hcl], Promethazine, Sulfa antibiotics, Tape [adhesive tape], and Tetanus toxoids    SOCIAL Hx:  Social History     Socioeconomic History    Marital status:      Spouse name: mattie    Number of children: 5    Years of education: 12    Highest education level: Not on file   Occupational History    Not on file   Tobacco Use    Smoking status: Current Every Day Smoker     Packs/day: 1.00     Years: 14.00     Pack years: 14.00     Types: Cigarettes     Start date: 10/23/2004    Smokeless tobacco: Never Used    Tobacco comment: only smokes about 4 cigs a day   Vaping Use    Vaping Use: Never used   Substance and Sexual Activity    Alcohol use: No     Comment: occ    Drug use: No    Sexual activity: Never   Other Topics Concern    Not on file   Social History Narrative    ** Merged History Encounter ** Social Determinants of Health     Financial Resource Strain: Low Risk     Difficulty of Paying Living Expenses: Not hard at all   Food Insecurity: No Food Insecurity    Worried About Running Out of Food in the Last Year: Never true    Tamiko of Food in the Last Year: Never true   Transportation Needs:     Lack of Transportation (Medical): Not on file    Lack of Transportation (Non-Medical): Not on file   Physical Activity:     Days of Exercise per Week: Not on file    Minutes of Exercise per Session: Not on file   Stress:     Feeling of Stress : Not on file   Social Connections:     Frequency of Communication with Friends and Family: Not on file    Frequency of Social Gatherings with Friends and Family: Not on file    Attends Nondenominational Services: Not on file    Active Member of [address] [place] [address] or Organizations: Not on file    Attends Club or Organization Meetings: Not on file    Marital Status: Not on file   Intimate Partner Violence:     Fear of Current or Ex-Partner: Not on file    Emotionally Abused: Not on file    Physically Abused: Not on file    Sexually Abused: Not on file   Housing Stability:     Unable to Pay for Housing in the Last Year: Not on file    Number of [place] in the Last Year: Not on file    Unstable Housing in the Last Year: Not on file       ROS: Positive in bold  General:   Denies chills, fatigue, fever, malaise, night sweats or weight loss    Psychological:   Denies anxiety, disorientation or hallucinations    ENT:    Denies epistaxis, headaches, vertigo or visual changes    Cardiovascular:   Denies any chest pain, irregular heartbeats, or palpitations. No paroxysmal nocturnal dyspnea. Respiratory:   Denies shortness of breath, coughing, sputum production, hemoptysis, or wheezing. No orthopnea. Gastrointestinal:   Denies nausea, vomiting, diarrhea, or constipation. Denies any abdominal pain. Denies change in bowel habits or stools.       Genito-Urinary:    Denies any urgency, frequency, hematuria. Voiding without difficulty. Musculoskeletal:   Denies joint pain, joint stiffness, joint swelling or muscle pain    Neurology:    Denies any headache or focal neurological deficits. No weakness or paresthesia. Derm:    Denies any rashes, ulcers, or excoriations. Denies bruising. Extremities:   Denies any lower extremity swelling or edema. PHYSICAL EXAM: Abnormal findings noted  VITALS:  Vitals:    03/16/22 2322   BP: 127/64   Pulse: 82   Resp: 20   Temp:    SpO2: 95%         CONSTITUTIONAL:    Awake, alert, cooperative, no apparent distress, and appears stated age    EYES:     EOMI, sclera clear, conjunctiva normal    ENT:    Normocephalic, atraumatic,  External ears without lesions. Juan Case NECK:    Supple, symmetrical, trachea midline,  no JVD    HEMATOLOGIC/LYMPHATICS:    No cervical lymphadenopathy and no supraclavicular lymphadenopathy    LUNGS:    Symmetric. No increased work of breathing, good air exchange, clear to auscultation bilaterally, no wheezes, rhonchi, or rales,     CARDIOVASCULAR:    Normal apical impulse, regular rate and rhythm, normal S1 and S2, no S3 or S4, and no murmur noted    ABDOMEN:    soft, non-distended, non-tender    MUSCULOSKELETAL:    There is no redness, warmth, or swelling of the joints. Patient has weakness to right upper and lower extremities  Patient has numbness to the right upper and lower extremity    NEUROLOGIC:    Awake, alert, oriented to name, place and time. SKIN:    No bruising or bleeding. No redness, warmth, or swelling    EXTREMITIES:    Peripheral pulses present. No edema, cyanosis, or swelling.     LINES/CATHETERS     LABORATORY DATA:  CBC with Differential:    Lab Results   Component Value Date    WBC 11.5 03/16/2022    RBC 5.34 03/16/2022    HGB 16.1 03/16/2022    HCT 50.9 03/16/2022     03/16/2022    MCV 95.3 03/16/2022    MCH 30.1 03/16/2022    MCHC 31.6 03/16/2022    RDW 13.5 03/16/2022 SEGSPCT 82 03/02/2014    LYMPHOPCT 22.8 03/16/2022    MONOPCT 7.0 03/16/2022    BASOPCT 0.7 03/16/2022    MONOSABS 0.80 03/16/2022    LYMPHSABS 2.62 03/16/2022    EOSABS 0.11 03/16/2022    BASOSABS 0.08 03/16/2022     CMP:    Lab Results   Component Value Date     03/16/2022    K 3.8 03/16/2022     03/16/2022    CO2 24 03/16/2022    BUN 16 03/16/2022    CREATININE 0.6 03/16/2022    GFRAA >60 03/16/2022    LABGLOM >60 03/16/2022    GLUCOSE 99 03/16/2022    GLUCOSE 92 03/06/2012    PROT 8.4 03/16/2022    LABALBU 4.8 03/16/2022    LABALBU 4.6 03/06/2012    CALCIUM 9.9 03/16/2022    BILITOT 0.3 03/16/2022    ALKPHOS 125 03/16/2022    AST 24 03/16/2022    ALT 25 03/16/2022       ASSESSMENT/PLAN:  1. Stroke  2. History of MI with patient having cardiac catheterization in December 2021  3. COPD/asthma  4. Sleep apnea without use of CPAP due to claustrophobia  5. History of seizure disorder  6. History of peptic ulcer disease  7. Degenerative disc disease involving lumbar spine  8. Spinal stenosis  9. Hypertension  10. Osteoarthritis  11. Restless leg syndrome  12. GERD  13. Depression and anxiety  14. Current tobacco abuse  15. Patient presents for lightheadedness as well as right-sided weakness and numbness. Patient had extensive work-up in the ED performed which did not show evidence of stroke. Patient symptoms continued to persist.  Patient started on Plavix due to allergy to aspirin. Neurology consulted. PT OT consulted. Speech therapy consult. Echocardiogram ordered. MRI patient states she will not thyroid have been fully sedated. Routine blood work ordered.     Brisa Champagne [place]  12:35 AM  3/17/2022    Electronically signed by Brisa Champagne DO on 3/17/22 at 12:35 AM EDT

## 2022-03-17 NOTE — ED NOTES
CT called, states they will be ready in five minutes. Currently awaiting orders from provider.      Jaja Cohen RN  03/16/22 2000

## 2022-03-18 ENCOUNTER — TELEPHONE (OUTPATIENT)
Dept: FAMILY MEDICINE CLINIC | Age: 67
End: 2022-03-18

## 2022-03-18 ENCOUNTER — CARE COORDINATION (OUTPATIENT)
Dept: CASE MANAGEMENT | Age: 67
End: 2022-03-18

## 2022-03-18 LAB
EKG ATRIAL RATE: 87 BPM
EKG P AXIS: 72 DEGREES
EKG P-R INTERVAL: 132 MS
EKG Q-T INTERVAL: 398 MS
EKG QRS DURATION: 90 MS
EKG QTC CALCULATION (BAZETT): 478 MS
EKG R AXIS: 46 DEGREES
EKG T AXIS: 60 DEGREES
EKG VENTRICULAR RATE: 87 BPM

## 2022-03-18 PROCEDURE — 93010 ELECTROCARDIOGRAM REPORT: CPT | Performed by: INTERNAL MEDICINE

## 2022-03-18 NOTE — DISCHARGE INSTR - COC
Continuity of Care Form    Patient Name: Jenn Moseley   :  1955  MRN:  [id number]    Admit date:  3/16/2022  Discharge date:  ***    Code Status Order: Full Code   Advance Directives:      Admitting Physician:  Maite Calabrese DO  PCP: Sarah Vital DO    Discharging Nurse: [facility] Unit/Room#: 2743/4372-43  Discharging Unit Phone Number: ***    Emergency Contact:   Extended Emergency Contact Information  Primary Emergency Contact: [facility]  Address: [address], [address] Phone: [phone]  Work Phone: [phone]  Mobile Phone: [phone]  Relation: Spouse    Past Surgical History:  Past Surgical History:   Procedure Laterality Date    ABDOMEN SURGERY      ABSCESS DRAINAGE Left 2016    left thigh seroma    [address]    right hand    CARPAL TUNNEL RELEASE Left 2017     SECTION  3/9/1984    CHOLECYSTECTOMY      CHOLECYSTECTOMY      COLONOSCOPY  3/2011    DILATATION, ESOPHAGUS      DILATION AND CURETTAGE OF UTERUS      x4    ENDOSCOPY, COLON, DIAGNOSTIC      JOINT REPLACEMENT  2003    right    JOINT REPLACEMENT  2003    left knee    JOINT REPLACEMENT      bilat knee replacement    KNEE ARTHROSCOPY      bilateral    OTHER SURGICAL HISTORY  16    closed reduction nasal bone fracture    OTHER SURGICAL HISTORY Left 6 2 16    seroma incision and drainage thigh    [place] LAP,DIAGNOSTIC ABDOMEN N/A 2018    DIAGNOSTIC LAPAROSCOPY, LYSIS OF ADHESIONS performed by Lisha Brar MD at [address] [place] [address] Drive  2005    Dr. Bari Flores ARTHROSCOPY Right 1 2 15    rotator cuff repair, subacromial decompression, debridement    SHOULDER SURGERY      left    TONSILLECTOMY      UPPER GASTROINTESTINAL ENDOSCOPY  10/2004    UPPER GASTROINTESTINAL ENDOSCOPY  12    [facility]    UPPER GASTROINTESTINAL ENDOSCOPY N/A 2018    EGD BIOPSY performed by Yamilka Gupta MD at [address] [place] [address],[facility] N/A 1/2/2019    EGD ESOPHAGOGASTRODUODENOSCOPY performed by Basilio Fontenot MD at [address] [place] [address] ARTHROSCOPY Right 12/02/2015    DEBRIDEMENT ASPIRATION; RIGHT DEQUARVAINES RELEASE    WRIST SURGERY Right 12/2/15       Immunization History:   Immunization History   Administered Date(s) Administered    COVID-19, Rosa Nani, Primary or Immunocompromised, PF, 100mcg/0.5mL 03/02/2021, 03/30/2021, 12/19/2021    Influenza Virus Vaccine 10/31/2014    Influenza, High Dose (Fluzone 65 yrs and older) 11/04/2020    Influenza, High-dose, Quadv, 65 yrs +, IM (Fluzone) 11/04/2020    Influenza, Quadv, 6 mo and older, IM, PF (Flulaval, Fluarix) 11/19/2018    Influenza, Quadv, adjuvanted, 65 yrs +, IM, PF (Fluad) 12/19/2021    Influenza, Triv, inactivated, subunit, adjuvanted, IM (Fluad 65 yrs and older) 12/16/2019    Pneumococcal Conjugate 13-valent ([id number]) 11/27/2018    Pneumococcal Polysaccharide ([id number]) 11/02/2014, 12/16/2019       Active Problems:  Patient Active Problem List   Diagnosis Code    GERD (gastroesophageal reflux disease) K21.9    History of tobacco abuse Z87.891    History of bariatric surgery Z98.84    Asthma with COPD ([facility] J44.9    Personal history of gastric bypass G14.36    Uncomplicated asthma Q33.363    RLS (restless legs syndrome) G25.81    Spinal stenosis of lumbosacral region M48.07    Recurrent depression ([address] F33.9    Essential hypertension I10    Stroke determined by clinical assessment ([address] I63.9    Acute CVA (cerebrovascular accident) ([address] I63.9    Near syncope R55       Isolation/Infection:   Isolation            No Isolation          Patient Infection Status       None to display            Nurse Assessment:  Last Vital Signs: BP (!) 147/70   Pulse 93   Temp 97.4 °F (36.3 °C) (Oral)   Resp 18   LMP  (LMP Unknown)   SpO2 94%     Last documented pain score (0-10 scale): Pain Level: 4  Last Weight:   Wt Readings from Last 1 Encounters:   22 231 lb 14.4 oz (105.2 kg)     Mental Status:  {IP PT MENTAL STATUS:}    IV Access:  [address] IV ACCESS:067552522}    Nursing Mobility/ADLs:  Walking   {CHP DME ZGGR:840577000}  Transfer  {CHP DME VSJD:317525730}  Bathing  {CHP DME XQMY:431323627}  Dressing  {CHP DME SQGI:540824822}  Toileting  {CHP DME YQKD:329654858}  Feeding  {CHP DME JGXV:255163008}  Med Admin  {CHP DME VCUN:019473418}  Med Delivery   {[facility] MED Delivery:395571773}    Wound Care Documentation and Therapy:        Elimination:  Continence: Bowel: {YES / JZ:21836}  Bladder: {YES / JH:54827}  Urinary Catheter: {Urinary Catheter:747739679}   Colostomy/Ileostomy/Ileal Conduit: {YES / SC:96883}       Date of Last BM: ***    Intake/Output Summary (Last 24 hours) at 3/17/2022 2013  Last data filed at 3/17/2022 0823  Gross per 24 hour   Intake 240 ml   Output --   Net 240 ml     I/O last 3 completed shifts:   In: 240 [P.O.:240]  Out: -     Safety Concerns:     [address] Safety Concerns:933192837}    Impairments/Disabilities:      [address] Impairments/Disabilities:099257224}    Nutrition Therapy:  Current Nutrition Therapy:   [address] Diet List:904865672}    Routes of Feeding: {CHP DME Other Feedings:963085643}  Liquids: {Slp liquid thickness:65231}  Daily Fluid Restriction: {CHP DME Yes amt example:018881949}  Last Modified Barium Swallow with Video (Video Swallowing Test): {Done Not Done XJYO:472215403}    Treatments at the Time of Hospital Discharge:   Respiratory Treatments: ***  Oxygen Therapy:  {Therapy; copd oxygen:88945}  Ventilator:    { CC Vent RAJV:149600741}    Rehab Therapies: {THERAPEUTIC INTERVENTION:6045037361}  Weight Bearing Status/Restrictions: [address] Weight Bearin}  Other Medical Equipment (for information only, NOT a DME order):  {EQUIPMENT:463384420}  Other Treatments: ***    Patient's personal belongings (please select all that are sent with patient):  {[facility] DME Belongings:660851566}    RN SIGNATURE:  {Esignature:259339388}    CASE MANAGEMENT/SOCIAL WORK SECTION    Inpatient Status Date: ***    Readmission Risk Assessment Score:  Readmission Risk              Risk of Unplanned Readmission:  15           Discharging to Facility/ Agency   Name:   Address:  Phone:  Fax:    Dialysis Facility (if applicable)   Name:  Address:  Dialysis Schedule:  Phone:  Fax:    / signature: {Esignature:241093629}    PHYSICIAN SECTION    Prognosis: {Prognosis:3646206410}    Condition at Discharge: [address] [place] [address] Patient Condition:887106194}    Rehab Potential (if transferring to Rehab): {Prognosis:8827396862}    Recommended Labs or Other Treatments After Discharge: ***    Physician Certification: I certify the above information and transfer of Constantine Grimm  is necessary for the continuing treatment of the diagnosis listed and that she requires {Admit to Appropriate Level of Care:77306} for {GREATER/LESS:088253947} 30 days.      Update Admission H&P: {CHP DME Changes in VDFPE:896413624}    PHYSICIAN SIGNATURE:  {Esignature:921620901}

## 2022-03-18 NOTE — SIGNIFICANT EVENT
I was paged about patient wanting to discuss discharge. I spoke to patient and patient had stated her symptoms were still present. She was still experiencing lightheadedness and dizziness with ambulation. Additionally she was still having numbness of right-sided extremities as well as significant weakness. Physical therapy had recommended that she participate in inpatient rehab due to her significant disability. We also discussed cardiac evaluation per neurology recommendation. Patient stated she would follow-up with her cardiologist tomorrow. Patient also was advised to follow-up with PCP as soon as possible. All of the above was discussed with the patient. We stressed continued hospitalization and eventual discharge to inpatient rehab. However she refused. We have noted additional discharge recommendations on her discharge instructions.       Melinda Chew DO

## 2022-03-18 NOTE — CARE COORDINATION
[facility] Transitions Initial Follow Up Call    Call within 2 business days of discharge: Yes    Patient: Nate Summers Patient : 1955   MRN: [id number]  Reason for Admission: CVA, [address]   Discharge Date: 3/17/22 RARS: Readmission Risk Score: 10 ( )      Last Discharge [facility]       Complaint Diagnosis Description Type Department Provider    3/16/22 Headache; Blurred Vision; Numbness Cerebrovascular accident (CVA), unspecified mechanism [facility]) ED to Hosp-Admission (Discharged) (ADMITTED) DO Tom; Wyn Homans. .. First attempt to reach the patient for initial Care Transition call post hospital discharge. Message left with CTN's contact information requesting return phone call. Will route a message to [facility] office staff requesting they attempt to contact the patient to schedule TCM visit within 7 days of discharge. Follow Up  No future appointments.     Radha Weiner RN

## 2022-03-18 NOTE — TELEPHONE ENCOUNTER
Hi,   Care transition nurse unable to reach, please attempt to reach and schedule TCM visit within 7 days of discharge, discharged 3/17/22. I will attempt to reach again on Monday, 3/21/22.     Thank you! Donnell LAMBN, RN, Care Transition Nurse     Routing comment      I called patient and left detailed msg on machine informing patient needs TCM appt. Informed that Dr. Sim Salazar last day at [facility] is 1/27/06 and to be certain to either contact his new practice at [phone] to make appt with him, or to call [facility] to establish care with a new provider.

## 2022-03-21 ENCOUNTER — CARE COORDINATION (OUTPATIENT)
Dept: CASE MANAGEMENT | Age: 67
End: 2022-03-21

## 2022-03-21 NOTE — CARE COORDINATION
[facility] Transitions Initial Follow Up Call    Call within 2 business days of discharge: Yes    Patient: Sonia Garcia Patient : 1955   MRN: [id number]  Reason for Admission: CVA  Discharge Date: 3/17/22 RARS: Readmission Risk Score: 10 ( )      Last Discharge [facility]       Complaint Diagnosis Description Type Department Provider    3/16/22 Headache; Blurred Vision; Numbness Cerebrovascular accident (CVA), unspecified mechanism [facility]) ED to Hosp-Admission (Discharged) (ADMITTED) DO Tom; Shireen Maddox. .. Second and final attempt to reach the patient for initial Care Transition call post hospital discharge. Message left with CTN's contact information requesting return phone call.      Follow Up  Future Appointments   Date Time Provider Gricelda Villanueva   3/22/2022 10:30 AM Sara Madison DO [facility]   2022  2:30 PM Sara Madison DO [facility] PAULO STOCK [address] Benjy Leroy RN

## 2022-03-26 NOTE — DISCHARGE SUMMARY
Internal Medicine Progress Note     [facility]     Bogdan Kandis. Mary Hensley., F.A.C.OSanketISanket Corrigan D.O., F.VIDYA.CSanketOSanketISanket Lion D.O. Leslie Espinosa, MSN, APRN, NP-C  Evaristo Kee. Tabby Arellano, MSN, [id number] [facility]       Internal Medicine  Discharge Summary    NAME: Stalin Mansfield  :  1955  MRN:  [id number]  PCP:Diego Pritchard DO  ADMITTED: 3/16/2022      DISCHARGED: 3/26/22    ADMITTING PHYSICIAN: Janeen att. providers found    CONSULTANT(S):   IP CONSULT TO NEUROLOGY     ADMITTING DIAGNOSIS:   Stroke determined by clinical assessment ([facility] [address] [I63.9]  Cerebrovascular accident (CVA), unspecified mechanism ([facility] [address] [I63.9]     DISCHARGE DIAGNOSES:   1. Strokelike symptoms with CVA being ruled out with negative MRI but with persistent clinical deficits  2. History of coronary artery disease  3. Not oxygen dependent COPD  4. Obstructive sleep apnea with inability to use CPAP secondary to claustrophobia  5. History of seizure disorder  6. History of peptic ulcer disease  7. Degenerative disc disease involving lumbar spine  8. Essential hypertension with outpatient noncompliance  9. Depression and anxiety  10. Current tobacco abuse    BRIEF HISTORY OF PRESENT ILLNESS:   Patient is a 58-year-old female presented to the ED due to lightheadedness or dizziness. Patient also has been experiencing pain right-sided numbness and tingling. Patient symptoms began earlier today while eating lunch at around 2 p.m. She developed lightheadedness and dizziness. She also developed right-sided numbness and tingling as well as weakness. She denies any history of previous stroke. States that symptoms have improved but t have not completely resolved. She denies any fever chills. Patient denies any nausea emesis. Patient has chronic diarrhea.     LABS[de-identified]  Lab Results   Component Value Date    WBC 7.4 2022    HGB 13.3 2022    HCT 41.6 2022     2022    NA 139 03/17/2022    K 3.7 03/17/2022     03/17/2022    CREATININE 0.5 03/17/2022    BUN 13 03/17/2022    CO2 22 03/17/2022    GLUCOSE 111 (H) 03/17/2022    ALT 18 03/17/2022    AST 15 03/17/2022    INR 1.0 03/16/2022     Lab Results   Component Value Date    INR 1.0 03/16/2022    INR 1.0 11/03/2021    INR 1.0 12/31/2015    PROTIME 11.4 03/16/2022    PROTIME 11.9 11/03/2021    PROTIME 10.6 12/31/2015      Lab Results   Component Value Date    TSH 2.120 03/17/2022     Lab Results   Component Value Date    TRIG 92 03/17/2022    TRIG 145 02/11/2022    TRIG 96 11/19/2018     Lab Results   Component Value Date    HDL 41 03/17/2022    HDL 58 02/11/2022    HDL 45 11/19/2018     Lab Results   Component Value Date    LDLCALC 64 03/17/2022    [address] 02/11/2022    LDLCALC 41 11/19/2018     Lab Results   Component Value Date    LABA1C 6.1 (H) 03/17/2022       IMAGING:  Echo Complete    Result Date: 3/17/2022  Transthoracic Echocardiography Report (TTE)  Demographics   Patient Name      Jayashree Jose Gender              Female                    L   Medical Record    [id number]         Room Number         0422  Number   Account #         [de-identified]        Procedure Date      03/17/2022   Corporate ID                       Ordering Physician  Josie Lincoln DO   Accession Number  [id number]       Referring Physician   Date of Birth     1955       Sonographer         Krishna Cam RDCS   Age               69 year(s)       Interpreting        Josie Lincoln DO                                     Physician                                      Any Other  Procedure Type of Study   TTE procedure:Echo Complete W/Doppler & Color Flow. Procedure Date Date: 03/17/2022 Start: 12:07 PM Study Location: Echo Lab Technical Quality: Poor visualization due to body habitus. Indications:CVA.  Patient Status: Routine Height: 60 inches Weight: 210 pounds BSA: 1.91 m^2 BMI: 41.01 kg/m^2  Findings   Left Ventricle  Left ventricular size is grossly normal.  Mild left ventricular concentric hypertrophy noted. Ejection fraction is visually estimated at 74%. No evidence of left ventricular mass or thrombus noted. No regional wall motion abnormalities seen. Mildly hyperdynamic left ventricle   Right Ventricle  Borderline dilated right ventricle. Left Atrium  The left atrium is borderline dilated. Interatrial septum appears intact. Right Atrium  Normal right atrium size. Mitral Valve  Physiologic and/or trace mitral regurgitation is present. Mitral annular calcification is present. Mild mitral stenosis. Tricuspid Valve  Physiologic and/or trace tricuspid regurgitation. RVSP is 28.58 mmHg. Aortic Valve  The aortic valve is trileaflet. The aortic valve appears mildly sclerotic. No evidence of aortic valve regurgitation. No hemodynamically significant aortic stenosis is present. Pulmonic Valve  Pulmonic valve is structurally normal.   Pericardial Effusion  No evidence of pericardial effusion. Aorta  The aorta is within normal limits. Miscellaneous  Regular rhythm. Conclusions   Summary  Left ventricular size is grossly normal.  Mild left ventricular concentric hypertrophy noted. Ejection fraction is visually estimated at 74%. No evidence of left ventricular mass or thrombus noted. No regional wall motion abnormalities seen. Mildly hyperdynamic left ventricle  The left atrium is borderline dilated. Interatrial septum appears intact. Borderline dilated right ventricle. Physiologic and/or trace mitral regurgitation is present. Mitral annular calcification is present. Mild mitral stenosis. The aortic valve is trileaflet. The aortic valve appears mildly sclerotic. No evidence of aortic valve regurgitation. No hemodynamically significant aortic stenosis is present. Physiologic and/or trace tricuspid regurgitation. RVSP is 28.58 mmHg. Regular rhythm.    Signature ----------------------------------------------------------------  Electronically signed by Rivera GOMEZInterpreting  physician) on 03/17/2022 06:09 PM  ----------------------------------------------------------------  M-Mode/2D Measurements & Calculations   LV Diastolic    LV Systolic Dimension: 2.1   AV Cusp Separation: 1.9 cmLA  Dimension: 3.6  cm                           Dimension: 3.6 cmAO Root  cm              LV Volume Diastolic: 06.9 ml Dimension: 2.9 cm  LV FS:41.7 %    LV Volume Systolic: 17.3 ml  LV PW           LV EDV/LV EDV Index: 69.8  Diastolic: 1.3  WI/01 CB/H^9XU ESV/LV ESV  cm              Index: 14.4 ml/8ml/ m^2      RV Diastolic Dimension: 3.3  LV PW Systolic: EF Calculated: 73.2 %        cm  1.8 cm          LV Mass Index: 79 l/min*m^2  RV Systolic Dimension: 2.4 cm  Septum                                       LA/Aorta: 0.97  Diastolic: 1.2  cm              LVOT: 2 cm                   LA volume/Index: 86.2 ml  Septum  Systolic: 1.7  cm   LV Mass: 150.63  g  Doppler Measurements & Calculations   MV Peak E-Wave:   AV Peak Velocity: 1.28 m/s    LVOT Peak Velocity: 1.47  0.96 m/s          AV Peak Gradient: 6.59 mmHg   m/s  MV Peak A-Wave:   AV Mean Velocity: 0.97 m/s    LVOT Mean Velocity: 1.09  1.58 m/s          AV Mean Gradient: 4 mmHg      m/s  MV E/A Ratio:     AV VTI: 26.9 cm               LVOT Peak Gradient: 8.7  0.61              AV Area (Continuity):3.81     mmHgLVOT Mean Gradient:  MV Peak Gradient: cm^2                          5.3 mmHg  10.8 mmHg                                       Estimated RVSP: 28.6 mmHg  MV Mean Gradient: LVOT VTI: 32.6 cm             Estimated RAP:10 mmHg  3.6 mmHg  MV Mean Velocity: Estimated PASP: 28.58 mmHg  0.88 m/s          Pulm. Vein A Reversal         TR Velocity:2.16 m/s  MV Deceleration   Duration:152.2 msec           TR Gradient:18.58 mmHg  Time: 205.5 msec  Pulm. Vein D Velocity:0.26    PV Peak Velocity: 1.01 m/s  MV P1/2t: 156.9   m/sPulm.  Vein A Reversal      PV Peak Gradient: 4.06  msec              Velocity:0.33 m/s             mmHg  MVA by PHT:1.4    Pulm. Vein S Velocity: 0.38   PV Mean Velocity: 0.75 m/s  cm^2              m/s                           PV Mean Gradient: 2.4 mmHg  MV Area  (continuity): 2.1  cm^2  [email or web address] [id number]    CT HEAD WO CONTRAST    Result Date: 3/16/2022  EXAMINATION: CT OF THE HEAD WITHOUT CONTRAST; CT OF THE HEAD WITH CONTRAST; CTA OF THE HEAD WITH CONTRAST 3/16/2022 8:15 pm: TECHNIQUE: CT of the head was performed without the administration of intravenous contrast. Dose modulation, iterative reconstruction, and/or weight based adjustment of the mA/kV was utilized to reduce the radiation dose to as low as reasonably achievable.; CT of the head/brain was performed with the administration of intravenous contrast. Multiplanar reformatted images are provided for review. Dose modulation, iterative reconstruction, and/or weight based adjustment of the mA/kV was utilized to reduce the radiation dose to as low as reasonably achievable.; CTA of the head/brain was performed with the administration of intravenous contrast. Multiplanar reformatted images are provided for review. MIP images are provided for review. Dose modulation, iterative reconstruction, and/or weight based adjustment of the mA/kV was utilized to reduce the radiation dose to as low as reasonably achievable. Noncontrast CT of the head with reconstructed 2-D images are also provided for review. COMPARISON: None. HISTORY: ORDERING SYSTEM PROVIDED HISTORY: Evaluate intracranial abnormality TECHNOLOGIST PROVIDED HISTORY: Has a \"code stroke\" or \"stroke alert\" been called? ->Yes Reason for exam:->Evaluate intracranial abnormality Decision Support Exception - unselect if not a suspected or confirmed emergency medical condition->Emergency Medical Condition (MA); ORDERING SYSTEM PROVIDED HISTORY: Strokelike symptoms TECHNOLOGIST PROVIDED HISTORY: Reason for exam:->Strokelike symptoms Decision Support Exception - unselect if not a suspected or confirmed emergency medical condition->Emergency Medical Condition (MA); ORDERING SYSTEM PROVIDED HISTORY: Strokelike symptoms TECHNOLOGIST PROVIDED HISTORY: Reason for exam:->Strokelike symptoms Has a \"code stroke\" or \"stroke alert\" been called? ->Yes Decision Support Exception - unselect if not a suspected or confirmed emergency medical condition->Emergency Medical Condition (MA) FINDINGS: CT HEAD: BRAIN/VENTRICLES:  No mass effect, edema or hemorrhage is seen. Mild volume loss is seen in the cerebrum with mild chronic microvascular ischemic changes. Enlarged perivascular space lacunar infarction is seen the right inferior basal ganglia. No hydrocephalus or extra-axial fluid is seen. ORBITS: Prosthetic lenses are seen in the globes bilaterally. The orbits are otherwise grossly unremarkable. A of this is reached sing radiologist S1 which at 2 rib SINUSES:  The visualized paranasal sinuses and mastoid air cells demonstrate no acute abnormality. SOFT TISSUES/SKULL: No acute abnormality of the visualized skull or soft tissues. CTA HEAD: ANTERIOR CIRCULATION: No significant stenosis of the intracranial internal carotid, anterior cerebral, or middle cerebral arteries. No aneurysm. POSTERIOR CIRCULATION: No significant stenosis of the vertebral, basilar, or posterior cerebral arteries. No aneurysm. OTHER: No dural venous sinus thrombosis on this non-dedicated study. CT PERFUSION: EXAM QUALITY: Due to technical failure, the perfusion imaging suboptimal.  No gross asymmetry is seen in the cerebral blood flow, cerebral volume or the mean transit time. NONCONTRAST CT OF THE HEAD: 1.  No acute intracranial abnormality. 2. Findings were conveyed to Dr. Ricky Tillman at 9:15 p.m. on 03/16/2022. CT PERFUSION OF THE HEAD: 1.  Limited study due to technical failure. 2. No gross asymmetry in blood flow, blood volume or mean transit time noted. CTA OF THE HEAD: 1. Unremarkable CTA of the head. RECOMMENDATIONS: Unavailable     XR CHEST PORTABLE    Result Date: 3/16/2022  EXAMINATION: ONE XRAY VIEW OF THE CHEST 3/16/2022 8:40 pm COMPARISON: 10/18/2021 HISTORY: ORDERING SYSTEM PROVIDED HISTORY: ams TECHNOLOGIST PROVIDED HISTORY: Reason for exam:->ams FINDINGS: The lungs are without acute focal process. There is no effusion or pneumothorax. The cardiomediastinal silhouette is without acute process. The osseous structures are without acute process. No acute process. CTA NECK W CONTRAST    Result Date: 3/16/2022  EXAMINATION: CTA OF THE NECK 3/16/2022 8:15 pm TECHNIQUE: CTA of the neck was performed with the administration of intravenous contrast. Multiplanar reformatted images are provided for review. MIP images are provided for review. Stenosis of the internal carotid arteries measured using NASCET criteria. Dose modulation, iterative reconstruction, and/or weight based adjustment of the mA/kV was utilized to reduce the radiation dose to as low as reasonably achievable. COMPARISON: None. HISTORY: ORDERING SYSTEM PROVIDED HISTORY: Strokelike symptoms TECHNOLOGIST PROVIDED HISTORY: Reason for exam:->Strokelike symptoms Has a \"code stroke\" or \"stroke alert\" been called? ->Yes Decision Support Exception - unselect if not a suspected or confirmed emergency medical condition->Emergency Medical Condition (MA) FINDINGS: AORTIC ARCH/ARCH VESSELS: No dissection or arterial injury. No significant stenosis of the brachiocephalic or subclavian arteries. CAROTID ARTERIES: No dissection, arterial injury, or hemodynamically significant stenosis by NASCET criteria. VERTEBRAL ARTERIES: No dissection, arterial injury, or significant stenosis. SOFT TISSUES: The lung apices are clear. No cervical or superior mediastinal lymphadenopathy.   The larynx and pharynx are unremarkable. No acute abnormality of the salivary and thyroid glands. BONES: No acute osseous abnormality. Unremarkable CTA of the neck. CT BRAIN PERFUSION    Result Date: 3/16/2022  EXAMINATION: CT OF THE HEAD WITHOUT CONTRAST; CT OF THE HEAD WITH CONTRAST; CTA OF THE HEAD WITH CONTRAST 3/16/2022 8:15 pm: TECHNIQUE: CT of the head was performed without the administration of intravenous contrast. Dose modulation, iterative reconstruction, and/or weight based adjustment of the mA/kV was utilized to reduce the radiation dose to as low as reasonably achievable.; CT of the head/brain was performed with the administration of intravenous contrast. Multiplanar reformatted images are provided for review. Dose modulation, iterative reconstruction, and/or weight based adjustment of the mA/kV was utilized to reduce the radiation dose to as low as reasonably achievable.; CTA of the head/brain was performed with the administration of intravenous contrast. Multiplanar reformatted images are provided for review. MIP images are provided for review. Dose modulation, iterative reconstruction, and/or weight based adjustment of the mA/kV was utilized to reduce the radiation dose to as low as reasonably achievable. Noncontrast CT of the head with reconstructed 2-D images are also provided for review. COMPARISON: None. HISTORY: ORDERING SYSTEM PROVIDED HISTORY: Evaluate intracranial abnormality TECHNOLOGIST PROVIDED HISTORY: Has a \"code stroke\" or \"stroke alert\" been called? ->Yes Reason for exam:->Evaluate intracranial abnormality Decision Support Exception - unselect if not a suspected or confirmed emergency medical condition->Emergency Medical Condition (MA); ORDERING SYSTEM PROVIDED HISTORY: Strokelike symptoms TECHNOLOGIST PROVIDED HISTORY: Reason for exam:->Strokelike symptoms Decision Support Exception - unselect if not a suspected or confirmed emergency medical condition->Emergency Medical Condition (MA); ORDERING SYSTEM PROVIDED HISTORY: Strokelike symptoms TECHNOLOGIST PROVIDED HISTORY: Reason for exam:->Strokelike symptoms Has a \"code stroke\" or \"stroke alert\" been called? ->Yes Decision Support Exception - unselect if not a suspected or confirmed emergency medical condition->Emergency Medical Condition (MA) FINDINGS: CT HEAD: BRAIN/VENTRICLES:  No mass effect, edema or hemorrhage is seen. Mild volume loss is seen in the cerebrum with mild chronic microvascular ischemic changes. Enlarged perivascular space lacunar infarction is seen the right inferior basal ganglia. No hydrocephalus or extra-axial fluid is seen. ORBITS: Prosthetic lenses are seen in the globes bilaterally. The orbits are otherwise grossly unremarkable. A of this is reached sing radiologist S1 which at 2 rib SINUSES:  The visualized paranasal sinuses and mastoid air cells demonstrate no acute abnormality. SOFT TISSUES/SKULL: No acute abnormality of the visualized skull or soft tissues. CTA HEAD: ANTERIOR CIRCULATION: No significant stenosis of the intracranial internal carotid, anterior cerebral, or middle cerebral arteries. No aneurysm. POSTERIOR CIRCULATION: No significant stenosis of the vertebral, basilar, or posterior cerebral arteries. No aneurysm. OTHER: No dural venous sinus thrombosis on this non-dedicated study. CT PERFUSION: EXAM QUALITY: Due to technical failure, the perfusion imaging suboptimal.  No gross asymmetry is seen in the cerebral blood flow, cerebral volume or the mean transit time. NONCONTRAST CT OF THE HEAD: 1.  No acute intracranial abnormality. 2. Findings were conveyed to Dr. Martha Ruiz at 9:15 p.m. on 03/16/2022. CT PERFUSION OF THE HEAD: 1. Limited study due to technical failure. 2. No gross asymmetry in blood flow, blood volume or mean transit time noted. CTA OF THE HEAD: 1. Unremarkable CTA of the head.  RECOMMENDATIONS: Unavailable     CTA HEAD W CONTRAST    Result Date: 3/16/2022  EXAMINATION: CT OF THE HEAD WITHOUT CONTRAST; CT OF THE HEAD WITH CONTRAST; CTA OF THE HEAD WITH CONTRAST 3/16/2022 8:15 pm: TECHNIQUE: CT of the head was performed without the administration of intravenous contrast. Dose modulation, iterative reconstruction, and/or weight based adjustment of the mA/kV was utilized to reduce the radiation dose to as low as reasonably achievable.; CT of the head/brain was performed with the administration of intravenous contrast. Multiplanar reformatted images are provided for review. Dose modulation, iterative reconstruction, and/or weight based adjustment of the mA/kV was utilized to reduce the radiation dose to as low as reasonably achievable.; CTA of the head/brain was performed with the administration of intravenous contrast. Multiplanar reformatted images are provided for review. MIP images are provided for review. Dose modulation, iterative reconstruction, and/or weight based adjustment of the mA/kV was utilized to reduce the radiation dose to as low as reasonably achievable. Noncontrast CT of the head with reconstructed 2-D images are also provided for review. COMPARISON: None. HISTORY: ORDERING SYSTEM PROVIDED HISTORY: Evaluate intracranial abnormality TECHNOLOGIST PROVIDED HISTORY: Has a \"code stroke\" or \"stroke alert\" been called? ->Yes Reason for exam:->Evaluate intracranial abnormality Decision Support Exception - unselect if not a suspected or confirmed emergency medical condition->Emergency Medical Condition (MA); ORDERING SYSTEM PROVIDED HISTORY: Strokelike symptoms TECHNOLOGIST PROVIDED HISTORY: Reason for exam:->Strokelike symptoms Decision Support Exception - unselect if not a suspected or confirmed emergency medical condition->Emergency Medical Condition (MA); ORDERING SYSTEM PROVIDED HISTORY: Strokelike symptoms TECHNOLOGIST PROVIDED HISTORY: Reason for exam:->Strokelike symptoms Has a \"code stroke\" or \"stroke alert\" been called? ->Yes Decision Support Exception - unselect if not a suspected or confirmed emergency medical condition->Emergency Medical Condition (MA) FINDINGS: CT HEAD: BRAIN/VENTRICLES:  No mass effect, edema or hemorrhage is seen. Mild volume loss is seen in the cerebrum with mild chronic microvascular ischemic changes. Enlarged perivascular space lacunar infarction is seen the right inferior basal ganglia. No hydrocephalus or extra-axial fluid is seen. ORBITS: Prosthetic lenses are seen in the globes bilaterally. The orbits are otherwise grossly unremarkable. A of this is reached sing radiologist S1 which at 2 rib SINUSES:  The visualized paranasal sinuses and mastoid air cells demonstrate no acute abnormality. SOFT TISSUES/SKULL: No acute abnormality of the visualized skull or soft tissues. CTA HEAD: ANTERIOR CIRCULATION: No significant stenosis of the intracranial internal carotid, anterior cerebral, or middle cerebral arteries. No aneurysm. POSTERIOR CIRCULATION: No significant stenosis of the vertebral, basilar, or posterior cerebral arteries. No aneurysm. OTHER: No dural venous sinus thrombosis on this non-dedicated study. CT PERFUSION: EXAM QUALITY: Due to technical failure, the perfusion imaging suboptimal.  No gross asymmetry is seen in the cerebral blood flow, cerebral volume or the mean transit time. NONCONTRAST CT OF THE HEAD: 1.  No acute intracranial abnormality. 2. Findings were conveyed to Dr. Derrell Gilliam at 9:15 p.m. on 03/16/2022. CT PERFUSION OF THE HEAD: 1. Limited study due to technical failure. 2. No gross asymmetry in blood flow, blood volume or mean transit time noted. CTA OF THE HEAD: 1. Unremarkable CTA of the head. RECOMMENDATIONS: Unavailable     MRI BRAIN WO CONTRAST    Result Date: 3/17/2022  EXAMINATION: MRI OF THE BRAIN WITHOUT CONTRAST  3/17/2022 11:22 am TECHNIQUE: Multiplanar multisequence MRI of the brain was performed without the administration of intravenous contrast. COMPARISON: CT brain performed 03/16/2022.  HISTORY: ORDERING SYSTEM PROVIDED HISTORY: stroke TECHNOLOGIST PROVIDED HISTORY: Reason for exam:->stroke What is the sedation requirement?->Sedation FINDINGS: INTRACRANIAL STRUCTURES/VENTRICLES: The sellar and suprasellar structures, optic chiasm, corpus callosum, pineal gland, tectum, and midline brainstem structures are unremarkable. The craniocervical junction is unremarkable. There is no acute hemorrhage, mass effect, or midline shift. There is satisfactory overall gray-white matter differentiation. There is chronic microvascular disease. The ventricular structures are symmetric and unremarkable. The infratentorial structures including the cerebellopontine angles and internal auditory canals are unremarkable. There is no abnormal restricted diffusion. There is no abnormal blooming artifact on susceptibility weighted imaging. ORBITS: The visualized portion of the orbits demonstrate no acute abnormality. SINUSES: The visualized paranasal sinuses and mastoid air cells demonstrate no acute abnormality. BONES/SOFT TISSUES: The bone marrow signal intensity appears normal. The soft tissues demonstrate no acute abnormality. Chronic microvascular disease without acute intracranial abnormality. HOSPITAL COURSE:   Unfortunately, the patient was extremely anxious for discharge and left the hospital before being deemed appropriate for discharge. Strokelike work-up was undertaken and MRI was negative for acute CVA. In the setting of her physical debility, physical therapy team suggested the possibility of inpatient rehabilitation. The patient wished against this and requested to leave. We attempted to convince her to maintain hospitalization without success. While she was pleasant about this, this type of behavior portends poor prognosis longitudinally. Particular in the setting of her multiple underlying issues.      BRIEF PHYSICAL EXAMINATION AND LABORATORIES ON DAY OF DISCHARGE:  VITALS:  BP (!) 147/70   Pulse 93   Temp 97.4 °F agreeable with the plan for discharge to home with recommendations for ongoing hospitalization and rehabilitation. DISCHARGE MEDICATIONS:   Discharge Medication List as of 3/17/2022  8:12 PM           Details   aspirin 81 MG chewable tablet Take 1 tablet by mouth daily, Disp-30 tablet, R-3Normal      atorvastatin (LIPITOR) 40 MG tablet Take 1 tablet by mouth nightly, Disp-30 tablet, R-3Normal              Details   albuterol sulfate HFA (PROVENTIL HFA) 108 (90 Base) MCG/ACT inhaler Inhale 2 puffs into the lungs every 6 hours as needed for Wheezing, Disp-1 each, R-1Normal      rOPINIRole (REQUIP) 1 MG tablet Take 1 tablet by mouth nightly, Disp-30 tablet, R-2Normal      vitamin D (ERGOCALCIFEROL) 1.25 MG (95403 UT) CAPS capsule Take 1 capsule by mouth once a week, Disp-12 capsule, R-1Normal      venlafaxine (EFFEXOR XR) 37.5 MG extended release capsule Take 1 capsule by mouth daily, Disp-30 capsule, R-2Normal      traZODone (DESYREL) 50 MG tablet Take 1 tablet by mouth nightly as needed for Sleep, Disp-30 tablet, R-0Normal      esomeprazole (NEXIUM) 40 MG delayed release capsule Take 1 capsule by mouth every morning (before breakfast), Disp-90 capsule, R-1Normal             FOLLOW UP/INSTRUCTIONS:  · This patient is instructed to follow-up with her primary care physician. · Patient is instructed to follow-up with the consults listed above as directed by them. · she is instructed to resume home medications and take new medications as indicated in the list above. · If the patient has a recurrence of symptoms, she is instructed to go to the ED. Preparing for this patient's discharge, including paperwork, orders, instructions, and meeting with patient did require > 40 minutes.     Hayden Fisher DO   3/26/2022  1:00 PM

## 2022-04-05 PROBLEM — I63.9 ACUTE CVA (CEREBROVASCULAR ACCIDENT) (HCC): Status: RESOLVED | Noted: 2022-03-17 | Resolved: 2022-04-05

## 2022-06-10 ENCOUNTER — HOSPITAL ENCOUNTER (OUTPATIENT)
Age: 67
Discharge: HOME | End: 2022-06-10
Payer: MEDICARE

## 2022-06-10 DIAGNOSIS — R73.09 ELEVATED HEMOGLOBIN A1C: ICD-10-CM

## 2022-06-10 DIAGNOSIS — I63.9 STROKE DETERMINED BY CLINICAL ASSESSMENT (HCC): ICD-10-CM

## 2022-06-10 DIAGNOSIS — E55.9 VITAMIN D DEFICIENCY: ICD-10-CM

## 2022-06-10 LAB
CHOLESTEROL, TOTAL: 141 MG/DL (ref 0–199)
HBA1C MFR BLD: 6.1 % (ref 4–5.6)
HDLC SERPL-MCNC: 58 MG/DL
LDL CHOLESTEROL CALCULATED: 63 MG/DL (ref 0–99)
TRIGL SERPL-MCNC: 101 MG/DL (ref 0–149)
VITAMIN D 25-HYDROXY: 28 NG/ML (ref 30–100)
VLDLC SERPL CALC-MCNC: 20 MG/DL

## 2022-06-10 PROCEDURE — 82306 VITAMIN D 25 HYDROXY: CPT

## 2022-06-10 PROCEDURE — 80061 LIPID PANEL: CPT

## 2022-06-10 PROCEDURE — 36415 COLL VENOUS BLD VENIPUNCTURE: CPT

## 2022-06-10 PROCEDURE — 83036 HEMOGLOBIN GLYCOSYLATED A1C: CPT

## 2022-06-14 PROBLEM — Z96.653 S/P TOTAL KNEE REPLACEMENT, BILATERAL: Status: ACTIVE | Noted: 2022-06-14

## 2022-06-14 PROBLEM — E55.9 VITAMIN D DEFICIENCY: Status: ACTIVE | Noted: 2022-06-14

## 2022-07-08 ENCOUNTER — TELEPHONE (OUTPATIENT)
Dept: BARIATRICS/WEIGHT MGMT | Age: 67
End: 2022-07-08

## 2022-07-08 NOTE — TELEPHONE ENCOUNTER
Received call from patients daughter regarding need to make appointment for patient due to no bowel movement for 6 days. I inquired what patient had tried and daughter states she has taken stool softner and Miralax. I reviewed patients chart and she has not seen Dr. Justine De La Cruz since 2019 and had gastric bypass almost 20 years ago. I explained to the daughter that she needs to follow up with her PCP regarding this issue since she has not been seen by Dr. Justine De La Cruz in over three years. Daughter asked for recommendations. I said she could take suppository or fleet enema. With further conversation, daughter states patient is currently in a facility with discharge planned for Monday. The facility is the one who told the daughter to call and make an appointment. I explained that if patient is in a facility, they have a doctor they can contact to obtain medication for patient and not make patient wait until next week. .  Daughter verbalized understanding. She states the PCP is difficult to get into. I suggested that if she cannot get an appointment with PCP and the facility has done nothing, then on Monday when she picks patient up she can take her to a walk in clinic. Daughter is familiar  Because she does use the walk in clinic often. I also suggested that she discuss with the patient when she is out of the facility to contact our office to get on schedule for her to restart her yearly follow up appointments. Daughter states she will discuss with patient.

## 2022-10-19 ENCOUNTER — APPOINTMENT (OUTPATIENT)
Dept: GENERAL RADIOLOGY | Age: 67
End: 2022-10-19
Payer: MEDICARE

## 2022-10-19 ENCOUNTER — APPOINTMENT (OUTPATIENT)
Dept: CT IMAGING | Age: 67
End: 2022-10-19
Payer: MEDICARE

## 2022-10-19 ENCOUNTER — HOSPITAL ENCOUNTER (EMERGENCY)
Age: 67
Discharge: HOME | End: 2022-10-19
Attending: STUDENT IN AN ORGANIZED HEALTH CARE EDUCATION/TRAINING PROGRAM
Payer: MEDICARE

## 2022-10-19 VITALS
RESPIRATION RATE: 20 BRPM | OXYGEN SATURATION: 95 % | DIASTOLIC BLOOD PRESSURE: 52 MMHG | TEMPERATURE: 98 F | SYSTOLIC BLOOD PRESSURE: 117 MMHG | HEART RATE: 91 BPM

## 2022-10-19 DIAGNOSIS — R42 VERTIGO: Primary | ICD-10-CM

## 2022-10-19 LAB
ALBUMIN SERPL-MCNC: 4.3 G/DL (ref 3.5–5.2)
ALP BLD-CCNC: 90 U/L (ref 35–104)
ALT SERPL-CCNC: 13 U/L (ref 0–32)
ANION GAP SERPL CALCULATED.3IONS-SCNC: 10 MMOL/L (ref 7–16)
AST SERPL-CCNC: 15 U/L (ref 0–31)
BASOPHILS ABSOLUTE: 0.07 E9/L (ref 0–0.2)
BASOPHILS RELATIVE PERCENT: 0.6 % (ref 0–2)
BILIRUB SERPL-MCNC: <0.2 MG/DL (ref 0–1.2)
BUN BLDV-MCNC: 18 MG/DL (ref 6–23)
CALCIUM SERPL-MCNC: 9.2 MG/DL (ref 8.6–10.2)
CHLORIDE BLD-SCNC: 98 MMOL/L (ref 98–107)
CO2: 29 MMOL/L (ref 22–29)
CREAT SERPL-MCNC: 1.1 MG/DL (ref 0.5–1)
EOSINOPHILS ABSOLUTE: 0.2 E9/L (ref 0.05–0.5)
EOSINOPHILS RELATIVE PERCENT: 1.7 % (ref 0–6)
GFR SERPL CREATININE-BSD FRML MDRD: 54 ML/MIN/1.73
GLUCOSE BLD-MCNC: 179 MG/DL (ref 74–99)
HCT VFR BLD CALC: 43.4 % (ref 34–48)
HEMOGLOBIN: 13.8 G/DL (ref 11.5–15.5)
IMMATURE GRANULOCYTES #: 0.08 E9/L
IMMATURE GRANULOCYTES %: 0.7 % (ref 0–5)
INR BLD: 1.1
LYMPHOCYTES ABSOLUTE: 1.97 E9/L (ref 1.5–4)
LYMPHOCYTES RELATIVE PERCENT: 16.3 % (ref 20–42)
MCH RBC QN AUTO: 29.9 PG (ref 26–35)
MCHC RBC AUTO-ENTMCNC: 31.8 % (ref 32–34.5)
MCV RBC AUTO: 94.1 FL (ref 80–99.9)
MONOCYTES ABSOLUTE: 0.68 E9/L (ref 0.1–0.95)
MONOCYTES RELATIVE PERCENT: 5.6 % (ref 2–12)
NEUTROPHILS ABSOLUTE: 9.1 E9/L (ref 1.8–7.3)
NEUTROPHILS RELATIVE PERCENT: 75.1 % (ref 43–80)
PDW BLD-RTO: 13.9 FL (ref 11.5–15)
PLATELET # BLD: 331 E9/L (ref 130–450)
PMV BLD AUTO: 10.2 FL (ref 7–12)
POTASSIUM REFLEX MAGNESIUM: 4.8 MMOL/L (ref 3.5–5)
PROTHROMBIN TIME: 12.3 SEC (ref 9.3–12.4)
RBC # BLD: 4.61 E12/L (ref 3.5–5.5)
SODIUM BLD-SCNC: 137 MMOL/L (ref 132–146)
TOTAL PROTEIN: 7.5 G/DL (ref 6.4–8.3)
TROPONIN, HIGH SENSITIVITY: <6 NG/L (ref 0–9)
WBC # BLD: 12.1 E9/L (ref 4.5–11.5)

## 2022-10-19 PROCEDURE — 84484 ASSAY OF TROPONIN QUANT: CPT

## 2022-10-19 PROCEDURE — 6370000000 HC RX 637 (ALT 250 FOR IP): Performed by: STUDENT IN AN ORGANIZED HEALTH CARE EDUCATION/TRAINING PROGRAM

## 2022-10-19 PROCEDURE — 85610 PROTHROMBIN TIME: CPT

## 2022-10-19 PROCEDURE — 85025 COMPLETE CBC W/AUTO DIFF WBC: CPT

## 2022-10-19 PROCEDURE — 80053 COMPREHEN METABOLIC PANEL: CPT

## 2022-10-19 PROCEDURE — 93005 ELECTROCARDIOGRAM TRACING: CPT | Performed by: STUDENT IN AN ORGANIZED HEALTH CARE EDUCATION/TRAINING PROGRAM

## 2022-10-19 PROCEDURE — 99285 EMERGENCY DEPT VISIT HI MDM: CPT

## 2022-10-19 PROCEDURE — 70450 CT HEAD/BRAIN W/O DYE: CPT

## 2022-10-19 PROCEDURE — 71045 X-RAY EXAM CHEST 1 VIEW: CPT

## 2022-10-19 RX ORDER — MECLIZINE HYDROCHLORIDE 25 MG/1
25 TABLET ORAL 3 TIMES DAILY PRN
Qty: 15 TABLET | Refills: 0 | Status: SHIPPED | OUTPATIENT
Start: 2022-10-19 | End: 2022-10-29

## 2022-10-19 RX ORDER — MECLIZINE HCL 12.5 MG/1
25 TABLET ORAL ONCE
Status: COMPLETED | OUTPATIENT
Start: 2022-10-19 | End: 2022-10-19

## 2022-10-19 RX ADMIN — MECLIZINE 25 MG: 12.5 TABLET ORAL at 19:33

## 2022-10-19 ASSESSMENT — PAIN SCALES - GENERAL: PAINLEVEL_OUTOF10: 10

## 2022-10-19 ASSESSMENT — PAIN DESCRIPTION - LOCATION: LOCATION: HEAD

## 2022-10-19 ASSESSMENT — PAIN - FUNCTIONAL ASSESSMENT: PAIN_FUNCTIONAL_ASSESSMENT: 0-10

## 2022-10-19 ASSESSMENT — PAIN DESCRIPTION - DESCRIPTORS: DESCRIPTORS: ACHING

## 2022-10-19 NOTE — ED NOTES
Pt. C/o difficulty ambulating, blurred vision, and headache for the past several days.      Arjun Myers RN  10/19/22 [id number]

## 2022-10-19 NOTE — ED PROVIDER NOTES
Av Jorge is a 69year old female with PMH of GERD, COPD, HTN, who presented to ED with difficulty ambulating, confusion, vision changes. VIsion changes difficulty focusing, staggered gait for one week, patient is having nausea and vomiting. Symptoms are moderate in severity and seemed to worsen over the past two days. Nothing makes it better, symptoms worse when trying to ambulate. Patient does take asa daily. The history is provided by the patient and medical records. Review of Systems   Constitutional:  Negative for chills, diaphoresis, fatigue and fever. Eyes:  Negative for photophobia and visual disturbance. Respiratory:  Negative for cough, chest tightness and shortness of breath. Cardiovascular:  Negative for chest pain, palpitations and leg swelling. Gastrointestinal:  Negative for abdominal distention, abdominal pain, diarrhea, nausea and vomiting. Genitourinary:  Negative for dysuria. Musculoskeletal:  Negative for neck pain and neck stiffness. Skin:  Negative for pallor and rash. Neurological:  Positive for dizziness. Negative for speech difficulty, light-headedness and headaches. Psychiatric/Behavioral:  Negative for confusion. Physical Exam  Vitals and nursing note reviewed. Constitutional:       General: She is not in acute distress. Appearance: Normal appearance. She is not ill-appearing. HENT:      Head: Normocephalic and atraumatic. Eyes:      General: No scleral icterus. Conjunctiva/sclera: Conjunctivae normal.      Pupils: Pupils are equal, round, and reactive to light. Cardiovascular:      Rate and Rhythm: Normal rate and regular rhythm. Pulmonary:      Effort: Pulmonary effort is normal.      Breath sounds: Normal breath sounds. Abdominal:      General: Bowel sounds are normal. There is no distension. Palpations: Abdomen is soft. Tenderness: There is no abdominal tenderness. There is no guarding or rebound. Musculoskeletal:      Cervical back: Normal range of motion and neck supple. No rigidity. No muscular tenderness. Right lower leg: No edema. Left lower leg: No edema. Skin:     General: Skin is warm and dry. Capillary Refill: Capillary refill takes less than 2 seconds. Coloration: Skin is not pale. Findings: No erythema or rash. Neurological:      Mental Status: She is alert and oriented to person, place, and time. Cranial Nerves: No cranial nerve deficit. Sensory: No sensory deficit. Motor: No weakness. Coordination: Coordination abnormal.      Gait: Gait abnormal.   Psychiatric:         Mood and Affect: Mood normal.        Procedures     MDM  Number of Diagnoses or Management Options  Vertigo  Diagnosis management comments: Mandeep Alanis is a 69year old female who presented to ED with concern for dizziness, nausea and difficulty ambulating symptoms have been present for one week but worse over 2 days, patient was suspected to have possible posterior CVA, patient CT head is unremarkable patient does have bilateral mild ataxia and difficulty ambulating, I recommended to patient that she stay for transfer for neurology and further evaluation and observation patient declined and decided to leave AMA patient was advised that she does have very concerning findings on physical exam for possible neurological cause of symptoms including CVA patient understands and has capacity make her own decision patient's  also support patient decision to leave at this time patient understands leaving AMA could result in disability or death patient was advised to return to emergency department if she would like to complete evaluation          EKG: This EKG is signed and interpreted by me.     Rate: 90  Rhythm: Sinus  Interpretation: non-specific EKG, no St elevation   Comparison: changes compared to previous EKG           --------------------------------------------- PAST HISTORY ---------------------------------------------  Past Medical History:  has a past medical history of Acute CVA (cerebrovascular accident) ([facility] [address] Anastomotic ulcer, Anemia, Anxiety, Asthma, Biceps tendon tear, Closed fracture of multiple ribs of right side, Closed fracture of nasal bone, Collapsed lung, COPD (chronic obstructive pulmonary disease) ([address] DDD (degenerative disc disease), lumbar, Degenerative disc disease at L5-S1 level, Depression, Distal radius fracture, Fracture of left olecranon process, GERD (gastroesophageal reflux disease), Heart attack ([address] History of blood transfusion, Hypertension, Impingement syndrome of shoulder, Intractable abdominal pain, Left carpal tunnel syndrome, Nasal bones, closed fracture, closed, initial encounter, Osteoarthritis, Peptic ulcer, unspecified site, unspecified as acute or chronic, without mention of hemorrhage or perforation, RLS (restless legs syndrome), Rotator cuff tear, Seizure ([address] Sepsis ([facility] [address] and Spinal stenosis. Past Surgical History:  has a past surgical history that includes Tonsillectomy (); Carpal tunnel release (); shoulder surgery (); Knee arthroscopy (); Dilation and curettage of uterus; Macario-en-Y Gastric Bypass (2005);  section (3/9/1984); Cholecystectomy (); Colonoscopy (3/2011); Upper gastrointestinal endoscopy (10/2004); Upper gastrointestinal endoscopy (12); Appendectomy; Endoscopy, colon, diagnostic; Abdomen surgery; Shoulder arthroscopy (Right, 1 2 15); Wrist arthroscopy (Right, 2015); Cholecystectomy; Dilatation, esophagus; Wrist surgery (Right, 12/2/15); other surgical history (16); other surgical history (Left,  16); Abscess Drainage (Left, 2016); Carpal tunnel release (Left, 2017);  Upper gastrointestinal endoscopy (N/A, 2018); pr lap,diagnostic abdomen (N/A, 2018); joint replacement (2003); joint replacement (2003); joint replacement; and Upper gastrointestinal endoscopy (N/A, 1/2/2019). Social History:  reports that she has been smoking cigarettes. She started smoking about 18 years ago. She has a 14.00 pack-year smoking history. She has never used smokeless tobacco. She reports that she does not drink alcohol and does not use drugs. Family History: family history includes Cancer in her mother; Emphysema in her father; Heart Disease in her sister. The patients home medications have been reviewed.     Allergies: Ceclor [cefaclor], Diprivan [propofol], Naproxen, Adhesive tape, Blueberry flavor, Ibuprofen, Mirapex [pramipexole], Oxycodone-acetaminophen, Shellfish-derived products, Tramadol, Vaccinium angustifolium, Blueberry fruit extract, Ceclor [cefaclor], Iodides, Iodine, Iv dye [iodides], Naprosyn [naproxen], Percocet [oxycodone-acetaminophen], Phenergan [promethazine hcl], Promethazine, Sulfa antibiotics, Tape [adhesive tape], and Tetanus toxoids    -------------------------------------------------- RESULTS -------------------------------------------------  Labs:  Results for orders placed or performed during the hospital encounter of 10/19/22   CBC with Auto Differential   Result Value Ref Range    WBC 12.1 (H) 4.5 - 11.5 E9/L    RBC 4.61 3.50 - 5.50 E12/L    Hemoglobin 13.8 11.5 - 15.5 g/dL    Hematocrit 43.4 34.0 - 48.0 %    MCV 94.1 80.0 - 99.9 fL    MCH 29.9 26.0 - 35.0 pg    MCHC 31.8 (L) 32.0 - 34.5 %    RDW 13.9 11.5 - 15.0 fL    Platelets 497 847 - 483 E9/L    MPV 10.2 7.0 - 12.0 fL    Neutrophils % 75.1 43.0 - 80.0 %    Immature Granulocytes % 0.7 0.0 - 5.0 %    Lymphocytes % 16.3 (L) 20.0 - 42.0 %    Monocytes % 5.6 2.0 - 12.0 %    Eosinophils % 1.7 0.0 - 6.0 %    Basophils % 0.6 0.0 - 2.0 %    Neutrophils Absolute 9.10 (H) 1.80 - 7.30 E9/L    Immature Granulocytes # 0.08 E9/L    Lymphocytes Absolute 1.97 1.50 - 4.00 E9/L    Monocytes Absolute 0.68 0.10 - 0.95 E9/L    Eosinophils Absolute 0.20 0.05 - 0.50 E9/L    Basophils Absolute 0.07 0.00 - 0.20 E9/L   Comprehensive Metabolic Panel w/ Reflex to MG   Result Value Ref Range    Sodium 137 132 - 146 mmol/L    Potassium reflex Magnesium 4.8 3.5 - 5.0 mmol/L    Chloride 98 98 - 107 mmol/L    CO2 29 22 - 29 mmol/L    Anion Gap 10 7 - 16 mmol/L    Glucose 179 (H) 74 - 99 mg/dL    BUN 18 6 - 23 mg/dL    Creatinine 1.1 (H) 0.5 - 1.0 mg/dL    Est, Glom Filt Rate 54 >=60 mL/min/1.73    Calcium 9.2 8.6 - 10.2 mg/dL    Total Protein 7.5 6.4 - 8.3 g/dL    Albumin 4.3 3.5 - 5.2 g/dL    Total Bilirubin <0.2 0.0 - 1.2 mg/dL    Alkaline Phosphatase 90 35 - 104 U/L    ALT 13 0 - 32 U/L    AST 15 0 - 31 U/L   Troponin   Result Value Ref Range    Troponin, High Sensitivity <6 0 - 9 ng/L   Protime-INR   Result Value Ref Range    Protime 12.3 9.3 - 12.4 sec    INR 1.1    EKG 12 Lead   Result Value Ref Range    Ventricular Rate 90 BPM    Atrial Rate 90 BPM    P-R Interval 144 ms    QRS Duration 80 ms    Q-T Interval 398 ms    QTc Calculation (Bazett) 486 ms    P Axis 71 degrees    R Axis 40 degrees    T Axis 70 degrees       Radiology:  CT HEAD WO CONTRAST   Final Result   No acute intracranial abnormality. XR CHEST 1 VIEW   Final Result   No pneumonia or pleural effusion.             ------------------------- NURSING NOTES AND VITALS REVIEWED ---------------------------  Date / Time Roomed:  10/19/2022  5:39 PM  ED Bed Assignment:  JANIA/JANIA    The nursing notes within the ED encounter and vital signs as below have been reviewed.    BP (!) 117/52   Pulse 91   Temp 98 °F (36.7 °C) (Oral)   Resp 20   LMP  (LMP Unknown)   SpO2 95%   Oxygen Saturation Interpretation: Normal      ------------------------------------------ PROGRESS NOTES ------------------------------------------  Patient left AMA          Discharge Medication List as of 10/19/2022  9:44 PM        START taking these medications    Details   meclizine (ANTIVERT) 25 MG tablet Take 1 tablet by mouth 3 times daily as needed for Dizziness or Nausea, Disp-15 tablet, R-0Normal             Diagnosis:  1. Vertigo        Disposition:  Patient's disposition: AMA  Patient's condition is stable.           Anastasiia Schaefer MD  10/20/22 1922

## 2022-10-20 ASSESSMENT — ENCOUNTER SYMPTOMS
NAUSEA: 0
SHORTNESS OF BREATH: 0
COUGH: 0
ABDOMINAL DISTENTION: 0
VOMITING: 0
ABDOMINAL PAIN: 0
DIARRHEA: 0
PHOTOPHOBIA: 0
CHEST TIGHTNESS: 0

## 2022-10-21 LAB
EKG ATRIAL RATE: 90 BPM
EKG P AXIS: 71 DEGREES
EKG P-R INTERVAL: 144 MS
EKG Q-T INTERVAL: 398 MS
EKG QRS DURATION: 80 MS
EKG QTC CALCULATION (BAZETT): 486 MS
EKG R AXIS: 40 DEGREES
EKG T AXIS: 70 DEGREES
EKG VENTRICULAR RATE: 90 BPM

## 2022-12-09 ENCOUNTER — EVALUATION (OUTPATIENT)
Dept: SPEECH THERAPY | Age: 67
End: 2022-12-09

## 2022-12-09 DIAGNOSIS — R41.841 COGNITIVE COMMUNICATION DEFICIT: Primary | ICD-10-CM

## 2022-12-16 NOTE — PROGRESS NOTES
[facility]  Outpatient Speech Therapy  Phone: [phone]   Fax:  [phone]    SPEECH/LANGUAGE PATHOLOGY  ROSS INFORMATION PROCESSING ASSESSMENT-2 (RIPA-2)   EVALUATION RESULTS and PLAN OF CARE    PATIENT NAME:  Sami Carter  (female)     MRN:  [id number]    :  1955  (69 y.o.)  STATUS:  Outpatient clinic   TODAY'S DATE:  2022  REFERRING PROVIDER:    Dr. Donnell Ponce: [facility]  Date of order:  2022  EVALUATING THERAPIST: HEAVENLY Angeles    CERTIFICATION/RECERTIFICATION PERIOD: 2022 to 3/08/23  INSURANCE PROVIDER:  Payor: Joseph Mess / Plan: Pratik Constantino PPO / Product Type: Medicare /    INSURANCE ID:  [id number] - (Medicare Managed)   SECONDARY INSURANCE (if applicable):        CPT Codes  EVALUATION: [id number]  standardized cognitive performance testing (per hour)    EFERRING/TREATMENT DIAGNOSIS: Memory loss [R41.3]          SPEECH THERAPY  PLAN OF CARE   The speech therapy  POC is established based on physician order, speech pathology diagnosis and results of clinical assessment     SPEECH PATHOLOGY DIAGNOSIS:    Min/Mild cognitive communication deficit    Speech Pathology intervention is not warranted at this time. Wille Osgood reports that she had a TIA in 2022. She states that she has some slight weakness on her right side and has chosen to no longer drive. She states that she will sometimes forget what she needs when walking from one room to another and has forgotten names at times. She lives with her  who stayed in the waiting room today per patient's request.  She denies any safety issues, swallowing issues, or issues with handling her finances. Stimulus questions were obtained from the RIPA-2.   There are 30 possible points for each subtest.   Severity ratings for each subtest were determined as follows:     RAW Asthma with COPD ([facility] [address]    Personal history of gastric bypass    Uncomplicated asthma    RLS (restless legs syndrome)    Spinal stenosis of lumbosacral region    Recurrent depression ([facility] [address]    Essential hypertension    Stroke determined by clinical assessment ([facility]    Near syncope    S/p total knee replacement, bilateral    BMI 40.0-44.9, adult ([facility] [address]    Vitamin D deficiency       Breonna Sultana MA/CCC-SLP  [address] [facility].           Phone: [phone]       If you have any questions or concerns, please don't hesitate to call. Thank you for your referral.    Physician/Provider Signature:________________________________Date:__________________  By signing above, the therapists plan is approved by the physician/provider. All patients under howsimple must be signed by physician/provider.

## 2022-12-28 ENCOUNTER — OFFICE VISIT (OUTPATIENT)
Dept: FAMILY MEDICINE CLINIC | Age: 67
End: 2022-12-28
Payer: MEDICARE

## 2022-12-28 VITALS
SYSTOLIC BLOOD PRESSURE: 133 MMHG | RESPIRATION RATE: 20 BRPM | BODY MASS INDEX: 42.8 KG/M2 | OXYGEN SATURATION: 98 % | TEMPERATURE: 97.6 F | HEIGHT: 60 IN | DIASTOLIC BLOOD PRESSURE: 83 MMHG | WEIGHT: 218 LBS

## 2022-12-28 DIAGNOSIS — R05.1 ACUTE COUGH: Primary | ICD-10-CM

## 2022-12-28 PROBLEM — E11.9 TYPE 2 DIABETES MELLITUS (HCC): Status: ACTIVE | Noted: 2022-12-28

## 2022-12-28 PROCEDURE — 1123F ACP DISCUSS/DSCN MKR DOCD: CPT

## 2022-12-28 PROCEDURE — 99213 OFFICE O/P EST LOW 20 MIN: CPT

## 2022-12-28 PROCEDURE — 3078F DIAST BP <80 MM HG: CPT

## 2022-12-28 PROCEDURE — 3074F SYST BP LT 130 MM HG: CPT

## 2022-12-28 RX ORDER — PREDNISONE 10 MG/1
TABLET ORAL
Qty: 18 TABLET | Refills: 0 | Status: SHIPPED | OUTPATIENT
Start: 2022-12-28 | End: 2023-01-06

## 2022-12-28 RX ORDER — DOXYCYCLINE HYCLATE 100 MG/1
100 CAPSULE ORAL 2 TIMES DAILY
COMMUNITY

## 2022-12-28 RX ORDER — GUAIFENESIN 100 MG/5ML
200 SYRUP ORAL 3 TIMES DAILY PRN
COMMUNITY

## 2022-12-28 SDOH — ECONOMIC STABILITY: FOOD INSECURITY: WITHIN THE PAST 12 MONTHS, YOU WORRIED THAT YOUR FOOD WOULD RUN OUT BEFORE YOU GOT MONEY TO BUY MORE.: NEVER TRUE

## 2022-12-28 SDOH — ECONOMIC STABILITY: FOOD INSECURITY: WITHIN THE PAST 12 MONTHS, THE FOOD YOU BOUGHT JUST DIDN'T LAST AND YOU DIDN'T HAVE MONEY TO GET MORE.: NEVER TRUE

## 2022-12-28 ASSESSMENT — SOCIAL DETERMINANTS OF HEALTH (SDOH): HOW HARD IS IT FOR YOU TO PAY FOR THE VERY BASICS LIKE FOOD, HOUSING, MEDICAL CARE, AND HEATING?: NOT HARD AT ALL

## 2022-12-28 NOTE — PROGRESS NOTES
22  Sil Seaman : 1955 Sex: female  Age 69 y.o. Subjective:  Chief Complaint   Patient presents with    Cough     Chest congestion , pressure possible pneumonia for three weeks        HPI:   Sil Seaman , 69 y.o. female presents to the clinic for evaluation of non productive x 21 days. The patient also reports chest congestion. The patient has taken doxy and cough medicine [facility]. The patient reports worsening symptoms over time. The patient denies ill exposure. The patient has hx of COVID-19. The patient denies acute loss of taste and smell, headache, sinus congestion sore throat, rash, and fever. The patient also denies chest pain, abdominal pain, shortness of breath, wheezing, and nausea / vomiting / diarrhea. Pt seen at [facility] and treated with doxycycline pt states she is on day 8 of 10 and no improvement. ROS:   Unless otherwise stated in this report the patient's positive and negative responses for review of systems for constitutional, eyes, ENT, cardiovascular, respiratory, gastrointestinal, neurological, , musculoskeletal, and integument systems and related systems to the presenting problem are either stated in the history of present illness or were not pertinent or were negative for the symptoms and/or complaints related to the presenting medical problem. Positives and pertinent negatives as per HPI. All others reviewed and are negative.       PMH:     Past Medical History:   Diagnosis Date    Acute CVA (cerebrovascular accident) ([facility] [address] 3/17/2022    Anastomotic ulcer 2012    Anemia     Anxiety     Asthma     Biceps tendon tear 2015    Closed fracture of multiple ribs of right side     Closed fracture of nasal bone     Collapsed lung     COPD (chronic obstructive pulmonary disease) ([facility])     DDD (degenerative disc disease), lumbar     Degenerative disc disease at L5-S1 level     Depression     Distal radius fracture 2015    Fracture of left olecranon process 01/10/2017    GERD (gastroesophageal reflux disease)     Heart attack ([facility] [address]     History of blood transfusion     Hypertension     Impingement syndrome of shoulder 2014    Intractable abdominal pain 2018    Left carpal tunnel syndrome 2017    Nasal bones, closed fracture, closed, initial encounter 2015    Osteoarthritis     Peptic ulcer, unspecified site, unspecified as acute or chronic, without mention of hemorrhage or perforation     RLS (restless legs syndrome)     Rotator cuff tear 2014    Seizure ([facility] [address]     Sepsis ([facility] [address]     Spinal stenosis        Past Surgical History:   Procedure Laterality Date    ABDOMEN SURGERY      ABSCESS DRAINAGE Left 2016    left thigh seroma    Vignesh 13    right hand    CARPAL TUNNEL RELEASE Left 2017     SECTION  3/9/1984    CHOLECYSTECTOMY      CHOLECYSTECTOMY      COLONOSCOPY  3/2011    DILATATION, ESOPHAGUS      DILATION AND CURETTAGE OF UTERUS      x4    ENDOSCOPY, COLON, DIAGNOSTIC      JOINT REPLACEMENT  2003    right    JOINT REPLACEMENT  2003    left knee    JOINT REPLACEMENT      bilat knee replacement    KNEE ARTHROSCOPY      bilateral    OTHER SURGICAL HISTORY  16    closed reduction nasal bone fracture    OTHER SURGICAL HISTORY Left 6 2 16    seroma incision and drainage thigh    IA LAP,DIAGNOSTIC ABDOMEN N/A 2018    DIAGNOSTIC LAPAROSCOPY, LYSIS OF ADHESIONS performed by Mau Salcedo MD at [address] [place] [address]  2005    Dr. Mcbride Roles ARTHROSCOPY Right 1 2 15    rotator cuff repair, subacromial decompression, debridement    SHOULDER SURGERY      left    TONSILLECTOMY      UPPER GASTROINTESTINAL ENDOSCOPY  10/2004    UPPER GASTROINTESTINAL ENDOSCOPY  12    [facility]    UPPER GASTROINTESTINAL ENDOSCOPY N/A 2018    EGD BIOPSY performed by Gislea Simental MD at [facility] ENDOSCOPY    UPPER GASTROINTESTINAL ENDOSCOPY N/A 1/2/2019    EGD ESOPHAGOGASTRODUODENOSCOPY performed by Miesha Lee MD at [address] ARTHROSCOPY Right 12/02/2015    DEBRIDEMENT ASPIRATION; RIGHT DEQUARVAINES RELEASE    WRIST SURGERY Right 12/2/15       Family History   Problem Relation Age of Onset    Cancer Mother     Heart Disease Sister     Emphysema Father        Medications:     Current Outpatient Medications:     doxycycline hyclate (VIBRAMYCIN) 100 MG capsule, Take 100 mg by mouth 2 times daily, Disp: , Rfl:     guaiFENesin (TUSSIN) 100 MG/5ML liquid, Take 200 mg by mouth 3 times daily as needed for Cough, Disp: , Rfl:     predniSONE (DELTASONE) 10 MG tablet, Take 1 tablet by mouth three times daily for 3 days, THEN 1 tablet 2 times daily for 3 days, THEN 1 tablet daily for 3 days. , Disp: 18 tablet, Rfl: 0    vitamin D (ERGOCALCIFEROL) 1.25 MG (76280 UT) CAPS capsule, Take 1 capsule by mouth once a week, Disp: 12 capsule, Rfl: 1    atorvastatin (LIPITOR) 40 MG tablet, Take 1 tablet by mouth nightly, Disp: 90 tablet, Rfl: 1    rOPINIRole (REQUIP) 1 MG tablet, Take 2 tablets by mouth nightly, Disp: 180 tablet, Rfl: 1    aspirin 81 MG chewable tablet, Take 1 tablet by mouth daily, Disp: 30 tablet, Rfl: 3    albuterol sulfate HFA (PROVENTIL HFA) 108 (90 Base) MCG/ACT inhaler, Inhale 2 puffs into the lungs every 6 hours as needed for Wheezing, Disp: 1 each, Rfl: 1    venlafaxine (EFFEXOR XR) 37.5 MG extended release capsule, Take 1 capsule by mouth daily, Disp: 30 capsule, Rfl: 2    traZODone (DESYREL) 50 MG tablet, Take 1 tablet by mouth nightly as needed for Sleep, Disp: 30 tablet, Rfl: 0    esomeprazole (NEXIUM) 40 MG delayed release capsule, Take 1 capsule by mouth every morning (before breakfast), Disp: 90 capsule, Rfl: 1    Allergies:      Allergies   Allergen Reactions    Ceclor [Cefaclor] Anaphylaxis    Diprivan [Propofol] Hives     Swelling of face    Naproxen Anaphylaxis     Heart races    Adhesive Tape Hives    Blueberry Flavor     Ibuprofen Hives    Mirapex [Pramipexole]     Oxycodone-Acetaminophen     Potato     Shellfish-Derived Products Hives    Tramadol     Vaccinium Angustifolium     Blueberry Fruit Extract Hives    Ceclor [Cefaclor] Hives    Iodides Hives    Iodine Hives and Rash     Contrast    Iv Dye [Iodides] Rash    Naprosyn [Naproxen] Palpitations    Percocet [Oxycodone-Acetaminophen] Rash    Phenergan [Promethazine Hcl] Nausea And Vomiting    Promethazine Nausea And Vomiting    Sulfa Antibiotics Hives    Tape [Adhesive Tape] Rash    Tetanus Toxoids Swelling and Rash       Social History:     Social History     Tobacco Use    Smoking status: Every Day     Packs/day: 1.00     Years: 14.00     Pack years: 14.00     Types: Cigarettes     Start date: 10/23/2004    Smokeless tobacco: Never   Vaping Use    Vaping Use: Never used   Substance Use Topics    Alcohol use: No    Drug use: No       Physical Exam:     Vitals:    12/28/22 1557   BP: 133/83   Site: Left Upper Arm   Position: Sitting   Cuff Size: Large Adult   Resp: 20   Temp: 97.6 °F (36.4 °C)   TempSrc: Temporal   SpO2: 98%   Weight: 218 lb (98.9 kg)   Height: 5' (1.524 m)       Physical Exam (PE)    Physical Exam  Vitals and nursing note reviewed. Constitutional:       Appearance: She is well-developed. HENT:      Head: Normocephalic and atraumatic. Right Ear: Hearing and external ear normal.      Left Ear: Hearing and external ear normal.      Nose: Congestion present. Mouth/Throat:      Pharynx: Uvula midline. Posterior oropharyngeal erythema present. Eyes:      General: Lids are normal.      Conjunctiva/sclera: Conjunctivae normal.      Pupils: Pupils are equal, round, and reactive to light. Cardiovascular:      Rate and Rhythm: Normal rate and regular rhythm. Heart sounds: Normal heart sounds. No murmur heard.   Pulmonary:      Effort: Pulmonary effort is normal.      Breath sounds: Examination of the right-middle field reveals rhonchi. Examination of the left-lower field reveals wheezing. Wheezing and rhonchi present. Abdominal:      General: Bowel sounds are normal.      Palpations: Abdomen is soft. Abdomen is not rigid. Tenderness: There is no abdominal tenderness. There is no guarding or rebound. Musculoskeletal:      Cervical back: Normal range of motion and neck supple. Skin:     General: Skin is warm and dry. Findings: No abrasion or rash. Neurological:      Mental Status: She is alert and oriented to person, place, and time. GCS: GCS eye subscore is 4. GCS verbal subscore is 5. GCS motor subscore is 6. Cranial Nerves: No cranial nerve deficit. Sensory: No sensory deficit. Coordination: Coordination normal.      Gait: Gait normal.        Testing:   (All laboratory and radiology results have been personally reviewed by myself)  Labs:  No results found for this visit on 12/28/22. Imaging: All Radiology results interpreted by Radiologist unless otherwise noted. XR CHEST (2 VW)    (Results Pending)       Assessment / Plan:   The patient's vitals, allergies, medications, and past medical history have been reviewed. Yeison Lock was seen today for cough. Diagnoses and all orders for this visit:    Acute cough  -     XR CHEST (2 VW); Future    Other orders  -     predniSONE (DELTASONE) 10 MG tablet; Take 1 tablet by mouth three times daily for 3 days, THEN 1 tablet 2 times daily for 3 days, THEN 1 tablet daily for 3 days. - Disposition: home    - Educational material printed for patient's review and were included in patient instructions. After Visit Summary was given to patient at the end of visit. - Encouraged oral fluids and rest. Discussed symptomatic treatments with patient today. The patient is to schedule a follow-up with PCP in the next 2-3 days for reevaluation. Red flag symptoms were also discussed with the patient today.  If symptoms worsen the patient is

## 2023-02-12 ENCOUNTER — HOSPITAL ENCOUNTER (EMERGENCY)
Age: 68
Discharge: HOME | End: 2023-02-12
Attending: EMERGENCY MEDICINE
Payer: MEDICARE

## 2023-02-12 ENCOUNTER — APPOINTMENT (OUTPATIENT)
Dept: GENERAL RADIOLOGY | Age: 68
End: 2023-02-12
Payer: MEDICARE

## 2023-02-12 ENCOUNTER — APPOINTMENT (OUTPATIENT)
Dept: CT IMAGING | Age: 68
End: 2023-02-12
Payer: MEDICARE

## 2023-02-12 VITALS
TEMPERATURE: 98.1 F | DIASTOLIC BLOOD PRESSURE: 72 MMHG | OXYGEN SATURATION: 94 % | SYSTOLIC BLOOD PRESSURE: 124 MMHG | HEART RATE: 103 BPM | RESPIRATION RATE: 20 BRPM

## 2023-02-12 DIAGNOSIS — R07.9 CHEST PAIN, UNSPECIFIED TYPE: Primary | ICD-10-CM

## 2023-02-12 LAB
ALBUMIN SERPL-MCNC: 4.3 G/DL (ref 3.5–5.2)
ALP BLD-CCNC: 94 U/L (ref 35–104)
ALT SERPL-CCNC: 13 U/L (ref 0–32)
ANION GAP SERPL CALCULATED.3IONS-SCNC: 11 MMOL/L (ref 7–16)
AST SERPL-CCNC: 14 U/L (ref 0–31)
BASOPHILS ABSOLUTE: 0.07 E9/L (ref 0–0.2)
BASOPHILS RELATIVE PERCENT: 0.4 % (ref 0–2)
BILIRUB SERPL-MCNC: 0.4 MG/DL (ref 0–1.2)
BUN BLDV-MCNC: 16 MG/DL (ref 6–23)
CALCIUM SERPL-MCNC: 9.4 MG/DL (ref 8.6–10.2)
CHLORIDE BLD-SCNC: 96 MMOL/L (ref 98–107)
CO2: 27 MMOL/L (ref 22–29)
CREAT SERPL-MCNC: 0.7 MG/DL (ref 0.5–1)
EOSINOPHILS ABSOLUTE: 0.1 E9/L (ref 0.05–0.5)
EOSINOPHILS RELATIVE PERCENT: 0.6 % (ref 0–6)
GFR SERPL CREATININE-BSD FRML MDRD: >60 ML/MIN/1.73
GLUCOSE BLD-MCNC: 174 MG/DL (ref 74–99)
HCT VFR BLD CALC: 47.4 % (ref 34–48)
HEMOGLOBIN: 14.8 G/DL (ref 11.5–15.5)
IMMATURE GRANULOCYTES #: 0.1 E9/L
IMMATURE GRANULOCYTES %: 0.6 % (ref 0–5)
INFLUENZA A BY PCR: NOT DETECTED
INFLUENZA B BY PCR: NOT DETECTED
LYMPHOCYTES ABSOLUTE: 1.51 E9/L (ref 1.5–4)
LYMPHOCYTES RELATIVE PERCENT: 8.9 % (ref 20–42)
MCH RBC QN AUTO: 29.4 PG (ref 26–35)
MCHC RBC AUTO-ENTMCNC: 31.2 % (ref 32–34.5)
MCV RBC AUTO: 94.2 FL (ref 80–99.9)
MONOCYTES ABSOLUTE: 1.23 E9/L (ref 0.1–0.95)
MONOCYTES RELATIVE PERCENT: 7.3 % (ref 2–12)
NEUTROPHILS ABSOLUTE: 13.88 E9/L (ref 1.8–7.3)
NEUTROPHILS RELATIVE PERCENT: 82.2 % (ref 43–80)
PDW BLD-RTO: 14.2 FL (ref 11.5–15)
PLATELET # BLD: 363 E9/L (ref 130–450)
PMV BLD AUTO: 10.1 FL (ref 7–12)
POTASSIUM REFLEX MAGNESIUM: 4.6 MMOL/L (ref 3.5–5)
RBC # BLD: 5.03 E12/L (ref 3.5–5.5)
SARS-COV-2, NAAT: NOT DETECTED
SODIUM BLD-SCNC: 134 MMOL/L (ref 132–146)
TOTAL PROTEIN: 7.3 G/DL (ref 6.4–8.3)
TROPONIN, HIGH SENSITIVITY: <6 NG/L (ref 0–9)
WBC # BLD: 16.9 E9/L (ref 4.5–11.5)

## 2023-02-12 PROCEDURE — 99285 EMERGENCY DEPT VISIT HI MDM: CPT

## 2023-02-12 PROCEDURE — 84484 ASSAY OF TROPONIN QUANT: CPT

## 2023-02-12 PROCEDURE — 96375 TX/PRO/DX INJ NEW DRUG ADDON: CPT

## 2023-02-12 PROCEDURE — 80053 COMPREHEN METABOLIC PANEL: CPT

## 2023-02-12 PROCEDURE — 71045 X-RAY EXAM CHEST 1 VIEW: CPT

## 2023-02-12 PROCEDURE — 6370000000 HC RX 637 (ALT 250 FOR IP): Performed by: EMERGENCY MEDICINE

## 2023-02-12 PROCEDURE — 96374 THER/PROPH/DIAG INJ IV PUSH: CPT

## 2023-02-12 PROCEDURE — 6360000002 HC RX W HCPCS: Performed by: EMERGENCY MEDICINE

## 2023-02-12 PROCEDURE — 6360000004 HC RX CONTRAST MEDICATION: Performed by: RADIOLOGY

## 2023-02-12 PROCEDURE — 71275 CT ANGIOGRAPHY CHEST: CPT

## 2023-02-12 PROCEDURE — 36415 COLL VENOUS BLD VENIPUNCTURE: CPT

## 2023-02-12 PROCEDURE — 93005 ELECTROCARDIOGRAM TRACING: CPT | Performed by: NURSE PRACTITIONER

## 2023-02-12 PROCEDURE — 87635 SARS-COV-2 COVID-19 AMP PRB: CPT

## 2023-02-12 PROCEDURE — 87502 INFLUENZA DNA AMP PROBE: CPT

## 2023-02-12 PROCEDURE — 85025 COMPLETE CBC W/AUTO DIFF WBC: CPT

## 2023-02-12 PROCEDURE — 96376 TX/PRO/DX INJ SAME DRUG ADON: CPT

## 2023-02-12 RX ORDER — CYCLOBENZAPRINE HCL 10 MG
10 TABLET ORAL 3 TIMES DAILY PRN
Qty: 21 TABLET | Refills: 0 | Status: SHIPPED | OUTPATIENT
Start: 2023-02-12 | End: 2023-02-22

## 2023-02-12 RX ORDER — FENTANYL CITRATE 0.05 MG/ML
50 INJECTION, SOLUTION INTRAMUSCULAR; INTRAVENOUS ONCE
Status: COMPLETED | OUTPATIENT
Start: 2023-02-12 | End: 2023-02-12

## 2023-02-12 RX ORDER — DIPHENHYDRAMINE HYDROCHLORIDE 50 MG/ML
25 INJECTION INTRAMUSCULAR; INTRAVENOUS ONCE
Status: COMPLETED | OUTPATIENT
Start: 2023-02-12 | End: 2023-02-12

## 2023-02-12 RX ORDER — DIPHENHYDRAMINE HCL 25 MG
25 TABLET ORAL EVERY 6 HOURS PRN
Status: DISCONTINUED | OUTPATIENT
Start: 2023-02-12 | End: 2023-02-12 | Stop reason: HOSPADM

## 2023-02-12 RX ORDER — METHYLPREDNISOLONE SODIUM SUCCINATE 125 MG/2ML
125 INJECTION, POWDER, LYOPHILIZED, FOR SOLUTION INTRAMUSCULAR; INTRAVENOUS ONCE
Status: COMPLETED | OUTPATIENT
Start: 2023-02-12 | End: 2023-02-12

## 2023-02-12 RX ADMIN — DIPHENHYDRAMINE HYDROCHLORIDE 25 MG: 50 INJECTION, SOLUTION INTRAMUSCULAR; INTRAVENOUS at 16:33

## 2023-02-12 RX ADMIN — DIPHENHYDRAMINE HYDROCHLORIDE 25 MG: 25 TABLET ORAL at 20:55

## 2023-02-12 RX ADMIN — IOPAMIDOL 70 ML: 755 INJECTION, SOLUTION INTRAVENOUS at 17:34

## 2023-02-12 RX ADMIN — METHYLPREDNISOLONE SODIUM SUCCINATE 125 MG: 125 INJECTION, POWDER, FOR SOLUTION INTRAMUSCULAR; INTRAVENOUS at 16:36

## 2023-02-12 RX ADMIN — FENTANYL CITRATE 50 MCG: 50 INJECTION INTRAMUSCULAR; INTRAVENOUS at 16:36

## 2023-02-12 RX ADMIN — FENTANYL CITRATE 50 MCG: 50 INJECTION INTRAMUSCULAR; INTRAVENOUS at 20:08

## 2023-02-12 ASSESSMENT — PAIN SCALES - GENERAL
PAINLEVEL_OUTOF10: 10
PAINLEVEL_OUTOF10: 9
PAINLEVEL_OUTOF10: 9

## 2023-02-12 ASSESSMENT — PAIN DESCRIPTION - LOCATION: LOCATION: CHEST;SHOULDER

## 2023-02-12 ASSESSMENT — PAIN - FUNCTIONAL ASSESSMENT: PAIN_FUNCTIONAL_ASSESSMENT: 0-10

## 2023-02-12 ASSESSMENT — PAIN DESCRIPTION - DESCRIPTORS: DESCRIPTORS: SHARP

## 2023-02-12 NOTE — ED NOTES
Department of Emergency Medicine  FIRST PROVIDER TRIAGE NOTE             Independent MLP           2/12/23  3:57 PM EST    Date of Encounter: 2/12/23   MRN: [id number]      HPI: Mateus Roberts is a 77 y.o. female who presents to the ED for Back Pain (Pt reports back pain that started last night) and Chest Pain (Pt states that it is hard to take a deep breath and states pain is in shoulder and into neck)   Patient reports the pain started in the middle of her back last suddenly while she was at a concert. Today the pain is now radiating to her anterior chest and her shoulders, pain is worse with movement and deep inspiration. Denies any known cardiac history, states the pain is constant and rates it as a 10 out of 10. She has not had any numbness or tingling to her lower extremities, abdominal pain, nausea, vomiting, shortness of breath. ROS: Negative for fever, cough, vomiting, diarrhea, urinary complaints, headache, or dizziness. PE: Gen Appearance/Constitutional: alert  HEENT: NC/NT. PERRLA,  Airway patent. Neck: supple  CV: regular rate  Pulm: CTA bilat  GI: soft and NT  Musculoskeletal: moves all extremities x 4  Lymphatics: no edema     Initial Plan of Care: All treatment areas with department are currently occupied. Plan to order/Initiate the following while awaiting opening in ED: labs, EKG, and imaging studies.   Initiate Treatment-Testing, Proceed toTreatment Area When Bed Available for ED Attending/MLP to Continue Care    Electronically signed by KRYSTA Child CNP   DD: 2/12/23       KRYSTA Child CNP  02/12/23 [id number]

## 2023-02-12 NOTE — ED PROVIDER NOTES
Department of Emergency Medicine   ED  Provider Note  Admit Date/RoomTime: 2/12/2023  4:02 PM  ED Room: 23/23          History of Present Illness:  2/12/23, Time: 4:30 PM EST  Chief Complaint   Patient presents with    Back Pain     Pt reports back pain that started last night    Chest Pain     Pt states that it is hard to take a deep breath and states pain is in shoulder and into neck                Debbie Wayne is a 77 y.o. female presenting to the ED for chest pain, back pain, beginning 1 day ago. The complaint has been persistent, moderate in severity, and worsened by movement. Patient says that she began having back pain last night while at a concert. It was located in the upper back. She says that today the pain started going to the shoulders and upper chest. Pain worse with deep breaths, movement, palpation. No alleviation factors. She denies any nausea, vomiting, diaphoresis, shortness of breath, fever, chills, runny nose, sore throat, cough. Patient tried Tylenol prior to arrival without much relief of symptoms.     Review of Systems:   A complete review of systems was performed and pertinent positives and negatives are stated within HPI, all other systems reviewed and are negative.        --------------------------------------------- PAST HISTORY ---------------------------------------------  Past Medical History:  has a past medical history of Acute CVA (cerebrovascular accident) ([address] Anastomotic ulcer, Anemia, Anxiety, Asthma, Biceps tendon tear, Closed fracture of multiple ribs of right side, Closed fracture of nasal bone, Collapsed lung, COPD (chronic obstructive pulmonary disease) ([address] DDD (degenerative disc disease), lumbar, Degenerative disc disease at L5-S1 level, Depression, Distal radius fracture, Fracture of left olecranon process, GERD (gastroesophageal reflux disease), Heart attack ([address] History of blood transfusion, Hypertension, Impingement syndrome of shoulder, Intractable abdominal pain, Left carpal tunnel syndrome, Nasal bones, closed fracture, closed, initial encounter, Osteoarthritis, Peptic ulcer, unspecified site, unspecified as acute or chronic, without mention of hemorrhage or perforation, RLS (restless legs syndrome), Rotator cuff tear, Seizure ([facility] Sepsis ([facility] and Spinal stenosis. Past Surgical History:  has a past surgical history that includes Tonsillectomy (); Carpal tunnel release (); shoulder surgery (); Knee arthroscopy (); Dilation and curettage of uterus; Macario-en-Y Gastric Bypass (2005);  section (3/9/1984); Cholecystectomy (); Colonoscopy (3/2011); Upper gastrointestinal endoscopy (10/2004); Upper gastrointestinal endoscopy (12); Appendectomy; Endoscopy, colon, diagnostic; Abdomen surgery; Shoulder arthroscopy (Right, 1 2 15); Wrist arthroscopy (Right, 2015); Cholecystectomy; Dilatation, esophagus; Wrist surgery (Right, 12/2/15); other surgical history (16); other surgical history (Left, 16); Abscess Drainage (Left, 2016); Carpal tunnel release (Left, 2017); Upper gastrointestinal endoscopy (N/A, 2018); pr laps abd prtm&omentum dx w/wo spec br/wa spx (N/A, 2018); joint replacement (2003); joint replacement (2003); joint replacement; and Upper gastrointestinal endoscopy (N/A, 2019). Social History:  reports that she has been smoking cigarettes. She started smoking about 18 years ago. She has a 14.00 pack-year smoking history. She has never used smokeless tobacco. She reports that she does not drink alcohol and does not use drugs. Family History: family history includes Cancer in her mother; Emphysema in her father; Heart Disease in her sister. . Unless otherwise noted, family history is non contributory    The patients home medications have been reviewed.     Allergies: Ceclor [cefaclor], Diprivan [propofol], Naproxen, Adhesive tape, Blueberry flavor, Ibuprofen, Mirapex [pramipexole], Oxycodone-acetaminophen, Potato, Shellfish-derived products, Tramadol, Vaccinium angustifolium, Blueberry fruit extract, Ceclor [cefaclor], Iodides, Iodine, Iv dye [iodides], Naprosyn [naproxen], Percocet [oxycodone-acetaminophen], Phenergan [promethazine hcl], Promethazine, Sulfa antibiotics, Tape [adhesive tape], and Tetanus toxoids    I have reviewed the past medical history, past surgical history, social history, and family history    ---------------------------------------------------PHYSICAL EXAM--------------------------------------    Constitutional/General: Alert and oriented x3  Head: Normocephalic and atraumatic  Eyes: PERRL, EOMI, sclera non icteric  ENT: Oropharynx clear, handling secretions, no trismus, no asymmetry of the posterior oropharynx or uvular edema  Neck: Supple, full ROM  Respiratory: Lungs clear to auscultation bilaterally, no wheezes, rales, or rhonchi. Not in respiratory distress  Cardiovascular:  Regular rate. Regular rhythm. No murmurs, no gallops, no rubs. 2+ distal pulses. Equal extremity pulses. Anterior chest wall tenderness to palpation. Gastrointestinal:  Abdomen Soft, Non tender, Non distended. No rebound, guarding, or rigidity. No pulsatile masses. Musculoskeletal: Moves all extremities x 4. Warm and well perfused, no clubbing, no cyanosis, no edema. Capillary refill <3 seconds. Bilateral trapezius tenderness to palpation. Skin: skin warm and dry. No rashes. Neurologic: GCS 15, no focal deficits, symmetric strength 5/5 in the upper and lower extremities bilaterally  Psychiatric: Normal Affect          -------------------------------------------------- RESULTS -------------------------------------------------  Results are listed below.      LABS: (Lab results interpreted by me)  Results for orders placed or performed during the hospital encounter of 02/12/23   COVID-19, Rapid    Specimen: Nasopharyngeal Swab   Result Value Ref Range SARS-CoV-2, NAAT Not Detected Not Detected   RAPID INFLUENZA A/B ANTIGENS    Specimen: Nasopharyngeal   Result Value Ref Range    Influenza A by PCR Not Detected Not Detected    Influenza B by PCR Not Detected Not Detected   CBC with Auto Differential   Result Value Ref Range    WBC 16.9 (H) 4.5 - 11.5 E9/L    RBC 5.03 3.50 - 5.50 E12/L    Hemoglobin 14.8 11.5 - 15.5 g/dL    Hematocrit 47.4 34.0 - 48.0 %    MCV 94.2 80.0 - 99.9 fL    MCH 29.4 26.0 - 35.0 pg    MCHC 31.2 (L) 32.0 - 34.5 %    RDW 14.2 11.5 - 15.0 fL    Platelets 002 013 - 280 E9/L    MPV 10.1 7.0 - 12.0 fL    Neutrophils % 82.2 (H) 43.0 - 80.0 %    Immature Granulocytes % 0.6 0.0 - 5.0 %    Lymphocytes % 8.9 (L) 20.0 - 42.0 %    Monocytes % 7.3 2.0 - 12.0 %    Eosinophils % 0.6 0.0 - 6.0 %    Basophils % 0.4 0.0 - 2.0 %    Neutrophils Absolute 13.88 (H) 1.80 - 7.30 E9/L    Immature Granulocytes # 0.10 E9/L    Lymphocytes Absolute 1.51 1.50 - 4.00 E9/L    Monocytes Absolute 1.23 (H) 0.10 - 0.95 E9/L    Eosinophils Absolute 0.10 0.05 - 0.50 E9/L    Basophils Absolute 0.07 0.00 - 0.20 E9/L   Comprehensive Metabolic Panel w/ Reflex to MG   Result Value Ref Range    Sodium 134 132 - 146 mmol/L    Potassium reflex Magnesium 4.6 3.5 - 5.0 mmol/L    Chloride 96 (L) 98 - 107 mmol/L    CO2 27 22 - 29 mmol/L    Anion Gap 11 7 - 16 mmol/L    Glucose 174 (H) 74 - 99 mg/dL    BUN 16 6 - 23 mg/dL    Creatinine 0.7 0.5 - 1.0 mg/dL    Est, Glom Filt Rate >60 >=60 mL/min/1.73    Calcium 9.4 8.6 - 10.2 mg/dL    Total Protein 7.3 6.4 - 8.3 g/dL    Albumin 4.3 3.5 - 5.2 g/dL    Total Bilirubin 0.4 0.0 - 1.2 mg/dL    Alkaline Phosphatase 94 35 - 104 U/L    ALT 13 0 - 32 U/L    AST 14 0 - 31 U/L   Troponin   Result Value Ref Range    Troponin, High Sensitivity <6 0 - 9 ng/L   ,       RADIOLOGY:    Radiologist interpretation  CTA PULMONARY W CONTRAST   Final Result   Technically limited study due to suboptimal opacification the pulmonary   arteries.   No obvious pulmonary emboli involving the central pulmonary   arteries. No evidence for pleural effusion or pneumonia. RECOMMENDATIONS:   Unavailable         XR CHEST 1 VIEW   Final Result   No pneumonia or pleural effusion. EKG Interpretation  Interpreted by emergency department physician, Annelise Rao MD    Date of EK23  Time: 1616    Rhythm: sinus tachycardia  Rate: tachycardia  Axis: right  Conduction: normal  ST Segments: no acute change  T Waves: no acute change    Clinical Impression: nonspecific EKG  Comparison to prior EKG: stable as compared to patient's most recent EKG      ------------------------- NURSING NOTES AND VITALS REVIEWED ---------------------------   The nursing notes within the ED encounter and vital signs as below have been reviewed by myself  /72   Pulse (!) 103   Temp 98.1 °F (36.7 °C) (Oral)   Resp 20   LMP  (LMP Unknown)   SpO2 94%     Oxygen Saturation Interpretation: Normal    The patients available past medical records and past encounters were reviewed. ------------------------------ ED COURSE/MEDICAL DECISION MAKING----------------------         The cardiac monitor revealed sinus tachycardia with a heart rate in the 100s as interpreted by me. The cardiac monitor was ordered secondary to the patient's chest pain and to monitor the patient for dysrhythmia. CPT [id number]     Oxygen Saturation Interpretation: 97% on RA. IDr. Yung, am the primary provider of record        Medical Decision Making:     History obtained from the patient. External records - 21 stress test reviewed.      Comorbidity - COPD, Collapsed lung, Hypertension    While not exhaustive, the following diagnoses and their severity were considered: ACS, PE, pneumonia, pneumothorax, dissection, muscle strain, viral illness, COVID, influenza    Independent interpretation of Laboratory tests by Annelise Rao MD:      Results for orders placed or performed during the hospital encounter of 02/12/23   COVID-19, Rapid    Specimen: Nasopharyngeal Swab   Result Value Ref Range    SARS-CoV-2, NAAT Not Detected Not Detected   RAPID INFLUENZA A/B ANTIGENS    Specimen: Nasopharyngeal   Result Value Ref Range    Influenza A by PCR Not Detected Not Detected    Influenza B by PCR Not Detected Not Detected   CBC with Auto Differential   Result Value Ref Range    WBC 16.9 (H) 4.5 - 11.5 E9/L    RBC 5.03 3.50 - 5.50 E12/L    Hemoglobin 14.8 11.5 - 15.5 g/dL    Hematocrit 47.4 34.0 - 48.0 %    MCV 94.2 80.0 - 99.9 fL    MCH 29.4 26.0 - 35.0 pg    MCHC 31.2 (L) 32.0 - 34.5 %    RDW 14.2 11.5 - 15.0 fL    Platelets 231 457 - 223 E9/L    MPV 10.1 7.0 - 12.0 fL    Neutrophils % 82.2 (H) 43.0 - 80.0 %    Immature Granulocytes % 0.6 0.0 - 5.0 %    Lymphocytes % 8.9 (L) 20.0 - 42.0 %    Monocytes % 7.3 2.0 - 12.0 %    Eosinophils % 0.6 0.0 - 6.0 %    Basophils % 0.4 0.0 - 2.0 %    Neutrophils Absolute 13.88 (H) 1.80 - 7.30 E9/L    Immature Granulocytes # 0.10 E9/L    Lymphocytes Absolute 1.51 1.50 - 4.00 E9/L    Monocytes Absolute 1.23 (H) 0.10 - 0.95 E9/L    Eosinophils Absolute 0.10 0.05 - 0.50 E9/L    Basophils Absolute 0.07 0.00 - 0.20 E9/L   Comprehensive Metabolic Panel w/ Reflex to MG   Result Value Ref Range    Sodium 134 132 - 146 mmol/L    Potassium reflex Magnesium 4.6 3.5 - 5.0 mmol/L    Chloride 96 (L) 98 - 107 mmol/L    CO2 27 22 - 29 mmol/L    Anion Gap 11 7 - 16 mmol/L    Glucose 174 (H) 74 - 99 mg/dL    BUN 16 6 - 23 mg/dL    Creatinine 0.7 0.5 - 1.0 mg/dL    Est, Glom Filt Rate >60 >=60 mL/min/1.73    Calcium 9.4 8.6 - 10.2 mg/dL    Total Protein 7.3 6.4 - 8.3 g/dL    Albumin 4.3 3.5 - 5.2 g/dL    Total Bilirubin 0.4 0.0 - 1.2 mg/dL    Alkaline Phosphatase 94 35 - 104 U/L    ALT 13 0 - 32 U/L    AST 14 0 - 31 U/L   Troponin   Result Value Ref Range    Troponin, High Sensitivity <6 0 - 9 ng/L       Independent interpretation of Radiology tests by Isi Goncalves MD: No PE, pleural effusion, pneumonia     Final Interpretation by Radiology:  CTA PULMONARY W CONTRAST   Final Result   Technically limited study due to suboptimal opacification the pulmonary   arteries. No obvious pulmonary emboli involving the central pulmonary   arteries. No evidence for pleural effusion or pneumonia. RECOMMENDATIONS:   Unavailable         XR CHEST 1 VIEW   Final Result   No pneumonia or pleural effusion. Are there any additional factors to consider that affect care (uninsured, homeless, illiterate, history from another source, etc.) (If yes, which ones). No      Name and Route of medications administered in the ED:  Medications   diphenhydrAMINE (BENADRYL) tablet 25 mg (25 mg Oral Given 2/12/23 2055)   fentaNYL (SUBLIMAZE) injection 50 mcg (50 mcg IntraVENous Given 2/12/23 1636)   diphenhydrAMINE (BENADRYL) injection 25 mg (25 mg IntraVENous Given 2/12/23 1633)   methylPREDNISolone sodium (SOLU-MEDROL) injection 125 mg (125 mg IntraVENous Given 2/12/23 1636)   iopamidol (ISOVUE-370) 76 % injection 70 mL (70 mLs IntraVENous Given 2/12/23 1734)   fentaNYL (SUBLIMAZE) injection 50 mcg (50 mcg IntraVENous Given 2/12/23 2008)           ED Course: This patient's ED course included: a personal history and physicial examination, re-evaluation prior to disposition, IV medications, cardiac monitoring, and continuous pulse oximetry    This patient has remained hemodynamically stable and improved during their ED course. Vital signs are stable. Physical exam is remarkable for tenderness to palpation in the trapezius muscle of the upper back and anterior chest wall. Patient given fentanyl for her pain. Labs and imaging obtained. White blood cell count is elevated. COVID and influenza are negative. CMP is within normal limits. Troponins negative. CTA of the chest was done. No obvious PE. No pleural effusion or pneumonia. No evidence of mediastinal lymphadenopathy.   The heart and pericardium do not show any acute abnormality. There is no acute abnormality of the thoracic aorta. On reevaluation, patient states that she still having pain. Additional fentanyl was given. I discussed with the patient admission versus discharge home. Patient states that she is feeling better. It is reproducible with palpation and movement. Patient is comfortable with following up with her primary care physician outpatient. I told her to take over-the-counter medication as needed for pain. I will discharge her home with a prescription for Flexeril. I told her to take the medication as directed and to return for worsening symptoms. She is agreeable with plan of care. Re-Evaluations:   Pain resolving. Now itching from the fentanyl. Benadryl Given. Consultations:  none      Critical Care: none    Counseling: The emergency provider has spoken with the patient and discussed todays results, in addition to providing specific details for the plan of care and counseling regarding the diagnosis and prognosis. Questions are answered at this time and they are agreeable with the plan.       --------------------------------- IMPRESSION AND DISPOSITION ---------------------------------    IMPRESSION  1. Chest pain, unspecified type        DISPOSITION  Disposition: Discharge to home  Patient condition is stable        NOTE: This report was transcribed using voice recognition software.  Every effort was made to ensure accuracy; however, inadvertent computerized transcription errors may be present        Hiren Walton MD  02/12/23 0581

## 2023-02-13 LAB
EKG ATRIAL RATE: 104 BPM
EKG P AXIS: 82 DEGREES
EKG P-R INTERVAL: 144 MS
EKG Q-T INTERVAL: 370 MS
EKG QRS DURATION: 86 MS
EKG QTC CALCULATION (BAZETT): 486 MS
EKG R AXIS: 171 DEGREES
EKG T AXIS: 51 DEGREES
EKG VENTRICULAR RATE: 104 BPM

## 2023-02-13 PROCEDURE — 93010 ELECTROCARDIOGRAM REPORT: CPT | Performed by: INTERNAL MEDICINE

## 2023-02-27 ENCOUNTER — OFFICE VISIT (OUTPATIENT)
Dept: FAMILY MEDICINE CLINIC | Age: 68
End: 2023-02-27

## 2023-02-27 VITALS
BODY MASS INDEX: 42.01 KG/M2 | WEIGHT: 214 LBS | RESPIRATION RATE: 17 BRPM | HEIGHT: 60 IN | OXYGEN SATURATION: 96 % | DIASTOLIC BLOOD PRESSURE: 87 MMHG | SYSTOLIC BLOOD PRESSURE: 148 MMHG | TEMPERATURE: 97.6 F

## 2023-02-27 DIAGNOSIS — J45.21 MILD INTERMITTENT ASTHMA WITH ACUTE EXACERBATION: Primary | ICD-10-CM

## 2023-02-27 DIAGNOSIS — J44.9 ASTHMA WITH COPD (HCC): ICD-10-CM

## 2023-02-27 RX ORDER — IPRATROPIUM BROMIDE AND ALBUTEROL SULFATE 2.5; .5 MG/3ML; MG/3ML
1 SOLUTION RESPIRATORY (INHALATION) EVERY 6 HOURS PRN
Qty: 360 ML | Refills: 0 | Status: ON HOLD | OUTPATIENT
Start: 2023-02-27

## 2023-02-27 RX ORDER — DEXAMETHASONE SODIUM PHOSPHATE 10 MG/ML
10 INJECTION INTRAMUSCULAR; INTRAVENOUS ONCE
Status: COMPLETED | OUTPATIENT
Start: 2023-02-27 | End: 2023-02-27

## 2023-02-27 RX ORDER — ALBUTEROL SULFATE 90 UG/1
2 AEROSOL, METERED RESPIRATORY (INHALATION) EVERY 6 HOURS PRN
Qty: 1 EACH | Refills: 1 | Status: ON HOLD | OUTPATIENT
Start: 2023-02-27 | End: 2024-02-27

## 2023-02-27 RX ORDER — IPRATROPIUM BROMIDE AND ALBUTEROL SULFATE 2.5; .5 MG/3ML; MG/3ML
1 SOLUTION RESPIRATORY (INHALATION) ONCE
Status: DISCONTINUED | OUTPATIENT
Start: 2023-02-27 | End: 2023-02-27

## 2023-02-27 RX ORDER — IPRATROPIUM BROMIDE AND ALBUTEROL SULFATE 2.5; .5 MG/3ML; MG/3ML
1 SOLUTION RESPIRATORY (INHALATION) ONCE
Status: COMPLETED | OUTPATIENT
Start: 2023-02-27 | End: 2023-02-27

## 2023-02-27 RX ADMIN — DEXAMETHASONE SODIUM PHOSPHATE 10 MG: 10 INJECTION INTRAMUSCULAR; INTRAVENOUS at 15:03

## 2023-02-27 RX ADMIN — IPRATROPIUM BROMIDE AND ALBUTEROL SULFATE 1 AMPULE: 2.5; .5 SOLUTION RESPIRATORY (INHALATION) at 15:34

## 2023-02-27 ASSESSMENT — PATIENT HEALTH QUESTIONNAIRE - PHQ9
8. MOVING OR SPEAKING SO SLOWLY THAT OTHER PEOPLE COULD HAVE NOTICED. OR THE OPPOSITE, BEING SO FIGETY OR RESTLESS THAT YOU HAVE BEEN MOVING AROUND A LOT MORE THAN USUAL: 0
SUM OF ALL RESPONSES TO PHQ QUESTIONS 1-9: 0
10. IF YOU CHECKED OFF ANY PROBLEMS, HOW DIFFICULT HAVE THESE PROBLEMS MADE IT FOR YOU TO DO YOUR WORK, TAKE CARE OF THINGS AT HOME, OR GET ALONG WITH OTHER PEOPLE: 0
2. FEELING DOWN, DEPRESSED OR HOPELESS: 0
4. FEELING TIRED OR HAVING LITTLE ENERGY: 0
SUM OF ALL RESPONSES TO PHQ QUESTIONS 1-9: 0
DEPRESSION UNABLE TO ASSESS: FUNCTIONAL CAPACITY MOTIVATION LIMITS ACCURACY
1. LITTLE INTEREST OR PLEASURE IN DOING THINGS: 0
3. TROUBLE FALLING OR STAYING ASLEEP: 0
SUM OF ALL RESPONSES TO PHQ QUESTIONS 1-9: 0
SUM OF ALL RESPONSES TO PHQ QUESTIONS 1-9: 0
5. POOR APPETITE OR OVEREATING: 0
6. FEELING BAD ABOUT YOURSELF - OR THAT YOU ARE A FAILURE OR HAVE LET YOURSELF OR YOUR FAMILY DOWN: 0
SUM OF ALL RESPONSES TO PHQ9 QUESTIONS 1 & 2: 0
7. TROUBLE CONCENTRATING ON THINGS, SUCH AS READING THE NEWSPAPER OR WATCHING TELEVISION: 0
9. THOUGHTS THAT YOU WOULD BE BETTER OFF DEAD, OR OF HURTING YOURSELF: 0

## 2023-02-27 NOTE — PROGRESS NOTES
23  Louann Young : 1955 Sex: female  Age 77 y.o. Subjective:  Chief Complaint   Patient presents with    Cough     Cant take deep breathe, sob started a few days ago       HPI:   Louann Young , 77 y.o. female presents to the clinic for evaluation of sough x 3 days. The patient also reports SOB, can't take a deep breath. The patient has taken albuterol inhaler for symptoms. The patient reports worsening symptoms over time. The patient has ill exposure through grandson who tested positive for strep. The patient has hx of COVID-19. The patient denies acute loss of taste and smell, headache, sinus congestion, cough, sore throat, rash, and fever. The patient also denies chest pain, abdominal pain, shortness of breath, wheezing, and nausea / vomiting / diarrhea. ROS:   Unless otherwise stated in this report the patient's positive and negative responses for review of systems for constitutional, eyes, ENT, cardiovascular, respiratory, gastrointestinal, neurological, , musculoskeletal, and integument systems and related systems to the presenting problem are either stated in the history of present illness or were not pertinent or were negative for the symptoms and/or complaints related to the presenting medical problem. Positives and pertinent negatives as per HPI. All others reviewed and are negative.       PMH:     Past Medical History:   Diagnosis Date    Acute CVA (cerebrovascular accident) ([facility] [address] 3/17/2022    Anastomotic ulcer 2012    Anemia     Anxiety     Asthma     Biceps tendon tear 2015    Closed fracture of multiple ribs of right side     Closed fracture of nasal bone     Collapsed lung     COPD (chronic obstructive pulmonary disease) ([facility])     DDD (degenerative disc disease), lumbar     Degenerative disc disease at L5-S1 level     Depression     Distal radius fracture 2015    Fracture of left olecranon process 01/10/2017    GERD (gastroesophageal reflux disease)     Heart attack ([facility] [address]     History of blood transfusion     Hypertension     Impingement syndrome of shoulder 2014    Intractable abdominal pain 2018    Left carpal tunnel syndrome 2017    Nasal bones, closed fracture, closed, initial encounter 2015    Osteoarthritis     Peptic ulcer, unspecified site, unspecified as acute or chronic, without mention of hemorrhage or perforation     RLS (restless legs syndrome)     Rotator cuff tear 2014    Seizure ([facility] [address]     Sepsis ([facility] [address]     Spinal stenosis        Past Surgical History:   Procedure Laterality Date    ABDOMEN SURGERY      ABSCESS DRAINAGE Left 2016    left thigh seroma    Vignesh 13    right hand    CARPAL TUNNEL RELEASE Left 2017     SECTION  3/9/1984    CHOLECYSTECTOMY      CHOLECYSTECTOMY      COLONOSCOPY  3/2011    DILATATION, ESOPHAGUS      DILATION AND CURETTAGE OF UTERUS      x4    ENDOSCOPY, COLON, DIAGNOSTIC      JOINT REPLACEMENT  2003    right    JOINT REPLACEMENT  2003    left knee    JOINT REPLACEMENT      bilat knee replacement    KNEE ARTHROSCOPY      bilateral    OTHER SURGICAL HISTORY  16    closed reduction nasal bone fracture    OTHER SURGICAL HISTORY Left 6 2 16    seroma incision and drainage thigh    MT LAPS ABD PRTM&OMENTUM DX W/WO SPEC BR/WA SPX N/A 2018    DIAGNOSTIC LAPAROSCOPY, LYSIS OF ADHESIONS performed by Audra Du MD at [address]  2005    Dr. Shaina Tabor ARTHROSCOPY Right 1 2 15    rotator cuff repair, subacromial decompression, debridement    SHOULDER SURGERY      left    TONSILLECTOMY      UPPER GASTROINTESTINAL ENDOSCOPY  10/2004    UPPER GASTROINTESTINAL ENDOSCOPY  12    [facility]    UPPER GASTROINTESTINAL ENDOSCOPY N/A 2018    EGD BIOPSY performed by Ishan Hernandez MD at [address] [place] [address] 2019    EGD ESOPHAGOGASTRODUODENOSCOPY performed by Ayan Larson MD at [address] ARTHROSCOPY Right 12/02/2015    DEBRIDEMENT ASPIRATION; RIGHT DEQUARVAINES RELEASE    WRIST SURGERY Right 12/2/15       Family History   Problem Relation Age of Onset    Cancer Mother     Heart Disease Sister     Emphysema Father        Medications:     Current Outpatient Medications:     albuterol sulfate HFA (PROVENTIL HFA) 108 (90 Base) MCG/ACT inhaler, Inhale 2 puffs into the lungs every 6 hours as needed for Wheezing, Disp: 1 each, Rfl: 1    ipratropium-albuterol (DUONEB) 0.5-2.5 (3) MG/3ML SOLN nebulizer solution, Inhale 3 mLs into the lungs every 6 hours as needed for Shortness of Breath, Disp: 360 mL, Rfl: 0    venlafaxine (EFFEXOR XR) 75 MG extended release capsule, Take 1 capsule by mouth daily, Disp: 90 capsule, Rfl: 0    vitamin D (ERGOCALCIFEROL) 1.25 MG (88458 UT) CAPS capsule, Take 1 capsule by mouth once a week, Disp: 12 capsule, Rfl: 1    atorvastatin (LIPITOR) 40 MG tablet, Take 1 tablet by mouth nightly, Disp: 90 tablet, Rfl: 1    rOPINIRole (REQUIP) 1 MG tablet, Take 2 tablets by mouth nightly, Disp: 180 tablet, Rfl: 1    aspirin 81 MG chewable tablet, Take 1 tablet by mouth daily, Disp: 30 tablet, Rfl: 3    esomeprazole (NEXIUM) 40 MG delayed release capsule, Take 1 capsule by mouth every morning (before breakfast), Disp: 90 capsule, Rfl: 1    Allergies:      Allergies   Allergen Reactions    Ceclor [Cefaclor] Anaphylaxis    Diprivan [Propofol] Hives     Swelling of face    Naproxen Anaphylaxis     Heart races    Adhesive Tape Hives    Blueberry Flavor     Ibuprofen Hives    Mirapex [Pramipexole]     Oxycodone-Acetaminophen     Potato     Shellfish-Derived Products Hives    Tramadol     Vaccinium Angustifolium     Blueberry Fruit Extract Hives    Ceclor [Cefaclor] Hives    Iodides Hives    Iodine Hives and Rash     Contrast    Iv Dye [Iodides] Rash    Naprosyn [Naproxen] Palpitations    Percocet [Oxycodone-Acetaminophen] Rash    Phenergan [Promethazine Hcl] Nausea And Vomiting    Promethazine Nausea And Vomiting    Sulfa Antibiotics Hives    Tape Mevelyn Merrim[place] Tape] Rash    Tetanus Toxoids Swelling and Rash       Social History:     Social History     Tobacco Use    Smoking status: Every Day     Packs/day: 1.00     Years: 14.00     Pack years: 14.00     Types: Cigarettes     Start date: 10/23/2004    Smokeless tobacco: Never   Vaping Use    Vaping Use: Never used   Substance Use Topics    Alcohol use: No    Drug use: No       Physical Exam:     Vitals:    02/27/23 1401   BP: (!) 148/87   Resp: 17   Temp: 97.6 °F (36.4 °C)   TempSrc: Temporal   SpO2: 96%   Weight: 214 lb (97.1 kg)   Height: 5' (1.524 m)       Physical Exam (PE)    Physical Exam  Vitals and nursing note reviewed. Constitutional:       Appearance: She is well-developed. HENT:      Head: Normocephalic and atraumatic. Right Ear: Hearing and external ear normal.      Left Ear: Hearing and external ear normal.      Nose: Nose normal.      Mouth/Throat:      Pharynx: Uvula midline. Eyes:      General: Lids are normal.      Conjunctiva/sclera: Conjunctivae normal.      Pupils: Pupils are equal, round, and reactive to light. Cardiovascular:      Rate and Rhythm: Normal rate and regular rhythm. Heart sounds: Normal heart sounds. No murmur heard. Pulmonary:      Effort: Pulmonary effort is normal.      Breath sounds: Examination of the right-middle field reveals wheezing. Examination of the right-lower field reveals wheezing. Examination of the left-lower field reveals wheezing. Wheezing present. Abdominal:      General: Bowel sounds are normal.      Palpations: Abdomen is soft. Abdomen is not rigid. Tenderness: There is no abdominal tenderness. There is no guarding or rebound. Musculoskeletal:      Cervical back: Normal range of motion and neck supple. Skin:     General: Skin is warm and dry.       Findings: No abrasion or rash.   Neurological:      Mental Status: She is alert and oriented to person, place, and time. Cranial Nerves: No cranial nerve deficit. Sensory: No sensory deficit. Coordination: Coordination normal.      Gait: Gait normal.        Testing:   (All laboratory and radiology results have been personally reviewed by myself)  Labs:  No results found for this visit on 02/27/23. Imaging: All Radiology results interpreted by Radiologist unless otherwise noted. No orders to display       Assessment / Plan:   The patient's vitals, allergies, medications, and past medical history have been reviewed. Joel Dalton was seen today for cough. Diagnoses and all orders for this visit:    Mild intermittent asthma with acute exacerbation    Asthma with COPD ([facility] [address]  -     albuterol sulfate HFA (PROVENTIL HFA) 108 (90 Base) MCG/ACT inhaler; Inhale 2 puffs into the lungs every 6 hours as needed for Wheezing    Other orders  -     dexamethasone (DECADRON) injection 10 mg  -     Discontinue: ipratropium-albuterol (DUONEB) nebulizer solution 1 ampule  -     ipratropium-albuterol (DUONEB) nebulizer solution 1 ampule  -     ipratropium-albuterol (DUONEB) 0.5-2.5 (3) MG/3ML SOLN nebulizer solution; Inhale 3 mLs into the lungs every 6 hours as needed for Shortness of Breath      - Disposition: Home    - Educational material printed for patient's review and were included in patient instructions. After Visit Summary was given to patient at the end of visit. - COVID-19 swab obtained and negative, Influenza swab obtained and negative. Encouraged oral fluids and rest. Discussed symptomatic treatments with patient today. The patient is to schedule a follow-up with PCP in the next 2-3 days for reevaluation. Red flag symptoms were also discussed with the patient today. If symptoms worsen the patient is to go directly to the emergency department for reevaluation and treatment.  Pt verbalizes understanding and is in agreement with plan of care. All questions answered. SIGNATURE: KRYSTA Mecredes - SINDY      *NOTE: This report was transcribed using voice recognition software. Every effort was made to ensure accuracy; however, inadvertent computerized transcription errors may be present.

## 2023-03-01 ENCOUNTER — HOSPITAL ENCOUNTER (INPATIENT)
Age: 68
LOS: 6 days | Discharge: HOME | DRG: 291 | End: 2023-03-07
Attending: EMERGENCY MEDICINE | Admitting: INTERNAL MEDICINE
Payer: MEDICARE

## 2023-03-01 ENCOUNTER — APPOINTMENT (OUTPATIENT)
Dept: GENERAL RADIOLOGY | Age: 68
DRG: 291 | End: 2023-03-01
Payer: MEDICARE

## 2023-03-01 DIAGNOSIS — J45.21 MILD INTERMITTENT ASTHMA WITH EXACERBATION: ICD-10-CM

## 2023-03-01 DIAGNOSIS — R06.00 DYSPNEA, UNSPECIFIED TYPE: Primary | ICD-10-CM

## 2023-03-01 DIAGNOSIS — I50.9 CONGESTIVE HEART FAILURE, UNSPECIFIED HF CHRONICITY, UNSPECIFIED HEART FAILURE TYPE (HCC): ICD-10-CM

## 2023-03-01 LAB
ANION GAP SERPL CALCULATED.3IONS-SCNC: 13 MMOL/L (ref 7–16)
BASOPHILS ABSOLUTE: 0.03 E9/L (ref 0–0.2)
BASOPHILS RELATIVE PERCENT: 0.2 % (ref 0–2)
BUN BLDV-MCNC: 20 MG/DL (ref 6–23)
CALCIUM SERPL-MCNC: 9.1 MG/DL (ref 8.6–10.2)
CHLORIDE BLD-SCNC: 98 MMOL/L (ref 98–107)
CO2: 26 MMOL/L (ref 22–29)
CREAT SERPL-MCNC: 0.7 MG/DL (ref 0.5–1)
EKG ATRIAL RATE: 124 BPM
EKG P AXIS: 72 DEGREES
EKG P-R INTERVAL: 140 MS
EKG Q-T INTERVAL: 338 MS
EKG QRS DURATION: 72 MS
EKG QTC CALCULATION (BAZETT): 485 MS
EKG R AXIS: -57 DEGREES
EKG T AXIS: 88 DEGREES
EKG VENTRICULAR RATE: 124 BPM
EOSINOPHILS ABSOLUTE: 0.01 E9/L (ref 0.05–0.5)
EOSINOPHILS RELATIVE PERCENT: 0.1 % (ref 0–6)
GFR SERPL CREATININE-BSD FRML MDRD: >60 ML/MIN/1.73
GLUCOSE BLD-MCNC: 198 MG/DL (ref 74–99)
HCT VFR BLD CALC: 39 % (ref 34–48)
HEMOGLOBIN: 12.6 G/DL (ref 11.5–15.5)
IMMATURE GRANULOCYTES #: 0.19 E9/L
IMMATURE GRANULOCYTES %: 1.2 % (ref 0–5)
INFLUENZA A BY PCR: NOT DETECTED
INFLUENZA B BY PCR: NOT DETECTED
LYMPHOCYTES ABSOLUTE: 0.91 E9/L (ref 1.5–4)
LYMPHOCYTES RELATIVE PERCENT: 5.8 % (ref 20–42)
MAGNESIUM: 1.9 MG/DL (ref 1.6–2.6)
MCH RBC QN AUTO: 30.1 PG (ref 26–35)
MCHC RBC AUTO-ENTMCNC: 32.3 % (ref 32–34.5)
MCV RBC AUTO: 93.3 FL (ref 80–99.9)
METER GLUCOSE: 212 MG/DL (ref 74–99)
MONOCYTES ABSOLUTE: 1.42 E9/L (ref 0.1–0.95)
MONOCYTES RELATIVE PERCENT: 9.1 % (ref 2–12)
NEUTROPHILS ABSOLUTE: 13.05 E9/L (ref 1.8–7.3)
NEUTROPHILS RELATIVE PERCENT: 83.6 % (ref 43–80)
PDW BLD-RTO: 14.6 FL (ref 11.5–15)
PLATELET # BLD: 412 E9/L (ref 130–450)
PMV BLD AUTO: 10.4 FL (ref 7–12)
POTASSIUM SERPL-SCNC: 3.9 MMOL/L (ref 3.5–5)
PRO-BNP: 686 PG/ML (ref 0–125)
PROCALCITONIN: 0.04 NG/ML (ref 0–0.08)
RBC # BLD: 4.18 E12/L (ref 3.5–5.5)
SARS-COV-2, NAAT: NOT DETECTED
SODIUM BLD-SCNC: 137 MMOL/L (ref 132–146)
T4 TOTAL: 8.2 MCG/DL (ref 4.5–11.7)
TROPONIN, HIGH SENSITIVITY: 8 NG/L (ref 0–9)
TROPONIN, HIGH SENSITIVITY: 9 NG/L (ref 0–9)
TSH SERPL DL<=0.05 MIU/L-ACNC: 1.82 UIU/ML (ref 0.27–4.2)
WBC # BLD: 15.6 E9/L (ref 4.5–11.5)

## 2023-03-01 PROCEDURE — 6360000002 HC RX W HCPCS: Performed by: INTERNAL MEDICINE

## 2023-03-01 PROCEDURE — 71046 X-RAY EXAM CHEST 2 VIEWS: CPT

## 2023-03-01 PROCEDURE — 84484 ASSAY OF TROPONIN QUANT: CPT

## 2023-03-01 PROCEDURE — 6360000002 HC RX W HCPCS: Performed by: EMERGENCY MEDICINE

## 2023-03-01 PROCEDURE — 6370000000 HC RX 637 (ALT 250 FOR IP): Performed by: INTERNAL MEDICINE

## 2023-03-01 PROCEDURE — 99285 EMERGENCY DEPT VISIT HI MDM: CPT

## 2023-03-01 PROCEDURE — 84436 ASSAY OF TOTAL THYROXINE: CPT

## 2023-03-01 PROCEDURE — 83880 ASSAY OF NATRIURETIC PEPTIDE: CPT

## 2023-03-01 PROCEDURE — 1200000000 HC SEMI PRIVATE

## 2023-03-01 PROCEDURE — 84443 ASSAY THYROID STIM HORMONE: CPT

## 2023-03-01 PROCEDURE — 80048 BASIC METABOLIC PNL TOTAL CA: CPT

## 2023-03-01 PROCEDURE — 94664 DEMO&/EVAL PT USE INHALER: CPT

## 2023-03-01 PROCEDURE — 83735 ASSAY OF MAGNESIUM: CPT

## 2023-03-01 PROCEDURE — 87502 INFLUENZA DNA AMP PROBE: CPT

## 2023-03-01 PROCEDURE — 87635 SARS-COV-2 COVID-19 AMP PRB: CPT

## 2023-03-01 PROCEDURE — 2580000003 HC RX 258: Performed by: INTERNAL MEDICINE

## 2023-03-01 PROCEDURE — 36415 COLL VENOUS BLD VENIPUNCTURE: CPT

## 2023-03-01 PROCEDURE — 94640 AIRWAY INHALATION TREATMENT: CPT

## 2023-03-01 PROCEDURE — 96374 THER/PROPH/DIAG INJ IV PUSH: CPT

## 2023-03-01 PROCEDURE — 6370000000 HC RX 637 (ALT 250 FOR IP): Performed by: EMERGENCY MEDICINE

## 2023-03-01 PROCEDURE — 85025 COMPLETE CBC W/AUTO DIFF WBC: CPT

## 2023-03-01 PROCEDURE — 93010 ELECTROCARDIOGRAM REPORT: CPT | Performed by: INTERNAL MEDICINE

## 2023-03-01 PROCEDURE — 93005 ELECTROCARDIOGRAM TRACING: CPT | Performed by: EMERGENCY MEDICINE

## 2023-03-01 PROCEDURE — 2580000003 HC RX 258: Performed by: EMERGENCY MEDICINE

## 2023-03-01 PROCEDURE — 84145 PROCALCITONIN (PCT): CPT

## 2023-03-01 PROCEDURE — 82962 GLUCOSE BLOOD TEST: CPT

## 2023-03-01 RX ORDER — SODIUM CHLORIDE 9 MG/ML
INJECTION, SOLUTION INTRAVENOUS PRN
Status: DISCONTINUED | OUTPATIENT
Start: 2023-03-01 | End: 2023-03-07 | Stop reason: HOSPADM

## 2023-03-01 RX ORDER — 0.9 % SODIUM CHLORIDE 0.9 %
1000 INTRAVENOUS SOLUTION INTRAVENOUS ONCE
Status: COMPLETED | OUTPATIENT
Start: 2023-03-01 | End: 2023-03-01

## 2023-03-01 RX ORDER — ALBUTEROL SULFATE 2.5 MG/3ML
7.5 SOLUTION RESPIRATORY (INHALATION) ONCE
Status: COMPLETED | OUTPATIENT
Start: 2023-03-01 | End: 2023-03-01

## 2023-03-01 RX ORDER — PROCHLORPERAZINE EDISYLATE 5 MG/ML
5 INJECTION INTRAMUSCULAR; INTRAVENOUS EVERY 8 HOURS PRN
Status: DISCONTINUED | OUTPATIENT
Start: 2023-03-01 | End: 2023-03-07 | Stop reason: HOSPADM

## 2023-03-01 RX ORDER — ENOXAPARIN SODIUM 100 MG/ML
40 INJECTION SUBCUTANEOUS DAILY
Status: DISCONTINUED | OUTPATIENT
Start: 2023-03-01 | End: 2023-03-02 | Stop reason: DRUGHIGH

## 2023-03-01 RX ORDER — SODIUM CHLORIDE 0.9 % (FLUSH) 0.9 %
5-40 SYRINGE (ML) INJECTION PRN
Status: DISCONTINUED | OUTPATIENT
Start: 2023-03-01 | End: 2023-03-07 | Stop reason: HOSPADM

## 2023-03-01 RX ORDER — HYDROXYZINE PAMOATE 25 MG/1
50 CAPSULE ORAL ONCE
Status: COMPLETED | OUTPATIENT
Start: 2023-03-01 | End: 2023-03-01

## 2023-03-01 RX ORDER — SODIUM CHLORIDE 0.9 % (FLUSH) 0.9 %
5-40 SYRINGE (ML) INJECTION EVERY 12 HOURS SCHEDULED
Status: DISCONTINUED | OUTPATIENT
Start: 2023-03-01 | End: 2023-03-07 | Stop reason: HOSPADM

## 2023-03-01 RX ORDER — ACETAMINOPHEN 650 MG/1
650 SUPPOSITORY RECTAL EVERY 6 HOURS PRN
Status: DISCONTINUED | OUTPATIENT
Start: 2023-03-01 | End: 2023-03-07 | Stop reason: HOSPADM

## 2023-03-01 RX ORDER — HYDROCODONE BITARTRATE AND ACETAMINOPHEN 5; 325 MG/1; MG/1
1 TABLET ORAL EVERY 8 HOURS PRN
Status: DISCONTINUED | OUTPATIENT
Start: 2023-03-01 | End: 2023-03-07 | Stop reason: HOSPADM

## 2023-03-01 RX ORDER — IPRATROPIUM BROMIDE AND ALBUTEROL SULFATE 2.5; .5 MG/3ML; MG/3ML
1 SOLUTION RESPIRATORY (INHALATION) EVERY 4 HOURS
Status: DISCONTINUED | OUTPATIENT
Start: 2023-03-01 | End: 2023-03-01 | Stop reason: CLARIF

## 2023-03-01 RX ORDER — METHYLPREDNISOLONE SODIUM SUCCINATE 40 MG/ML
40 INJECTION, POWDER, LYOPHILIZED, FOR SOLUTION INTRAMUSCULAR; INTRAVENOUS EVERY 12 HOURS
Status: DISCONTINUED | OUTPATIENT
Start: 2023-03-01 | End: 2023-03-02

## 2023-03-01 RX ORDER — ASPIRIN 81 MG/1
81 TABLET, CHEWABLE ORAL DAILY
Status: DISCONTINUED | OUTPATIENT
Start: 2023-03-01 | End: 2023-03-01

## 2023-03-01 RX ORDER — VENLAFAXINE HYDROCHLORIDE 37.5 MG/1
75 CAPSULE, EXTENDED RELEASE ORAL DAILY
Status: DISCONTINUED | OUTPATIENT
Start: 2023-03-01 | End: 2023-03-01

## 2023-03-01 RX ORDER — ROPINIROLE 1 MG/1
2 TABLET, FILM COATED ORAL NIGHTLY
Status: DISCONTINUED | OUTPATIENT
Start: 2023-03-01 | End: 2023-03-07 | Stop reason: HOSPADM

## 2023-03-01 RX ORDER — POLYETHYLENE GLYCOL 3350 17 G/17G
17 POWDER, FOR SOLUTION ORAL DAILY PRN
Status: DISCONTINUED | OUTPATIENT
Start: 2023-03-01 | End: 2023-03-07 | Stop reason: HOSPADM

## 2023-03-01 RX ORDER — ALBUTEROL SULFATE 2.5 MG/3ML
2.5 SOLUTION RESPIRATORY (INHALATION) EVERY 4 HOURS
Status: DISCONTINUED | OUTPATIENT
Start: 2023-03-01 | End: 2023-03-05

## 2023-03-01 RX ORDER — FUROSEMIDE 10 MG/ML
40 INJECTION INTRAMUSCULAR; INTRAVENOUS 2 TIMES DAILY
Status: DISCONTINUED | OUTPATIENT
Start: 2023-03-01 | End: 2023-03-04

## 2023-03-01 RX ORDER — FUROSEMIDE 10 MG/ML
40 INJECTION INTRAMUSCULAR; INTRAVENOUS ONCE
Status: COMPLETED | OUTPATIENT
Start: 2023-03-01 | End: 2023-03-01

## 2023-03-01 RX ORDER — ACETAMINOPHEN 325 MG/1
650 TABLET ORAL EVERY 6 HOURS PRN
Status: DISCONTINUED | OUTPATIENT
Start: 2023-03-01 | End: 2023-03-07 | Stop reason: HOSPADM

## 2023-03-01 RX ORDER — ESOMEPRAZOLE MAGNESIUM 40 MG/1
40 CAPSULE, DELAYED RELEASE ORAL
Status: DISCONTINUED | OUTPATIENT
Start: 2023-03-02 | End: 2023-03-01 | Stop reason: CLARIF

## 2023-03-01 RX ORDER — PANTOPRAZOLE SODIUM 40 MG/1
40 TABLET, DELAYED RELEASE ORAL
Status: DISCONTINUED | OUTPATIENT
Start: 2023-03-02 | End: 2023-03-04

## 2023-03-01 RX ORDER — VENLAFAXINE HYDROCHLORIDE 37.5 MG/1
75 CAPSULE, EXTENDED RELEASE ORAL DAILY
Status: DISCONTINUED | OUTPATIENT
Start: 2023-03-02 | End: 2023-03-07 | Stop reason: HOSPADM

## 2023-03-01 RX ORDER — DEXTROSE MONOHYDRATE 100 MG/ML
INJECTION, SOLUTION INTRAVENOUS CONTINUOUS PRN
Status: DISCONTINUED | OUTPATIENT
Start: 2023-03-01 | End: 2023-03-07 | Stop reason: HOSPADM

## 2023-03-01 RX ORDER — ASPIRIN 81 MG/1
81 TABLET, CHEWABLE ORAL DAILY
Status: DISCONTINUED | OUTPATIENT
Start: 2023-03-02 | End: 2023-03-07

## 2023-03-01 RX ADMIN — ALBUTEROL SULFATE 2.5 MG: 2.5 SOLUTION RESPIRATORY (INHALATION) at 22:28

## 2023-03-01 RX ADMIN — HYDROXYZINE PAMOATE 50 MG: 25 CAPSULE ORAL at 13:05

## 2023-03-01 RX ADMIN — IPRATROPIUM BROMIDE 0.5 MG: 0.5 SOLUTION RESPIRATORY (INHALATION) at 19:08

## 2023-03-01 RX ADMIN — PROCHLORPERAZINE EDISYLATE 5 MG: 5 INJECTION INTRAMUSCULAR; INTRAVENOUS at 19:42

## 2023-03-01 RX ADMIN — ENOXAPARIN SODIUM 40 MG: 100 INJECTION SUBCUTANEOUS at 18:18

## 2023-03-01 RX ADMIN — FUROSEMIDE 40 MG: 10 INJECTION, SOLUTION INTRAMUSCULAR; INTRAVENOUS at 15:20

## 2023-03-01 RX ADMIN — METHYLPREDNISOLONE SODIUM SUCCINATE 40 MG: 40 INJECTION, POWDER, FOR SOLUTION INTRAMUSCULAR; INTRAVENOUS at 21:25

## 2023-03-01 RX ADMIN — LIDOCAINE HYDROCHLORIDE: 20 SOLUTION ORAL; TOPICAL at 13:59

## 2023-03-01 RX ADMIN — IPRATROPIUM BROMIDE 0.5 MG: 0.5 SOLUTION RESPIRATORY (INHALATION) at 13:15

## 2023-03-01 RX ADMIN — ALBUTEROL SULFATE 2.5 MG: 2.5 SOLUTION RESPIRATORY (INHALATION) at 19:07

## 2023-03-01 RX ADMIN — IPRATROPIUM BROMIDE 0.5 MG: 0.5 SOLUTION RESPIRATORY (INHALATION) at 22:28

## 2023-03-01 RX ADMIN — HYDROCODONE BITARTRATE AND ACETAMINOPHEN 1 TABLET: 5; 325 TABLET ORAL at 18:17

## 2023-03-01 RX ADMIN — Medication 10 ML: at 20:03

## 2023-03-01 RX ADMIN — FUROSEMIDE 40 MG: 10 INJECTION, SOLUTION INTRAMUSCULAR; INTRAVENOUS at 21:25

## 2023-03-01 RX ADMIN — SODIUM CHLORIDE 1000 ML: 9 INJECTION, SOLUTION INTRAVENOUS at 13:01

## 2023-03-01 RX ADMIN — ROPINIROLE HYDROCHLORIDE 2 MG: 1 TABLET, FILM COATED ORAL at 20:02

## 2023-03-01 RX ADMIN — ALBUTEROL SULFATE 7.5 MG: 2.5 SOLUTION RESPIRATORY (INHALATION) at 13:15

## 2023-03-01 ASSESSMENT — ENCOUNTER SYMPTOMS
DIARRHEA: 0
VOMITING: 0
SORE THROAT: 0
WHEEZING: 1
RHINORRHEA: 1
ABDOMINAL PAIN: 0
BACK PAIN: 0
COUGH: 1
SHORTNESS OF BREATH: 1
NAUSEA: 0
SINUS PRESSURE: 0
CHEST TIGHTNESS: 0

## 2023-03-01 ASSESSMENT — PAIN DESCRIPTION - DESCRIPTORS
DESCRIPTORS: ACHING;DISCOMFORT;SORE
DESCRIPTORS: DISCOMFORT

## 2023-03-01 ASSESSMENT — PAIN SCALES - GENERAL
PAINLEVEL_OUTOF10: 8
PAINLEVEL_OUTOF10: 3
PAINLEVEL_OUTOF10: 8

## 2023-03-01 ASSESSMENT — PAIN DESCRIPTION - LOCATION
LOCATION: CHEST;RIB CAGE
LOCATION: CHEST
LOCATION: CHEST;RIB CAGE

## 2023-03-01 ASSESSMENT — PAIN DESCRIPTION - ORIENTATION
ORIENTATION: RIGHT;LEFT
ORIENTATION: RIGHT;LEFT;MID

## 2023-03-01 NOTE — H&P
Department of Internal Medicine  History and Physical    PCP: Kristine Duncan DO  Admitting Physician: Dr. Fidel Bacon  Consultants:   Date of Service: 3/1/2023    CHIEF COMPLAINT: Shortness of breath    HISTORY OF PRESENT ILLNESS:    Patient is 78-year-old female who presented to the ED due to shortness of breath for the last few weeks. She has associated wheezing and productive cough. She denies any fever or chills. She has been treated with IV steroids as an outpatient. States that this did help.     PAST MEDICAL Hx:  Past Medical History:   Diagnosis Date    Acute CVA (cerebrovascular accident) ([address] 3/17/2022    Anastomotic ulcer 2012    Anemia     Anxiety     Asthma     Biceps tendon tear 2015    Closed fracture of multiple ribs of right side     Closed fracture of nasal bone     Collapsed lung     COPD (chronic obstructive pulmonary disease) ([facility]     DDD (degenerative disc disease), lumbar     Degenerative disc disease at L5-S1 level     Depression     Distal radius fracture 2015    Fracture of left olecranon process 01/10/2017    GERD (gastroesophageal reflux disease)     Heart attack ([address]     History of blood transfusion     Hypertension     Impingement syndrome of shoulder 2014    Intractable abdominal pain 2018    Left carpal tunnel syndrome 2017    Nasal bones, closed fracture, closed, initial encounter 2015    Osteoarthritis     Peptic ulcer, unspecified site, unspecified as acute or chronic, without mention of hemorrhage or perforation     RLS (restless legs syndrome)     Rotator cuff tear 2014    Seizure ([address]     Sepsis ([address]     Spinal stenosis        PAST SURGICAL Hx:   Past Surgical History:   Procedure Laterality Date    ABDOMEN SURGERY      ABSCESS DRAINAGE Left 2016    left thigh seroma    APPENDECTOMY      CARPAL TUNNEL RELEASE  1998    right hand    CARPAL TUNNEL RELEASE Left 2017     SECTION  3/9/1984 CHOLECYSTECTOMY  1986    CHOLECYSTECTOMY      COLONOSCOPY  3/2011    DILATATION, ESOPHAGUS      DILATION AND CURETTAGE OF UTERUS      x4    ENDOSCOPY, COLON, DIAGNOSTIC      JOINT REPLACEMENT  1/8/2003    right    JOINT REPLACEMENT  6/5/2003    left knee    JOINT REPLACEMENT      bilat knee replacement    KNEE ARTHROSCOPY  2002    bilateral    OTHER SURGICAL HISTORY  01/14/16    closed reduction nasal bone fracture    OTHER SURGICAL HISTORY Left 6 2 16    seroma incision and drainage thigh    IN LAPS ABD PRTM&OMENTUM DX W/WO SPEC BR/WA SPX N/A 11/21/2018    DIAGNOSTIC LAPAROSCOPY, LYSIS OF ADHESIONS performed by America Rendon MD at [address]  06/14/2005    Dr. Grady Saleh ARTHROSCOPY Right 1 2 15    rotator cuff repair, subacromial decompression, debridement    SHOULDER SURGERY  1999    left    TONSILLECTOMY  1959    UPPER GASTROINTESTINAL ENDOSCOPY  10/2004    UPPER GASTROINTESTINAL ENDOSCOPY  4/18/12    [facility]    UPPER GASTROINTESTINAL ENDOSCOPY N/A 11/20/2018    EGD BIOPSY performed by Lauren Mantilla MD at [address] [facility] 1/2/2019    EGD ESOPHAGOGASTRODUODENOSCOPY performed by America Rendon MD at [address] ARTHROSCOPY Right 12/02/2015    DEBRIDEMENT ASPIRATION; RIGHT DEQUARVAINES RELEASE    WRIST SURGERY Right 12/2/15       FAMILY Hx:  Family History   Problem Relation Age of Onset    Cancer Mother     Heart Disease Sister     Emphysema Father        HOME MEDICATIONS:  Prior to Admission medications    Medication Sig Start Date End Date Taking?  Authorizing Provider   albuterol sulfate HFA (PROVENTIL HFA) 108 (90 Base) MCG/ACT inhaler Inhale 2 puffs into the lungs every 6 hours as needed for Wheezing 2/27/23 2/27/24  Rosellen Po, APRN - CNP   ipratropium-albuterol (DUONEB) 0.5-2.5 (3) MG/3ML SOLN nebulizer solution Inhale 3 mLs into the lungs every 6 hours as needed for Shortness of Breath 2/27/23   Rosellen Po, APRN - CNP   venlafaxine (EFFEXOR XR) 75 MG extended release capsule Take 1 capsule by mouth daily 1/17/23   Terry Beth,    vitamin D (ERGOCALCIFEROL) 1.25 MG (40893 UT) CAPS capsule Take 1 capsule by mouth once a week 11/16/22   Terry Beth, DO   atorvastatin (LIPITOR) 40 MG tablet Take 1 tablet by mouth nightly 11/16/22   Terry Beth, DO   rOPINIRole (REQUIP) 1 MG tablet Take 2 tablets by mouth nightly 11/16/22   Terry Beth, DO   aspirin 81 MG chewable tablet Take 1 tablet by mouth daily 3/18/22   Lionel Shabazz,    esomeprazole (NEXIUM) 40 MG delayed release capsule Take 1 capsule by mouth every morning (before breakfast) 2/10/21   Terry Beth,        ALLERGIES:  Ceclor [cefaclor], Diprivan [propofol], Naproxen, Adhesive tape, Blueberry flavor, Ibuprofen, Mirapex [pramipexole], Oxycodone-acetaminophen, Potato, Shellfish-derived products, Tramadol, Vaccinium angustifolium, Blueberry fruit extract, Ceclor [cefaclor], Iodides, Iodine, Iv dye [iodides], Naprosyn [naproxen], Percocet [oxycodone-acetaminophen], Phenergan [promethazine hcl], Promethazine, Sulfa antibiotics, Tape [adhesive tape], and Tetanus toxoids    SOCIAL Hx:  Social History     Socioeconomic History    Marital status:      Spouse name: mattie    Number of children: 5    Years of education: 12    Highest education level: Not on file   Occupational History    Not on file   Tobacco Use    Smoking status: Every Day     Packs/day: 1.00     Years: 14.00     Pack years: 14.00     Types: Cigarettes     Start date: 10/23/2004    Smokeless tobacco: Never   Vaping Use    Vaping Use: Never used   Substance and Sexual Activity    Alcohol use: No    Drug use: No    Sexual activity: Never   Other Topics Concern    Not on file   Social History Narrative    ** Merged History Encounter **          Social Determinants of Health     Financial Resource Strain: Low Risk     Difficulty of Paying Living Expenses: Not hard at all   Food Insecurity: No Food Insecurity    Worried About Running Out of Food in the Last Year: Never true    Ran Out of Food in the Last Year: Never true   Transportation Needs: Not on file   Physical Activity: Insufficiently Active    Days of Exercise per Week: 2 days    Minutes of Exercise per Session: 20 min   Stress: Not on file   Social Connections: Not on file   Intimate Partner Violence: Not on file   Housing Stability: Not on file       ROS: Positive in bold  General:   Denies chills, fatigue, fever, malaise, night sweats or weight loss    Psychological:   Denies anxiety, disorientation or hallucinations    ENT:    Denies epistaxis, headaches, vertigo or visual changes    Cardiovascular:   Denies any chest pain, irregular heartbeats, or palpitations. No paroxysmal nocturnal dyspnea.    Respiratory:   Denies shortness of breath, coughing, sputum production, hemoptysis, or wheezing.  No orthopnea.    Gastrointestinal:   Denies nausea, vomiting, diarrhea, or constipation.  Denies any abdominal pain.  Denies change in bowel habits or stools.      Genito-Urinary:    Denies any urgency, frequency, hematuria.  Voiding without difficulty.    Musculoskeletal:   Denies joint pain, joint stiffness, joint swelling or muscle pain    Neurology:    Denies any headache or focal neurological deficits. No weakness or paresthesia.    Derm:    Denies any rashes, ulcers, or excoriations.  Denies bruising.      Extremities:   Denies any lower extremity swelling or edema.      PHYSICAL EXAM: Abnormal findings noted  VITALS:  Vitals:    03/01/23 1520   BP: 136/78   Pulse:    Resp:    Temp:    SpO2:          CONSTITUTIONAL:    Awake, alert, cooperative, no apparent distress, and appears stated age    EYES:     EOMI, sclera clear, conjunctiva normal    ENT:    Normocephalic, atraumatic, External ears without lesions.     NECK:    Supple, symmetrical, trachea midline, no JVD    HEMATOLOGIC/LYMPHATICS:    No cervical lymphadenopathy and no supraclavicular  lymphadenopathy    LUNGS:    Symmetric. No increased work of breathing, good air exchange, clear to auscultation bilaterally, no wheezes, rhonchi, or rales,   Patient had bilateral wheezes    CARDIOVASCULAR:    Normal apical impulse, regular rate and rhythm, normal S1 and S2, no S3 or S4, and no murmur noted    ABDOMEN:    soft, non-distended, non-tender    MUSCULOSKELETAL:    There is no redness, warmth, or swelling of the joints. NEUROLOGIC:    Awake, alert, oriented to name, place and time. SKIN:    No bruising or bleeding. No redness, warmth, or swelling    EXTREMITIES:    Peripheral pulses present. No edema, cyanosis, or swelling.     LINES/CATHETERS     LABORATORY DATA:  CBC with Differential:    Lab Results   Component Value Date/Time    WBC 15.6 03/01/2023 12:43 PM    RBC 4.18 03/01/2023 12:43 PM    HGB 12.6 03/01/2023 12:43 PM    HCT 39.0 03/01/2023 12:43 PM     03/01/2023 12:43 PM    MCV 93.3 03/01/2023 12:43 PM    MCH 30.1 03/01/2023 12:43 PM    MCHC 32.3 03/01/2023 12:43 PM    RDW 14.6 03/01/2023 12:43 PM    SEGSPCT 82 03/02/2014 12:00 PM    LYMPHOPCT 5.8 03/01/2023 12:43 PM    MONOPCT 9.1 03/01/2023 12:43 PM    BASOPCT 0.2 03/01/2023 12:43 PM    MONOSABS 1.42 03/01/2023 12:43 PM    LYMPHSABS 0.91 03/01/2023 12:43 PM    EOSABS 0.01 03/01/2023 12:43 PM    BASOSABS 0.03 03/01/2023 12:43 PM     CMP:    Lab Results   Component Value Date/Time     03/01/2023 12:43 PM    K 3.9 03/01/2023 12:43 PM    K 4.6 02/12/2023 04:11 PM    CL 98 03/01/2023 12:43 PM    CO2 26 03/01/2023 12:43 PM    BUN 20 03/01/2023 12:43 PM    CREATININE 0.7 03/01/2023 12:43 PM    GFRAA >60 03/17/2022 07:03 AM    LABGLOM >60 03/01/2023 12:43 PM    GLUCOSE 198 03/01/2023 12:43 PM    GLUCOSE 92 03/06/2012 02:52 AM    PROT 7.3 02/12/2023 04:11 PM    LABALBU 4.3 02/12/2023 04:11 PM    LABALBU 4.6 03/06/2012 02:52 AM    CALCIUM 9.1 03/01/2023 12:43 PM    BILITOT 0.4 02/12/2023 04:11 PM    ALKPHOS 94 02/12/2023 04:11 PM AST 14 02/12/2023 04:11 PM    ALT 13 02/12/2023 04:11 PM       ASSESSMENT/PLAN:  Acute CHF exacerbation  Patient placed on IV Lasix. Echocardiogram ordered. Cardiology consulted. COPD/Asthma exacerbation  Patient placed on IV breathing treatments and IV steroids. Leukocytosis  Possibly secondary to steroid use.   History of CVA  Mild mitral stenosis  History of seizure disorder  History of peptic ulcer disease  Spinal stenosis  Restless leg syndrome  GERD  Hypertension  Anxiety and depression  Current tobacco abuse        Danielle Maier DO  4:44 PM  3/1/2023    Electronically signed by Danielle Maier DO on 3/1/23 at 4:44 PM EST

## 2023-03-01 NOTE — ED NOTES
Pt c/o increasing cp radiating to shoulders post/ neck. ekg performed and dr deras aware.       Mariano Bui, LPN  96/74/60 1209

## 2023-03-01 NOTE — ED PROVIDER NOTES
Chief complaint:  Short of breath    HPI history provided by the patient  Patient was in complaining of days to weeks of wheezing and shortness of breath with asthma flare. States has been worked up for this and still has a wheezing and short of breath. No chest pain or palpitations. No lightheadedness or syncope. No fevers. Minimal cough. Mild nasal congestion. No abdominal pain, flank pain, hematuria or dysuria. No nausea vomiting or diarrhea. Review of Systems   Constitutional:  Negative for chills, fatigue and fever. HENT:  Positive for postnasal drip and rhinorrhea. Negative for congestion, sinus pressure and sore throat. Respiratory:  Positive for cough, shortness of breath and wheezing. Negative for chest tightness. Cardiovascular:  Negative for chest pain, palpitations and leg swelling. Gastrointestinal:  Negative for abdominal pain, diarrhea, nausea and vomiting. Genitourinary:  Negative for dysuria, flank pain, frequency, hematuria and urgency. Musculoskeletal:  Negative for arthralgias, back pain, gait problem, joint swelling, myalgias, neck pain and neck stiffness. Skin:  Negative for rash and wound. Neurological:  Negative for dizziness, seizures, syncope, weakness, light-headedness, numbness and headaches. All other systems reviewed and are negative. Physical Exam  Vitals and nursing note reviewed. Constitutional:       General: She is awake. She is not in acute distress. Appearance: She is well-developed. She is not ill-appearing, toxic-appearing or diaphoretic. HENT:      Head: Normocephalic and atraumatic. Right Ear: Tympanic membrane, ear canal and external ear normal.      Left Ear: Tympanic membrane, ear canal and external ear normal.      Nose: Mucosal edema present. No congestion or rhinorrhea. Mouth/Throat:      Pharynx: Oropharynx is clear. Uvula midline.  No pharyngeal swelling, oropharyngeal exudate, posterior oropharyngeal erythema or uvula swelling.      Tonsils: No tonsillar exudate or tonsillar abscesses.      Comments: No trismus or stridor  Eyes:      General: No scleral icterus.     Pupils: Pupils are equal, round, and reactive to light.   Cardiovascular:      Rate and Rhythm: Normal rate and regular rhythm.      Heart sounds: Normal heart sounds. No murmur heard.  Pulmonary:      Effort: Pulmonary effort is normal. Tachypnea present. No respiratory distress.      Breath sounds: No stridor, decreased air movement or transmitted upper airway sounds. Wheezing present. No decreased breath sounds, rhonchi or rales.      Comments: Scattered mild expiratory wheeze with no respiratory distress with good air exchange.  Chest:      Chest wall: No tenderness.   Abdominal:      General: Bowel sounds are normal. There is no distension.      Palpations: Abdomen is soft.      Tenderness: There is no abdominal tenderness. There is no right CVA tenderness, left CVA tenderness, guarding or rebound.   Musculoskeletal:         General: No swelling, tenderness, deformity or signs of injury.      Cervical back: Full passive range of motion without pain, normal range of motion and neck supple. No signs of trauma or rigidity. No spinous process tenderness or muscular tenderness. Normal range of motion.      Right lower leg: No edema.      Left lower leg: No edema.      Comments: Arms and legs are neurovascular intact with no pretibial edema or calf pain.   Skin:     General: Skin is warm and dry.      Coloration: Skin is not cyanotic, jaundiced, mottled or pale.      Findings: No bruising, erythema or rash.   Neurological:      General: No focal deficit present.      Mental Status: She is alert and oriented to person, place, and time.      GCS: GCS eye subscore is 4. GCS verbal subscore is 5. GCS motor subscore is 6.      Cranial Nerves: Cranial nerves 2-12 are intact. No cranial nerve deficit.      Sensory: Sensation is intact.      Motor: Motor function is  intact. Coordination: Coordination is intact. Coordination normal.      Gait: Gait is intact. Psychiatric:         Mood and Affect: Mood is anxious. Behavior: Behavior is cooperative. Procedures     MDM     History provided by: The patient    Patient presents with a history of asthma complaining of increased wheezing and short of breath for weeks to months. No fevers or particular URI symptoms. No specific chest pain or pressure. No lightheadedness or syncope. No leg pain or swelling. Symptoms worsen with exertion. Social factors affecting care: None  Chronic conditions affecting care: Asthma  Chart reviewed: Patient with recent work-up including a CTA chest on 2- that showed no acute pulmonary embolism read by radiology. Also at that time troponin less than 6, electrolytes BNP all unremarkable. LFTs normal.  TSH and T4 checked on 3- and were unremarkable. 2- white count 16.9, hemoglobin 14.8 and hematocrit 47.4. Differential includes but not limited to: CHF, pneumonia, pneumothorax, asthma exacerbation, COVID, influenza, MI    Work up includes with interpretations: Troponin x2 negative here, EKG unremarkable no acute findings. COVID-negative, influenza negative White count 15.6 when compared to previous is actually down from previous that was 16.9. Basic metabolic panel blood sugar 198, BUN 20 creatinine 0.7 both normal.  Electrolytes normal.  proBNP increasing from baseline although several years old previous was around 100 today at 686 proBNP. Magnesium 1.9.  T48.2 TSH 1.8 both normal.  Chest x-ray read by radiologist shows CHF. Advanced directive discussion: None    Treatment in ER: Albuterol, ipratropium treatments, p.o.  Vistaril, IV Lasix    Consultations in ER: Cardiology, internal medicine for admission    Diagnosis and disposition: Dyspnea, new CHF, asthma    ED Course as of 03/01/23 1643   Wed Mar 01, 2023   1500 Chart reviewed: Cardiac echo March 2022 showed ejection fraction 74% [NC]   1624 Case discussed with Dr. Ortega, patient's cardiologist, detailed overview given, reviewed the previous echo showing ejection fraction 74% went over the labs today and her physical exam and presentation and history, is comfortable following up with her on an outpatient basis which is what the patient was requesting.  We will give her Lasix in the ER. [NC]   1624 Sitting in chair resting comfortably no distress, minimally anxious.  Mild adventitial breath sounds bilaterally with no gross wheezes or rhonchi and no necessarily Rales.  No respiratory distress.  Work-up results are discussed with her and she is asking that I speak with her cardiologist and she would prefer to try to go home. [NC]   1634 Sitting in chair resting comfortably, no distress, vital signs generally unchanged, minimal tachypnea, mild tachycardia.  Adventitial bilateral sounds with no distress.  At this time not requiring oxygen although has new findings on chest x-ray, still stating that she just feels tight breathing.  Would benefit from observation. [NC]      ED Course User Index  [NC] Otoniel Serrano DO        EKG Interpretation #1    Interpreted by emergency department physician    Rhythm: sinus tachycardia  Rate: 124  Axis: left  Ectopy: premature atrial contraction  Conduction: normal  ST Segments: no acute change  T Waves: no acute change  Q Waves: none    Clinical Impression: sinus tachycardia    Otoniel Serrano DO      EKG Interpretation #2    Interpreted by emergency department physician    Rhythm: sinus tachycardia  Rate: 118  Axis: left  Ectopy: none  Conduction: normal  ST Segments: no acute change  T Waves: no acute change  Q Waves: none    Clinical Impression: no acute changes    Otoniel Serrano DO  ED Course as of 03/01/23 1643   Wed Mar 01, 2023   1500 Chart reviewed: Cardiac echo March 2022 showed ejection fraction 74% [NC]   1622 Case discussed with   Shannon, patient's cardiologist, detailed overview given, reviewed the previous echo showing ejection fraction 74% went over the labs today and her physical exam and presentation and history, is comfortable following up with her on an outpatient basis which is what the patient was requesting. We will give her Lasix in the ER. [NC]   5002 Sitting in chair resting comfortably no distress, minimally anxious. Mild adventitial breath sounds bilaterally with no gross wheezes or rhonchi and no necessarily Rales. No respiratory distress. Work-up results are discussed with her and she is asking that I speak with her cardiologist and she would prefer to try to go home. [NC]   0327 Sitting in chair resting comfortably, no distress, vital signs generally unchanged, minimal tachypnea, mild tachycardia. Adventitial bilateral sounds with no distress. At this time not requiring oxygen although has new findings on chest x-ray, still stating that she just feels tight breathing. Would benefit from observation.  [NC]      ED Course User Index  [NC] Bartolo Serrano, DO       --------------------------------------------- PAST HISTORY ---------------------------------------------  Past Medical History:  has a past medical history of Acute CVA (cerebrovascular accident) ([address] Anastomotic ulcer, Anemia, Anxiety, Asthma, Biceps tendon tear, Closed fracture of multiple ribs of right side, Closed fracture of nasal bone, Collapsed lung, COPD (chronic obstructive pulmonary disease) ([address] DDD (degenerative disc disease), lumbar, Degenerative disc disease at L5-S1 level, Depression, Distal radius fracture, Fracture of left olecranon process, GERD (gastroesophageal reflux disease), Heart attack ([address] History of blood transfusion, Hypertension, Impingement syndrome of shoulder, Intractable abdominal pain, Left carpal tunnel syndrome, Nasal bones, closed fracture, closed, initial encounter, Osteoarthritis, Peptic ulcer, unspecified site, unspecified as acute or chronic, without mention of hemorrhage or perforation, RLS (restless legs syndrome), Rotator cuff tear, Seizure ([facility] Sepsis ([facility] [address] [facility] and Spinal stenosis. Past Surgical History:  has a past surgical history that includes Tonsillectomy (); Carpal tunnel release (); shoulder surgery (); Knee arthroscopy (); Dilation and curettage of uterus; Macario-en-Y Gastric Bypass (2005);  section (3/9/1984); Cholecystectomy (); Colonoscopy (3/2011); Upper gastrointestinal endoscopy (10/2004); Upper gastrointestinal endoscopy (12); Appendectomy; Endoscopy, colon, diagnostic; Abdomen surgery; Shoulder arthroscopy (Right, 1 2 15); Wrist arthroscopy (Right, 2015); Cholecystectomy; Dilatation, esophagus; Wrist surgery (Right, 12/2/15); other surgical history (16); other surgical history (Left, 16); Abscess Drainage (Left, 2016); Carpal tunnel release (Left, 2017); Upper gastrointestinal endoscopy (N/A, 2018); pr laps abd prtm&omentum dx w/wo spec br/wa spx (N/A, 2018); joint replacement (2003); joint replacement (2003); joint replacement; and Upper gastrointestinal endoscopy (N/A, 2019). Social History:  reports that she has been smoking cigarettes. She started smoking about 18 years ago. She has a 14.00 pack-year smoking history. She has never used smokeless tobacco. She reports that she does not drink alcohol and does not use drugs. Family History: family history includes Cancer in her mother; Emphysema in her father; Heart Disease in her sister. The patients home medications have been reviewed.     Allergies: Ceclor [cefaclor], Diprivan [propofol], Naproxen, Adhesive tape, Blueberry flavor, Ibuprofen, Mirapex [pramipexole], Oxycodone-acetaminophen, Potato, Shellfish-derived products, Tramadol, Vaccinium angustifolium, Blueberry fruit extract, Ceclor [cefaclor], Iodides, Iodine, Iv dye [iodides], Naprosyn [naproxen], Percocet [oxycodone-acetaminophen], Phenergan [promethazine hcl], Promethazine, Sulfa antibiotics, Tape [adhesive tape], and Tetanus toxoids    -------------------------------------------------- RESULTS -------------------------------------------------    LABS:  Results for orders placed or performed during the hospital encounter of 03/01/23   COVID-19, Rapid    Specimen: Nasopharyngeal Swab   Result Value Ref Range    SARS-CoV-2, NAAT Not Detected Not Detected   Rapid influenza A/B antigens    Specimen: Nares   Result Value Ref Range    Influenza A by PCR Not Detected Not Detected    Influenza B by PCR Not Detected Not Detected   CBC with Auto Differential   Result Value Ref Range    WBC 15.6 (H) 4.5 - 11.5 E9/L    RBC 4.18 3.50 - 5.50 E12/L    Hemoglobin 12.6 11.5 - 15.5 g/dL    Hematocrit 39.0 34.0 - 48.0 %    MCV 93.3 80.0 - 99.9 fL    MCH 30.1 26.0 - 35.0 pg    MCHC 32.3 32.0 - 34.5 %    RDW 14.6 11.5 - 15.0 fL    Platelets 050 530 - 882 E9/L    MPV 10.4 7.0 - 12.0 fL    Neutrophils % 83.6 (H) 43.0 - 80.0 %    Immature Granulocytes % 1.2 0.0 - 5.0 %    Lymphocytes % 5.8 (L) 20.0 - 42.0 %    Monocytes % 9.1 2.0 - 12.0 %    Eosinophils % 0.1 0.0 - 6.0 %    Basophils % 0.2 0.0 - 2.0 %    Neutrophils Absolute 13.05 (H) 1.80 - 7.30 E9/L    Immature Granulocytes # 0.19 E9/L    Lymphocytes Absolute 0.91 (L) 1.50 - 4.00 E9/L    Monocytes Absolute 1.42 (H) 0.10 - 0.95 E9/L    Eosinophils Absolute 0.01 (L) 0.05 - 0.50 E9/L    Basophils Absolute 0.03 0.00 - 0.20 Y7/V   Basic Metabolic Panel   Result Value Ref Range    Sodium 137 132 - 146 mmol/L    Potassium 3.9 3.5 - 5.0 mmol/L    Chloride 98 98 - 107 mmol/L    CO2 26 22 - 29 mmol/L    Anion Gap 13 7 - 16 mmol/L    Glucose 198 (H) 74 - 99 mg/dL    BUN 20 6 - 23 mg/dL    Creatinine 0.7 0.5 - 1.0 mg/dL    Est, Glom Filt Rate >60 >=60 mL/min/1.73    Calcium 9.1 8.6 - 10.2 mg/dL   Brain Natriuretic Peptide   Result Value Ref Range    Pro- (H) 0 - 125 pg/mL Troponin   Result Value Ref Range    Troponin, High Sensitivity 8 0 - 9 ng/L   Magnesium   Result Value Ref Range    Magnesium 1.9 1.6 - 2.6 mg/dL   T4   Result Value Ref Range    T4, Total 8.2 4.5 - 11.7 mcg/dL   TSH   Result Value Ref Range    TSH 1.820 0.270 - 4.200 uIU/mL   Troponin   Result Value Ref Range    Troponin, High Sensitivity 9 0 - 9 ng/L   EKG 12 Lead   Result Value Ref Range    Ventricular Rate 124 BPM    Atrial Rate 124 BPM    P-R Interval 140 ms    QRS Duration 72 ms    Q-T Interval 338 ms    QTc Calculation (Bazett) 485 ms    P Axis 72 degrees    R Axis -57 degrees    T Axis 88 degrees       RADIOLOGY:  XR CHEST (2 VW)   Final Result   CHF. Superimposed bibasilar pneumonia cannot be excluded. ------------------------- NURSING NOTES AND VITALS REVIEWED ---------------------------  Date / Time Roomed:  3/1/2023 12:06 PM  ED Bed Assignment:  25/25    The nursing notes within the ED encounter and vital signs as below have been reviewed. Patient Vitals for the past 24 hrs:   BP Temp Pulse Resp SpO2   03/01/23 1520 136/78 -- -- -- --   03/01/23 1401 (!) 152/63 -- (!) 119 24 96 %   03/01/23 1307 (!) 147/67 -- (!) 125 28 99 %   03/01/23 1204 129/88 -- -- -- --   03/01/23 1159 -- 98.1 °F (36.7 °C) (!) 111 24 98 %       Oxygen Saturation Interpretation: Normal    ------------------------------------------ PROGRESS NOTES --------------------------    Counseling:  I have spoken with the patient and discussed todays results, in addition to providing specific details for the plan of care and counseling regarding the diagnosis and prognosis. Their questions are answered at this time and they are agreeable with the plan of admission.    --------------------------------- ADDITIONAL PROVIDER NOTES ---------------------------------  Consultations:   This patient's ED course included: a personal history and physicial examination, re-evaluation prior to disposition, multiple bedside re-evaluations, IV medications, cardiac monitoring, and continuous pulse oximetry    This patient has remained hemodynamically stable during their ED course. Diagnosis:  1. Dyspnea, unspecified type    2. Mild intermittent asthma with exacerbation    3. Congestive heart failure, unspecified HF chronicity, unspecified heart failure type ([facility] [facility]        Disposition:  Patient's disposition: Admit to telemetry  Patient's condition is stable.            Amadou Olvera DO  03/01/23 3122

## 2023-03-02 ENCOUNTER — APPOINTMENT (OUTPATIENT)
Dept: CT IMAGING | Age: 68
DRG: 291 | End: 2023-03-02
Payer: MEDICARE

## 2023-03-02 LAB
ADENOVIRUS BY PCR: NOT DETECTED
ALBUMIN SERPL-MCNC: 3.6 G/DL (ref 3.5–5.2)
ALP BLD-CCNC: 106 U/L (ref 35–104)
ALT SERPL-CCNC: 25 U/L (ref 0–32)
ANION GAP SERPL CALCULATED.3IONS-SCNC: 12 MMOL/L (ref 7–16)
AST SERPL-CCNC: 18 U/L (ref 0–31)
B.E.: 2.6 MMOL/L (ref -3–3)
BASOPHILS ABSOLUTE: 0.02 E9/L (ref 0–0.2)
BASOPHILS RELATIVE PERCENT: 0.1 % (ref 0–2)
BILIRUB SERPL-MCNC: 0.4 MG/DL (ref 0–1.2)
BORDETELLA PARAPERTUSSIS BY PCR: NOT DETECTED
BORDETELLA PERTUSSIS BY PCR: NOT DETECTED
BUN BLDV-MCNC: 14 MG/DL (ref 6–23)
C-REACTIVE PROTEIN: 17.4 MG/DL (ref 0–0.4)
CALCIUM SERPL-MCNC: 8.7 MG/DL (ref 8.6–10.2)
CHLAMYDOPHILIA PNEUMONIAE BY PCR: NOT DETECTED
CHLORIDE BLD-SCNC: 98 MMOL/L (ref 98–107)
CO2: 28 MMOL/L (ref 22–29)
COHB: 0.7 % (ref 0–1.5)
CORONAVIRUS 229E BY PCR: NOT DETECTED
CORONAVIRUS HKU1 BY PCR: NOT DETECTED
CORONAVIRUS NL63 BY PCR: NOT DETECTED
CORONAVIRUS OC43 BY PCR: NOT DETECTED
CREAT SERPL-MCNC: 0.6 MG/DL (ref 0.5–1)
CRITICAL: ABNORMAL
D DIMER: 2876 NG/ML DDU
DATE ANALYZED: ABNORMAL
DATE OF COLLECTION: ABNORMAL
EKG ATRIAL RATE: 118 BPM
EKG P AXIS: -26 DEGREES
EKG P-R INTERVAL: 142 MS
EKG Q-T INTERVAL: 338 MS
EKG QRS DURATION: 74 MS
EKG QTC CALCULATION (BAZETT): 473 MS
EKG R AXIS: -81 DEGREES
EKG T AXIS: -33 DEGREES
EKG VENTRICULAR RATE: 118 BPM
EOSINOPHILS ABSOLUTE: 0.02 E9/L (ref 0.05–0.5)
EOSINOPHILS RELATIVE PERCENT: 0.1 % (ref 0–6)
GFR SERPL CREATININE-BSD FRML MDRD: >60 ML/MIN/1.73
GLUCOSE BLD-MCNC: 230 MG/DL (ref 74–99)
HCO3: 25.7 MMOL/L (ref 22–26)
HCT VFR BLD CALC: 37.2 % (ref 34–48)
HEMOGLOBIN: 12 G/DL (ref 11.5–15.5)
HHB: 5.1 % (ref 0–5)
HUMAN METAPNEUMOVIRUS BY PCR: NOT DETECTED
HUMAN RHINOVIRUS/ENTEROVIRUS BY PCR: NOT DETECTED
IMMATURE GRANULOCYTES #: 0.12 E9/L
IMMATURE GRANULOCYTES %: 0.9 % (ref 0–5)
INFLUENZA A BY PCR: NOT DETECTED
INFLUENZA B BY PCR: NOT DETECTED
LAB: ABNORMAL
LV EF: 60 %
LVEF MODALITY: NORMAL
LYMPHOCYTES ABSOLUTE: 0.54 E9/L (ref 1.5–4)
LYMPHOCYTES RELATIVE PERCENT: 4 % (ref 20–42)
Lab: ABNORMAL
MAGNESIUM: 2.1 MG/DL (ref 1.6–2.6)
MCH RBC QN AUTO: 30.2 PG (ref 26–35)
MCHC RBC AUTO-ENTMCNC: 32.3 % (ref 32–34.5)
MCV RBC AUTO: 93.5 FL (ref 80–99.9)
METER GLUCOSE: 224 MG/DL (ref 74–99)
METER GLUCOSE: 228 MG/DL (ref 74–99)
METER GLUCOSE: 254 MG/DL (ref 74–99)
METER GLUCOSE: 259 MG/DL (ref 74–99)
METHB: 0 % (ref 0–1.5)
MODE: ABNORMAL
MONOCYTES ABSOLUTE: 0.6 E9/L (ref 0.1–0.95)
MONOCYTES RELATIVE PERCENT: 4.5 % (ref 2–12)
MYCOPLASMA PNEUMONIAE BY PCR: NOT DETECTED
NEUTROPHILS ABSOLUTE: 12.04 E9/L (ref 1.8–7.3)
NEUTROPHILS RELATIVE PERCENT: 90.4 % (ref 43–80)
O2 CONTENT: 17.5 ML/DL
O2 SATURATION: 94.9 % (ref 92–98.5)
O2HB: 94.2 % (ref 94–97)
OPERATOR ID: ABNORMAL
PARAINFLUENZA VIRUS 1 BY PCR: NOT DETECTED
PARAINFLUENZA VIRUS 2 BY PCR: NOT DETECTED
PARAINFLUENZA VIRUS 3 BY PCR: NOT DETECTED
PARAINFLUENZA VIRUS 4 BY PCR: NOT DETECTED
PATIENT TEMP: 37 C
PCO2: 34.6 MMHG (ref 35–45)
PDW BLD-RTO: 14.2 FL (ref 11.5–15)
PH BLOOD GAS: 7.49 (ref 7.35–7.45)
PHOSPHORUS: 3.8 MG/DL (ref 2.5–4.5)
PLATELET # BLD: 359 E9/L (ref 130–450)
PMV BLD AUTO: 11 FL (ref 7–12)
PO2: 75.4 MMHG (ref 75–100)
POTASSIUM SERPL-SCNC: 3.4 MMOL/L (ref 3.5–5)
RBC # BLD: 3.98 E12/L (ref 3.5–5.5)
RESPIRATORY SYNCYTIAL VIRUS BY PCR: NOT DETECTED
SARS-COV-2 ANTIBODY, TOTAL: REACTIVE
SARS-COV-2, PCR: NOT DETECTED
SODIUM BLD-SCNC: 138 MMOL/L (ref 132–146)
SOURCE, BLOOD GAS: ABNORMAL
THB: 13.2 G/DL (ref 11.5–16.5)
TIME ANALYZED: 1441
TOTAL PROTEIN: 6.7 G/DL (ref 6.4–8.3)
WBC # BLD: 13.3 E9/L (ref 4.5–11.5)

## 2023-03-02 PROCEDURE — 83735 ASSAY OF MAGNESIUM: CPT

## 2023-03-02 PROCEDURE — 93306 TTE W/DOPPLER COMPLETE: CPT

## 2023-03-02 PROCEDURE — 82962 GLUCOSE BLOOD TEST: CPT

## 2023-03-02 PROCEDURE — 71250 CT THORAX DX C-: CPT

## 2023-03-02 PROCEDURE — 82805 BLOOD GASES W/O2 SATURATION: CPT

## 2023-03-02 PROCEDURE — 6360000004 HC RX CONTRAST MEDICATION: Performed by: RADIOLOGY

## 2023-03-02 PROCEDURE — 6360000002 HC RX W HCPCS: Performed by: INTERNAL MEDICINE

## 2023-03-02 PROCEDURE — 6360000002 HC RX W HCPCS: Performed by: NURSE PRACTITIONER

## 2023-03-02 PROCEDURE — 6370000000 HC RX 637 (ALT 250 FOR IP): Performed by: NURSE PRACTITIONER

## 2023-03-02 PROCEDURE — 84100 ASSAY OF PHOSPHORUS: CPT

## 2023-03-02 PROCEDURE — 93010 ELECTROCARDIOGRAM REPORT: CPT | Performed by: INTERNAL MEDICINE

## 2023-03-02 PROCEDURE — 80053 COMPREHEN METABOLIC PANEL: CPT

## 2023-03-02 PROCEDURE — 1200000000 HC SEMI PRIVATE

## 2023-03-02 PROCEDURE — 2580000003 HC RX 258: Performed by: INTERNAL MEDICINE

## 2023-03-02 PROCEDURE — 71275 CT ANGIOGRAPHY CHEST: CPT

## 2023-03-02 PROCEDURE — 36415 COLL VENOUS BLD VENIPUNCTURE: CPT

## 2023-03-02 PROCEDURE — 94640 AIRWAY INHALATION TREATMENT: CPT

## 2023-03-02 PROCEDURE — 6370000000 HC RX 637 (ALT 250 FOR IP): Performed by: INTERNAL MEDICINE

## 2023-03-02 PROCEDURE — 87040 BLOOD CULTURE FOR BACTERIA: CPT

## 2023-03-02 PROCEDURE — 85025 COMPLETE CBC W/AUTO DIFF WBC: CPT

## 2023-03-02 RX ORDER — ENOXAPARIN SODIUM 150 MG/ML
1 INJECTION SUBCUTANEOUS 2 TIMES DAILY
Status: DISCONTINUED | OUTPATIENT
Start: 2023-03-02 | End: 2023-03-03

## 2023-03-02 RX ORDER — METHYLPREDNISOLONE SODIUM SUCCINATE 40 MG/ML
40 INJECTION, POWDER, LYOPHILIZED, FOR SOLUTION INTRAMUSCULAR; INTRAVENOUS EVERY 6 HOURS
Status: DISCONTINUED | OUTPATIENT
Start: 2023-03-02 | End: 2023-03-04

## 2023-03-02 RX ORDER — MONTELUKAST SODIUM 10 MG/1
10 TABLET ORAL NIGHTLY
Status: DISCONTINUED | OUTPATIENT
Start: 2023-03-02 | End: 2023-03-07 | Stop reason: HOSPADM

## 2023-03-02 RX ORDER — DIPHENHYDRAMINE HCL 25 MG
50 TABLET ORAL ONCE
Status: COMPLETED | OUTPATIENT
Start: 2023-03-02 | End: 2023-03-02

## 2023-03-02 RX ORDER — BUDESONIDE 0.5 MG/2ML
0.5 INHALANT ORAL 2 TIMES DAILY
Status: DISCONTINUED | OUTPATIENT
Start: 2023-03-02 | End: 2023-03-07 | Stop reason: HOSPADM

## 2023-03-02 RX ORDER — ENOXAPARIN SODIUM 100 MG/ML
30 INJECTION SUBCUTANEOUS 2 TIMES DAILY
Status: DISCONTINUED | OUTPATIENT
Start: 2023-03-02 | End: 2023-03-02

## 2023-03-02 RX ADMIN — IPRATROPIUM BROMIDE 0.5 MG: 0.5 SOLUTION RESPIRATORY (INHALATION) at 09:39

## 2023-03-02 RX ADMIN — Medication 10 ML: at 20:32

## 2023-03-02 RX ADMIN — METHYLPREDNISOLONE SODIUM SUCCINATE 40 MG: 40 INJECTION, POWDER, FOR SOLUTION INTRAMUSCULAR; INTRAVENOUS at 20:31

## 2023-03-02 RX ADMIN — ALBUTEROL SULFATE 2.5 MG: 2.5 SOLUTION RESPIRATORY (INHALATION) at 09:39

## 2023-03-02 RX ADMIN — ALBUTEROL SULFATE 2.5 MG: 2.5 SOLUTION RESPIRATORY (INHALATION) at 06:05

## 2023-03-02 RX ADMIN — METHYLPREDNISOLONE SODIUM SUCCINATE 40 MG: 40 INJECTION, POWDER, FOR SOLUTION INTRAMUSCULAR; INTRAVENOUS at 07:59

## 2023-03-02 RX ADMIN — IPRATROPIUM BROMIDE 0.5 MG: 0.5 SOLUTION RESPIRATORY (INHALATION) at 14:34

## 2023-03-02 RX ADMIN — BUDESONIDE 500 MCG: 0.5 SUSPENSION RESPIRATORY (INHALATION) at 06:05

## 2023-03-02 RX ADMIN — IPRATROPIUM BROMIDE 0.5 MG: 0.5 SOLUTION RESPIRATORY (INHALATION) at 17:32

## 2023-03-02 RX ADMIN — VENLAFAXINE HYDROCHLORIDE 75 MG: 37.5 CAPSULE, EXTENDED RELEASE ORAL at 07:59

## 2023-03-02 RX ADMIN — ROPINIROLE HYDROCHLORIDE 2 MG: 1 TABLET, FILM COATED ORAL at 20:32

## 2023-03-02 RX ADMIN — IOPAMIDOL 75 ML: 755 INJECTION, SOLUTION INTRAVENOUS at 22:29

## 2023-03-02 RX ADMIN — FUROSEMIDE 40 MG: 10 INJECTION, SOLUTION INTRAMUSCULAR; INTRAVENOUS at 07:59

## 2023-03-02 RX ADMIN — ALBUTEROL SULFATE 2.5 MG: 2.5 SOLUTION RESPIRATORY (INHALATION) at 02:10

## 2023-03-02 RX ADMIN — ENOXAPARIN SODIUM 105 MG: 150 INJECTION SUBCUTANEOUS at 20:31

## 2023-03-02 RX ADMIN — IPRATROPIUM BROMIDE 0.5 MG: 0.5 SOLUTION RESPIRATORY (INHALATION) at 21:20

## 2023-03-02 RX ADMIN — ALBUTEROL SULFATE 2.5 MG: 2.5 SOLUTION RESPIRATORY (INHALATION) at 21:20

## 2023-03-02 RX ADMIN — IPRATROPIUM BROMIDE 0.5 MG: 0.5 SOLUTION RESPIRATORY (INHALATION) at 06:04

## 2023-03-02 RX ADMIN — MONTELUKAST 10 MG: 10 TABLET, FILM COATED ORAL at 20:32

## 2023-03-02 RX ADMIN — Medication 10 ML: at 07:59

## 2023-03-02 RX ADMIN — FUROSEMIDE 40 MG: 10 INJECTION, SOLUTION INTRAMUSCULAR; INTRAVENOUS at 17:21

## 2023-03-02 RX ADMIN — DIPHENHYDRAMINE HYDROCHLORIDE 50 MG: 25 TABLET ORAL at 21:10

## 2023-03-02 RX ADMIN — POLYETHYLENE GLYCOL 3350 17 G: 17 POWDER, FOR SOLUTION ORAL at 17:21

## 2023-03-02 RX ADMIN — ASPIRIN 81 MG: 81 TABLET, CHEWABLE ORAL at 07:59

## 2023-03-02 RX ADMIN — BUDESONIDE 500 MCG: 0.5 SUSPENSION RESPIRATORY (INHALATION) at 17:32

## 2023-03-02 RX ADMIN — ALBUTEROL SULFATE 2.5 MG: 2.5 SOLUTION RESPIRATORY (INHALATION) at 14:34

## 2023-03-02 RX ADMIN — PANTOPRAZOLE SODIUM 40 MG: 40 TABLET, DELAYED RELEASE ORAL at 05:59

## 2023-03-02 RX ADMIN — IPRATROPIUM BROMIDE 0.5 MG: 0.5 SOLUTION RESPIRATORY (INHALATION) at 02:10

## 2023-03-02 RX ADMIN — ENOXAPARIN SODIUM 40 MG: 100 INJECTION SUBCUTANEOUS at 07:59

## 2023-03-02 RX ADMIN — ALBUTEROL SULFATE 2.5 MG: 2.5 SOLUTION RESPIRATORY (INHALATION) at 17:32

## 2023-03-02 NOTE — PROGRESS NOTES
ABG drawn x 1 from Right Radial. Patient had NormalAllen's Test.  Patient was on room air  at time of puncture. Pressure held for 5. No bleeding or bruising noted at puncture site.   Patient tolerated procedure well      Performed by Mamie Sharpe RCP

## 2023-03-02 NOTE — CONSULTS
[address]                                  CONSULTATION    PATIENT NAME: Prema Martínez                 :        1955  MED REC NO:   [id number]                            ROOM:       3440  ACCOUNT NO:   [de-identified]                           ADMIT DATE: 2023  PROVIDER:     Ely Blanco MD    CONSULT DATE:  2023    HISTORY OF PRESENT ILLNESS:  The patient is a 68-year-old lady who I saw  in the past.    She used to get complaints of chest pain and shortness of breath in the  past.  She ended having cardiac catheterization in 2021, about a year  ago, showing patent coronary vessels with normal left ventricular  systolic function. She was admitted yesterday with what appears to be shortness of breath. The patient stated that she was having these episodes of sudden onset of  shortness of breath started few weeks ago. She gets some wheezing  feeling with that with dry nonproductive cough. She had ER visit recently and she was given aerosol treatment and she  thought it helped her symptoms. She had another episode of this shortness of breath yesterday, so she  came back to the emergency room. Her proBNP was mildly elevated. She was given dose of Lasix. She was admitted. Cardiac consult was  requested. The patient was sitting up in her bed when I came to see her this  morning. She did not appear in any acute distress. PAST MEDICAL HISTORY:  As above plus history of hypertension. Chronic  back pain. Arthritis. Acid reflux. History of asthma. Hyperlipidemia. HABITS:  The patient used to smoke for many years in the past.  She quit  in . She was never heavy alcohol drinker. REVIEW OF SYSTEMS:  CONSTITUTIONAL:  Some body weakness. No fever or chills. HEENT:  No nosebleed. No hearing problem. No double vision. No  stridor.   No hoarseness of the voice.  CARDIAC:  No recent chest pain. No palpitation. PULMONARY:  Shortness of breath as stated above. Some dry nonproductive  cough. GASTROENTEROLOGY:  No abdominal pain. No vomiting. No diarrhea. GENITOURINARY:  No dysuria or frequency. No bladder or kidney tumor. NEURO:  History of TIA/mini stroke about a year ago in 03/2022. HEMATOLOGY:  No bleeding disorder. No adenopathy. ENDOCRINOLOGY:  No polyuria or polydipsia. SKIN:  No skin disorder. MUSCULOSKELETAL:  The patient has arthritis in lower extremities. PSYCH:  She has history of anxiety and depression. PHYSICAL EXAMINATION:  GENERAL:  The patient was sitting upright, in no acute distress. VITAL SIGNS:  Blood pressure 132/69, pulse around 100, temperature 97.9. NECK:  No JVD. HEART:  Regular S1 and S2. No S3.  1/6 systolic murmur heard best at  the left sternal border. LUNGS:  Mildly diminished breath sounds in the bases. I could not hear  rales or wheezing. ABDOMEN:  Soft, nontender. EXTREMITIES:  No edema, cyanosis or clubbing. NEURO:  Alert and awake with no focal deficits. EKG on admission showing sinus tachycardia with heart rate of 124 a  minute and left axis deviation with nonspecific ST-T wave changes. LABORATORY DATA:  WBC 15.6, hemoglobin 12.6, platelet count 904. Sodium  137, potassium 3.9, BUN 20, creatinine 0.7, glucose 198. Troponin is  negative. TSH normal.  ProBNP is 686. Chest x-ray is showing vascular congestion. IMPRESSION:  1. Shortness of breath. The patient gets sudden onset of shortness of  breath with dry nonproductive cough. She has long history of heavy  smoking in the past resulting in COPD. I feel her symptoms are mostly  pulmonary related from COPD and possible asthma exacerbation. She may  have component of mild acute diastolic heart failure and I feel it is  reasonable to keep her on small dose of Lasix and see if this will help.   She just had cardiac catheterization done about a year ago showing  patent coronary vessels with normal left ventricular systolic function. Her troponins are negative and she has no complaints of chest pain. EKG  is unremarkable. With that, I do not see need for further coronary  workup. 2.  Hypertension. 3.  COPD.         Donnell Jones MD    D: 03/02/2023 9:15:57       T: 03/02/2023 9:20:06     [id number]  Job#: [id number]     Doc#: [id number]    CC:

## 2023-03-02 NOTE — CARE COORDINATION
3/2/2023 1505 COVID Negative 3/1/2023. CM attempted to meet with pt for transition of care needs at d/c. Pt out of room for testing. CM will do full assessment when pt is available.   Electronically signed by Fabio Homans, RN on 3/2/2023 at 3:11 PM

## 2023-03-02 NOTE — PROGRESS NOTES
Internal Medicine Progress Note    [facility]    Stanley Kip. Danna Angela., KAY.[email or web address]. Allie Arias D.O., SACHI Mendes D.O. Chaya Helper, MSN, APRN, NP-C  Vincenzo Castellano. Frankie Ellington, MSN, APRN-CNP     Primary Care Physician: Mendez Angela DO   Admitting Physician:  Enio Don DO  Admission date and time: 3/1/2023 12:06 PM    Room:  [address] [place] [address]  Admitting diagnosis: Mild intermittent asthma with exacerbation [J45.21]  Acute decompensated heart failure ([facility] [address] [I50.9]  Dyspnea, unspecified type [R06.00]  Congestive heart failure, unspecified HF chronicity, unspecified heart failure type [facility]) [I50.9]    Patient Name: Alessandra Zeng  MRN: [id number]    Date of Service: 3/2/2023     Subjective:  Reji Neal is a 77 y.o. female who was seen and examined today,3/2/2023, at the bedside. Patient relates to having shortness of breath. Discussed the results of the course of events which have occurred over the past 2 weeks. We will proceed with further diagnostic work-up the present time. The patient is not wearing oxygen but does have conversational dyspnea. Daughter and  present at the bedside        Review of System:   Constitutional:   Denies fever or chills, weight loss or gain, fatigue or malaise. HEENT:   Denies ear pain, sore throat, sinus or eye problems. Cardiovascular:   Denies any chest pain, irregular heartbeats, or palpitations. Respiratory:   Relates to having exertional dyspnea with associated orthopnea  Gastrointestinal:   Denies nausea, vomiting, diarrhea, or constipation. Denies any abdominal pain. Genitourinary:    Denies any urgency, frequency, hematuria. Voiding  without difficulty. Extremities:   Denies lower extremity swelling, edema or cyanosis. Neurology:    Denies any headache or focal neurological deficits, Denies generalized weakness or memory difficulty.    Psch: Denies being anxious or depressed. Musculoskeletal:    Denies  myalgias, joint complaints or back pain. Integumentary:   Denies any rashes, ulcers, or excoriations. Denies bruising. Hematologic/Lymphatic:  Denies bruising or bleeding. Physical Exam:  I/O this shift: In: 745 [P.O.:745]  Out: 600 [Urine:600]    Intake/Output Summary (Last 24 hours) at 3/2/2023 1703  Last data filed at 3/2/2023 1600  Gross per 24 hour   Intake 1225 ml   Output 600 ml   Net 625 ml   I/O last 3 completed shifts: In: 480 [P.O.:480]  Out: -   Patient Vitals for the past 96 hrs (Last 3 readings):   Weight   03/02/23 0439 234 lb 8 oz (106.4 kg)   03/02/23 0415 234 lb 8 oz (106.4 kg)   03/02/23 0110 236 lb 7 oz (107.2 kg)     Vital Signs:   Blood pressure (!) 122/53, pulse (!) 102, temperature 98.1 °F (36.7 °C), temperature source Oral, resp. rate 18, height 5' (1.524 m), weight 234 lb 8 oz (106.4 kg), SpO2 94 %, not currently breastfeeding. General appearance:  Alert, responsive, oriented to person, place, and time. Well preserved, alert, no distress. Head:  Normocephalic. No masses, lesions or tenderness. Eyes:  PERRLA. EOMI. Sclera clear. Buccal mucosa moist.  ENT:  Ears normal. Mucosa normal.  Neck:    Supple. Trachea midline. No thyromegaly. No JVD. No bruits. Heart:    Rhythm regular. Rate controlled. No murmurs. Lungs:    Diminished posteriorly  Abdomen:   Soft. Non-tender. Non-distended. Bowel sounds positive. No organomegaly or masses. No pain on palpation. Obese  Extremities:    Peripheral pulses present. No peripheral edema. No ulcers. No cyanosis. No clubbing. Neurologic:    Alert x 3. No focal deficit. Cranial nerves grossly intact. No focal weakness. Psych:   Behavior is normal. Mood appears normal. Speech is not rapid and/or pressured. Musculoskeletal:   Spine ROM normal. Muscular strength intact. Gait not assessed.   Integumentary:  No rashes  Skin normal color and texture. Genitalia/Breast:  Deferred    Medication:  Scheduled Meds:   budesonide  0.5 mg Nebulization BID    enoxaparin  30 mg SubCUTAneous BID    sodium chloride flush  5-40 mL IntraVENous 2 times per day    rOPINIRole  2 mg Oral Nightly    albuterol  2.5 mg Nebulization Q4H    And    ipratropium  0.5 mg Nebulization Q4H    pantoprazole  40 mg Oral QAM AC    venlafaxine  75 mg Oral Daily    aspirin  81 mg Oral Daily    methylPREDNISolone  40 mg IntraVENous Q12H    furosemide  40 mg IntraVENous BID     Continuous Infusions:   dextrose      sodium chloride         Objective Data:  Recent Labs     03/01/23  1243 03/02/23  0532   WBC 15.6* 13.3*   RBC 4.18 3.98   HGB 12.6 12.0   HCT 39.0 37.2   MCV 93.3 93.5   MCH 30.1 30.2   MCHC 32.3 32.3   RDW 14.6 14.2    359   MPV 10.4 11.0     Recent Labs     03/01/23  1243 03/02/23  0532    138   K 3.9 3.4*   CL 98 98   CO2 26 28   BUN 20 14   CREATININE 0.7 0.6   GLUCOSE 198* 230*   CALCIUM 9.1 8.7   PROT  --  6.7   LABALBU  --  3.6   BILITOT  --  0.4   ALKPHOS  --  106*   AST  --  18   ALT  --  25     Lab Results   Component Value Date    TROPONINI <0.01 06/27/2019    TROPONINI <0.01 07/26/2018    TROPONINI <0.01 07/25/2018                 Assessment:    Acute and progressive respiratory distress undetermined etiology  Chronic obstructive pulm disease with associated acute exacerbation asthma with superimposed restrictive lung disease  Leukocytosis probably secondary to steroids  History of CVA  Valvular heart disease of mild mitral stenosis  History of seizure disorder  Spinal stenosis with restless leg syndrome  Gastroesophageal reflux disease  Hypertension  Anxiety and depression      Plan: We will obtain cardiac consultation.   Echocardiogram  We will check D-dimer and COVID antibodies  Obtain molecular film array  CT of the chest high-resolution  Check IgE and peak flows  Proceed with further diagnostic work-up as clinically indicated  ABGs and possible pulmonary consult    Greater than 40 minutes of critical care time was spent with the patient. This includes chart review, , and discussion with those consultants involved in the patient's care.     Lizette was counseled on smoking cessation. Lizette smokes approximately 20 cigarettes per day x 14 years. We have had extensive discussion regarding the need to quit smoking, the negative health implications of smoking overall, as well as the impact on Lizette's comorbid conditions. Lizette does  voice a willingness to quit smoking and we have  set a quit date.  We have discussed potential methods for smoking cessation including nicotine replacement via patch, gum or lozenge, behavioral and lifestyle modifications, and follow-up with primary care provider for consideration of medications for smoking cessation as well.  We have also discussed additional resources such as [employer] site for additional information, quitassist.com, and [employer].  We have discussed the arrangement of follow-up with primary care provider to discuss progress as well as the potential institution of medications if required/desired.  The amount of time spent counseling the patient directly regarding smoking cessation is greater than 10 minutes.        More than 50% of my  time was spent at the bedside counseling/coordinating care with the patient and/or family with face to face contact.  This time was spent reviewing notes and laboratory data as well as instructing and counseling the patient. Time I spent with the family or surrogate(s) is included only if the patient was incapable of providing the necessary information or participating in medical decisions. I also discussed the differential diagnosis and all of the proposed management plans with the patient and individuals accompanying the patient.    Job Davila DO, F.A.C.O.I.  3/2/2023  5:03 PM

## 2023-03-02 NOTE — PROGRESS NOTES
.    Pharmacist Review and Automatic Dose Adjustment of Prophylactic Enoxaparin         The reviewing pharmacist has made an adjustment to the ordered enoxaparin dose or converted to UFH per the approved [facility] protocol and table as identified below. Katja Anne is a 77 y.o. female. Recent Labs     03/01/23  1243 03/02/23  0532   CREATININE 0.7 0.6       Estimated Creatinine Clearance: 96 mL/min (based on SCr of 0.6 mg/dL). Recent Labs     03/01/23  1243 03/02/23  0532   HGB 12.6 12.0   HCT 39.0 37.2    359     No results for input(s): INR in the last 72 hours.     Height:   Ht Readings from Last 1 Encounters:   03/02/23 5' (1.524 m)     Weight:  Wt Readings from Last 1 Encounters:   03/02/23 234 lb 8 oz (106.4 kg)               Plan: Based upon the patient's weight and renal function    Ordered: Enoxaparin 40mg SUBQ Daily    Changed/converted to    New Order: Enoxaparin 30mg SUBQ BID      Thank you,  Vee Singleton, [address] [facility]  3/2/2023, 9:10 AM

## 2023-03-03 ENCOUNTER — APPOINTMENT (OUTPATIENT)
Dept: GENERAL RADIOLOGY | Age: 68
DRG: 291 | End: 2023-03-03
Payer: MEDICARE

## 2023-03-03 LAB
ALBUMIN SERPL-MCNC: 3.5 G/DL (ref 3.5–5.2)
ALP BLD-CCNC: 107 U/L (ref 35–104)
ALT SERPL-CCNC: 21 U/L (ref 0–32)
ANION GAP SERPL CALCULATED.3IONS-SCNC: 11 MMOL/L (ref 7–16)
AST SERPL-CCNC: 11 U/L (ref 0–31)
BASOPHILS ABSOLUTE: 0.01 E9/L (ref 0–0.2)
BASOPHILS RELATIVE PERCENT: 0.1 % (ref 0–2)
BILIRUB SERPL-MCNC: 0.3 MG/DL (ref 0–1.2)
BUN BLDV-MCNC: 19 MG/DL (ref 6–23)
CALCIUM SERPL-MCNC: 9.1 MG/DL (ref 8.6–10.2)
CHLORIDE BLD-SCNC: 96 MMOL/L (ref 98–107)
CO2: 28 MMOL/L (ref 22–29)
CREAT SERPL-MCNC: 0.6 MG/DL (ref 0.5–1)
EOSINOPHILS ABSOLUTE: 0.01 E9/L (ref 0.05–0.5)
EOSINOPHILS RELATIVE PERCENT: 0.1 % (ref 0–6)
GFR SERPL CREATININE-BSD FRML MDRD: >60 ML/MIN/1.73
GLUCOSE BLD-MCNC: 221 MG/DL (ref 74–99)
HCT VFR BLD CALC: 41.6 % (ref 34–48)
HEMOGLOBIN: 12.8 G/DL (ref 11.5–15.5)
IMMATURE GRANULOCYTES #: 0.13 E9/L
IMMATURE GRANULOCYTES %: 0.8 % (ref 0–5)
LYMPHOCYTES ABSOLUTE: 0.78 E9/L (ref 1.5–4)
LYMPHOCYTES RELATIVE PERCENT: 4.9 % (ref 20–42)
MCH RBC QN AUTO: 29 PG (ref 26–35)
MCHC RBC AUTO-ENTMCNC: 30.8 % (ref 32–34.5)
MCV RBC AUTO: 94.3 FL (ref 80–99.9)
METER GLUCOSE: 172 MG/DL (ref 74–99)
METER GLUCOSE: 205 MG/DL (ref 74–99)
METER GLUCOSE: 215 MG/DL (ref 74–99)
METER GLUCOSE: 331 MG/DL (ref 74–99)
MONOCYTES ABSOLUTE: 0.64 E9/L (ref 0.1–0.95)
MONOCYTES RELATIVE PERCENT: 4 % (ref 2–12)
NEUTROPHILS ABSOLUTE: 14.27 E9/L (ref 1.8–7.3)
NEUTROPHILS RELATIVE PERCENT: 90.1 % (ref 43–80)
PDW BLD-RTO: 14 FL (ref 11.5–15)
PLATELET # BLD: 408 E9/L (ref 130–450)
PMV BLD AUTO: 11 FL (ref 7–12)
POTASSIUM SERPL-SCNC: 3.6 MMOL/L (ref 3.5–5)
RBC # BLD: 4.41 E12/L (ref 3.5–5.5)
SODIUM BLD-SCNC: 135 MMOL/L (ref 132–146)
TOTAL PROTEIN: 6.7 G/DL (ref 6.4–8.3)
WBC # BLD: 15.8 E9/L (ref 4.5–11.5)

## 2023-03-03 PROCEDURE — 1200000000 HC SEMI PRIVATE

## 2023-03-03 PROCEDURE — 6370000000 HC RX 637 (ALT 250 FOR IP): Performed by: INTERNAL MEDICINE

## 2023-03-03 PROCEDURE — 80053 COMPREHEN METABOLIC PANEL: CPT

## 2023-03-03 PROCEDURE — 6370000000 HC RX 637 (ALT 250 FOR IP): Performed by: NURSE PRACTITIONER

## 2023-03-03 PROCEDURE — 6360000002 HC RX W HCPCS: Performed by: INTERNAL MEDICINE

## 2023-03-03 PROCEDURE — 2500000003 HC RX 250 WO HCPCS: Performed by: INTERNAL MEDICINE

## 2023-03-03 PROCEDURE — 2580000003 HC RX 258: Performed by: INTERNAL MEDICINE

## 2023-03-03 PROCEDURE — 74220 X-RAY XM ESOPHAGUS 1CNTRST: CPT

## 2023-03-03 PROCEDURE — 94640 AIRWAY INHALATION TREATMENT: CPT

## 2023-03-03 PROCEDURE — 85025 COMPLETE CBC W/AUTO DIFF WBC: CPT

## 2023-03-03 PROCEDURE — 2580000003 HC RX 258: Performed by: NURSE PRACTITIONER

## 2023-03-03 PROCEDURE — 94669 MECHANICAL CHEST WALL OSCILL: CPT

## 2023-03-03 PROCEDURE — 36415 COLL VENOUS BLD VENIPUNCTURE: CPT

## 2023-03-03 PROCEDURE — 82962 GLUCOSE BLOOD TEST: CPT

## 2023-03-03 PROCEDURE — 6360000002 HC RX W HCPCS: Performed by: NURSE PRACTITIONER

## 2023-03-03 RX ORDER — DEXTROSE MONOHYDRATE 100 MG/ML
INJECTION, SOLUTION INTRAVENOUS CONTINUOUS PRN
Status: DISCONTINUED | OUTPATIENT
Start: 2023-03-03 | End: 2023-03-03 | Stop reason: SDUPTHER

## 2023-03-03 RX ORDER — INSULIN LISPRO 100 [IU]/ML
0-8 INJECTION, SOLUTION INTRAVENOUS; SUBCUTANEOUS
Status: DISCONTINUED | OUTPATIENT
Start: 2023-03-03 | End: 2023-03-07 | Stop reason: HOSPADM

## 2023-03-03 RX ORDER — ROFLUMILAST 500 UG/1
500 TABLET ORAL DAILY
Status: DISCONTINUED | OUTPATIENT
Start: 2023-03-03 | End: 2023-03-04

## 2023-03-03 RX ORDER — ENOXAPARIN SODIUM 100 MG/ML
40 INJECTION SUBCUTANEOUS DAILY
Status: DISCONTINUED | OUTPATIENT
Start: 2023-03-04 | End: 2023-03-05

## 2023-03-03 RX ORDER — METOCLOPRAMIDE HYDROCHLORIDE 5 MG/ML
10 INJECTION INTRAMUSCULAR; INTRAVENOUS 2 TIMES DAILY
Status: DISCONTINUED | OUTPATIENT
Start: 2023-03-03 | End: 2023-03-07 | Stop reason: HOSPADM

## 2023-03-03 RX ORDER — GUAIFENESIN 600 MG/1
600 TABLET, EXTENDED RELEASE ORAL 2 TIMES DAILY
Status: DISCONTINUED | OUTPATIENT
Start: 2023-03-03 | End: 2023-03-07 | Stop reason: HOSPADM

## 2023-03-03 RX ORDER — INSULIN LISPRO 100 [IU]/ML
0-4 INJECTION, SOLUTION INTRAVENOUS; SUBCUTANEOUS NIGHTLY
Status: DISCONTINUED | OUTPATIENT
Start: 2023-03-03 | End: 2023-03-07 | Stop reason: HOSPADM

## 2023-03-03 RX ORDER — DIPHENHYDRAMINE HCL 25 MG
25 TABLET ORAL EVERY 6 HOURS PRN
Status: DISCONTINUED | OUTPATIENT
Start: 2023-03-03 | End: 2023-03-07 | Stop reason: HOSPADM

## 2023-03-03 RX ADMIN — IPRATROPIUM BROMIDE 0.5 MG: 0.5 SOLUTION RESPIRATORY (INHALATION) at 04:59

## 2023-03-03 RX ADMIN — Medication 10 ML: at 20:41

## 2023-03-03 RX ADMIN — IPRATROPIUM BROMIDE 0.5 MG: 0.5 SOLUTION RESPIRATORY (INHALATION) at 10:42

## 2023-03-03 RX ADMIN — BUDESONIDE 500 MCG: 0.5 SUSPENSION RESPIRATORY (INHALATION) at 17:53

## 2023-03-03 RX ADMIN — IPRATROPIUM BROMIDE 0.5 MG: 0.5 SOLUTION RESPIRATORY (INHALATION) at 14:19

## 2023-03-03 RX ADMIN — HYDROCODONE BITARTRATE AND ACETAMINOPHEN 1 TABLET: 5; 325 TABLET ORAL at 05:46

## 2023-03-03 RX ADMIN — VENLAFAXINE HYDROCHLORIDE 75 MG: 37.5 CAPSULE, EXTENDED RELEASE ORAL at 08:02

## 2023-03-03 RX ADMIN — IPRATROPIUM BROMIDE 0.5 MG: 0.5 SOLUTION RESPIRATORY (INHALATION) at 22:08

## 2023-03-03 RX ADMIN — ALBUTEROL SULFATE 2.5 MG: 2.5 SOLUTION RESPIRATORY (INHALATION) at 10:42

## 2023-03-03 RX ADMIN — ASPIRIN 81 MG: 81 TABLET, CHEWABLE ORAL at 08:02

## 2023-03-03 RX ADMIN — GUAIFENESIN 600 MG: 600 TABLET, EXTENDED RELEASE ORAL at 15:26

## 2023-03-03 RX ADMIN — METHYLPREDNISOLONE SODIUM SUCCINATE 40 MG: 40 INJECTION, POWDER, FOR SOLUTION INTRAMUSCULAR; INTRAVENOUS at 15:26

## 2023-03-03 RX ADMIN — ALBUTEROL SULFATE 2.5 MG: 2.5 SOLUTION RESPIRATORY (INHALATION) at 22:08

## 2023-03-03 RX ADMIN — METHYLPREDNISOLONE SODIUM SUCCINATE 40 MG: 40 INJECTION, POWDER, FOR SOLUTION INTRAMUSCULAR; INTRAVENOUS at 20:44

## 2023-03-03 RX ADMIN — PANTOPRAZOLE SODIUM 40 MG: 40 TABLET, DELAYED RELEASE ORAL at 05:46

## 2023-03-03 RX ADMIN — DIPHENHYDRAMINE HYDROCHLORIDE 25 MG: 25 TABLET ORAL at 16:06

## 2023-03-03 RX ADMIN — METHYLPREDNISOLONE SODIUM SUCCINATE 40 MG: 40 INJECTION, POWDER, FOR SOLUTION INTRAMUSCULAR; INTRAVENOUS at 02:33

## 2023-03-03 RX ADMIN — FUROSEMIDE 40 MG: 10 INJECTION, SOLUTION INTRAMUSCULAR; INTRAVENOUS at 08:02

## 2023-03-03 RX ADMIN — ALBUTEROL SULFATE 2.5 MG: 2.5 SOLUTION RESPIRATORY (INHALATION) at 14:19

## 2023-03-03 RX ADMIN — GUAIFENESIN 600 MG: 600 TABLET, EXTENDED RELEASE ORAL at 20:45

## 2023-03-03 RX ADMIN — ACETAMINOPHEN 650 MG: 325 TABLET ORAL at 02:34

## 2023-03-03 RX ADMIN — ANTACID/ANTIFLATULENT 1 EACH: 380; 550; 10; 10 GRANULE, EFFERVESCENT ORAL at 13:37

## 2023-03-03 RX ADMIN — METOCLOPRAMIDE 10 MG: 5 INJECTION, SOLUTION INTRAMUSCULAR; INTRAVENOUS at 20:45

## 2023-03-03 RX ADMIN — MONTELUKAST 10 MG: 10 TABLET, FILM COATED ORAL at 20:45

## 2023-03-03 RX ADMIN — BUDESONIDE 500 MCG: 0.5 SUSPENSION RESPIRATORY (INHALATION) at 04:59

## 2023-03-03 RX ADMIN — ENOXAPARIN SODIUM 105 MG: 150 INJECTION SUBCUTANEOUS at 08:02

## 2023-03-03 RX ADMIN — AZITHROMYCIN MONOHYDRATE 500 MG: 500 INJECTION, POWDER, LYOPHILIZED, FOR SOLUTION INTRAVENOUS at 08:08

## 2023-03-03 RX ADMIN — BARIUM SULFATE 176 G: 960 POWDER, FOR SUSPENSION ORAL at 13:37

## 2023-03-03 RX ADMIN — ROPINIROLE HYDROCHLORIDE 2 MG: 1 TABLET, FILM COATED ORAL at 20:44

## 2023-03-03 RX ADMIN — ALBUTEROL SULFATE 2.5 MG: 2.5 SOLUTION RESPIRATORY (INHALATION) at 04:59

## 2023-03-03 RX ADMIN — Medication 10 ML: at 08:09

## 2023-03-03 RX ADMIN — ALBUTEROL SULFATE 2.5 MG: 2.5 SOLUTION RESPIRATORY (INHALATION) at 17:53

## 2023-03-03 RX ADMIN — METOCLOPRAMIDE 10 MG: 5 INJECTION, SOLUTION INTRAMUSCULAR; INTRAVENOUS at 15:26

## 2023-03-03 RX ADMIN — BARIUM SULFATE 334 G: 980 POWDER, FOR SUSPENSION ORAL at 13:36

## 2023-03-03 RX ADMIN — FUROSEMIDE 40 MG: 10 INJECTION, SOLUTION INTRAMUSCULAR; INTRAVENOUS at 17:25

## 2023-03-03 RX ADMIN — METHYLPREDNISOLONE SODIUM SUCCINATE 40 MG: 40 INJECTION, POWDER, FOR SOLUTION INTRAMUSCULAR; INTRAVENOUS at 08:02

## 2023-03-03 RX ADMIN — ROFLUMILAST 500 MCG: 500 TABLET ORAL at 17:25

## 2023-03-03 RX ADMIN — IPRATROPIUM BROMIDE 0.5 MG: 0.5 SOLUTION RESPIRATORY (INHALATION) at 17:53

## 2023-03-03 ASSESSMENT — PAIN SCALES - GENERAL
PAINLEVEL_OUTOF10: 7
PAINLEVEL_OUTOF10: 0
PAINLEVEL_OUTOF10: 4
PAINLEVEL_OUTOF10: 0
PAINLEVEL_OUTOF10: 3

## 2023-03-03 ASSESSMENT — PAIN DESCRIPTION - LOCATION
LOCATION: HEAD;CHEST
LOCATION: HEAD

## 2023-03-03 ASSESSMENT — PULMONARY FUNCTION TESTS: PEFR_L/MIN: 200

## 2023-03-03 NOTE — PROGRESS NOTES
Vasquez Hardwick NP re: pt chest/neck/face & flushed when returned from Xray, stated normal for barium to cause those sypmtoms- pt also states she feels shaky, VS stable. Orders placed.

## 2023-03-03 NOTE — CONSULTS
Pulmonary/Critical Care Consult Note    CHIEF COMPLAINT: Shortness of breath    HISTORY OF PRESENT ILLNESS: Patient is a 68-year-old female with history of asthma, MI, hypertension, anxiety, depression, seizures, RLS, spinal stenosis, and peptic ulcer disease. Patient presented to the ED on 3/1/2023 with complaints of congestion, cough, shortness of breath, and wheezing. Patient has history of asthma and uses albuterol nebulizers and rescue inhaler at home as needed. States her shortness of breath is worse with activity and laying flat. Patient states she stopped smoking around 1 week ago when her symptoms started. She is currently in no distress and on room air. High-resolution chest CT obtained today showed four-chamber cardiomegaly with valvular calcifications and small pericardial effusion with questionable pericarditis. Smooth interlobular septal thickening with lower lobe prominence and mild decompression with small bilateral pleural effusions noted. Chest CTA negative for PE and again moderate pleural and pericardial effusions noted.         ALLERGY:  Ceclor [cefaclor], Diprivan [propofol], Naproxen, Adhesive tape, Blueberry flavor, Ibuprofen, Mirapex [pramipexole], Oxycodone-acetaminophen, Potato, Shellfish-derived products, Tramadol, Vaccinium angustifolium, Blueberry fruit extract, Ceclor [cefaclor], Iodides, Iodine, Iv dye [iodides], Naprosyn [naproxen], Percocet [oxycodone-acetaminophen], Phenergan [promethazine hcl], Promethazine, Sulfa antibiotics, Tape [adhesive tape], and Tetanus toxoids    FAMILY HISTORY:  Family History   Problem Relation Age of Onset    Cancer Mother     Heart Disease Sister     Emphysema Father        SOCIAL HISTORY:  Social History     Socioeconomic History    Marital status:      Spouse name: mattie    Number of children: 5    Years of education: 12    Highest education level: Not on file   Occupational History    Not on file   Tobacco Use    Smoking status: Every Day     Packs/day: 1.00     Years: 14.00     Pack years: 14.00     Types: Cigarettes     Start date: 10/23/2004    Smokeless tobacco: Never   Vaping Use    Vaping Use: Never used   Substance and Sexual Activity    Alcohol use: No    Drug use: No    Sexual activity: Never   Other Topics Concern    Not on file   Social History Narrative    ** Merged History Encounter **          Social Determinants of Health     Financial Resource Strain: Low Risk     Difficulty of Paying Living Expenses: Not hard at all   Food Insecurity: No Food Insecurity    Worried About Running Out of Food in the Last Year: Never true    Ran Out of Food in the Last Year: Never true   Transportation Needs: Not on file   Physical Activity: Insufficiently Active    Days of Exercise per Week: 2 days    Minutes of Exercise per Session: 20 min   Stress: Not on file   Social Connections: Not on file   Intimate Partner Violence: Not on file   Housing Stability: Not on file       MEDICAL HISTORY:  Past Medical History:   Diagnosis Date    Acute CVA (cerebrovascular accident) ([facility] [address] 3/17/2022    Anastomotic ulcer 04/30/2012    Anemia     Anxiety     Asthma     Biceps tendon tear 01/02/2015    Closed fracture of multiple ribs of right side     Closed fracture of nasal bone     Collapsed lung 1986    COPD (chronic obstructive pulmonary disease) ([facility] [address]     DDD (degenerative disc disease), lumbar     Degenerative disc disease at L5-S1 level     Depression     Distal radius fracture 05/11/2015    Fracture of left olecranon process 01/10/2017    GERD (gastroesophageal reflux disease)     Heart attack ([facility] [address]     History of blood transfusion     Hypertension     Impingement syndrome of shoulder 12/09/2014    Intractable abdominal pain 11/18/2018    Left carpal tunnel syndrome 01/30/2017    Nasal bones, closed fracture, closed, initial encounter 12/31/2015    Osteoarthritis     Peptic ulcer, unspecified site, unspecified as acute or chronic, without mention of hemorrhage or perforation     RLS (restless legs syndrome)     Rotator cuff tear 12/09/2014    Seizure (HCC)     Sepsis ([facility] [address]     Spinal stenosis        MEDICATIONS:   budesonide  0.5 mg Nebulization BID    enoxaparin  1 mg/kg SubCUTAneous BID    methylPREDNISolone  40 mg IntraVENous Q6H    montelukast  10 mg Oral Nightly    sodium chloride flush  5-40 mL IntraVENous 2 times per day    rOPINIRole  2 mg Oral Nightly    albuterol  2.5 mg Nebulization Q4H    And    ipratropium  0.5 mg Nebulization Q4H    pantoprazole  40 mg Oral QAM AC    venlafaxine  75 mg Oral Daily    aspirin  81 mg Oral Daily    furosemide  40 mg IntraVENous BID      dextrose      sodium chloride       perflutren lipid microspheres, glucose, dextrose bolus **OR** dextrose bolus, glucagon (rDNA), dextrose, sodium chloride flush, sodium chloride, polyethylene glycol, acetaminophen **OR** acetaminophen, HYDROcodone 5 mg - acetaminophen, prochlorperazine    REVIEW OF SYSTEMS:  Constitutional: Denies fever, weight loss, night sweats, and fatigue  Skin: Denies pigmentation, dark lesions, and rashes   HEENT: Denies hearing loss, tinnitus, ear drainage, epistaxis, sore throat, and hoarseness. Cardiovascular: Denies palpitations, chest pain, and reports chest pressure. Respiratory: Reports mostly dry cough, dyspnea at rest worse with exertion, ports orthopnea, denies hemoptysis, apnea, and choking.   Gastrointestinal: Denies nausea, vomiting, poor appetite, diarrhea, heartburn or reflux  Genitourinary: Denies dysuria, frequency, urgency or hematuria  Musculoskeletal: Denies myalgias, muscle weakness, and bone pain  Neurological: Denies dizziness, vertigo, headache, and focal weakness  Psychological: Denies anxiety and depression  Endocrine: Denies heat intolerance and cold intolerance  Hematopoietic/Lymphatic: Denies bleeding problems and blood transfusions    PHYSICAL EXAM:  Vitals:    03/02/23 1507   BP: (!) 122/53   Pulse: (!) 102   Resp: 18   Temp: 98.1 °F (36.7 °C)   SpO2: 94%           O2 Device: None (Room air)    Constitutional: No fever, chills, diaphoresis  HEENT: No head lesions, PERRL, EOMI, mouth without lesions, no nasal lesions, no cervical adenopathy palpated   Respiratory: Lungs with equal breath sounds and inspiratory and expiratory wheezes bilaterally, no accessory muscle use   CV: Regular rate, no murmurs, JVD, trace bilateral leg edema   Abdomen: Soft, non tender, + bowel sounds, no lesions   Skin: Adequate turgor, no rash, capillary refill <2 seconds   Extremities: Muscular strength 4/4 in 4 limbs, moves 4 limbs spontaneously, distal pulses present   Neurology: Awake and alert, follows commands, moves 4 limbs on command and spontaneously, equal sensation, no dysmetria, neck is supple, no meningitic signs present.      LABS:  WBC   Date Value Ref Range Status   03/02/2023 13.3 (H) 4.5 - 11.5 E9/L Final   03/01/2023 15.6 (H) 4.5 - 11.5 E9/L Final   02/12/2023 16.9 (H) 4.5 - 11.5 E9/L Final     Hemoglobin   Date Value Ref Range Status   03/02/2023 12.0 11.5 - 15.5 g/dL Final   03/01/2023 12.6 11.5 - 15.5 g/dL Final   02/12/2023 14.8 11.5 - 15.5 g/dL Final     Hematocrit   Date Value Ref Range Status   03/02/2023 37.2 34.0 - 48.0 % Final   03/01/2023 39.0 34.0 - 48.0 % Final   02/12/2023 47.4 34.0 - 48.0 % Final     MCV   Date Value Ref Range Status   03/02/2023 93.5 80.0 - 99.9 fL Final   03/01/2023 93.3 80.0 - 99.9 fL Final   02/12/2023 94.2 80.0 - 99.9 fL Final     Platelets   Date Value Ref Range Status   03/02/2023 359 130 - 450 E9/L Final   03/01/2023 412 130 - 450 E9/L Final   02/12/2023 363 130 - 450 E9/L Final     Sodium   Date Value Ref Range Status   03/02/2023 138 132 - 146 mmol/L Final   03/01/2023 137 132 - 146 mmol/L Final   02/12/2023 134 132 - 146 mmol/L Final     Potassium   Date Value Ref Range Status   03/02/2023 3.4 (L) 3.5 - 5.0 mmol/L Final   03/01/2023 3.9 3.5 - 5.0 mmol/L Final   03/17/2022 3.7 3.5 - 5.0 mmol/L Final Potassium reflex Magnesium   Date Value Ref Range Status   02/12/2023 4.6 3.5 - 5.0 mmol/L Final   10/19/2022 4.8 3.5 - 5.0 mmol/L Final   03/16/2022 3.8 3.5 - 5.0 mmol/L Final     Chloride   Date Value Ref Range Status   03/02/2023 98 98 - 107 mmol/L Final   03/01/2023 98 98 - 107 mmol/L Final   02/12/2023 96 (L) 98 - 107 mmol/L Final     CO2   Date Value Ref Range Status   03/02/2023 28 22 - 29 mmol/L Final   03/01/2023 26 22 - 29 mmol/L Final   02/12/2023 27 22 - 29 mmol/L Final     BUN   Date Value Ref Range Status   03/02/2023 14 6 - 23 mg/dL Final   03/01/2023 20 6 - 23 mg/dL Final   02/12/2023 16 6 - 23 mg/dL Final     Creatinine   Date Value Ref Range Status   03/02/2023 0.6 0.5 - 1.0 mg/dL Final   03/01/2023 0.7 0.5 - 1.0 mg/dL Final   02/12/2023 0.7 0.5 - 1.0 mg/dL Final     Glucose   Date Value Ref Range Status   03/02/2023 230 (H) 74 - 99 mg/dL Final   03/01/2023 198 (H) 74 - 99 mg/dL Final   02/12/2023 174 (H) 74 - 99 mg/dL Final   03/06/2012 92 70 - 110 mg/dL Final   11/21/2011 86 70 - 110 mg/dL Final   06/10/2011 66 (L) 70 - 110 mg/dL Final     Calcium   Date Value Ref Range Status   03/02/2023 8.7 8.6 - 10.2 mg/dL Final   03/01/2023 9.1 8.6 - 10.2 mg/dL Final   02/12/2023 9.4 8.6 - 10.2 mg/dL Final     Total Protein   Date Value Ref Range Status   03/02/2023 6.7 6.4 - 8.3 g/dL Final   02/12/2023 7.3 6.4 - 8.3 g/dL Final   10/19/2022 7.5 6.4 - 8.3 g/dL Final     Albumin   Date Value Ref Range Status   03/02/2023 3.6 3.5 - 5.2 g/dL Final   02/12/2023 4.3 3.5 - 5.2 g/dL Final   10/19/2022 4.3 3.5 - 5.2 g/dL Final   03/06/2012 4.6 3.2 - 4.8 g/dL Final   11/21/2011 4.5 3.2 - 4.8 g/dL Final   06/10/2011 4.6 3.2 - 4.8 g/dL Final     Total Bilirubin   Date Value Ref Range Status   03/02/2023 0.4 0.0 - 1.2 mg/dL Final   02/12/2023 0.4 0.0 - 1.2 mg/dL Final   10/19/2022 <0.2 0.0 - 1.2 mg/dL Final     Alkaline Phosphatase   Date Value Ref Range Status   03/02/2023 106 (H) 35 - 104 U/L Final   02/12/2023 94 35 - 104 U/L Final   10/19/2022 90 35 - 104 U/L Final     AST   Date Value Ref Range Status   03/02/2023 18 0 - 31 U/L Final   02/12/2023 14 0 - 31 U/L Final   10/19/2022 15 0 - 31 U/L Final     ALT   Date Value Ref Range Status   03/02/2023 25 0 - 32 U/L Final   02/12/2023 13 0 - 32 U/L Final   10/19/2022 13 0 - 32 U/L Final     Est, Glom Filt Rate   Date Value Ref Range Status   03/02/2023 >60 >=60 mL/min/1.73 Final     Comment:     Pediatric calculator link  [employer].at. [email or web address]/gfr_calculatorped  Effective Oct 3, 2022  These results are not intended for use in patients  <25years of age. eGFR results are calculated without  a race factor using the 2021 CKD-EPI equation. Careful  clinical correlation is recommended, particularly when  comparing to results calculated using previous equations. The CKD-EPI equation is less accurate in patients with  extremes of muscle mass, extra-renal metabolism of  creatinine, excessive creatinine ingestion, or following  therapy that affects renal tubular secretion. 03/01/2023 >60 >=60 mL/min/1.73 Final     Comment:     Pediatric calculator link  [employer].at. [email or web address]/gfr_calculatorped  Effective Oct 3, 2022  These results are not intended for use in patients  <25years of age. eGFR results are calculated without  a race factor using the 2021 CKD-EPI equation. Careful  clinical correlation is recommended, particularly when  comparing to results calculated using previous equations. The CKD-EPI equation is less accurate in patients with  extremes of muscle mass, extra-renal metabolism of  creatinine, excessive creatinine ingestion, or following  therapy that affects renal tubular secretion. 02/12/2023 >60 >=60 mL/min/1.73 Final     Comment:     Pediatric calculator link  [employer].at. [email or web address]/gfr_calculatorped  Effective Oct 3, 2022  These results are not intended for use in patients  <25years of age. eGFR results are calculated without  a race factor using the 2021 CKD-EPI equation. Careful  clinical correlation is recommended, particularly when  comparing to results calculated using previous equations. The CKD-EPI equation is less accurate in patients with  extremes of muscle mass, extra-renal metabolism of  creatinine, excessive creatinine ingestion, or following  therapy that affects renal tubular secretion. GFR    Date Value Ref Range Status   03/17/2022 >60  Final   03/16/2022 >60  Final   02/11/2022 >60  Final     Magnesium   Date Value Ref Range Status   03/02/2023 2.1 1.6 - 2.6 mg/dL Final   03/01/2023 1.9 1.6 - 2.6 mg/dL Final   03/17/2022 2.1 1.6 - 2.6 mg/dL Final     Phosphorus   Date Value Ref Range Status   03/02/2023 3.8 2.5 - 4.5 mg/dL Final   03/17/2022 3.8 2.5 - 4.5 mg/dL Final   11/19/2018 2.6 2.5 - 4.5 mg/dL Final     Recent Labs     03/02/23  1441   PH 7.488*   PO2 75.4   PCO2 34.6*   HCO3 25.7   BE 2.6   O2SAT 94.9       RADIOLOGY:  CT CHEST HIGH RESOLUTION   Final Result   Four-chamber cardiomegaly with valvular calcifications and small volume   pericardial effusion as well as questionable stranding of the pericardium   could represent acute inflammation. Concern for pericarditis without   calcifications. Smooth interlobular septal thickening with lower lobe   predominance and mild decompensation with small bilateral pleural effusions. XR CHEST (2 VW)   Final Result   CHF. Superimposed bibasilar pneumonia cannot be excluded.          CTA PULMONARY W CONTRAST    (Results Pending)       IMPRESSION:  COPD exacerbation  Small bilateral pleural effusions  Small pericardial effusion  Tobacco use  Morbid obesity-BMI 45.97        PLAN:  Oxygen supplementation as needed to keep SPO2 greater than 89%  Increase Solu-Medrol to 40 mg every 6 hours  Zithromax 500 mg IV x5 days  Continue Pulmicort, albuterol, and Atrovent  Singulair 10 mg p.o. daily  IgE and hypersensitivity pneumonitis profile pending  Cardiology following      Electronically signed by KRYSTA Suarez CNP on 3/2/2023 at 8:02 PM

## 2023-03-03 NOTE — CARE COORDINATION
Case Management Assessment  Initial Evaluation    Date/Time of Evaluation: 3/3/2023 10:17 AM  Assessment Completed by: Spurgeon Dubin, RN    If patient is discharged prior to next notation, then this note serves as note for discharge by case management. Patient Name: Estela Estrada                   YOB: 1955  Diagnosis: Mild intermittent asthma with exacerbation [J45.21]  Acute decompensated heart failure ([facility] [I50.9]  Dyspnea, unspecified type [R06.00]  Congestive heart failure, unspecified HF chronicity, unspecified heart failure type ([facility] [facility] [I50.9]                   Date / Time: 3/1/2023 12:06 PM    Patient Admission Status: Inpatient   Readmission Risk (Low < 19, Mod (19-27), High > 27): Readmission Risk Score: 12.2    Current PCP: Sami Morrison, DO  PCP verified by CM? Yes    Chart Reviewed: Yes      History Provided by: Patient  Patient Orientation: Alert and Oriented    Patient Cognition: Alert    Hospitalization in the last 30 days (Readmission):  No    If yes, Readmission Assessment in CM Navigator will be completed. Advance Directives:      Code Status: Full Code   Patient's Primary Decision Maker is: Legal Next of Kin    Primary Decision MakerEpriyanka Andres Spouse - [phone]    Discharge Planning:    Patient lives with: Spouse/Significant Other Type of Home: House  Primary Care Giver: Self  Patient Support Systems include: Spouse/Significant Other, Children   Current Financial resources:    Current community resources:    Current services prior to admission: None            Current DME:              Type of Home Care services:  None    ADLS  Prior functional level: Independent in ADLs/IADLs  Current functional level: Independent in ADLs/IADLs    PT AM-PAC:   /24  OT AM-PAC:   /24    Family can provide assistance at DC: Yes  Would you like Case Management to discuss the discharge plan with any other family members/significant others, and if so, who?  No  Plans to Return to Present Housing: Yes  Other Identified Issues/Barriers to RETURNING to current housing: NONE  Potential Assistance needed at discharge: N/A            Potential DME:    Patient expects to discharge to: [address] [place] [address] for transportation at discharge:      Financial    Payor: Sushila Elaine / Plan: [place] / Product Type: *No Product type* /     Does insurance require precert for SNF: Yes    Potential assistance Purchasing Medications:    Meds-to-Beds request: Yes      [address] [place] [address] [phone] [facility] [phone]   [facility] [address]  Phone: [phone] Fax: [phone]      Notes:    Factors facilitating achievement of predicted outcomes: Family support, Cooperative, Pleasant, and Good insight into deficits    Barriers to discharge: NONE    Additional Case Management Notes: 3/3/2023 1010 CM met with pt for transition of care needs at d/c. Pt resides with her  in a 1 story house with 3 st entry. Pt is independent but doesn't drive. Her  takes her to appts and shopping etc.  Pt is on room air. Pt has a history of using [facility] but no history of SNF. Pt denies the need for [address] at this time. Pt own a shower bench, cane and a walker. No home going needs identified. PCP is Dr Aide Mir and uses [facility] on [address] [place] [address]. CM will follow.  Electronically signed by Geovany Garcia RN on 3/3/2023 at 10:31 AM                     Geovany Garcia RN  Case Management Department

## 2023-03-03 NOTE — DISCHARGE INSTRUCTIONS
HEART FAILURE  / CONGESTIVE HEART FAILURE  DISCHARGE INSTRUCTIONS:  GUIDELINES TO FOLLOW AT HOME    Self- Managed Care:     MEDICATIONS:  Take your medication as directed. If you are experiencing any side effects, inform your doctor, Do not stop taking any of your medications without letting your doctor know. Check with your doctor before taking any over-the-counter medications / herbal / or dietary supplements. They may interfere with your other medications. Do not take ibuprofen (Advil or Motrin) and naproxen (Aleve) without talking to your doctor first. They could make your heart failure worse. WEIGHT MONITORING:   Weigh yourself everyday (with the same scale) around the same time of the day and write it down. (you can chart them on a calendar or keep track of them on paper. Notify your doctor of a weight gain of 3 pounds or more in 1 day   OR a total of 5 pounds or more in 1 week    Take your weight record to your doctor visits  Also, the same goes if you loose more than 3# in one day, let your heart doctor know. DIET:   Cardiac heart healthy diet- Low saturated / low trans fat, no added salt, caffeine restricted, Low sodium diet-   No more than 2,000mg (2 grams) of salt / sodium per day (which equals to a little less than  a teaspoon of salt)  If your doctor wants you on a fluid restriction. ..it is usually recommended a fluid limit of 2,000cc -  Fluid restriction- 2,000 ml (milliliters) = 64 ounces = you can have 8 glasses of fluid per day (each glass 8 ounces)    Follow a low salt diet - avoid using salt at the table, avoid / limit use of canned soups, processed / packaged foods, salted snacks, olives and pickles. Do not use a salt substitute without checking with your doctor, they may contain a high amount of potassioum. (Mrs. Rex Nassar is safe to use).     Limit the use of alcohol       CALL YOUR DOCTOR THE FIRST DAY YOU NOTICE ANY OF THESE   SYMPTOMS:  You have a weight gain of 3 pounds or more in 1 day         OR 5 pounds or more in one week  More shortness of breath  More swelling of your stomach, legs, ankles or feet  Feeling more tired, No energy  Dry hacky cough  Dizziness  More chest pain / discomfort       (CALL 911 IF ANY OF THE FOLLOWING OCCURS  Chest pain (not relieved with nitroglycerine, if you have been prescribed this medication)  Severe shortness of breath  Faint / Pass out  Confusion / cannot think clearly  If symptoms get worse           SMOKING - TOBACCO USE:  * IF YOU SMOKE - STOP! Kick the habit. [address] Program is offered at [facility] and [address] [facility]. Call [phone] extension [phone] for more information. ACTIVITY:   (Ask your doctor when you will be able to return to work and before starting any exercise program.  Do not drive unless unless your doctor has given you permission to do so). Start light exercise. Even if you can only do a small amount, exercise will help you get stronger, have more energy, help manage your weight and decrease  stress. Walking is an easy way to get exercise. Start out slowly and  increase the amount you walk as tolerated  If you become short of breath, dizzy or have chest pain; stop, sit down, and rest.  If you feel \"wiped out\" the day after you exercise, walk at a slower pace or for a shorter distance. You can gradually increase the pace or amount of time. (Do not exercise right after a meal or in extreme temperatures, such as above 85 degrees, if the air is really humid, or wind chill is less than 20 degrees)                                             ADDITIONAL INFORMATION:  Avoid getting sick from colds and the flu. Stay away from friends or family that you know may have a contagious illness  Get plenty of rest   Get a flu shot each year. Get a pneumococcal vaccine shot.  If you have had one before, ask your doctor whether you need another dose. My Goal for Self-management of Heart Failure Includes 5 steps :    1. Notice a change in symptoms ( weight gain, short of breath, leg swelling, decreased activity level, bloating. ...)    2. Evaluate the change: (use the Heart Failure Zones )     3. Decide to take action: decide what your options are, such as: (call your doctor for an extra visit, take a prescribed medication, such as your water pill if your doctor has given you directions to do so, [address])    4. Come up with a strategy:  (now you call the doctor for advice / appointment. This is where you take action!!! Do not wait, catch the symptom early and treat it before it worsens. 5. Evaluate the response: The next day, check your Heart Failure Zones: are you in the GREEN ZONE (safe zone)? Worsening symptoms of YELLOW ZONE? Or have you moved to the RED ZONE and need to call 911 or go to the Emergency Room for evaluation? Call your doctor's office to update them on your symptoms of heart failure. Learning About Benefits From Quitting Smoking  Why is it important to quit smoking? If you're thinking about quitting smoking, you may have a few reasons to be smoke-free. Your health may be one of them. When you quit smoking, you lower your risk for many serious health problems, such as cancer, lung disease, heart attack, stroke, blood vessel disease, and blindness from macular degeneration. When you're smoke-free, you get sick less often, and you heal faster. You are less likely to get colds, flu, bronchitis, and pneumonia. As a nonsmoker, you may find that your mood is better and you are less stressed. When and how will you feel healthier? Quitting has real health benefits that start from day 1 of being smoke-free. And the longer you stay smoke-free, the healthier you get and the better you feel.   The first hours or days  Soon after you stop smoking, your blood pressure and heart rate go down. That means there's less stress on your heart and blood vessels. Within days, the level of carbon monoxide in your blood drops back to normal. That makes room for more oxygen. Within weeks or months  When your lungs begin to clear, you cough less and breathe deeper, so it may be easier to be active. Your sense of taste and smell should return. That means you may enjoy food more than you have since you started smoking. Over the years  Over the years, your risks of heart disease, heart attack, and stroke are lower. After 10 years, your risk of lung cancer is cut by about half. And your risk for many other types of cancer is lower too. How would quitting help others in your life? When you quit smoking, you improve the health of everyone who now breathes in your smoke. Their heart, lung, and cancer risks may drop, much like yours. They are sick less. For babies and small children, living smoke-free means they're less likely to have ear infections, pneumonia, and bronchitis. If you are or will be pregnant someday, quitting smoking means a healthier . Children who are close to you are less likely to become adult smokers. Where can you learn more? Go to http://www.woods.com/ and enter O319 to learn more about \"Learning About Benefits From Quitting Smoking. \"  Current as of: 2021               Content Version: 13.5  © 9594-5061 Healthwise, Incorporated. Care instructions adapted under license by [facility] ([facility]). If you have questions about a medical condition or this instruction, always ask your healthcare professional. [place] [address] any warranty or liability for your use of this information.

## 2023-03-03 NOTE — PLAN OF CARE
Patient's chart updated to reflect:      . - HF discharge instructions.  -Orders: 2 gram sodium diet, daily weights, I/O.  -PCP and cardiology follow up appointments to be scheduled within 7 days of hospital discharge. -CHF education session will be provided to the patient prior to hospital discharge.     Ivana Escobar, RN MSN,RN  Heart Failure Navigator

## 2023-03-03 NOTE — PROGRESS NOTES
Pulmonary/Critical Care Progress Note    We are following patient for COPD exacerbation with asthmatic component, small bilateral pleural effusions (new since 12/2023), question diastolic dysfunction    SUBJECTIVE:  Patient is feeling somewhat better today but notes that she is still wheezing. She does stop smoking about a week ago. We will add Roflumilast, a phosphodiesterase inhibitor, and with her history of systemic hypertension and hyperdynamic left ventricle, it is very possible that there is a component of diastolic dysfunction and is therefore being given Lasix by the cardiology service. MEDICATIONS:   azithromycin  500 mg IntraVENous Q24H    metoclopramide  10 mg IntraVENous BID    guaiFENesin  600 mg Oral BID    Roflumilast  500 mcg Oral Daily    budesonide  0.5 mg Nebulization BID    enoxaparin  1 mg/kg SubCUTAneous BID    methylPREDNISolone  40 mg IntraVENous Q6H    montelukast  10 mg Oral Nightly    sodium chloride flush  5-40 mL IntraVENous 2 times per day    rOPINIRole  2 mg Oral Nightly    albuterol  2.5 mg Nebulization Q4H    And    ipratropium  0.5 mg Nebulization Q4H    pantoprazole  40 mg Oral QAM AC    venlafaxine  75 mg Oral Daily    aspirin  81 mg Oral Daily    furosemide  40 mg IntraVENous BID      dextrose      sodium chloride       diphenhydrAMINE, perflutren lipid microspheres, glucose, dextrose bolus **OR** dextrose bolus, glucagon (rDNA), dextrose, sodium chloride flush, sodium chloride, polyethylene glycol, acetaminophen **OR** acetaminophen, HYDROcodone 5 mg - acetaminophen, prochlorperazine      REVIEW OF SYSTEMS:  Constitutional: Denies fever, weight loss, night sweats, and fatigue  Skin: Denies pigmentation, dark lesions, and rashes   HEENT: Denies hearing loss, tinnitus, ear drainage, epistaxis, sore throat, and hoarseness. Cardiovascular: Denies palpitations, chest pain, and chest pressure.   Respiratory: Positive for cough, negative for dyspnea at rest, hemoptysis, apnea, and choking. Gastrointestinal: Denies nausea, vomiting, poor appetite, diarrhea, heartburn or reflux  Genitourinary: Denies dysuria, frequency, urgency or hematuria  Musculoskeletal: Denies myalgias, muscle weakness, and bone pain  Neurological: Denies dizziness, vertigo, headache, and focal weakness  Psychological: Denies anxiety and depression  Endocrine: Denies heat intolerance and cold intolerance  Hematopoietic/Lymphatic: Denies bleeding problems and blood transfusions    OBJECTIVE:  Vitals:    03/03/23 1515   BP: 136/77   Pulse: (!) 104   Resp: 20   Temp: 98.1 °F (36.7 °C)   SpO2: 94%           O2 Device: None (Room air)    PHYSICAL EXAM:  Constitutional: No fever, chills, diaphoresis  Skin: Skin rash, no skin breakdown  HEENT: Mucous membranes are moist  Neck: JVD, lymphadenopathy, thyromegaly  Cardiovascular: S1, S2 regular. No S3 or rubs present  Respiratory: Expiratory wheezes over both posterior lung fields with improved air movement since yesterday  Gastrointestinal: Left, obese, nontender  Genitourinary: No CVA tenderness  Extremities: No clubbing, cyanosis, or edema  Neurological: Awake, alert, oriented x3. No evidence of focal motor or sensory deficits  Psychological: In good spirits.   Appropriate affect    LABS:  WBC   Date Value Ref Range Status   03/03/2023 15.8 (H) 4.5 - 11.5 E9/L Final   03/02/2023 13.3 (H) 4.5 - 11.5 E9/L Final   03/01/2023 15.6 (H) 4.5 - 11.5 E9/L Final     Hemoglobin   Date Value Ref Range Status   03/03/2023 12.8 11.5 - 15.5 g/dL Final   03/02/2023 12.0 11.5 - 15.5 g/dL Final   03/01/2023 12.6 11.5 - 15.5 g/dL Final     Hematocrit   Date Value Ref Range Status   03/03/2023 41.6 34.0 - 48.0 % Final   03/02/2023 37.2 34.0 - 48.0 % Final   03/01/2023 39.0 34.0 - 48.0 % Final     MCV   Date Value Ref Range Status   03/03/2023 94.3 80.0 - 99.9 fL Final   03/02/2023 93.5 80.0 - 99.9 fL Final   03/01/2023 93.3 80.0 - 99.9 fL Final     Platelets   Date Value Ref Range Status 03/03/2023 408 130 - 450 E9/L Final   03/02/2023 359 130 - 450 E9/L Final   03/01/2023 412 130 - 450 E9/L Final     Sodium   Date Value Ref Range Status   03/03/2023 135 132 - 146 mmol/L Final   03/02/2023 138 132 - 146 mmol/L Final   03/01/2023 137 132 - 146 mmol/L Final     Potassium   Date Value Ref Range Status   03/03/2023 3.6 3.5 - 5.0 mmol/L Final   03/02/2023 3.4 (L) 3.5 - 5.0 mmol/L Final   03/01/2023 3.9 3.5 - 5.0 mmol/L Final     Potassium reflex Magnesium   Date Value Ref Range Status   02/12/2023 4.6 3.5 - 5.0 mmol/L Final   10/19/2022 4.8 3.5 - 5.0 mmol/L Final   03/16/2022 3.8 3.5 - 5.0 mmol/L Final     Chloride   Date Value Ref Range Status   03/03/2023 96 (L) 98 - 107 mmol/L Final   03/02/2023 98 98 - 107 mmol/L Final   03/01/2023 98 98 - 107 mmol/L Final     CO2   Date Value Ref Range Status   03/03/2023 28 22 - 29 mmol/L Final   03/02/2023 28 22 - 29 mmol/L Final   03/01/2023 26 22 - 29 mmol/L Final     BUN   Date Value Ref Range Status   03/03/2023 19 6 - 23 mg/dL Final   03/02/2023 14 6 - 23 mg/dL Final   03/01/2023 20 6 - 23 mg/dL Final     Creatinine   Date Value Ref Range Status   03/03/2023 0.6 0.5 - 1.0 mg/dL Final   03/02/2023 0.6 0.5 - 1.0 mg/dL Final   03/01/2023 0.7 0.5 - 1.0 mg/dL Final     Glucose   Date Value Ref Range Status   03/03/2023 221 (H) 74 - 99 mg/dL Final   03/02/2023 230 (H) 74 - 99 mg/dL Final   03/01/2023 198 (H) 74 - 99 mg/dL Final   03/06/2012 92 70 - 110 mg/dL Final   11/21/2011 86 70 - 110 mg/dL Final   06/10/2011 66 (L) 70 - 110 mg/dL Final     Calcium   Date Value Ref Range Status   03/03/2023 9.1 8.6 - 10.2 mg/dL Final   03/02/2023 8.7 8.6 - 10.2 mg/dL Final   03/01/2023 9.1 8.6 - 10.2 mg/dL Final     Total Protein   Date Value Ref Range Status   03/03/2023 6.7 6.4 - 8.3 g/dL Final   03/02/2023 6.7 6.4 - 8.3 g/dL Final   02/12/2023 7.3 6.4 - 8.3 g/dL Final     Albumin   Date Value Ref Range Status   03/03/2023 3.5 3.5 - 5.2 g/dL Final   03/02/2023 3.6 3.5 - 5.2 g/dL Final   02/12/2023 4.3 3.5 - 5.2 g/dL Final   03/06/2012 4.6 3.2 - 4.8 g/dL Final   11/21/2011 4.5 3.2 - 4.8 g/dL Final   06/10/2011 4.6 3.2 - 4.8 g/dL Final     Total Bilirubin   Date Value Ref Range Status   03/03/2023 0.3 0.0 - 1.2 mg/dL Final   03/02/2023 0.4 0.0 - 1.2 mg/dL Final   02/12/2023 0.4 0.0 - 1.2 mg/dL Final     Alkaline Phosphatase   Date Value Ref Range Status   03/03/2023 107 (H) 35 - 104 U/L Final   03/02/2023 106 (H) 35 - 104 U/L Final   02/12/2023 94 35 - 104 U/L Final     AST   Date Value Ref Range Status   03/03/2023 11 0 - 31 U/L Final   03/02/2023 18 0 - 31 U/L Final   02/12/2023 14 0 - 31 U/L Final     ALT   Date Value Ref Range Status   03/03/2023 21 0 - 32 U/L Final   03/02/2023 25 0 - 32 U/L Final   02/12/2023 13 0 - 32 U/L Final     Est, Glom Filt Rate   Date Value Ref Range Status   03/03/2023 >60 >=60 mL/min/1.73 Final     Comment:     Pediatric calculator link  GeneCapture.[email or web address]. [email or web address]/gfr_calculatorped  Effective Oct 3, 2022  These results are not intended for use in patients  <25years of age. eGFR results are calculated without  a race factor using the 2021 CKD-EPI equation. Careful  clinical correlation is recommended, particularly when  comparing to results calculated using previous equations. The CKD-EPI equation is less accurate in patients with  extremes of muscle mass, extra-renal metabolism of  creatinine, excessive creatinine ingestion, or following  therapy that affects renal tubular secretion. 03/02/2023 >60 >=60 mL/min/1.73 Final     Comment:     Pediatric calculator link  One Touch EMRat. [email or web address]/gfr_calculatorped  Effective Oct 3, 2022  These results are not intended for use in patients  <25years of age. eGFR results are calculated without  a race factor using the 2021 CKD-EPI equation. Careful  clinical correlation is recommended, particularly when  comparing to results calculated using previous equations.   The CKD-EPI equation is less accurate in patients with  extremes of muscle mass, extra-renal metabolism of  creatinine, excessive creatinine ingestion, or following  therapy that affects renal tubular secretion. 03/01/2023 >60 >=60 mL/min/1.73 Final     Comment:     Pediatric calculator link  Gaby.at. [email or web address]/gfr_calculatorped  Effective Oct 3, 2022  These results are not intended for use in patients  <25years of age. eGFR results are calculated without  a race factor using the 2021 CKD-EPI equation. Careful  clinical correlation is recommended, particularly when  comparing to results calculated using previous equations. The CKD-EPI equation is less accurate in patients with  extremes of muscle mass, extra-renal metabolism of  creatinine, excessive creatinine ingestion, or following  therapy that affects renal tubular secretion. GFR    Date Value Ref Range Status   03/17/2022 >60  Final   03/16/2022 >60  Final   02/11/2022 >60  Final     Magnesium   Date Value Ref Range Status   03/02/2023 2.1 1.6 - 2.6 mg/dL Final   03/01/2023 1.9 1.6 - 2.6 mg/dL Final   03/17/2022 2.1 1.6 - 2.6 mg/dL Final     Phosphorus   Date Value Ref Range Status   03/02/2023 3.8 2.5 - 4.5 mg/dL Final   03/17/2022 3.8 2.5 - 4.5 mg/dL Final   11/19/2018 2.6 2.5 - 4.5 mg/dL Final     Recent Labs     03/02/23  1441   PH 7.488*   PO2 75.4   PCO2 34.6*   HCO3 25.7   BE 2.6   O2SAT 94.9       RADIOLOGY:  FL ESOPHAGRAM   Final Result   There appears to be contrast both within the gastric pouch and the native   stomach. It is difficult to evaluate the stomach due to the overlapping of   the gastric bypass changes. It is recommended that the patient undergo a CT   scan of the abdomen approximately 20 minutes after initial ingestion of oral   contrast and medially following additional contrast administered just before   the patient has the CT scan.   I cannot on wrap old the stomach on the   dedicated esophagram.         CTA PULMONARY W CONTRAST   Final Result   1. Moderate pleural and pericardial effusions   2. No acute pulmonary embolus identified         CT CHEST HIGH RESOLUTION   Final Result   Four-chamber cardiomegaly with valvular calcifications and small volume   pericardial effusion as well as questionable stranding of the pericardium   could represent acute inflammation. Concern for pericarditis without   calcifications. Smooth interlobular septal thickening with lower lobe   predominance and mild decompensation with small bilateral pleural effusions. XR CHEST (2 VW)   Final Result   CHF. Superimposed bibasilar pneumonia cannot be excluded. PROBLEM LIST:  Principal Problem:    Acute decompensated heart failure ([address]  Resolved Problems:    * No resolved hospital problems.  *      IMPRESSION:  COPD with exacerbation primarily with an asthmatic component  Diastolic dysfunction  Obesity  Possible underlying obstructive sleep apnea  Small bilateral pleural effusions    PLAN:  Continue IV steroids at current dose  Continue IV azithromycin  Add Roflumilast  Await IgE level  Monitor labs, especially potassium and watch for contraction alkalosis while on Lasix  Add flutter device to help raise sputum        Electronically signed by Naman Gaytan MD on 3/3/2023 at 4:27 PM

## 2023-03-03 NOTE — ACP (ADVANCE CARE PLANNING)
Advance Care Planning   Healthcare Decision Maker:    Primary Decision Maker: [id number] [facility] [phone]

## 2023-03-03 NOTE — CONSULTS
Nutrition Assessment     Type and Reason for Visit: Initial, Consult (low Na DI)    Pt admits w/ SOB, congestion, cough & wheezing, Dx: Acute decompensated HF. Mild intermittent asthma w/ exacerbation. Hx: asthma, CVA, MI, HTN, anxiety, seizures. Pt meal intake is ~ >50%. Pt was receptive to [facility] & H/O. Will follow dper policy. Nutrition Education/Counseling: Education completed (low Na diet)  Coordination of Nutrition Care: Continue to monitor while inpatient     Goals:Nutrition Education:  Educated on [address], label reading & shopping tips. All questions were answered  Learners: Patient and Family  Readiness: Acceptance  Method: Explanation and Handout  Response: Verbalizes Understanding  Contact name and number provided.       Dereje Prather RD  Contact: [phone]

## 2023-03-03 NOTE — PROGRESS NOTES
Cardiology  Progress Note      SUBJECTIVE:  No chest pain. She still gets some shortness of breath with dry nonproductive cough.     Current Inpatient Medications  Current Facility-Administered Medications: budesonide (PULMICORT) nebulizer suspension 500 mcg, 0.5 mg, Nebulization, BID  perflutren lipid microspheres (DEFINITY) injection 1.5 mL, 1.5 mL, IntraVENous, ONCE PRN  enoxaparin (LOVENOX) injection 105 mg, 1 mg/kg, SubCUTAneous, BID  methylPREDNISolone sodium (SOLU-MEDROL) injection 40 mg, 40 mg, IntraVENous, Q6H  montelukast (SINGULAIR) tablet 10 mg, 10 mg, Oral, Nightly  glucose chewable tablet 16 g, 4 tablet, Oral, PRN  dextrose bolus 10% 125 mL, 125 mL, IntraVENous, PRN **OR** dextrose bolus 10% 250 mL, 250 mL, IntraVENous, PRN  glucagon (rDNA) injection 1 mg, 1 mg, SubCUTAneous, PRN  dextrose 10 % infusion, , IntraVENous, Continuous PRN  sodium chloride flush 0.9 % injection 5-40 mL, 5-40 mL, IntraVENous, 2 times per day  sodium chloride flush 0.9 % injection 5-40 mL, 5-40 mL, IntraVENous, PRN  0.9 % sodium chloride infusion, , IntraVENous, PRN  polyethylene glycol (GLYCOLAX) packet 17 g, 17 g, Oral, Daily PRN  acetaminophen (TYLENOL) tablet 650 mg, 650 mg, Oral, Q6H PRN **OR** acetaminophen (TYLENOL) suppository 650 mg, 650 mg, Rectal, Q6H PRN  rOPINIRole (REQUIP) tablet 2 mg, 2 mg, Oral, Nightly  albuterol (PROVENTIL) nebulizer solution 2.5 mg, 2.5 mg, Nebulization, Q4H **AND** ipratropium (ATROVENT) 0.02 % nebulizer solution 0.5 mg, 0.5 mg, Nebulization, Q4H  HYDROcodone-acetaminophen (NORCO) 5-325 MG per tablet 1 tablet, 1 tablet, Oral, Q8H PRN  prochlorperazine (COMPAZINE) injection 5 mg, 5 mg, IntraVENous, Q8H PRN  pantoprazole (PROTONIX) tablet 40 mg, 40 mg, Oral, QAM AC  venlafaxine (EFFEXOR XR) extended release capsule 75 mg, 75 mg, Oral, Daily  aspirin chewable tablet 81 mg, 81 mg, Oral, Daily  furosemide (LASIX) injection 40 mg, 40 mg, IntraVENous, BID      Physical  VITALS:  /65   Pulse (!) 105   Temp 98.1 °F (36.7 °C) (Oral)   Resp 18   Ht 5' (1.524 m)   Wt 235 lb 6 oz (106.8 kg)   LMP  (LMP Unknown)   SpO2 95%   BMI 45.97 kg/m²   CURRENT TEMPERATURE:  Temp: 98.1 °F (36.7 °C)  CONSTITUTIONAL: No acute distress. EYES: Vision is intact. ENT: No sore throat. No ear drainage. NECK: No JVD. BACK: Symmetric. LUNGS:  diminished breath sounds left base  CARDIOVASCULAR:  normal S1 and S2, no S3, and no S4  ABDOMEN:  non-tender  NEUROLOGIC: No focal deficits. EXTREMITIES: No edema cyanosis or clubbing. DATA:      ECG:  I have reviewed EKG with the following interpretation:  normal sinus rhythm    Cardiology Labs:  BMP:    Lab Results   Component Value Date/Time     03/03/2023 05:04 AM    K 3.6 03/03/2023 05:04 AM    K 4.6 02/12/2023 04:11 PM    CL 96 03/03/2023 05:04 AM    CO2 28 03/03/2023 05:04 AM    BUN 19 03/03/2023 05:04 AM     CBC:    Lab Results   Component Value Date/Time    WBC 15.8 03/03/2023 05:04 AM    RBC 4.41 03/03/2023 05:04 AM    HGB 12.8 03/03/2023 05:04 AM    HCT 41.6 03/03/2023 05:04 AM    MCV 94.3 03/03/2023 05:04 AM    RDW 14.0 03/03/2023 05:04 AM     03/03/2023 05:04 AM     PT/INR:  No results found for: PTINR  TROPONIN:  No components found for: TROP    ASSESSMENT    Shortness of breath. Please refer to consult. Appears to be more pulmonary related. Patient has problem with COPD from smoking in the past.  She appears also to have component of mild acute diastolic heart failure mostly related to hypertension and obesity. We will keep her on Lasix for now. She just had cardiac catheterization a year ago showing patent coronary vessels with normal left ventricular systolic function. Troponin levels are negative. She has no complaints of chest pain. Chest CT high-resolution indicated small pericardial effusion. Chest CTA showed no evidence of pulmonary embolism with moderate pericardial effusion.   Echo was ordered for further assessment of this pericardial effusion. I doubt patient has any hemodynamically significant pericardial effusion to cause her shortness of breath.

## 2023-03-03 NOTE — PROGRESS NOTES
Internal Medicine Progress Note    [facility]    Gracie Clark. Vernon Cummins., KELI Holly D.O., KELI Hernandez D.O. SACHI Roberts, MSN, APRN, NP-C  Annia Zuniga. David Wagner, MSN, APRN-CNP     Primary Care Physician: Krishna Hernandez DO   Admitting Physician:  Vladislav Brink DO  Admission date and time: 3/1/2023 12:06 PM    Room:  [address] [place] [address]  Admitting diagnosis: Mild intermittent asthma with exacerbation [J45.21]  Acute decompensated heart failure ([facility] [address] [I50.9]  Dyspnea, unspecified type [R06.00]  Congestive heart failure, unspecified HF chronicity, unspecified heart failure type [facility]) [I50.9]    Patient Name: Alessandra Ashton  MRN: [id number]    Date of Service: 3/3/2023     Subjective:  Saleem Harris is a 77 y.o. female who was seen and examined today,3/3/2023, at the bedside. Patient sitting on the edge of the bed. Seem to be breathing easier and not wearing oxygen. Extensive discussion of the results of current testing. The patient has been seen by Dr. Lynette Don and he does not feel that a cardiac component is significant. We do appreciate the input of pulmonary. In the meantime we will continue with steroids and respiratory treatment. We will check peak flows pre and post respiratory treatment. The patient does have persistent cough without production    Multiple family member present      Review of System:   Constitutional:   Denies fever or chills, weight loss or gain, fatigue or malaise. HEENT:   Denies ear pain, sore throat, sinus or eye problems. Cardiovascular:   Denies any chest pain, irregular heartbeats, or palpitations. Respiratory:   Relates to having exertional dyspnea with associated orthopnea associated cough  Gastrointestinal:   Denies nausea, vomiting, diarrhea, or constipation. Denies any abdominal pain.   Admits to  having nocturnal reflux  Genitourinary:    Denies any urgency, frequency, hematuria. Voiding  without difficulty. Extremities:   Denies lower extremity swelling, edema or cyanosis. Neurology:    Denies any headache or focal neurological deficits, Denies generalized weakness or memory difficulty. Psch:   Denies being anxious or depressed. Musculoskeletal:    Denies  myalgias, joint complaints or back pain. Integumentary:   Denies any rashes, ulcers, or excoriations. Denies bruising. Hematologic/Lymphatic:  Denies bruising or bleeding. Physical Exam:  I/O this shift: In: 720 [P.O.:720]  Out: 750 [Urine:750]    Intake/Output Summary (Last 24 hours) at 3/3/2023 1530  Last data filed at 3/3/2023 1334  Gross per 24 hour   Intake 920 ml   Output 1900 ml   Net -980 ml   I/O last 3 completed shifts: In: 1225 [P.O.:1225]  Out: 1750 [TNYPH:5024]  Patient Vitals for the past 96 hrs (Last 3 readings):   Weight   03/03/23 0124 235 lb 6 oz (106.8 kg)   03/02/23 0439 234 lb 8 oz (106.4 kg)   03/02/23 0415 234 lb 8 oz (106.4 kg)     Vital Signs:   Blood pressure 136/77, pulse (!) 104, temperature 98.1 °F (36.7 °C), temperature source Oral, resp. rate 20, height 5' (1.524 m), weight 235 lb 6 oz (106.8 kg), SpO2 94 %, not currently breastfeeding. General appearance:  Alert, responsive, oriented to person, place, and time. Well preserved, alert, no distress. Head:  Normocephalic. No masses, lesions or tenderness. Eyes:  PERRLA. EOMI. Sclera clear. Buccal mucosa moist.  ENT:  Ears normal. Mucosa normal.  Neck:    Supple. Trachea midline. No thyromegaly. No JVD. No bruits. Heart:    Rhythm regular. Rate controlled. No murmurs. Lungs:    Diminished posteriorly. Coarse rhonchi  Abdomen:   Soft. Non-tender. Non-distended. Bowel sounds positive. No organomegaly or masses. No pain on palpation. Obese  Extremities:    Peripheral pulses present. No peripheral edema. No ulcers. No cyanosis. No clubbing. Neurologic:    Alert x 3. No focal deficit.   Cranial nerves grossly intact. No focal weakness. Psych:   Behavior is normal. Mood appears normal. Speech is not rapid and/or pressured. Musculoskeletal:   Spine ROM normal. Muscular strength intact. Gait not assessed. Integumentary:  No rashes  Skin normal color and texture.   Genitalia/Breast:  Deferred    Medication:  Scheduled Meds:   azithromycin  500 mg IntraVENous Q24H    metoclopramide  10 mg IntraVENous BID    guaiFENesin  600 mg Oral BID    budesonide  0.5 mg Nebulization BID    enoxaparin  1 mg/kg SubCUTAneous BID    methylPREDNISolone  40 mg IntraVENous Q6H    montelukast  10 mg Oral Nightly    sodium chloride flush  5-40 mL IntraVENous 2 times per day    rOPINIRole  2 mg Oral Nightly    albuterol  2.5 mg Nebulization Q4H    And    ipratropium  0.5 mg Nebulization Q4H    pantoprazole  40 mg Oral QAM AC    venlafaxine  75 mg Oral Daily    aspirin  81 mg Oral Daily    furosemide  40 mg IntraVENous BID     Continuous Infusions:   dextrose      sodium chloride         Objective Data:  Recent Labs     03/01/23  1243 03/02/23  0532 03/03/23  0504   WBC 15.6* 13.3* 15.8*   RBC 4.18 3.98 4.41   HGB 12.6 12.0 12.8   HCT 39.0 37.2 41.6   MCV 93.3 93.5 94.3   MCH 30.1 30.2 29.0   MCHC 32.3 32.3 30.8*   RDW 14.6 14.2 14.0    359 408   MPV 10.4 11.0 11.0     Recent Labs     03/01/23  1243 03/02/23  0532 03/03/23  0504    138 135   K 3.9 3.4* 3.6   CL 98 98 96*   CO2 26 28 28   BUN 20 14 19   CREATININE 0.7 0.6 0.6   GLUCOSE 198* 230* 221*   CALCIUM 9.1 8.7 9.1   PROT  --  6.7 6.7   LABALBU  --  3.6 3.5   BILITOT  --  0.4 0.3   ALKPHOS  --  106* 107*   AST  --  18 11   ALT  --  25 21     Lab Results   Component Value Date    TROPONINI <0.01 06/27/2019    TROPONINI <0.01 07/26/2018    TROPONINI <0.01 07/25/2018                 Assessment:    Acute and progressive respiratory distress undetermined etiology  Chronic obstructive pulm disease with associated acute exacerbation asthma with superimposed restrictive lung disease  Leukocytosis probably secondary to steroids  History of CVA  Valvular heart disease of mild mitral stenosis  History of seizure disorder  Spinal stenosis with restless leg syndrome  Gastroesophageal reflux disease  Hypertension  Anxiety and depression      Plan:     Discussed the results of high-resolution CT and the CTA.  Seem to be a discrepancy in  the amount of pericardial effusion.  Echocardiogram does not show significant amount.  Appreciate cardiology consult.  We will continue with diuretic therapy  Appreciate input of pulmonary.  We will check peak flows pre and post respiratory treatment  Continue steroids and taper accordingly  We will need outpatient sleep study  Behavior modification such as weight loss and cigarettes cessation  We will need formal outpatient pulmonary function test  Due to the patient's symptomatology of reflux we will check esophagram.  Continue PPI and nocturnal Reglan.  Suggest avoiding food and drinks 2 hours before bedtime  Awaiting IgE and hypersensitivity profile    Greater than 40 minutes of critical care time was spent with the patient. This includes chart review, , and discussion with those consultants involved in the patient's care.             More than 50% of my  time was spent at the bedside counseling/coordinating care with the patient and/or family with face to face contact.  This time was spent reviewing notes and laboratory data as well as instructing and counseling the patient. Time I spent with the family or surrogate(s) is included only if the patient was incapable of providing the necessary information or participating in medical decisions. I also discussed the differential diagnosis and all of the proposed management plans with the patient and individuals accompanying the patient.    Job Davila DO, F.A.C.O.I.  3/3/2023  3:30 PM

## 2023-03-03 NOTE — CONSULTS
CHF NURSE NAVIGATOR CONSULT NOTE:      Patient currently admitted with diagnosis of Diastolic heart failure. Patient was awake and alert sitting up in bed during the consultation. She was engaged and asked appropriate questions throughout the education session. She is agreeable to symptom monitoring, daily weights, and following a low sodium diet. Scheduling with the CHF clinic No: will discuss with PCP and Cardiology . HFU appointment made with Dr. Radha Jhaveri (3/21/23 1pm)  Unable to schedule HFU due to office being closed for the day- instructed patient to call to schedule a HFU appointment within 5-7 days is discharged over the weekend. Future Appointments   Date Time Provider Gricelda Villanueva   3/29/2023  1:20 PM Yovany Ruperto, [address] Neurology -   4/17/2023 11:00 AM DO GIULIANA Lucas   1/3/2024  2:00 PM DO GIULIANA Wolf       Barriers to Care:  Contributing risk factors for Heart Failure are identified as overwhelmed by complexity of regimen and multiple comorbidites. Pt does not have a scale but is able to purchase. Pt follows a low sodium diet and denies any problems with transportation to appointments or obtaining and taking medications. Reviewed HF book, HF zones and CHF clinic information, not interested in establishing at this time. The patient is ordered:  Diet: ADULT DIET; Regular;  Low Sodium (2 gm); 2000 ml   Sodium controlled diet Yes  Fluid restriction daily ordered (fluid restriction recommended if patient is hyponatremic and/or diuretic is initiated or increased) Yes  FR:   Daily Weights: Patient Vitals for the past 96 hrs (Last 3 readings):   Weight   03/03/23 0124 235 lb 6 oz (106.8 kg)   03/02/23 0439 234 lb 8 oz (106.4 kg)   03/02/23 0415 234 lb 8 oz (106.4 kg)     I/O:   Intake/Output Summary (Last 24 hours) at 3/3/2023 1607  Last data filed at 3/3/2023 1334  Gross per 24 hour   Intake 720 ml   Output 1400 ml   Net -680 ml              We reviewed the introduction to Heart Failure, the HF zones, signs and symptoms to report on day 1 of onset, medications, medication compliance, the importance of obtaining daily weights, following a low sodium diet, reading food labels for the sodium content, keeping physician appointments, and smoking cessation. We discussed writing / tracking daily weights on a calendar / log because a 5 pound gain in 1 week can sneak up if you are not tracking it. I advised patient they can reduce the risk for Heart Failure exacerbations by modifying / controlling the risk factors. We discussed self-managed care which includes the following:  to take medications as prescribed, report any intolerable side effects of medications to the cardiologist / doctor, do not just stop taking the medication; follow a cardiac heart healthy / low sodium diet; weigh yourself daily, exercise regularly- per doctor recommendation and not to smoke or use an excess amount of alcohol. We discussed calling the cardiologist / doctor with a weight gain of 3 pounds in one day or a total of 5 pounds or more in one week. Also, if you should have a significant weight loss of 3# or more in one day to call the doctor, they may need to decrease or hold the diuretic dose. On days you feels nauseated and not eating / drinking, having emesis or diarrhea,  informed to call the cardiologist  / doctor, they may need to decrease or hold diuretic to avoid dehydration. I stressed the importance of informing their cardiologist the first day of onset of any of the signs and symptoms in the \"Yellow Zone\" of the HF Zones. Patient verbalizes understanding. Greater than 30 minutes was spent educating patient. The Heart Failure Booklet given to the patient with additional patient education addressing:  What is Heart Failure?   Things You Can Do to Live Well with HF  How to Take Your Medications  How to Eat Less Salt  [place] its Salt?  Exercising Well with Heart Failure  Signs and symptoms of HF to report  Weight Yourself Each Day  Home Self Management- activity, weight tracking, taking medications as prescribed, meals /diet planning (sodium and fluid restriction), how to read food labels, keeping physician follow ups, smoking cessation, follow the Heart Failure Zones  The Heart Failure zones  Every Dose Every Day      Instructed  to call 911 if you have any of the following symptoms:       Struggling to breathe unrelieved with rest,     Having chest pain     Have confusion or cant think clearly          Ivana Escobar, RN MSN, RN  Heart Failure Navigator        CONGESTIVE HEART FAILURE (CHF) AHA GUIDELINES  (Must be completed for Primary Diagnosis CHF or History of CHF)    Discharge Plan:  I placed the Heart Failure Home Instructions in patient's discharge instructions. Per Heart Failure GWT[facility], the patient should have a follow-up appointment made within 7 days of discharge.     New Diagnosis No    ECHO Results most recent: 3/2/23 60%  Lab Results   Component Value Date    LVEF 74 03/17/2022                                        Social History     Tobacco Use   Smoking Status Every Day    Packs/day: 1.00    Years: 14.00    Pack years: 14.00    Types: Cigarettes    Start date: 10/23/2004   Smokeless Tobacco Never        Immunization History   Administered Date(s) Administered    COVID-19, MODERNA BLUE border, Primary or Immunocompromised, (age 12y+), IM, 100 mcg/0.5mL 03/02/2021, 03/30/2021, 12/19/2021, 06/01/2022    COVID-19, PFIZER Bivalent BOOSTER, DO NOT Dilute, (age 12y+), IM, 30 mcg/0.3 mL 10/23/2022    Influenza Virus Vaccine 10/31/2014    Influenza, FLUAD, (age 72 y+), Adjuvanted, 0.5mL 12/19/2021    Influenza, FLUZONE (age 72 y+), High Dose, 0.7mL 11/04/2020    Influenza, High Dose (Fluzone 65 yrs and older) 11/04/2020    Influenza, Quadv, 6 mo and older, IM, PF (Flulaval, Fluarix) 11/19/2018    Influenza, Triv, inactivated, subunit, adjuvanted, IM (Fluad 65 yrs and older) 12/16/2019    Pneumococcal Conjugate 13-valent ([id number]) 11/27/2018    Pneumococcal Polysaccharide ([id number]) 11/02/2014, 12/16/2019    Zoster Recombinant (Shingrix) 10/23/2022        HFrEF   Angiotensin-Converting-Enzyme (ACE) inhibitor ordered:  [] Yes  [x] No (specify contraindication):    [] Contraindicated  [] Hypotensive patient who was at immediate risk of cardiogenic shock  [] Hospitalized patient who experienced marked azotemia  [] Other Contraindications  [x] Not Eligible  [] Not Tolerant  [] Patient Reason  [] System Reason  [] Other Reason    Angiotensin II receptor blockers (ARB) ordered:  [] Yes  [x] No (specify contraindication):    [] Contraindicated  [] Hypotensive patient who was at immediate risk of cardiogenic shock  [] Hospitalized patient who experienced marked azotemia  [] Other Contraindications    ARNI - Angiotensin Receptor Neprilysin Inhibitor ordered:  [] Yes  [x] No (specify contraindication):    [] ACE inhibitor use within the prior 36 hours  [] Allergy  [] Hyperkalemia  [] Hypotension  [] Renal dysfunction defined as creatinine > 2.5 mg/dL in men or > 2.0 mg/dL in women  [] Other Contraindications  [x] Not Eligible  [] Not Tolerant  [] Patient Reason  []System Reason  []Other Reason      Beta Blocker ordered:    [] Yes  [x] No (specify contraindication):    [] Contraindicated  [x] Asthma  [] Fluid Overload  [] Low Blood Pressure  [] Patient recently treated with an intravenous positive inotropic agent  [] Other Contraindications  [] Not Eligible  [] Not Tolerant  [] Patient Reason  [] System Reason    SGLT2 Inhibitor ordered:  [] Yes  [x] No (specify contraindication):    [] Contraindicated  [] Patient currently on dialysis  [] Ketoacidosis  [] Known hypersensitivity to the medication  [] Type I diabetes (not approved for use in patients with Type I diabetes due to increased risk of ketoacidosis)  [] Other Contraindications  [] Not Eligible  [] Not Tolerant  [] Patient Reason  [] System Reason  [x] Other Reason    Aldosterone Antagonist ordered:  [] Yes  [x] No (specify contraindication):    [] Contraindicated  [] Allergy due to aldosterone receptor antagonist  [] Hyperkalemia  [] Renal dysfunction defined as creatinine >2.5 mg/dL in men or >2.0 mg/dL in women.   [] Other contraindications  [x] Not Eligible  [] Not Tolerant  [] Patient Reason  [] System Reason  [] Other Reason

## 2023-03-04 ENCOUNTER — APPOINTMENT (OUTPATIENT)
Dept: GENERAL RADIOLOGY | Age: 68
DRG: 291 | End: 2023-03-04
Payer: MEDICARE

## 2023-03-04 LAB
ALBUMIN SERPL-MCNC: 3.7 G/DL (ref 3.5–5.2)
ALP BLD-CCNC: 92 U/L (ref 35–104)
ALT SERPL-CCNC: 23 U/L (ref 0–32)
ANION GAP SERPL CALCULATED.3IONS-SCNC: 15 MMOL/L (ref 7–16)
AST SERPL-CCNC: 11 U/L (ref 0–31)
BASOPHILS ABSOLUTE: 0.01 E9/L (ref 0–0.2)
BASOPHILS RELATIVE PERCENT: 0.1 % (ref 0–2)
BILIRUB SERPL-MCNC: <0.2 MG/DL (ref 0–1.2)
BUN BLDV-MCNC: 25 MG/DL (ref 6–23)
CALCIUM SERPL-MCNC: 9.1 MG/DL (ref 8.6–10.2)
CHLORIDE BLD-SCNC: 97 MMOL/L (ref 98–107)
CO2: 27 MMOL/L (ref 22–29)
CREAT SERPL-MCNC: 0.6 MG/DL (ref 0.5–1)
EOSINOPHILS ABSOLUTE: 0 E9/L (ref 0.05–0.5)
EOSINOPHILS RELATIVE PERCENT: 0 % (ref 0–6)
GFR SERPL CREATININE-BSD FRML MDRD: >60 ML/MIN/1.73
GLUCOSE BLD-MCNC: 217 MG/DL (ref 74–99)
HCT VFR BLD CALC: 41.4 % (ref 34–48)
HEMOGLOBIN: 13.3 G/DL (ref 11.5–15.5)
IMMATURE GRANULOCYTES #: 0.16 E9/L
IMMATURE GRANULOCYTES %: 1 % (ref 0–5)
LYMPHOCYTES ABSOLUTE: 0.92 E9/L (ref 1.5–4)
LYMPHOCYTES RELATIVE PERCENT: 5.8 % (ref 20–42)
MCH RBC QN AUTO: 29.8 PG (ref 26–35)
MCHC RBC AUTO-ENTMCNC: 32.1 % (ref 32–34.5)
MCV RBC AUTO: 92.8 FL (ref 80–99.9)
METER GLUCOSE: 208 MG/DL (ref 74–99)
METER GLUCOSE: 218 MG/DL (ref 74–99)
METER GLUCOSE: 231 MG/DL (ref 74–99)
METER GLUCOSE: 259 MG/DL (ref 74–99)
MONOCYTES ABSOLUTE: 0.62 E9/L (ref 0.1–0.95)
MONOCYTES RELATIVE PERCENT: 3.9 % (ref 2–12)
NEUTROPHILS ABSOLUTE: 14.21 E9/L (ref 1.8–7.3)
NEUTROPHILS RELATIVE PERCENT: 89.2 % (ref 43–80)
PDW BLD-RTO: 14.2 FL (ref 11.5–15)
PLATELET # BLD: 480 E9/L (ref 130–450)
PMV BLD AUTO: 10.8 FL (ref 7–12)
POTASSIUM SERPL-SCNC: 3.8 MMOL/L (ref 3.5–5)
RBC # BLD: 4.46 E12/L (ref 3.5–5.5)
SODIUM BLD-SCNC: 139 MMOL/L (ref 132–146)
TOTAL PROTEIN: 6.8 G/DL (ref 6.4–8.3)
WBC # BLD: 15.9 E9/L (ref 4.5–11.5)

## 2023-03-04 PROCEDURE — 6360000002 HC RX W HCPCS: Performed by: INTERNAL MEDICINE

## 2023-03-04 PROCEDURE — 94669 MECHANICAL CHEST WALL OSCILL: CPT

## 2023-03-04 PROCEDURE — 6370000000 HC RX 637 (ALT 250 FOR IP): Performed by: INTERNAL MEDICINE

## 2023-03-04 PROCEDURE — 94640 AIRWAY INHALATION TREATMENT: CPT

## 2023-03-04 PROCEDURE — 2580000003 HC RX 258: Performed by: NURSE PRACTITIONER

## 2023-03-04 PROCEDURE — 71045 X-RAY EXAM CHEST 1 VIEW: CPT

## 2023-03-04 PROCEDURE — 36415 COLL VENOUS BLD VENIPUNCTURE: CPT

## 2023-03-04 PROCEDURE — 80053 COMPREHEN METABOLIC PANEL: CPT

## 2023-03-04 PROCEDURE — 6360000002 HC RX W HCPCS: Performed by: NURSE PRACTITIONER

## 2023-03-04 PROCEDURE — A4216 STERILE WATER/SALINE, 10 ML: HCPCS | Performed by: NURSE PRACTITIONER

## 2023-03-04 PROCEDURE — C9113 INJ PANTOPRAZOLE SODIUM, VIA: HCPCS | Performed by: NURSE PRACTITIONER

## 2023-03-04 PROCEDURE — 1200000000 HC SEMI PRIVATE

## 2023-03-04 PROCEDURE — 82962 GLUCOSE BLOOD TEST: CPT

## 2023-03-04 PROCEDURE — 2580000003 HC RX 258: Performed by: INTERNAL MEDICINE

## 2023-03-04 PROCEDURE — 85025 COMPLETE CBC W/AUTO DIFF WBC: CPT

## 2023-03-04 PROCEDURE — 6370000000 HC RX 637 (ALT 250 FOR IP): Performed by: NURSE PRACTITIONER

## 2023-03-04 PROCEDURE — 99223 1ST HOSP IP/OBS HIGH 75: CPT | Performed by: SURGERY

## 2023-03-04 RX ORDER — AZITHROMYCIN 250 MG/1
500 TABLET, FILM COATED ORAL DAILY
Status: DISCONTINUED | OUTPATIENT
Start: 2023-03-05 | End: 2023-03-07 | Stop reason: HOSPADM

## 2023-03-04 RX ORDER — FUROSEMIDE 10 MG/ML
20 INJECTION INTRAMUSCULAR; INTRAVENOUS 2 TIMES DAILY
Status: DISCONTINUED | OUTPATIENT
Start: 2023-03-04 | End: 2023-03-05

## 2023-03-04 RX ORDER — INSULIN GLARGINE 100 [IU]/ML
8 INJECTION, SOLUTION SUBCUTANEOUS NIGHTLY
Status: DISCONTINUED | OUTPATIENT
Start: 2023-03-04 | End: 2023-03-05

## 2023-03-04 RX ADMIN — ALBUTEROL SULFATE 2.5 MG: 2.5 SOLUTION RESPIRATORY (INHALATION) at 13:31

## 2023-03-04 RX ADMIN — Medication 10 ML: at 09:08

## 2023-03-04 RX ADMIN — ROPINIROLE HYDROCHLORIDE 2 MG: 1 TABLET, FILM COATED ORAL at 20:02

## 2023-03-04 RX ADMIN — METOCLOPRAMIDE 10 MG: 5 INJECTION, SOLUTION INTRAMUSCULAR; INTRAVENOUS at 08:34

## 2023-03-04 RX ADMIN — IPRATROPIUM BROMIDE 0.5 MG: 0.5 SOLUTION RESPIRATORY (INHALATION) at 22:27

## 2023-03-04 RX ADMIN — IPRATROPIUM BROMIDE 0.5 MG: 0.5 SOLUTION RESPIRATORY (INHALATION) at 19:42

## 2023-03-04 RX ADMIN — METHYLPREDNISOLONE SODIUM SUCCINATE 40 MG: 40 INJECTION, POWDER, FOR SOLUTION INTRAMUSCULAR; INTRAVENOUS at 08:40

## 2023-03-04 RX ADMIN — FUROSEMIDE 20 MG: 10 INJECTION, SOLUTION INTRAMUSCULAR; INTRAVENOUS at 18:03

## 2023-03-04 RX ADMIN — METHYLPREDNISOLONE SODIUM SUCCINATE 40 MG: 40 INJECTION, POWDER, FOR SOLUTION INTRAMUSCULAR; INTRAVENOUS at 03:01

## 2023-03-04 RX ADMIN — GUAIFENESIN 600 MG: 600 TABLET, EXTENDED RELEASE ORAL at 08:35

## 2023-03-04 RX ADMIN — METOCLOPRAMIDE 10 MG: 5 INJECTION, SOLUTION INTRAMUSCULAR; INTRAVENOUS at 20:02

## 2023-03-04 RX ADMIN — ACETAMINOPHEN 650 MG: 325 TABLET ORAL at 13:23

## 2023-03-04 RX ADMIN — ACETAMINOPHEN 650 MG: 325 TABLET ORAL at 20:02

## 2023-03-04 RX ADMIN — ROFLUMILAST 500 MCG: 500 TABLET ORAL at 08:35

## 2023-03-04 RX ADMIN — ALBUTEROL SULFATE 2.5 MG: 2.5 SOLUTION RESPIRATORY (INHALATION) at 09:35

## 2023-03-04 RX ADMIN — IPRATROPIUM BROMIDE 0.5 MG: 0.5 SOLUTION RESPIRATORY (INHALATION) at 09:35

## 2023-03-04 RX ADMIN — VENLAFAXINE HYDROCHLORIDE 75 MG: 37.5 CAPSULE, EXTENDED RELEASE ORAL at 08:35

## 2023-03-04 RX ADMIN — GUAIFENESIN 600 MG: 600 TABLET, EXTENDED RELEASE ORAL at 20:02

## 2023-03-04 RX ADMIN — ALBUTEROL SULFATE 2.5 MG: 2.5 SOLUTION RESPIRATORY (INHALATION) at 06:03

## 2023-03-04 RX ADMIN — FUROSEMIDE 40 MG: 10 INJECTION, SOLUTION INTRAMUSCULAR; INTRAVENOUS at 08:34

## 2023-03-04 RX ADMIN — ALBUTEROL SULFATE 2.5 MG: 2.5 SOLUTION RESPIRATORY (INHALATION) at 22:27

## 2023-03-04 RX ADMIN — ACETAMINOPHEN 650 MG: 325 TABLET ORAL at 05:52

## 2023-03-04 RX ADMIN — SODIUM CHLORIDE 40 MG: 9 INJECTION INTRAMUSCULAR; INTRAVENOUS; SUBCUTANEOUS at 18:03

## 2023-03-04 RX ADMIN — IPRATROPIUM BROMIDE 0.5 MG: 0.5 SOLUTION RESPIRATORY (INHALATION) at 13:31

## 2023-03-04 RX ADMIN — ALBUTEROL SULFATE 2.5 MG: 2.5 SOLUTION RESPIRATORY (INHALATION) at 19:42

## 2023-03-04 RX ADMIN — ASPIRIN 81 MG: 81 TABLET, CHEWABLE ORAL at 08:35

## 2023-03-04 RX ADMIN — INSULIN LISPRO 4 UNITS: 100 INJECTION, SOLUTION INTRAVENOUS; SUBCUTANEOUS at 13:25

## 2023-03-04 RX ADMIN — AZITHROMYCIN MONOHYDRATE 500 MG: 500 INJECTION, POWDER, LYOPHILIZED, FOR SOLUTION INTRAVENOUS at 08:43

## 2023-03-04 RX ADMIN — ENOXAPARIN SODIUM 40 MG: 100 INJECTION SUBCUTANEOUS at 08:34

## 2023-03-04 RX ADMIN — MONTELUKAST 10 MG: 10 TABLET, FILM COATED ORAL at 20:02

## 2023-03-04 RX ADMIN — BUDESONIDE 500 MCG: 0.5 SUSPENSION RESPIRATORY (INHALATION) at 06:03

## 2023-03-04 RX ADMIN — BUDESONIDE 500 MCG: 0.5 SUSPENSION RESPIRATORY (INHALATION) at 19:42

## 2023-03-04 RX ADMIN — IPRATROPIUM BROMIDE 0.5 MG: 0.5 SOLUTION RESPIRATORY (INHALATION) at 06:03

## 2023-03-04 RX ADMIN — SODIUM CHLORIDE 40 MG: 9 INJECTION INTRAMUSCULAR; INTRAVENOUS; SUBCUTANEOUS at 05:54

## 2023-03-04 RX ADMIN — INSULIN LISPRO 2 UNITS: 100 INJECTION, SOLUTION INTRAVENOUS; SUBCUTANEOUS at 17:04

## 2023-03-04 RX ADMIN — HYDROCODONE BITARTRATE AND ACETAMINOPHEN 1 TABLET: 5; 325 TABLET ORAL at 21:53

## 2023-03-04 RX ADMIN — INSULIN LISPRO 2 UNITS: 100 INJECTION, SOLUTION INTRAVENOUS; SUBCUTANEOUS at 08:46

## 2023-03-04 RX ADMIN — Medication 10 ML: at 20:02

## 2023-03-04 RX ADMIN — PREDNISONE 30 MG: 20 TABLET ORAL at 13:24

## 2023-03-04 ASSESSMENT — PAIN SCALES - GENERAL
PAINLEVEL_OUTOF10: 2
PAINLEVEL_OUTOF10: 4
PAINLEVEL_OUTOF10: 5
PAINLEVEL_OUTOF10: 7
PAINLEVEL_OUTOF10: 9
PAINLEVEL_OUTOF10: 5
PAINLEVEL_OUTOF10: 0

## 2023-03-04 ASSESSMENT — PAIN DESCRIPTION - DESCRIPTORS
DESCRIPTORS: ACHING
DESCRIPTORS: DISCOMFORT
DESCRIPTORS: ACHING

## 2023-03-04 ASSESSMENT — PAIN - FUNCTIONAL ASSESSMENT
PAIN_FUNCTIONAL_ASSESSMENT: PREVENTS OR INTERFERES WITH ALL ACTIVE AND SOME PASSIVE ACTIVITIES
PAIN_FUNCTIONAL_ASSESSMENT: PREVENTS OR INTERFERES WITH MANY ACTIVE NOT PASSIVE ACTIVITIES

## 2023-03-04 ASSESSMENT — PAIN DESCRIPTION - LOCATION
LOCATION: HEAD
LOCATION: HEAD
LOCATION: GENERALIZED
LOCATION: HEAD
LOCATION: GENERALIZED

## 2023-03-04 ASSESSMENT — PULMONARY FUNCTION TESTS
PEFR_L/MIN: 210
PEFR_L/MIN: 230

## 2023-03-04 NOTE — CONSULTS
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 female who weighs 235 lb post open gastric bypass in 2005 at 285 lb, she got down to 170 lb in 2008 and weight has fluctuated over the years.  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 She takes Nexium daily to try to control the ulcer pains and reflux. She was still smoking and has not been healthy enough to have surgery to resect the ulcer for the past few years. She was admitted with CHF, fluid overload and breathing difficulty.    Weight: 236 lb 7 oz (107.2 kg).     Past Medical History: She has a past medical history of Acute CVA (cerebrovascular accident) ([facility]), Anastomotic ulcer, Anemia, Anxiety, Asthma, Biceps tendon tear, Closed fracture of multiple ribs of right side, Closed fracture of nasal bone, Collapsed lung, COPD (chronic obstructive pulmonary disease) ([facility]), DDD (degenerative disc disease), lumbar, Degenerative disc disease at L5-S1 level, Depression, Distal radius fracture, Fracture of left olecranon process, GERD (gastroesophageal reflux disease), Heart attack ([facility]), History of blood transfusion, Hypertension, Impingement syndrome of shoulder, Intractable abdominal pain, Left carpal tunnel syndrome, Nasal bones, closed fracture, closed, initial encounter, Osteoarthritis, Peptic ulcer, unspecified site, unspecified as acute or chronic, without mention of hemorrhage or perforation, RLS (restless legs syndrome), Rotator cuff tear, Seizure ([facility]), Sepsis ([facility]), and Spinal stenosis.     Past Surgical History: She has a past surgical history that includes Tonsillectomy (1959); Carpal tunnel release (1998); shoulder surgery (1999); Knee arthroscopy (2002); Dilation and curettage of uterus;  Macario-en-Y Gastric Bypass (2005);  section (3/9/1984); Cholecystectomy (); Colonoscopy (3/2011); Upper gastrointestinal endoscopy (10/2004); Upper gastrointestinal endoscopy (12); Appendectomy; Endoscopy, colon, diagnostic; Abdomen surgery; Shoulder arthroscopy (Right, 1 2 15); Wrist arthroscopy (Right, 2015); Cholecystectomy; Dilatation, esophagus; Wrist surgery (Right, 12/2/15); other surgical history (16); other surgical history (Left, 16); Abscess Drainage (Left, 2016); Carpal tunnel release (Left, 2017); Upper gastrointestinal endoscopy (N/A, 2018); pr laps abd prtm&omentum dx w/wo spec br/wa spx (N/A, 2018); joint replacement (2003); joint replacement (2003); joint replacement; and Upper gastrointestinal endoscopy (N/A, 2019). Allergies: Ceclor [cefaclor], Diprivan [propofol], Naproxen, Adhesive tape, Blueberry flavor, Ibuprofen, Mirapex [pramipexole], Oxycodone-acetaminophen, Potato, Shellfish-derived products, Tramadol, Vaccinium angustifolium, Blueberry fruit extract, Ceclor [cefaclor], Iodides, Iodine, Iv dye [iodides], Naprosyn [naproxen], Percocet [oxycodone-acetaminophen], Phenergan [promethazine hcl], Promethazine, Sulfa antibiotics, Tape [adhesive tape], and Tetanus toxoids    Home Medicines:   Prior to Visit Medications    Medication Sig Taking?  Authorizing Provider   albuterol sulfate HFA (PROVENTIL HFA) 108 (90 Base) MCG/ACT inhaler Inhale 2 puffs into the lungs every 6 hours as needed for Wheezing  KRYSTA Martinez CNP   ipratropium-albuterol (DUONEB) 0.5-2.5 (3) MG/3ML SOLN nebulizer solution Inhale 3 mLs into the lungs every 6 hours as needed for Shortness of Breath  Mario Yuly, APRN - CNP   venlafaxine (EFFEXOR XR) 75 MG extended release capsule Take 1 capsule by mouth daily  Diego Pritchard DO   vitamin D (ERGOCALCIFEROL) 1.25 MG (67661 UT) CAPS capsule Take 1 capsule by mouth once a week  [employer], DO   atorvastatin (LIPITOR) 40 MG tablet Take 1 tablet by mouth nightly  Diego Pritchard, DO   rOPINIRole (REQUIP) 1 MG tablet Take 2 tablets by mouth nightly  Diego Pritchard,    aspirin 81 MG chewable tablet Take 1 tablet by mouth daily  Bindu Mera, DO   esomeprazole (NEXIUM) 40 MG delayed release capsule Take 1 capsule by mouth every morning (before breakfast)  Diego Pritchard,        Social History:   TOBACCO:   reports that she has been smoking cigarettes. She started smoking about 18 years ago. She has a 14.00 pack-year smoking history. She has never used smokeless tobacco.  ETOH:    reports no history of alcohol use.       Problem Relation Age of Onset    Cancer Mother     Heart Disease Sister     Emphysema Father         REVIEW OF SYSTEMS:  Constitutional: negative  Respiratory: COPD,   Cardiovascular: CHF  Gastrointestinal: anastomotic ulcers, GE reflux, gastro-gastric fistula  Musculoskeletal:negative  Behavioral/Psych: negative  All others reviewed, negative    Recent Labs     03/02/23  0532 03/03/23  0504 03/04/23  0524   WBC 13.3* 15.8* 15.9*   HGB 12.0 12.8 13.3    408 480*    135 139   CL 98 96* 97*   K 3.4* 3.6 3.8   BUN 14 19 25*   CREATININE 0.6 0.6 0.6   GLUCOSE 230* 221* 217*   LABALBU 3.6 3.5 3.7   PROT 6.7 6.7 6.8   CALCIUM 8.7 9.1 9.1   MG 2.1  --   --    PHOS 3.8  --   --    BILITOT 0.4 0.3 <0.2   ALKPHOS 106* 107* 92   AST 18 11 11   ALT 25 21 23       Physical exam:   VITALS:  Blood pressure (!) 148/76, pulse 100, temperature 98.1 °F (36.7 °C), temperature source Oral, resp. rate 20, height 5' (1.524 m), weight 235 lb 2 oz (106.7 kg), SpO2 92 %, not currently breastfeeding.  General appearance: alert, appears stated age and cooperative  Head: Normocephalic, without obvious abnormality, atraumatic  Eyes: PERRL, EOMI  Neck: no adenopathy, no carotid bruit, no JVD, supple, symmetrical, trachea midline and thyroid not enlarged, symmetric, no tenderness/mass/nodules  Lungs:  clear to auscultation bilaterally  Heart: regular rate and rhythm,   Abdomen: soft, upper abdomen tender; bowel sounds normal; no hernias palpable  Extremities: extremities normal, no cyanosis or edema    ASSESSMENT:   Known gastro-gastric fistula from an anastomotic ulcer due to smoking many years post gastric bypass    PLAN:  Continue Nexium  Must stop smoking.  Surgical revision of the anastomosis will be very risky and will need to be discussed as an outpatient.    Signed: Dr. Job Campos Jr, M.D.    Send copy of H&P to PCP, Diego Pritchard DO

## 2023-03-04 NOTE — PROGRESS NOTES
Pulmonary/Critical Care Progress Note    We are following patient for COPD exacerbation with asthmatic component, small bilateral pleural effusions (new since 12/2023), question diastolic dysfunction    SUBJECTIVE:  Patient is feeling somewhat better today but notes that she is still wheezing. She does stop smoking about a week ago. Patient CO SOB for the last few days, stable, worse with exertion, better with rest, associated occasional cough, wheezing, no (fever,chills,cp)    Daily progress note:  3/4TH:off any o2 supplementation. CXR with pleural effusions/CHF, EF 60%.  Wean to po steroids, DC roflumilast, decrease but continue diuresis, added lantus for BS control, follow up chest imaging for resolution, dispo plans per primary team, encouraged on continued cessation of tob use      MEDICATIONS:   pantoprazole (PROTONIX) 40 mg injection  40 mg IntraVENous Q12H    predniSONE  30 mg Oral Daily    insulin glargine  8 Units SubCUTAneous Nightly    azithromycin  500 mg IntraVENous Q24H    metoclopramide  10 mg IntraVENous BID    guaiFENesin  600 mg Oral BID    enoxaparin  40 mg SubCUTAneous Daily    insulin lispro  0-8 Units SubCUTAneous TID WC    insulin lispro  0-4 Units SubCUTAneous Nightly    budesonide  0.5 mg Nebulization BID    montelukast  10 mg Oral Nightly    sodium chloride flush  5-40 mL IntraVENous 2 times per day    rOPINIRole  2 mg Oral Nightly    albuterol  2.5 mg Nebulization Q4H    And    ipratropium  0.5 mg Nebulization Q4H    venlafaxine  75 mg Oral Daily    aspirin  81 mg Oral Daily    furosemide  40 mg IntraVENous BID      dextrose      sodium chloride       diphenhydrAMINE, perflutren lipid microspheres, glucose, dextrose bolus **OR** dextrose bolus, glucagon (rDNA), dextrose, sodium chloride flush, sodium chloride, polyethylene glycol, acetaminophen **OR** acetaminophen, HYDROcodone 5 mg - acetaminophen, prochlorperazine      REVIEW OF SYSTEMS:  All other 10 systems reviewed and OW negative      OBJECTIVE:  Vitals:    03/04/23 0830   BP:    Pulse: 100   Resp:    Temp:    SpO2:            O2 Device: None (Room air)    PHYSICAL EXAM:  Constitutional: No fever, chills, diaphoresis  Skin: Skin rash, no skin breakdown  HEENT: Mucous membranes are moist  Neck: +JVD, no lymphadenopathy, thyromegaly  Cardiovascular: S1, S2 regular. No S3 or rubs present  Respiratory: Expiratory wheezes over both posterior lung fields with, bibasilar crackles  Gastrointestinal: Left, obese, nontender  Genitourinary: No CVA tenderness  Extremities: No clubbing, cyanosis, or edema  Neurological: Awake, alert, oriented x3. No evidence of focal motor or sensory deficits  Psychological: In good spirits.   Appropriate affect    LABS:  WBC   Date Value Ref Range Status   03/04/2023 15.9 (H) 4.5 - 11.5 E9/L Final   03/03/2023 15.8 (H) 4.5 - 11.5 E9/L Final   03/02/2023 13.3 (H) 4.5 - 11.5 E9/L Final     Hemoglobin   Date Value Ref Range Status   03/04/2023 13.3 11.5 - 15.5 g/dL Final   03/03/2023 12.8 11.5 - 15.5 g/dL Final   03/02/2023 12.0 11.5 - 15.5 g/dL Final     Hematocrit   Date Value Ref Range Status   03/04/2023 41.4 34.0 - 48.0 % Final   03/03/2023 41.6 34.0 - 48.0 % Final   03/02/2023 37.2 34.0 - 48.0 % Final     MCV   Date Value Ref Range Status   03/04/2023 92.8 80.0 - 99.9 fL Final   03/03/2023 94.3 80.0 - 99.9 fL Final   03/02/2023 93.5 80.0 - 99.9 fL Final     Platelets   Date Value Ref Range Status   03/04/2023 480 (H) 130 - 450 E9/L Final   03/03/2023 408 130 - 450 E9/L Final   03/02/2023 359 130 - 450 E9/L Final     Sodium   Date Value Ref Range Status   03/04/2023 139 132 - 146 mmol/L Final   03/03/2023 135 132 - 146 mmol/L Final   03/02/2023 138 132 - 146 mmol/L Final     Potassium   Date Value Ref Range Status   03/04/2023 3.8 3.5 - 5.0 mmol/L Final   03/03/2023 3.6 3.5 - 5.0 mmol/L Final   03/02/2023 3.4 (L) 3.5 - 5.0 mmol/L Final     Potassium reflex Magnesium   Date Value Ref Range Status   02/12/2023 4.6 3.5 - 5.0 mmol/L Final   10/19/2022 4.8 3.5 - 5.0 mmol/L Final   03/16/2022 3.8 3.5 - 5.0 mmol/L Final     Chloride   Date Value Ref Range Status   03/04/2023 97 (L) 98 - 107 mmol/L Final   03/03/2023 96 (L) 98 - 107 mmol/L Final   03/02/2023 98 98 - 107 mmol/L Final     CO2   Date Value Ref Range Status   03/04/2023 27 22 - 29 mmol/L Final   03/03/2023 28 22 - 29 mmol/L Final   03/02/2023 28 22 - 29 mmol/L Final     BUN   Date Value Ref Range Status   03/04/2023 25 (H) 6 - 23 mg/dL Final   03/03/2023 19 6 - 23 mg/dL Final   03/02/2023 14 6 - 23 mg/dL Final     Creatinine   Date Value Ref Range Status   03/04/2023 0.6 0.5 - 1.0 mg/dL Final   03/03/2023 0.6 0.5 - 1.0 mg/dL Final   03/02/2023 0.6 0.5 - 1.0 mg/dL Final     Glucose   Date Value Ref Range Status   03/04/2023 217 (H) 74 - 99 mg/dL Final   03/03/2023 221 (H) 74 - 99 mg/dL Final   03/02/2023 230 (H) 74 - 99 mg/dL Final   03/06/2012 92 70 - 110 mg/dL Final   11/21/2011 86 70 - 110 mg/dL Final   06/10/2011 66 (L) 70 - 110 mg/dL Final     Calcium   Date Value Ref Range Status   03/04/2023 9.1 8.6 - 10.2 mg/dL Final   03/03/2023 9.1 8.6 - 10.2 mg/dL Final   03/02/2023 8.7 8.6 - 10.2 mg/dL Final     Total Protein   Date Value Ref Range Status   03/04/2023 6.8 6.4 - 8.3 g/dL Final   03/03/2023 6.7 6.4 - 8.3 g/dL Final   03/02/2023 6.7 6.4 - 8.3 g/dL Final     Albumin   Date Value Ref Range Status   03/04/2023 3.7 3.5 - 5.2 g/dL Final   03/03/2023 3.5 3.5 - 5.2 g/dL Final   03/02/2023 3.6 3.5 - 5.2 g/dL Final   03/06/2012 4.6 3.2 - 4.8 g/dL Final   11/21/2011 4.5 3.2 - 4.8 g/dL Final   06/10/2011 4.6 3.2 - 4.8 g/dL Final     Total Bilirubin   Date Value Ref Range Status   03/04/2023 <0.2 0.0 - 1.2 mg/dL Final   03/03/2023 0.3 0.0 - 1.2 mg/dL Final   03/02/2023 0.4 0.0 - 1.2 mg/dL Final     Alkaline Phosphatase   Date Value Ref Range Status   03/04/2023 92 35 - 104 U/L Final   03/03/2023 107 (H) 35 - 104 U/L Final   03/02/2023 106 (H) 35 - 104 U/L Final     AST   Date Value Ref Range Status   03/04/2023 11 0 - 31 U/L Final   03/03/2023 11 0 - 31 U/L Final   03/02/2023 18 0 - 31 U/L Final     ALT   Date Value Ref Range Status   03/04/2023 23 0 - 32 U/L Final   03/03/2023 21 0 - 32 U/L Final   03/02/2023 25 0 - 32 U/L Final     Est, Glom Filt Rate   Date Value Ref Range Status   03/04/2023 >60 >=60 mL/min/1.73 Final     Comment:     Pediatric calculator link  https://www.kidney.org/professionals/kdoqi/gfr_calculatorped  Effective Oct 3, 2022  These results are not intended for use in patients  <18 years of age.  eGFR results are calculated without  a race factor using the 2021 CKD-EPI equation.  Careful  clinical correlation is recommended, particularly when  comparing to results calculated using previous equations.  The CKD-EPI equation is less accurate in patients with  extremes of muscle mass, extra-renal metabolism of  creatinine, excessive creatinine ingestion, or following  therapy that affects renal tubular secretion.     03/03/2023 >60 >=60 mL/min/1.73 Final     Comment:     Pediatric calculator link  https://www.kidney.org/professionals/kdoqi/gfr_calculatorped  Effective Oct 3, 2022  These results are not intended for use in patients  <18 years of age.  eGFR results are calculated without  a race factor using the 2021 CKD-EPI equation.  Careful  clinical correlation is recommended, particularly when  comparing to results calculated using previous equations.  The CKD-EPI equation is less accurate in patients with  extremes of muscle mass, extra-renal metabolism of  creatinine, excessive creatinine ingestion, or following  therapy that affects renal tubular secretion.     03/02/2023 >60 >=60 mL/min/1.73 Final     Comment:     Pediatric calculator link  https://www.kidney.org/professionals/kdoqi/gfr_calculatorped  Effective Oct 3, 2022  These results are not intended for use in patients  <18 years of age.  eGFR results are calculated without  a race factor using the  2021 CKD-EPI equation. Careful  clinical correlation is recommended, particularly when  comparing to results calculated using previous equations. The CKD-EPI equation is less accurate in patients with  extremes of muscle mass, extra-renal metabolism of  creatinine, excessive creatinine ingestion, or following  therapy that affects renal tubular secretion. GFR    Date Value Ref Range Status   03/17/2022 >60  Final   03/16/2022 >60  Final   02/11/2022 >60  Final     Magnesium   Date Value Ref Range Status   03/02/2023 2.1 1.6 - 2.6 mg/dL Final   03/01/2023 1.9 1.6 - 2.6 mg/dL Final   03/17/2022 2.1 1.6 - 2.6 mg/dL Final     Phosphorus   Date Value Ref Range Status   03/02/2023 3.8 2.5 - 4.5 mg/dL Final   03/17/2022 3.8 2.5 - 4.5 mg/dL Final   11/19/2018 2.6 2.5 - 4.5 mg/dL Final     Recent Labs     03/02/23  1441   PH 7.488*   PO2 75.4   PCO2 34.6*   HCO3 25.7   BE 2.6   O2SAT 94.9         RADIOLOGY:  FL ESOPHAGRAM   Final Result   There appears to be contrast both within the gastric pouch and the native   stomach. It is difficult to evaluate the stomach due to the overlapping of   the gastric bypass changes. It is recommended that the patient undergo a CT   scan of the abdomen approximately 20 minutes after initial ingestion of oral   contrast and medially following additional contrast administered just before   the patient has the CT scan. I cannot on wrap old the stomach on the   dedicated esophagram.         CTA PULMONARY W CONTRAST   Final Result   1. Moderate pleural and pericardial effusions   2. No acute pulmonary embolus identified         CT CHEST HIGH RESOLUTION   Final Result   Four-chamber cardiomegaly with valvular calcifications and small volume   pericardial effusion as well as questionable stranding of the pericardium   could represent acute inflammation. Concern for pericarditis without   calcifications.   Smooth interlobular septal thickening with lower lobe   predominance and mild decompensation with small bilateral pleural effusions. XR CHEST (2 VW)   Final Result   CHF. Superimposed bibasilar pneumonia cannot be excluded. XR CHEST PORTABLE    (Results Pending)           PROBLEM LIST:  Principal Problem:    Acute decompensated heart failure ([address]  Resolved Problems:    * No resolved hospital problems.  *      IMPRESSION:  COPD with exacerbation primarily with an asthmatic component  Acute HFPEF/ Diastolic dysfunction  Dm uncontrolled with hyperglycemia  Atelectasis   Small bilateral pleural effusions  Possible underlying obstructive sleep apnea    PLAN:  Continue  steroids   Continue IV azithromycin  Diurese,decreased to 20 bid, monitor RFP  Follow up chest imaging  Monitor labs, especially potassium and watch for contraction alkalosis while on Lasix  Add flutter device to help raise sputum  Dispo plans per primary team        Electronically signed by Nilo Wolfe MD on 3/4/2023 at 11:58 AM

## 2023-03-04 NOTE — PROGRESS NOTES
Internal Medicine Progress Note    [facility]    Esther Gerard. Evangelina Person., SARAOSanketI. Los Erickson D.O., KELI Dong D.O. SACHI Rosas, MSN, APRN, NP-C  Warren Console. Erin Kilpatrick, MSN, APRN-CNP     Primary Care Physician: Romie Dong DO   Admitting Physician:  Ese House DO  Admission date and time: 3/1/2023 12:06 PM    Room:  [address] [place] [address]  Admitting diagnosis: Mild intermittent asthma with exacerbation [J45.21]  Acute decompensated heart failure ([facility] [I50.9]  Dyspnea, unspecified type [R06.00]  Congestive heart failure, unspecified HF chronicity, unspecified heart failure type [facility]) [I50.9]    Patient Name: Anca Bacon  MRN: [id number]    Date of Service: 3/4/2023     Subjective:  Juanito Oppenheim is a 77 y.o. female who was seen and examined today,3/4/2023, at the bedside. Patient sitting on the edge of the bed seems to be doing better today. Discussed results of peak flow. Does not seem to be much improvement with bronchodilators. Results of upper GI does show abnormalities and surgery has been consulted. Overall the patient is clinically improving with plans for hopefully discharge tomorrow      Friend present at the bedside      Review of System:   Constitutional:   Denies fever or chills, weight loss or gain, fatigue or malaise. HEENT:   Denies ear pain, sore throat, sinus or eye problems. Cardiovascular:   Denies any chest pain, irregular heartbeats, or palpitations. Respiratory:   Relates to having exertional dyspnea with associated orthopnea associated cough  Gastrointestinal:   Denies nausea, vomiting, diarrhea, or constipation. Denies any abdominal pain. Admits to  having nocturnal reflux  Genitourinary:    Denies any urgency, frequency, hematuria. Voiding  without difficulty. Extremities:   Denies lower extremity swelling, edema or cyanosis.    Neurology:    Denies any headache or focal neurological deficits, Denies generalized weakness or memory difficulty. Psch:   Denies being anxious or depressed. Musculoskeletal:    Denies  myalgias, joint complaints or back pain. Integumentary:   Denies any rashes, ulcers, or excoriations. Denies bruising. Hematologic/Lymphatic:  Denies bruising or bleeding. Physical Exam:  I/O this shift: In: 780 [P.O.:780]  Out: 900 [Urine:900]    Intake/Output Summary (Last 24 hours) at 3/4/2023 1319  Last data filed at 3/4/2023 1237  Gross per 24 hour   Intake 1260 ml   Output 2200 ml   Net -940 ml   I/O last 3 completed shifts: In: 720 [P.O.:720]  Out: 2050 [Urine:2050]  Patient Vitals for the past 96 hrs (Last 3 readings):   Weight   03/04/23 0037 235 lb 2 oz (106.7 kg)   03/03/23 0124 235 lb 6 oz (106.8 kg)   03/02/23 0439 234 lb 8 oz (106.4 kg)     Vital Signs:   Blood pressure (!) 148/76, pulse 100, temperature 98.1 °F (36.7 °C), temperature source Oral, resp. rate 20, height 5' (1.524 m), weight 235 lb 2 oz (106.7 kg), SpO2 92 %, not currently breastfeeding. General appearance:  Alert, responsive, oriented to person, place, and time. Well preserved, alert, no distress. Head:  Normocephalic. No masses, lesions or tenderness. Eyes:  PERRLA. EOMI. Sclera clear. Buccal mucosa moist.  ENT:  Ears normal. Mucosa normal.  Neck:    Supple. Trachea midline. No thyromegaly. No JVD. No bruits. Heart:    Rhythm regular. Rate controlled. No murmurs. Lungs:    Diminished posteriorly. Coarse rhonchi  Abdomen:   Soft. Non-tender. Non-distended. Bowel sounds positive. No organomegaly or masses. No pain on palpation. Obese  Extremities:    Peripheral pulses present. No peripheral edema. No ulcers. No cyanosis. No clubbing. Neurologic:    Alert x 3. No focal deficit. Cranial nerves grossly intact. No focal weakness. Psych:   Behavior is normal. Mood appears normal. Speech is not rapid and/or pressured.   Musculoskeletal:   Spine ROM normal. Muscular strength intact. Gait not assessed. Integumentary:  No rashes  Skin normal color and texture.   Genitalia/Breast:  Deferred    Medication:  Scheduled Meds:   pantoprazole (PROTONIX) 40 mg injection  40 mg IntraVENous Q12H    predniSONE  30 mg Oral Daily    insulin glargine  8 Units SubCUTAneous Nightly    furosemide  20 mg IntraVENous BID    azithromycin  500 mg IntraVENous Q24H    metoclopramide  10 mg IntraVENous BID    guaiFENesin  600 mg Oral BID    enoxaparin  40 mg SubCUTAneous Daily    insulin lispro  0-8 Units SubCUTAneous TID     insulin lispro  0-4 Units SubCUTAneous Nightly    budesonide  0.5 mg Nebulization BID    montelukast  10 mg Oral Nightly    sodium chloride flush  5-40 mL IntraVENous 2 times per day    rOPINIRole  2 mg Oral Nightly    albuterol  2.5 mg Nebulization Q4H    And    ipratropium  0.5 mg Nebulization Q4H    venlafaxine  75 mg Oral Daily    aspirin  81 mg Oral Daily     Continuous Infusions:   dextrose      sodium chloride         Objective Data:  Recent Labs     03/02/23  0532 03/03/23  0504 03/04/23  0524   WBC 13.3* 15.8* 15.9*   RBC 3.98 4.41 4.46   HGB 12.0 12.8 13.3   HCT 37.2 41.6 41.4   MCV 93.5 94.3 92.8   MCH 30.2 29.0 29.8   MCHC 32.3 30.8* 32.1   RDW 14.2 14.0 14.2    408 480*   MPV 11.0 11.0 10.8     Recent Labs     03/02/23  0532 03/03/23  0504 03/04/23  0524    135 139   K 3.4* 3.6 3.8   CL 98 96* 97*   CO2 28 28 27   BUN 14 19 25*   CREATININE 0.6 0.6 0.6   GLUCOSE 230* 221* 217*   CALCIUM 8.7 9.1 9.1   PROT 6.7 6.7 6.8   LABALBU 3.6 3.5 3.7   BILITOT 0.4 0.3 <0.2   ALKPHOS 106* 107* 92   AST 18 11 11   ALT 25 21 23     Lab Results   Component Value Date    TROPONINI <0.01 06/27/2019    TROPONINI <0.01 07/26/2018    TROPONINI <0.01 07/25/2018                 Assessment:    Acute and progressive respiratory distress undetermined etiology  Chronic obstructive pulm disease with associated acute exacerbation asthma with superimposed restrictive lung disease  Leukocytosis probably secondary to steroids  History of CVA  Valvular heart disease of mild mitral stenosis  History of seizure disorder  Spinal stenosis with restless leg syndrome  Gastroesophageal reflux disease  Hypertension  Anxiety and depression  Abnormal upper GI with reflux status post gastric bypass      Plan:     Results of the upper GI was noted. Suspect component of reflux is a contributing factor. Surgical consult has been requested no  We will switch over to p.o. steroids with plan for discharge tomorrow  Discussed the need for behavior modification including absolute cigarette cessation. Continue weight loss  Need outpatient sleep study  Glycemic control                More than 50% of my  time was spent at the bedside counseling/coordinating care with the patient and/or family with face to face contact. This time was spent reviewing notes and laboratory data as well as instructing and counseling the patient. Time I spent with the family or surrogate(s) is included only if the patient was incapable of providing the necessary information or participating in medical decisions. I also discussed the differential diagnosis and all of the proposed management plans with the patient and individuals accompanying the patient.     Davon Davila DO, F.A.C.O.I.  3/4/2023  1:19 PM

## 2023-03-04 NOTE — PLAN OF CARE
Problem: Discharge Planning  Goal: Discharge to home or other facility with appropriate resources  3/4/2023 0955 by Carlene Tariq RN  Outcome: Progressing     Problem: Pain  Goal: Verbalizes/displays adequate comfort level or baseline comfort level  3/4/2023 0955 by Carlene Tariq RN  Outcome: Progressing     Problem: Chronic Conditions and Co-morbidities  Goal: Patient's chronic conditions and co-morbidity symptoms are monitored and maintained or improved  3/4/2023 0955 by Carlene Tariq RN  Outcome: Progressing

## 2023-03-05 LAB
ALBUMIN SERPL-MCNC: 3.6 G/DL (ref 3.5–5.2)
ALP BLD-CCNC: 89 U/L (ref 35–104)
ALT SERPL-CCNC: 25 U/L (ref 0–32)
ANION GAP SERPL CALCULATED.3IONS-SCNC: 11 MMOL/L (ref 7–16)
AST SERPL-CCNC: 15 U/L (ref 0–31)
BASOPHILS ABSOLUTE: 0 E9/L (ref 0–0.2)
BASOPHILS RELATIVE PERCENT: 0.2 % (ref 0–2)
BILIRUB SERPL-MCNC: 0.2 MG/DL (ref 0–1.2)
BUN BLDV-MCNC: 24 MG/DL (ref 6–23)
BURR CELLS: ABNORMAL
CALCIUM SERPL-MCNC: 8.7 MG/DL (ref 8.6–10.2)
CHLORIDE BLD-SCNC: 96 MMOL/L (ref 98–107)
CO2: 29 MMOL/L (ref 22–29)
CREAT SERPL-MCNC: 0.6 MG/DL (ref 0.5–1)
EKG ATRIAL RATE: 147 BPM
EKG ATRIAL RATE: 278 BPM
EKG P-R INTERVAL: 126 MS
EKG Q-T INTERVAL: 298 MS
EKG Q-T INTERVAL: 336 MS
EKG QRS DURATION: 158 MS
EKG QRS DURATION: 96 MS
EKG QTC CALCULATION (BAZETT): 466 MS
EKG QTC CALCULATION (BAZETT): 477 MS
EKG R AXIS: 37 DEGREES
EKG R AXIS: 43 DEGREES
EKG T AXIS: -79 DEGREES
EKG T AXIS: 115 DEGREES
EKG VENTRICULAR RATE: 121 BPM
EKG VENTRICULAR RATE: 147 BPM
EOSINOPHILS ABSOLUTE: 0 E9/L (ref 0.05–0.5)
EOSINOPHILS RELATIVE PERCENT: 0 % (ref 0–6)
GFR SERPL CREATININE-BSD FRML MDRD: >60 ML/MIN/1.73
GLUCOSE BLD-MCNC: 142 MG/DL (ref 74–99)
HBA1C MFR BLD: 6.8 % (ref 4–5.6)
HCT VFR BLD CALC: 45.6 % (ref 34–48)
HEMOGLOBIN: 14.2 G/DL (ref 11.5–15.5)
LYMPHOCYTES ABSOLUTE: 5.22 E9/L (ref 1.5–4)
LYMPHOCYTES RELATIVE PERCENT: 29.6 % (ref 20–42)
MAGNESIUM: 2.6 MG/DL (ref 1.6–2.6)
MAGNESIUM: 2.7 MG/DL (ref 1.6–2.6)
MCH RBC QN AUTO: 28.9 PG (ref 26–35)
MCHC RBC AUTO-ENTMCNC: 31.1 % (ref 32–34.5)
MCV RBC AUTO: 92.7 FL (ref 80–99.9)
METER GLUCOSE: 129 MG/DL (ref 74–99)
METER GLUCOSE: 175 MG/DL (ref 74–99)
METER GLUCOSE: 188 MG/DL (ref 74–99)
METER GLUCOSE: 190 MG/DL (ref 74–99)
MONOCYTES ABSOLUTE: 0.87 E9/L (ref 0.1–0.95)
MONOCYTES RELATIVE PERCENT: 5.2 % (ref 2–12)
NEUTROPHILS ABSOLUTE: 11.31 E9/L (ref 1.8–7.3)
NEUTROPHILS RELATIVE PERCENT: 65.2 % (ref 43–80)
OVALOCYTES: ABNORMAL
PDW BLD-RTO: 14 FL (ref 11.5–15)
PLATELET # BLD: 524 E9/L (ref 130–450)
PMV BLD AUTO: 10.5 FL (ref 7–12)
POIKILOCYTES: ABNORMAL
POLYCHROMASIA: ABNORMAL
POTASSIUM SERPL-SCNC: 3.1 MMOL/L (ref 3.5–5)
RBC # BLD: 4.92 E12/L (ref 3.5–5.5)
SODIUM BLD-SCNC: 136 MMOL/L (ref 132–146)
TOTAL PROTEIN: 6.5 G/DL (ref 6.4–8.3)
WBC # BLD: 17.4 E9/L (ref 4.5–11.5)

## 2023-03-05 PROCEDURE — 36415 COLL VENOUS BLD VENIPUNCTURE: CPT

## 2023-03-05 PROCEDURE — 6370000000 HC RX 637 (ALT 250 FOR IP): Performed by: INTERNAL MEDICINE

## 2023-03-05 PROCEDURE — 99233 SBSQ HOSP IP/OBS HIGH 50: CPT | Performed by: SURGERY

## 2023-03-05 PROCEDURE — 2580000003 HC RX 258: Performed by: NURSE PRACTITIONER

## 2023-03-05 PROCEDURE — 6360000002 HC RX W HCPCS: Performed by: INTERNAL MEDICINE

## 2023-03-05 PROCEDURE — 6370000000 HC RX 637 (ALT 250 FOR IP): Performed by: NURSE PRACTITIONER

## 2023-03-05 PROCEDURE — 93010 ELECTROCARDIOGRAM REPORT: CPT | Performed by: INTERNAL MEDICINE

## 2023-03-05 PROCEDURE — C9113 INJ PANTOPRAZOLE SODIUM, VIA: HCPCS | Performed by: NURSE PRACTITIONER

## 2023-03-05 PROCEDURE — 2500000003 HC RX 250 WO HCPCS: Performed by: INTERNAL MEDICINE

## 2023-03-05 PROCEDURE — 94640 AIRWAY INHALATION TREATMENT: CPT

## 2023-03-05 PROCEDURE — 6360000002 HC RX W HCPCS: Performed by: SPECIALIST

## 2023-03-05 PROCEDURE — 93005 ELECTROCARDIOGRAM TRACING: CPT | Performed by: SPECIALIST

## 2023-03-05 PROCEDURE — 6370000000 HC RX 637 (ALT 250 FOR IP): Performed by: SPECIALIST

## 2023-03-05 PROCEDURE — 80053 COMPREHEN METABOLIC PANEL: CPT

## 2023-03-05 PROCEDURE — 93005 ELECTROCARDIOGRAM TRACING: CPT | Performed by: NURSE PRACTITIONER

## 2023-03-05 PROCEDURE — A4216 STERILE WATER/SALINE, 10 ML: HCPCS | Performed by: NURSE PRACTITIONER

## 2023-03-05 PROCEDURE — 82962 GLUCOSE BLOOD TEST: CPT

## 2023-03-05 PROCEDURE — 2580000003 HC RX 258: Performed by: SPECIALIST

## 2023-03-05 PROCEDURE — 85025 COMPLETE CBC W/AUTO DIFF WBC: CPT

## 2023-03-05 PROCEDURE — 83735 ASSAY OF MAGNESIUM: CPT

## 2023-03-05 PROCEDURE — 1200000000 HC SEMI PRIVATE

## 2023-03-05 PROCEDURE — 2580000003 HC RX 258: Performed by: INTERNAL MEDICINE

## 2023-03-05 PROCEDURE — 6360000002 HC RX W HCPCS: Performed by: NURSE PRACTITIONER

## 2023-03-05 PROCEDURE — 83036 HEMOGLOBIN GLYCOSYLATED A1C: CPT

## 2023-03-05 RX ORDER — METOPROLOL TARTRATE 5 MG/5ML
5 INJECTION INTRAVENOUS ONCE
Status: COMPLETED | OUTPATIENT
Start: 2023-03-05 | End: 2023-03-05

## 2023-03-05 RX ORDER — METOPROLOL TARTRATE 50 MG/1
100 TABLET, FILM COATED ORAL 2 TIMES DAILY
Status: DISCONTINUED | OUTPATIENT
Start: 2023-03-05 | End: 2023-03-07 | Stop reason: HOSPADM

## 2023-03-05 RX ORDER — INSULIN GLARGINE 100 [IU]/ML
6 INJECTION, SOLUTION SUBCUTANEOUS NIGHTLY
Status: DISCONTINUED | OUTPATIENT
Start: 2023-03-05 | End: 2023-03-07 | Stop reason: HOSPADM

## 2023-03-05 RX ORDER — LEVALBUTEROL 1.25 MG/.5ML
1.25 SOLUTION, CONCENTRATE RESPIRATORY (INHALATION) 4 TIMES DAILY
Status: DISCONTINUED | OUTPATIENT
Start: 2023-03-05 | End: 2023-03-07 | Stop reason: HOSPADM

## 2023-03-05 RX ORDER — FUROSEMIDE 10 MG/ML
20 INJECTION INTRAMUSCULAR; INTRAVENOUS DAILY
Status: DISCONTINUED | OUTPATIENT
Start: 2023-03-05 | End: 2023-03-07

## 2023-03-05 RX ORDER — SODIUM CHLORIDE FOR INHALATION 0.9 %
3 VIAL, NEBULIZER (ML) INHALATION EVERY 8 HOURS PRN
Status: DISCONTINUED | OUTPATIENT
Start: 2023-03-05 | End: 2023-03-07 | Stop reason: HOSPADM

## 2023-03-05 RX ORDER — MAGNESIUM SULFATE 1 G/100ML
1000 INJECTION INTRAVENOUS PRN
Status: DISCONTINUED | OUTPATIENT
Start: 2023-03-05 | End: 2023-03-07 | Stop reason: HOSPADM

## 2023-03-05 RX ORDER — POTASSIUM CHLORIDE 20 MEQ/1
40 TABLET, EXTENDED RELEASE ORAL ONCE
Status: COMPLETED | OUTPATIENT
Start: 2023-03-05 | End: 2023-03-05

## 2023-03-05 RX ORDER — POTASSIUM CHLORIDE 7.45 MG/ML
10 INJECTION INTRAVENOUS ONCE
Status: COMPLETED | OUTPATIENT
Start: 2023-03-05 | End: 2023-03-05

## 2023-03-05 RX ORDER — METOPROLOL TARTRATE 50 MG/1
100 TABLET, FILM COATED ORAL ONCE
Status: COMPLETED | OUTPATIENT
Start: 2023-03-05 | End: 2023-03-05

## 2023-03-05 RX ORDER — 0.9 % SODIUM CHLORIDE 0.9 %
250 INTRAVENOUS SOLUTION INTRAVENOUS ONCE
Status: COMPLETED | OUTPATIENT
Start: 2023-03-05 | End: 2023-03-05

## 2023-03-05 RX ADMIN — DIPHENHYDRAMINE HYDROCHLORIDE 25 MG: 25 TABLET ORAL at 10:08

## 2023-03-05 RX ADMIN — Medication 10 ML: at 07:53

## 2023-03-05 RX ADMIN — AZITHROMYCIN MONOHYDRATE 500 MG: 250 TABLET ORAL at 07:52

## 2023-03-05 RX ADMIN — IPRATROPIUM BROMIDE 0.5 MG: 0.5 SOLUTION RESPIRATORY (INHALATION) at 04:58

## 2023-03-05 RX ADMIN — ASPIRIN 81 MG: 81 TABLET, CHEWABLE ORAL at 07:52

## 2023-03-05 RX ADMIN — ROPINIROLE HYDROCHLORIDE 2 MG: 1 TABLET, FILM COATED ORAL at 19:53

## 2023-03-05 RX ADMIN — METOCLOPRAMIDE 10 MG: 5 INJECTION, SOLUTION INTRAMUSCULAR; INTRAVENOUS at 19:54

## 2023-03-05 RX ADMIN — ALBUTEROL SULFATE 2.5 MG: 2.5 SOLUTION RESPIRATORY (INHALATION) at 04:58

## 2023-03-05 RX ADMIN — METOPROLOL TARTRATE 5 MG: 5 INJECTION INTRAVENOUS at 01:35

## 2023-03-05 RX ADMIN — DEXTROSE 1 MG/MIN: 5 SOLUTION INTRAVENOUS at 10:25

## 2023-03-05 RX ADMIN — POTASSIUM CHLORIDE 40 MEQ: 1500 TABLET, EXTENDED RELEASE ORAL at 11:08

## 2023-03-05 RX ADMIN — DIPHENHYDRAMINE HYDROCHLORIDE 25 MG: 25 TABLET ORAL at 01:07

## 2023-03-05 RX ADMIN — BUDESONIDE 500 MCG: 0.5 SUSPENSION RESPIRATORY (INHALATION) at 04:58

## 2023-03-05 RX ADMIN — MAGNESIUM SULFATE HEPTAHYDRATE 1000 MG: 1 INJECTION, SOLUTION INTRAVENOUS at 11:11

## 2023-03-05 RX ADMIN — PREDNISONE 30 MG: 20 TABLET ORAL at 07:52

## 2023-03-05 RX ADMIN — SODIUM CHLORIDE 40 MG: 9 INJECTION INTRAMUSCULAR; INTRAVENOUS; SUBCUTANEOUS at 04:34

## 2023-03-05 RX ADMIN — SODIUM CHLORIDE 40 MG: 9 INJECTION INTRAMUSCULAR; INTRAVENOUS; SUBCUTANEOUS at 17:15

## 2023-03-05 RX ADMIN — ENOXAPARIN SODIUM 40 MG: 100 INJECTION SUBCUTANEOUS at 07:52

## 2023-03-05 RX ADMIN — METOCLOPRAMIDE 10 MG: 5 INJECTION, SOLUTION INTRAMUSCULAR; INTRAVENOUS at 07:53

## 2023-03-05 RX ADMIN — BUDESONIDE 500 MCG: 0.5 SUSPENSION RESPIRATORY (INHALATION) at 18:22

## 2023-03-05 RX ADMIN — APIXABAN 10 MG: 5 TABLET, FILM COATED ORAL at 19:53

## 2023-03-05 RX ADMIN — VENLAFAXINE HYDROCHLORIDE 75 MG: 37.5 CAPSULE, EXTENDED RELEASE ORAL at 07:52

## 2023-03-05 RX ADMIN — LEVALBUTEROL HYDROCHLORIDE 1.25 MG: 1.25 SOLUTION, CONCENTRATE RESPIRATORY (INHALATION) at 18:22

## 2023-03-05 RX ADMIN — POTASSIUM CHLORIDE 10 MEQ: 7.46 INJECTION, SOLUTION INTRAVENOUS at 12:41

## 2023-03-05 RX ADMIN — Medication 10 ML: at 19:54

## 2023-03-05 RX ADMIN — MONTELUKAST 10 MG: 10 TABLET, FILM COATED ORAL at 19:54

## 2023-03-05 RX ADMIN — SODIUM CHLORIDE 250 ML: 9 INJECTION, SOLUTION INTRAVENOUS at 09:25

## 2023-03-05 RX ADMIN — DEXTROSE MONOHYDRATE 150 MG: 50 INJECTION, SOLUTION INTRAVENOUS at 10:08

## 2023-03-05 RX ADMIN — METOPROLOL TARTRATE 100 MG: 50 TABLET, FILM COATED ORAL at 05:46

## 2023-03-05 RX ADMIN — IPRATROPIUM BROMIDE 0.5 MG: 0.5 SOLUTION RESPIRATORY (INHALATION) at 13:23

## 2023-03-05 RX ADMIN — GUAIFENESIN 600 MG: 600 TABLET, EXTENDED RELEASE ORAL at 19:53

## 2023-03-05 RX ADMIN — IPRATROPIUM BROMIDE 0.5 MG: 0.5 SOLUTION RESPIRATORY (INHALATION) at 09:44

## 2023-03-05 RX ADMIN — METOPROLOL TARTRATE 100 MG: 50 TABLET, FILM COATED ORAL at 19:54

## 2023-03-05 RX ADMIN — DEXTROSE 1 MG/MIN: 5 SOLUTION INTRAVENOUS at 19:48

## 2023-03-05 RX ADMIN — HYDROCODONE BITARTRATE AND ACETAMINOPHEN 1 TABLET: 5; 325 TABLET ORAL at 19:55

## 2023-03-05 RX ADMIN — GUAIFENESIN 600 MG: 600 TABLET, EXTENDED RELEASE ORAL at 07:52

## 2023-03-05 ASSESSMENT — PAIN SCALES - GENERAL
PAINLEVEL_OUTOF10: 2
PAINLEVEL_OUTOF10: 3
PAINLEVEL_OUTOF10: 0
PAINLEVEL_OUTOF10: 7

## 2023-03-05 ASSESSMENT — PAIN DESCRIPTION - ORIENTATION: ORIENTATION: MID

## 2023-03-05 ASSESSMENT — PAIN SCALES - WONG BAKER
WONGBAKER_NUMERICALRESPONSE: 0

## 2023-03-05 ASSESSMENT — PAIN DESCRIPTION - LOCATION
LOCATION: HEAD
LOCATION: GENERALIZED

## 2023-03-05 ASSESSMENT — PAIN DESCRIPTION - DESCRIPTORS
DESCRIPTORS: ACHING
DESCRIPTORS: ACHING

## 2023-03-05 ASSESSMENT — PULMONARY FUNCTION TESTS
PEFR_L/MIN: 180
PEFR_L/MIN: 80

## 2023-03-05 NOTE — PLAN OF CARE
Problem: Discharge Planning  Goal: Discharge to home or other facility with appropriate resources  3/5/2023 1047 by KISHOR Moya  Outcome: Progressing     Problem: Pain  Goal: Verbalizes/displays adequate comfort level or baseline comfort level  3/5/2023 1047 by KISHOR Moya  Outcome: Progressing     Problem: Chronic Conditions and Co-morbidities  Goal: Patient's chronic conditions and co-morbidity symptoms are monitored and maintained or improved  3/5/2023 1047 by KISHOR Moya  Outcome: Progressing     Problem: ABCDS Injury Assessment  Goal: Absence of physical injury  3/5/2023 1047 by KISHOR Moya  Outcome: Progressing

## 2023-03-05 NOTE — PROGRESS NOTES
Pulmonary/Critical Care Progress Note    We are following patient for COPD exacerbation with asthmatic component, small bilateral pleural effusions (new since 12/2023), question diastolic dysfunction    SUBJECTIVE:  Patient is feeling somewhat better today but notes that she is still wheezing. She does stop smoking about a week ago. Patient CO SOB for the last few days, stable, worse with exertion, better with rest, associated occasional cough, wheezing, no (fever,chills,cp)    Daily progress note:  3/4TH:off any o2 supplementation. CXR with pleural effusions/CHF, EF 60%.  Wean to po steroids, DC roflumilast, decrease but continue diuresis, added lantus for BS control, follow up chest imaging for resolution, dispo plans per primary team, encouraged on continued cessation of tob use    3/5th:aflutter today, amio drip per cards, redued diuretics, replacing electrolytes, dispo plans per primary team, encouraged on continued cessation of tob use    MEDICATIONS:   metoprolol tartrate  100 mg Oral BID    potassium chloride  40 mEq Oral Once    [START ON 3/6/2023] furosemide  20 mg IntraVENous Daily    insulin glargine  6 Units SubCUTAneous Nightly    potassium chloride  10 mEq IntraVENous Once    pantoprazole (PROTONIX) 40 mg injection  40 mg IntraVENous Q12H    predniSONE  30 mg Oral Daily    azithromycin  500 mg Oral Daily    metoclopramide  10 mg IntraVENous BID    guaiFENesin  600 mg Oral BID    enoxaparin  40 mg SubCUTAneous Daily    insulin lispro  0-8 Units SubCUTAneous TID WC    insulin lispro  0-4 Units SubCUTAneous Nightly    budesonide  0.5 mg Nebulization BID    montelukast  10 mg Oral Nightly    sodium chloride flush  5-40 mL IntraVENous 2 times per day    rOPINIRole  2 mg Oral Nightly    ipratropium  0.5 mg Nebulization Q4H    venlafaxine  75 mg Oral Daily    aspirin  81 mg Oral Daily      amiodarone 1 mg/min (03/05/23 1025)    dextrose      sodium chloride       magnesium sulfate, diphenhydrAMINE, perflutren lipid microspheres, glucose, dextrose bolus **OR** dextrose bolus, glucagon (rDNA), dextrose, sodium chloride flush, sodium chloride, polyethylene glycol, acetaminophen **OR** acetaminophen, HYDROcodone 5 mg - acetaminophen, prochlorperazine      REVIEW OF SYSTEMS:  All other 10 systems reviewed and OW negative      OBJECTIVE:  Vitals:    03/05/23 1021   BP: 97/85   Pulse: (!) 119   Resp: 20   Temp: 97.5 °F (36.4 °C)   SpO2:            O2 Device: None (Room air)    PHYSICAL EXAM:  Constitutional: No fever, chills, diaphoresis  Skin: Skin rash, no skin breakdown  HEENT: Mucous membranes are moist  Neck: +JVD, no lymphadenopathy, thyromegaly  Cardiovascular: S1, S2 regular. No S3 or rubs present  Respiratory: Expiratory wheezes over both posterior lung fields with, bibasilar crackles  Gastrointestinal: Left, obese, nontender  Genitourinary: No CVA tenderness  Extremities: No clubbing, cyanosis, or edema  Neurological: Awake, alert, oriented x3. No evidence of focal motor or sensory deficits  Psychological: In good spirits.   Appropriate affect    LABS:  WBC   Date Value Ref Range Status   03/05/2023 17.4 (H) 4.5 - 11.5 E9/L Final   03/04/2023 15.9 (H) 4.5 - 11.5 E9/L Final   03/03/2023 15.8 (H) 4.5 - 11.5 E9/L Final     Hemoglobin   Date Value Ref Range Status   03/05/2023 14.2 11.5 - 15.5 g/dL Final   03/04/2023 13.3 11.5 - 15.5 g/dL Final   03/03/2023 12.8 11.5 - 15.5 g/dL Final     Hematocrit   Date Value Ref Range Status   03/05/2023 45.6 34.0 - 48.0 % Final   03/04/2023 41.4 34.0 - 48.0 % Final   03/03/2023 41.6 34.0 - 48.0 % Final     MCV   Date Value Ref Range Status   03/05/2023 92.7 80.0 - 99.9 fL Final   03/04/2023 92.8 80.0 - 99.9 fL Final   03/03/2023 94.3 80.0 - 99.9 fL Final     Platelets   Date Value Ref Range Status   03/05/2023 524 (H) 130 - 450 E9/L Final   03/04/2023 480 (H) 130 - 450 E9/L Final   03/03/2023 408 130 - 450 E9/L Final     Sodium   Date Value Ref Range Status   03/05/2023 136 132 - 146 mmol/L Final   03/04/2023 139 132 - 146 mmol/L Final   03/03/2023 135 132 - 146 mmol/L Final     Potassium   Date Value Ref Range Status   03/05/2023 3.1 (L) 3.5 - 5.0 mmol/L Final   03/04/2023 3.8 3.5 - 5.0 mmol/L Final   03/03/2023 3.6 3.5 - 5.0 mmol/L Final     Potassium reflex Magnesium   Date Value Ref Range Status   02/12/2023 4.6 3.5 - 5.0 mmol/L Final   10/19/2022 4.8 3.5 - 5.0 mmol/L Final   03/16/2022 3.8 3.5 - 5.0 mmol/L Final     Chloride   Date Value Ref Range Status   03/05/2023 96 (L) 98 - 107 mmol/L Final   03/04/2023 97 (L) 98 - 107 mmol/L Final   03/03/2023 96 (L) 98 - 107 mmol/L Final     CO2   Date Value Ref Range Status   03/05/2023 29 22 - 29 mmol/L Final   03/04/2023 27 22 - 29 mmol/L Final   03/03/2023 28 22 - 29 mmol/L Final     BUN   Date Value Ref Range Status   03/05/2023 24 (H) 6 - 23 mg/dL Final   03/04/2023 25 (H) 6 - 23 mg/dL Final   03/03/2023 19 6 - 23 mg/dL Final     Creatinine   Date Value Ref Range Status   03/05/2023 0.6 0.5 - 1.0 mg/dL Final   03/04/2023 0.6 0.5 - 1.0 mg/dL Final   03/03/2023 0.6 0.5 - 1.0 mg/dL Final     Glucose   Date Value Ref Range Status   03/05/2023 142 (H) 74 - 99 mg/dL Final   03/04/2023 217 (H) 74 - 99 mg/dL Final   03/03/2023 221 (H) 74 - 99 mg/dL Final   03/06/2012 92 70 - 110 mg/dL Final   11/21/2011 86 70 - 110 mg/dL Final   06/10/2011 66 (L) 70 - 110 mg/dL Final     Calcium   Date Value Ref Range Status   03/05/2023 8.7 8.6 - 10.2 mg/dL Final   03/04/2023 9.1 8.6 - 10.2 mg/dL Final   03/03/2023 9.1 8.6 - 10.2 mg/dL Final     Total Protein   Date Value Ref Range Status   03/05/2023 6.5 6.4 - 8.3 g/dL Final   03/04/2023 6.8 6.4 - 8.3 g/dL Final   03/03/2023 6.7 6.4 - 8.3 g/dL Final     Albumin   Date Value Ref Range Status   03/05/2023 3.6 3.5 - 5.2 g/dL Final   03/04/2023 3.7 3.5 - 5.2 g/dL Final   03/03/2023 3.5 3.5 - 5.2 g/dL Final   03/06/2012 4.6 3.2 - 4.8 g/dL Final   11/21/2011 4.5 3.2 - 4.8 g/dL Final   06/10/2011 4.6 3.2 - 4.8 g/dL Final     Total Bilirubin   Date Value Ref Range Status   03/05/2023 0.2 0.0 - 1.2 mg/dL Final   03/04/2023 <0.2 0.0 - 1.2 mg/dL Final   03/03/2023 0.3 0.0 - 1.2 mg/dL Final     Alkaline Phosphatase   Date Value Ref Range Status   03/05/2023 89 35 - 104 U/L Final   03/04/2023 92 35 - 104 U/L Final   03/03/2023 107 (H) 35 - 104 U/L Final     AST   Date Value Ref Range Status   03/05/2023 15 0 - 31 U/L Final   03/04/2023 11 0 - 31 U/L Final   03/03/2023 11 0 - 31 U/L Final     ALT   Date Value Ref Range Status   03/05/2023 25 0 - 32 U/L Final   03/04/2023 23 0 - 32 U/L Final   03/03/2023 21 0 - 32 U/L Final     Est, Glom Filt Rate   Date Value Ref Range Status   03/05/2023 >60 >=60 mL/min/1.73 Final     Comment:     Pediatric calculator link  "[employer].at. [email or web address]/kdoqi/gfr_calculatorped  Effective Oct 3, 2022  These results are not intended for use in patients  <25years of age. eGFR results are calculated without  a race factor using the 2021 CKD-EPI equation. Careful  clinical correlation is recommended, particularly when  comparing to results calculated using previous equations. The CKD-EPI equation is less accurate in patients with  extremes of muscle mass, extra-renal metabolism of  creatinine, excessive creatinine ingestion, or following  therapy that affects renal tubular secretion. 03/04/2023 >60 >=60 mL/min/1.73 Final     Comment:     Pediatric calculator link  "[employer].Scrip-t. org/professionals/kdoqi/gfr_calculatorped  Effective Oct 3, 2022  These results are not intended for use in patients  <25years of age. eGFR results are calculated without  a race factor using the 2021 CKD-EPI equation. Careful  clinical correlation is recommended, particularly when  comparing to results calculated using previous equations.   The CKD-EPI equation is less accurate in patients with  extremes of muscle mass, extra-renal metabolism of  creatinine, excessive creatinine ingestion, or following  therapy that affects renal tubular secretion. 03/03/2023 >60 >=60 mL/min/1.73 Final     Comment:     Pediatric calculator link  [employer].at. org/professionals/kdoqi/gfr_calculatorped  Effective Oct 3, 2022  These results are not intended for use in patients  <25years of age. eGFR results are calculated without  a race factor using the 2021 CKD-EPI equation. Careful  clinical correlation is recommended, particularly when  comparing to results calculated using previous equations. The CKD-EPI equation is less accurate in patients with  extremes of muscle mass, extra-renal metabolism of  creatinine, excessive creatinine ingestion, or following  therapy that affects renal tubular secretion. GFR    Date Value Ref Range Status   03/17/2022 >60  Final   03/16/2022 >60  Final   02/11/2022 >60  Final     Magnesium   Date Value Ref Range Status   03/02/2023 2.1 1.6 - 2.6 mg/dL Final   03/01/2023 1.9 1.6 - 2.6 mg/dL Final   03/17/2022 2.1 1.6 - 2.6 mg/dL Final     Phosphorus   Date Value Ref Range Status   03/02/2023 3.8 2.5 - 4.5 mg/dL Final   03/17/2022 3.8 2.5 - 4.5 mg/dL Final   11/19/2018 2.6 2.5 - 4.5 mg/dL Final     Recent Labs     03/02/23  1441   PH 7.488*   PO2 75.4   PCO2 34.6*   HCO3 25.7   BE 2.6   O2SAT 94.9         RADIOLOGY:  XR CHEST PORTABLE   Final Result   Persistent but improving atelectasis/infiltrates and pleural effusions in the   lung bases left more than right. FL ESOPHAGRAM   Final Result   There appears to be contrast both within the gastric pouch and the native   stomach. It is difficult to evaluate the stomach due to the overlapping of   the gastric bypass changes. It is recommended that the patient undergo a CT   scan of the abdomen approximately 20 minutes after initial ingestion of oral   contrast and medially following additional contrast administered just before   the patient has the CT scan.   I cannot on wrap old the stomach on the dedicated esophagram.         CTA PULMONARY W CONTRAST   Final Result   1. Moderate pleural and pericardial effusions   2. No acute pulmonary embolus identified         CT CHEST HIGH RESOLUTION   Final Result   Four-chamber cardiomegaly with valvular calcifications and small volume   pericardial effusion as well as questionable stranding of the pericardium   could represent acute inflammation. Concern for pericarditis without   calcifications. Smooth interlobular septal thickening with lower lobe   predominance and mild decompensation with small bilateral pleural effusions. XR CHEST (2 VW)   Final Result   CHF. Superimposed bibasilar pneumonia cannot be excluded. PROBLEM LIST:  Principal Problem:    Acute decompensated heart failure ([address]  Resolved Problems:    * No resolved hospital problems.  *      IMPRESSION:  COPD with exacerbation primarily with an asthmatic component  Acute HFPEF/ Diastolic dysfunction  Dm uncontrolled with hyperglycemia  Atelectasis   Small bilateral pleural effusions  Possible underlying obstructive sleep apnea  Atrial flutter    PLAN:  Continue  steroids   Continue IV azithromycin  Diurese,decreased to 20 daily, monitor RFP  Follow up chest imaging  Monitor labs, especially potassium and watch for contraction alkalosis while on Lasix  Cards recs re amio drip  Dispo plans per primary team        Electronically signed by Leanne Nguyen MD on 3/5/2023 at 10:37 AM

## 2023-03-05 NOTE — PROGRESS NOTES
[address]       Surgery Progress Note     Subjective:  awake but more lethargic, says she was up all night with tachycardia and breathing difficulty. Labs:   Recent Labs     03/04/23  0524 03/05/23  0545   WBC 15.9* 17.4*   HGB 13.3 14.2   * 524*    136   CL 97* 96*   K 3.8 3.1*   BUN 25* 24*   CREATININE 0.6 0.6   GLUCOSE 217* 142*   LABALBU 3.7 3.6   PROT 6.8 6.5   CALCIUM 9.1 8.7   BILITOT <0.2 0.2   ALKPHOS 92 89   AST 11 15   ALT 23 25     Physical Exam  VITALS:  Blood pressure 112/80, pulse (!) 105, temperature 97.2 °F (36.2 °C), temperature source Temporal, resp. rate 16, height 5' (1.524 m), weight 238 lb (108 kg), SpO2 93 %, not currently breastfeeding. 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 PLAN:    Continue Nexium  Must stop smoking. Surgical revision of the anastomosis would be very risky and will need to be discussed as an outpatient.     Physician Signature: Electronically signed by Dr. Mejia Blevins M.D.   3/5/2023

## 2023-03-05 NOTE — PROGRESS NOTES
Internal Medicine Progress Note    [facility]    Max Patten. Caroline Dobbs, KELI Clayton D.O., SACHI Arita D.O. Rodger Milling, MSN, APRN, NP-C  Giuliana Miguel. Red Galindo, MSN, APRN-CNP     Primary Care Physician: Paige Salmon DO   Admitting Physician:  Ross Joya DO  Admission date and time: 3/1/2023 12:06 PM    Room:  [address] [place] [address]  Admitting diagnosis: Mild intermittent asthma with exacerbation [J45.21]  Acute decompensated heart failure ([facility] [address] [I50.9]  Dyspnea, unspecified type [R06.00]  Congestive heart failure, unspecified HF chronicity, unspecified heart failure type [facility]) [I50.9]    Patient Name: Nessa Fletcher  MRN: [id number]    Date of Service: 3/5/2023     Subjective:  Analia Reid is a 77 y.o. female who was seen and examined today,3/5/2023, at the bedside. Patient seems upset today. She did go into rapid atrial fibrillation earlier this morning and is currently on Cardizem drip. She does seem to be breathing easier  she was looking forward to going home. Peak flow remained stable without significant improvement. No family member present          Review of System:   Constitutional:   Denies fever or chills, weight loss or gain, fatigue or malaise. HEENT:   Denies ear pain, sore throat, sinus or eye problems. Cardiovascular:   Developed atrial febrile rapid ventricular response  Respiratory:   Relates to having exertional dyspnea with associated orthopnea associated cough  Gastrointestinal:   Denies nausea, vomiting, diarrhea, or constipation. Denies any abdominal pain. Admits to  having nocturnal reflux  Genitourinary:    Denies any urgency, frequency, hematuria. Voiding  without difficulty. Extremities:   Denies lower extremity swelling, edema or cyanosis. Neurology:    Denies any headache or focal neurological deficits, Denies generalized weakness or memory difficulty. Psch:   Denies being anxious or depressed. Musculoskeletal:    Denies  myalgias, joint complaints or back pain. Integumentary:   Denies any rashes, ulcers, or excoriations. Denies bruising. Hematologic/Lymphatic:  Denies bruising or bleeding. Physical Exam:  I/O this shift:  In: 600 [P.O.:600]  Out: -     Intake/Output Summary (Last 24 hours) at 3/5/2023 1411  Last data filed at 3/5/2023 1225  Gross per 24 hour   Intake 840 ml   Output 2200 ml   Net -1360 ml   I/O last 3 completed shifts: In: 1020 [P.O.:1020]  Out: 3750 [Urine:3750]  Patient Vitals for the past 96 hrs (Last 3 readings):   Weight   03/04/23 1955 238 lb (108 kg)   03/04/23 0037 235 lb 2 oz (106.7 kg)   03/03/23 0124 235 lb 6 oz (106.8 kg)     Vital Signs:   Blood pressure 118/73, pulse 71, temperature 97.8 °F (36.6 °C), temperature source Oral, resp. rate 18, height 5' (1.524 m), weight 238 lb (108 kg), SpO2 96 %, not currently breastfeeding. General appearance:  Alert, responsive, oriented to person, place, and time. Well preserved, alert, no distress. Head:  Normocephalic. No masses, lesions or tenderness. Eyes:  PERRLA. EOMI. Sclera clear. Buccal mucosa moist.  ENT:  Ears normal. Mucosa normal.  Neck:    Supple. Trachea midline. No thyromegaly. No JVD. No bruits. Heart:    Irregular rhythm with controlled rate. 1/6 murmur  Lungs:    Diminished posteriorly. Coarse rhonchi  Abdomen:   Soft. Non-tender. Non-distended. Bowel sounds positive. No organomegaly or masses. No pain on palpation. Obese  Extremities:    Peripheral pulses present. No peripheral edema. No ulcers. No cyanosis. No clubbing. Neurologic:    Alert x 3. No focal deficit. Cranial nerves grossly intact. No focal weakness. Psych:   Behavior is normal. Mood appears normal. Speech is not rapid and/or pressured. Musculoskeletal:   Spine ROM normal. Muscular strength intact. Gait not assessed.   Integumentary:  No rashes  Skin normal color and texture.   Genitalia/Breast:  Deferred    Medication:  Scheduled Meds:   metoprolol tartrate  100 mg Oral BID    furosemide  20 mg IntraVENous Daily    insulin glargine  6 Units SubCUTAneous Nightly    pantoprazole (PROTONIX) 40 mg injection  40 mg IntraVENous Q12H    predniSONE  30 mg Oral Daily    azithromycin  500 mg Oral Daily    metoclopramide  10 mg IntraVENous BID    guaiFENesin  600 mg Oral BID    enoxaparin  40 mg SubCUTAneous Daily    insulin lispro  0-8 Units SubCUTAneous TID WC    insulin lispro  0-4 Units SubCUTAneous Nightly    budesonide  0.5 mg Nebulization BID    montelukast  10 mg Oral Nightly    sodium chloride flush  5-40 mL IntraVENous 2 times per day    rOPINIRole  2 mg Oral Nightly    ipratropium  0.5 mg Nebulization Q4H    venlafaxine  75 mg Oral Daily    aspirin  81 mg Oral Daily     Continuous Infusions:   amiodarone 1 mg/min (03/05/23 1025)    dextrose      sodium chloride         Objective Data:  Recent Labs     03/03/23  0504 03/04/23  0524 03/05/23  0545   WBC 15.8* 15.9* 17.4*   RBC 4.41 4.46 4.92   HGB 12.8 13.3 14.2   HCT 41.6 41.4 45.6   MCV 94.3 92.8 92.7   MCH 29.0 29.8 28.9   MCHC 30.8* 32.1 31.1*   RDW 14.0 14.2 14.0    480* 524*   MPV 11.0 10.8 10.5     Recent Labs     03/03/23  0504 03/04/23  0524 03/05/23  0545    139 136   K 3.6 3.8 3.1*   CL 96* 97* 96*   CO2 28 27 29   BUN 19 25* 24*   CREATININE 0.6 0.6 0.6   GLUCOSE 221* 217* 142*   CALCIUM 9.1 9.1 8.7   PROT 6.7 6.8 6.5   LABALBU 3.5 3.7 3.6   BILITOT 0.3 <0.2 0.2   ALKPHOS 107* 92 89   AST 11 11 15   ALT 21 23 25     Lab Results   Component Value Date    TROPONINI <0.01 06/27/2019    TROPONINI <0.01 07/26/2018    TROPONINI <0.01 07/25/2018                 Assessment:    Acute and progressive respiratory distress undetermined etiology  Chronic obstructive pulm disease with associated acute exacerbation asthma with superimposed restrictive lung disease  Leukocytosis probably secondary to steroids  History of CVA  Valvular heart disease of mild mitral stenosis  History of seizure disorder  Spinal stenosis with restless leg syndrome  Gastroesophageal reflux disease  Hypertension  Anxiety and depression  Abnormal upper GI with reflux status post gastric bypass      Plan:     Patient developed an irregular heart rhythm.  We will need to be on anticoagulant therapy and is currently on Cardizem drip  Currently on p.o. steroids  Consider switching to Xopenex which might be more cardioselective  Continue with outlying plan  Continue to follow with other consultants              More than 50% of my  time was spent at the bedside counseling/coordinating care with the patient and/or family with face to face contact.  This time was spent reviewing notes and laboratory data as well as instructing and counseling the patient. Time I spent with the family or surrogate(s) is included only if the patient was incapable of providing the necessary information or participating in medical decisions. I also discussed the differential diagnosis and all of the proposed management plans with the patient and individuals accompanying the patient.    Job Davila DO, EMILIE.C.O.I.  3/5/2023  2:11 PM

## 2023-03-05 NOTE — PLAN OF CARE
Problem: Discharge Planning  Goal: Discharge to home or other facility with appropriate resources  2/8/7051 [id number] by Marko Arthur RN  Outcome: Progressing     Problem: Pain  Goal: Verbalizes/displays adequate comfort level or baseline comfort level  3/7/3170 [id number] by Marko Arthur RN  Outcome: Progressing     Problem: Chronic Conditions and Co-morbidities  Goal: Patient's chronic conditions and co-morbidity symptoms are monitored and maintained or improved  3/3/8475 1553 by Marko Arthur RN  Outcome: Progressing     Problem: ABCDS Injury Assessment  Goal: Absence of physical injury  Outcome: Progressing

## 2023-03-05 NOTE — PROGRESS NOTES
Patient went into [facility], got EKG to confirm. Called Dr. Manzo [place] and got orders for Amiodarone and NS bolus. Notified provider of iodine/iodide allergy with reaction of rash and hives and he said he was okay with giving Q6 Benedryl and amiodarone.

## 2023-03-06 LAB
ALBUMIN SERPL-MCNC: 3.4 G/DL (ref 3.5–5.2)
ALP BLD-CCNC: 76 U/L (ref 35–104)
ALT SERPL-CCNC: 31 U/L (ref 0–32)
ANION GAP SERPL CALCULATED.3IONS-SCNC: 12 MMOL/L (ref 7–16)
AST SERPL-CCNC: 21 U/L (ref 0–31)
BASOPHILS ABSOLUTE: 0.03 E9/L (ref 0–0.2)
BASOPHILS RELATIVE PERCENT: 0.2 % (ref 0–2)
BILIRUB SERPL-MCNC: 0.2 MG/DL (ref 0–1.2)
BUN BLDV-MCNC: 28 MG/DL (ref 6–23)
CALCIUM SERPL-MCNC: 8.3 MG/DL (ref 8.6–10.2)
CHLORIDE BLD-SCNC: 99 MMOL/L (ref 98–107)
CO2: 27 MMOL/L (ref 22–29)
CREAT SERPL-MCNC: 0.6 MG/DL (ref 0.5–1)
EOSINOPHILS ABSOLUTE: 0.09 E9/L (ref 0.05–0.5)
EOSINOPHILS RELATIVE PERCENT: 0.7 % (ref 0–6)
GFR SERPL CREATININE-BSD FRML MDRD: >60 ML/MIN/1.73
GLUCOSE BLD-MCNC: 117 MG/DL (ref 74–99)
HCT VFR BLD CALC: 42 % (ref 34–48)
HEMOGLOBIN: 13 G/DL (ref 11.5–15.5)
IGE: 83 KU/L
IMMATURE GRANULOCYTES #: 0.22 E9/L
IMMATURE GRANULOCYTES %: 1.7 % (ref 0–5)
LYMPHOCYTES ABSOLUTE: 3.35 E9/L (ref 1.5–4)
LYMPHOCYTES RELATIVE PERCENT: 25.2 % (ref 20–42)
MCH RBC QN AUTO: 29.7 PG (ref 26–35)
MCHC RBC AUTO-ENTMCNC: 31 % (ref 32–34.5)
MCV RBC AUTO: 96.1 FL (ref 80–99.9)
METER GLUCOSE: 112 MG/DL (ref 74–99)
METER GLUCOSE: 112 MG/DL (ref 74–99)
METER GLUCOSE: 159 MG/DL (ref 74–99)
METER GLUCOSE: 327 MG/DL (ref 74–99)
METER GLUCOSE: 356 MG/DL (ref 74–99)
MONOCYTES ABSOLUTE: 1.17 E9/L (ref 0.1–0.95)
MONOCYTES RELATIVE PERCENT: 8.8 % (ref 2–12)
NEUTROPHILS ABSOLUTE: 8.43 E9/L (ref 1.8–7.3)
NEUTROPHILS RELATIVE PERCENT: 63.4 % (ref 43–80)
PDW BLD-RTO: 14.6 FL (ref 11.5–15)
PLATELET # BLD: 385 E9/L (ref 130–450)
PMV BLD AUTO: 10.8 FL (ref 7–12)
POTASSIUM SERPL-SCNC: 3.6 MMOL/L (ref 3.5–5)
RBC # BLD: 4.37 E12/L (ref 3.5–5.5)
SODIUM BLD-SCNC: 138 MMOL/L (ref 132–146)
TOTAL PROTEIN: 6 G/DL (ref 6.4–8.3)
WBC # BLD: 13.3 E9/L (ref 4.5–11.5)

## 2023-03-06 PROCEDURE — 82962 GLUCOSE BLOOD TEST: CPT

## 2023-03-06 PROCEDURE — 2580000003 HC RX 258: Performed by: INTERNAL MEDICINE

## 2023-03-06 PROCEDURE — 85025 COMPLETE CBC W/AUTO DIFF WBC: CPT

## 2023-03-06 PROCEDURE — 2580000003 HC RX 258: Performed by: NURSE PRACTITIONER

## 2023-03-06 PROCEDURE — 6360000002 HC RX W HCPCS: Performed by: NURSE PRACTITIONER

## 2023-03-06 PROCEDURE — 6370000000 HC RX 637 (ALT 250 FOR IP): Performed by: SPECIALIST

## 2023-03-06 PROCEDURE — 6370000000 HC RX 637 (ALT 250 FOR IP): Performed by: INTERNAL MEDICINE

## 2023-03-06 PROCEDURE — 80053 COMPREHEN METABOLIC PANEL: CPT

## 2023-03-06 PROCEDURE — 6360000002 HC RX W HCPCS: Performed by: INTERNAL MEDICINE

## 2023-03-06 PROCEDURE — 94669 MECHANICAL CHEST WALL OSCILL: CPT

## 2023-03-06 PROCEDURE — 6370000000 HC RX 637 (ALT 250 FOR IP): Performed by: NURSE PRACTITIONER

## 2023-03-06 PROCEDURE — A4216 STERILE WATER/SALINE, 10 ML: HCPCS | Performed by: NURSE PRACTITIONER

## 2023-03-06 PROCEDURE — C9113 INJ PANTOPRAZOLE SODIUM, VIA: HCPCS | Performed by: NURSE PRACTITIONER

## 2023-03-06 PROCEDURE — 1200000000 HC SEMI PRIVATE

## 2023-03-06 PROCEDURE — 94640 AIRWAY INHALATION TREATMENT: CPT

## 2023-03-06 PROCEDURE — 36415 COLL VENOUS BLD VENIPUNCTURE: CPT

## 2023-03-06 RX ORDER — PREDNISONE 20 MG/1
20 TABLET ORAL DAILY
Status: DISCONTINUED | OUTPATIENT
Start: 2023-03-07 | End: 2023-03-07 | Stop reason: HOSPADM

## 2023-03-06 RX ORDER — PANTOPRAZOLE SODIUM 40 MG/1
40 TABLET, DELAYED RELEASE ORAL
Status: DISCONTINUED | OUTPATIENT
Start: 2023-03-07 | End: 2023-03-07 | Stop reason: HOSPADM

## 2023-03-06 RX ORDER — FLECAINIDE ACETATE 100 MG/1
100 TABLET ORAL EVERY 12 HOURS SCHEDULED
Status: DISCONTINUED | OUTPATIENT
Start: 2023-03-06 | End: 2023-03-07 | Stop reason: HOSPADM

## 2023-03-06 RX ADMIN — METOPROLOL TARTRATE 100 MG: 50 TABLET, FILM COATED ORAL at 20:50

## 2023-03-06 RX ADMIN — PREDNISONE 30 MG: 20 TABLET ORAL at 09:20

## 2023-03-06 RX ADMIN — INSULIN LISPRO 6 UNITS: 100 INJECTION, SOLUTION INTRAVENOUS; SUBCUTANEOUS at 16:42

## 2023-03-06 RX ADMIN — PROCHLORPERAZINE EDISYLATE 5 MG: 5 INJECTION INTRAMUSCULAR; INTRAVENOUS at 13:05

## 2023-03-06 RX ADMIN — METOCLOPRAMIDE 10 MG: 5 INJECTION, SOLUTION INTRAMUSCULAR; INTRAVENOUS at 09:33

## 2023-03-06 RX ADMIN — FUROSEMIDE 20 MG: 10 INJECTION, SOLUTION INTRAMUSCULAR; INTRAVENOUS at 09:34

## 2023-03-06 RX ADMIN — ROPINIROLE HYDROCHLORIDE 2 MG: 1 TABLET, FILM COATED ORAL at 20:49

## 2023-03-06 RX ADMIN — GUAIFENESIN 600 MG: 600 TABLET, EXTENDED RELEASE ORAL at 20:50

## 2023-03-06 RX ADMIN — LEVALBUTEROL HYDROCHLORIDE 1.25 MG: 1.25 SOLUTION, CONCENTRATE RESPIRATORY (INHALATION) at 09:17

## 2023-03-06 RX ADMIN — Medication 10 ML: at 14:00

## 2023-03-06 RX ADMIN — MONTELUKAST 10 MG: 10 TABLET, FILM COATED ORAL at 20:50

## 2023-03-06 RX ADMIN — FLECAINIDE ACETATE 100 MG: 100 TABLET ORAL at 13:59

## 2023-03-06 RX ADMIN — SODIUM CHLORIDE 40 MG: 9 INJECTION INTRAMUSCULAR; INTRAVENOUS; SUBCUTANEOUS at 05:24

## 2023-03-06 RX ADMIN — METOCLOPRAMIDE 10 MG: 5 INJECTION, SOLUTION INTRAMUSCULAR; INTRAVENOUS at 20:49

## 2023-03-06 RX ADMIN — METOPROLOL TARTRATE 100 MG: 50 TABLET, FILM COATED ORAL at 09:19

## 2023-03-06 RX ADMIN — ASPIRIN 81 MG: 81 TABLET, CHEWABLE ORAL at 09:19

## 2023-03-06 RX ADMIN — LEVALBUTEROL HYDROCHLORIDE 1.25 MG: 1.25 SOLUTION, CONCENTRATE RESPIRATORY (INHALATION) at 18:08

## 2023-03-06 RX ADMIN — APIXABAN 5 MG: 5 TABLET, FILM COATED ORAL at 13:59

## 2023-03-06 RX ADMIN — LEVALBUTEROL HYDROCHLORIDE 1.25 MG: 1.25 SOLUTION, CONCENTRATE RESPIRATORY (INHALATION) at 05:11

## 2023-03-06 RX ADMIN — GUAIFENESIN 600 MG: 600 TABLET, EXTENDED RELEASE ORAL at 09:20

## 2023-03-06 RX ADMIN — Medication 10 ML: at 20:50

## 2023-03-06 RX ADMIN — FLECAINIDE ACETATE 100 MG: 100 TABLET ORAL at 20:50

## 2023-03-06 RX ADMIN — AZITHROMYCIN MONOHYDRATE 500 MG: 250 TABLET ORAL at 09:19

## 2023-03-06 RX ADMIN — VENLAFAXINE HYDROCHLORIDE 75 MG: 37.5 CAPSULE, EXTENDED RELEASE ORAL at 09:19

## 2023-03-06 RX ADMIN — BUDESONIDE 500 MCG: 0.5 SUSPENSION RESPIRATORY (INHALATION) at 05:12

## 2023-03-06 RX ADMIN — BUDESONIDE 500 MCG: 0.5 SUSPENSION RESPIRATORY (INHALATION) at 18:08

## 2023-03-06 RX ADMIN — APIXABAN 5 MG: 5 TABLET, FILM COATED ORAL at 20:49

## 2023-03-06 RX ADMIN — INSULIN GLARGINE 6 UNITS: 100 INJECTION, SOLUTION SUBCUTANEOUS at 21:03

## 2023-03-06 ASSESSMENT — PAIN SCALES - WONG BAKER: WONGBAKER_NUMERICALRESPONSE: 0

## 2023-03-06 ASSESSMENT — PAIN SCALES - GENERAL
PAINLEVEL_OUTOF10: 0
PAINLEVEL_OUTOF10: 0

## 2023-03-06 NOTE — PROGRESS NOTES
Cardiology  Progress Note      SUBJECTIVE: Feels much better this morning. No complaints.     Current Inpatient Medications  Current Facility-Administered Medications: apixaban (ELIQUIS) tablet 5 mg, 5 mg, Oral, BID  flecainide (TAMBOCOR) tablet 100 mg, 100 mg, Oral, 2 times per day  metoprolol tartrate (LOPRESSOR) tablet 100 mg, 100 mg, Oral, BID  [COMPLETED] amiodarone (CORDARONE) 150 mg in dextrose 5 % 100 mL bolus, 150 mg, IntraVENous, Once **FOLLOWED BY** amiodarone (CORDARONE) 450 mg in dextrose 5 % 250 mL infusion, 1 mg/min, IntraVENous, Continuous  furosemide (LASIX) injection 20 mg, 20 mg, IntraVENous, Daily  insulin glargine (LANTUS) injection vial 6 Units, 6 Units, SubCUTAneous, Nightly  magnesium sulfate 1000 mg in dextrose 5% 100 mL IVPB, 1,000 mg, IntraVENous, PRN  levalbuterol (XOPENEX) nebulizer solution 1.25 mg, 1.25 mg, Nebulization, 4x daily  sodium chloride nebulizer 0.9 % solution 3 mL, 3 mL, Nebulization, Q8H PRN  pantoprazole (PROTONIX) 40 mg in sodium chloride (PF) 0.9 % 10 mL injection, 40 mg, IntraVENous, Q12H  predniSONE (DELTASONE) tablet 30 mg, 30 mg, Oral, Daily  azithromycin (ZITHROMAX) tablet 500 mg, 500 mg, Oral, Daily  metoclopramide (REGLAN) injection 10 mg, 10 mg, IntraVENous, BID  guaiFENesin (MUCINEX) extended release tablet 600 mg, 600 mg, Oral, BID  diphenhydrAMINE (BENADRYL) tablet 25 mg, 25 mg, Oral, Q6H PRN  insulin lispro (HUMALOG) injection vial 0-8 Units, 0-8 Units, SubCUTAneous, TID WC  insulin lispro (HUMALOG) injection vial 0-4 Units, 0-4 Units, SubCUTAneous, Nightly  budesonide (PULMICORT) nebulizer suspension 500 mcg, 0.5 mg, Nebulization, BID  perflutren lipid microspheres (DEFINITY) injection 1.5 mL, 1.5 mL, IntraVENous, ONCE PRN  montelukast (SINGULAIR) tablet 10 mg, 10 mg, Oral, Nightly  glucose chewable tablet 16 g, 4 tablet, Oral, PRN  dextrose bolus 10% 125 mL, 125 mL, IntraVENous, PRN **OR** dextrose bolus 10% 250 mL, 250 mL, IntraVENous, PRN  glucagon (rDNA) injection 1 mg, 1 mg, SubCUTAneous, PRN  dextrose 10 % infusion, , IntraVENous, Continuous PRN  sodium chloride flush 0.9 % injection 5-40 mL, 5-40 mL, IntraVENous, 2 times per day  sodium chloride flush 0.9 % injection 5-40 mL, 5-40 mL, IntraVENous, PRN  0.9 % sodium chloride infusion, , IntraVENous, PRN  polyethylene glycol (GLYCOLAX) packet 17 g, 17 g, Oral, Daily PRN  acetaminophen (TYLENOL) tablet 650 mg, 650 mg, Oral, Q6H PRN **OR** acetaminophen (TYLENOL) suppository 650 mg, 650 mg, Rectal, Q6H PRN  rOPINIRole (REQUIP) tablet 2 mg, 2 mg, Oral, Nightly  HYDROcodone-acetaminophen (NORCO) 5-325 MG per tablet 1 tablet, 1 tablet, Oral, Q8H PRN  prochlorperazine (COMPAZINE) injection 5 mg, 5 mg, IntraVENous, Q8H PRN  venlafaxine (EFFEXOR XR) extended release capsule 75 mg, 75 mg, Oral, Daily  aspirin chewable tablet 81 mg, 81 mg, Oral, Daily      Physical  VITALS:  BP (!) 141/70   Pulse 72   Temp 97.9 °F (36.6 °C) (Oral)   Resp 20   Ht 5' (1.524 m)   Wt 218 lb (98.9 kg)   LMP  (LMP Unknown)   SpO2 96%   BMI 42.58 kg/m²   CURRENT TEMPERATURE:  Temp: 97.9 °F (36.6 °C)  CONSTITUTIONAL: No acute distress.  EYES: Vision is intact.  ENT: No sore throat.  No ear drainage.  NECK: No JVD.  BACK: Symmetric.  LUNGS:  diminished breath sounds left base  CARDIOVASCULAR:  normal S1 and S2, no S3, and no S4  ABDOMEN:  non-tender  NEUROLOGIC: No focal deficits.    EXTREMITIES: No edema cyanosis or clubbing.    DATA:      Cardiology Labs:  BMP:    Lab Results   Component Value Date/Time     03/05/2023 05:45 AM    K 3.1 03/05/2023 05:45 AM    K 4.6 02/12/2023 04:11 PM    CL 96 03/05/2023 05:45 AM    CO2 29 03/05/2023 05:45 AM    BUN 24 03/05/2023 05:45 AM     CBC:    Lab Results   Component Value Date/Time    WBC 17.4 03/05/2023 05:45 AM    RBC 4.92 03/05/2023 05:45 AM    HGB 14.2 03/05/2023 05:45 AM    HCT 45.6 03/05/2023 05:45 AM    MCV 92.7 03/05/2023 05:45 AM    RDW 14.0 03/05/2023 05:45 AM      03/05/2023 05:45 AM     PT/INR:  No results found for: PTINR  TROPONIN:  No components found for: TROP    ASSESSMENT      1. Shortness of breath. Please refer to consult. Appears to be more pulmonary related. Patient has problem with COPD from smoking in the past.  She appears also to have component of mild acute diastolic heart failure mostly related to hypertension and obesity. We will keep her on Lasix for now. She just had cardiac catheterization a year ago showing patent coronary vessels with normal left ventricular systolic function. Troponin levels are negative. She has no complaints of chest pain. Chest CT high-resolution indicated small pericardial effusion. Chest CTA showed no evidence of pulmonary embolism   Echocardiogram showing normal left ventricular systolic function with mild mitral stenosis but no evidence of any significant pericardial effusion. 2.new onset persistent atrial fibrillation. Patient was in sinus rhythm on admission. She went into atrial fibrillation with rapid ventricular response yesterday morning. She was started on amiodarone drip. She just converted to sinus rhythm early this morning. She was started on Eliquis yesterday. Interestingly the patient had history of TIA/mini stroke in the past and the etiology was not clear. It is possible patient had paroxysmal atrial fibrillation in the past that was not detected up till now. Recommend long-term anticoagulation therapy in view of all above. Patient said that she felt symptomatic yesterday with her atrial fibrillation with fatigue and palpitation with some worsening shortness of breath. She felt much better this morning after she converted to sinus rhythm. Will stop amiodarone. We will start patient on flecainide to help maintain sinus rhythm. I am trying to avoid amiodarone p.o. in view of her history of asthma.

## 2023-03-06 NOTE — PLAN OF CARE
Problem: Discharge Planning  Goal: Discharge to home or other facility with appropriate resources  3/3/3697 [id number] by Rachel Mcintosh RN  Outcome: Progressing     Problem: Pain  Goal: Verbalizes/displays adequate comfort level or baseline comfort level  2/3/1182 0687 by Rachel Mcintosh RN  Outcome: Progressing     Problem: Chronic Conditions and Co-morbidities  Goal: Patient's chronic conditions and co-morbidity symptoms are monitored and maintained or improved  7/0/3171 5302 by Rachel Mcintosh RN  Outcome: Progressing     Problem: ABCDS Injury Assessment  Goal: Absence of physical injury  1/5/9890 [id number] by Rachel Mcintosh RN  Outcome: Progressing

## 2023-03-06 NOTE — PROGRESS NOTES
Spoke with Dr. Omega Perez after pharmacy called to verify amiodarone rate. Dr. Omega Perez states he would like the amio to run at 33.3 ml for 24 hours. Dr. Omega Perez also wanted to verify patient is on anticoagulant, order for eliquis already in chart.

## 2023-03-06 NOTE — PROGRESS NOTES
Internal Medicine Progress Note    [facility]    Judie Pepper. Alisson Santiago., KELI Smith D.O., SACHI De Leon D.O. Cristopher Solar, MSN, APRN, NP-C  Mario Koenig. Shari Ellis, MSN, APRN-CNP     Primary Care Physician: Dominick Kumar DO   Admitting Physician:  Cande Ellington DO  Admission date and time: 3/1/2023 12:06 PM    Room:  [address] [place] [address]  Admitting diagnosis: Mild intermittent asthma with exacerbation [J45.21]  Acute decompensated heart failure ([facility] [address] [I50.9]  Dyspnea, unspecified type [R06.00]  Congestive heart failure, unspecified HF chronicity, unspecified heart failure type [facility]) [I50.9]    Patient Name: Florecita Quintero  MRN: [id number]    Date of Service: 3/6/2023     Subjective:  [place] is a 77 y.o. female who was seen and examined today,3/6/2023, at the bedside. [place] is resting comfortably at the bedside today. She is maintained on IV diuresis with additional de-escalation today. Amiodarone has been discontinued with transition to flecainide. Oral anticoagulation therapy has been instituted. Echocardiographic results have been reviewed. Discharge planning is underway and we discussed the high likelihood of discharge tomorrow. No family members were present. Review of System:   Constitutional:   Improving malaise and fatigue. HEENT:   Denies ear pain, sore throat, sinus or eye problems. Cardiovascular:   Developed atrial febrile rapid ventricular response  Respiratory:   Improving dyspnea. Gastrointestinal:   Denies nausea, vomiting, diarrhea, or constipation. Denies any abdominal pain. Tolerating a diet without complication. Genitourinary:    Denies any urgency, frequency, hematuria. Voiding  without difficulty. Extremities:   Denies lower extremity swelling, edema or cyanosis.    Neurology:    Denies any headache or focal neurological deficits, Denies generalized weakness or memory difficulty. Psch:   Denies being anxious or depressed. Musculoskeletal:    Denies  myalgias, joint complaints or back pain. Integumentary:   Denies any rashes, ulcers, or excoriations. Denies bruising. Hematologic/Lymphatic:  Denies bruising or bleeding. Physical Exam:  I/O this shift:  In: 220 [P.O.:220]  Out: -     Intake/Output Summary (Last 24 hours) at 3/6/2023 1248  Last data filed at 3/6/2023 1236  Gross per 24 hour   Intake 610 ml   Output --   Net 610 ml   I/O last 3 completed shifts: In: 1230 [P.O.:1230]  Out: 1200 [Urine:1200]  Patient Vitals for the past 96 hrs (Last 3 readings):   Weight   03/06/23 0530 218 lb (98.9 kg)   03/06/23 0529 207 lb 4.8 oz (94 kg)   03/04/23 1955 238 lb (108 kg)     Vital Signs:   Blood pressure (!) 141/70, pulse 72, temperature 97.9 °F (36.6 °C), temperature source Oral, resp. rate 20, height 5' (1.524 m), weight 218 lb (98.9 kg), SpO2 96 %, not currently breastfeeding. General appearance:  Alert, responsive, oriented to person, place, and time. Well preserved, alert, no distress. Head:  Normocephalic. No masses, lesions or tenderness. Eyes:  PERRLA. EOMI. Sclera clear. Buccal mucosa moist.  ENT:  Ears normal. Mucosa normal.  Neck:    Supple. Trachea midline. No thyromegaly. No JVD. No bruits. Heart:    Atrial fibrillation with current rate controlled. Mild murmur. Lungs:    Significantly better aeration throughout. Abdomen:   Soft. Non-tender. Non-distended. Bowel sounds positive. No organomegaly or masses. No pain on palpation. Obese  Extremities:    Peripheral pulses present. No peripheral edema. No ulcers. No cyanosis. No clubbing. Neurologic:    Alert x 3. No focal deficit. Cranial nerves grossly intact. No focal weakness. Psych:   Behavior is normal. Mood appears normal. Speech is not rapid and/or pressured. Musculoskeletal:   Spine ROM normal. Muscular strength intact. Gait not assessed.   Integumentary:  No rashes Skin normal color and texture.  Genitalia/Breast:  Deferred    Medication:  Scheduled Meds:   apixaban  5 mg Oral BID    flecainide  100 mg Oral 2 times per day    [START ON 3/7/2023] predniSONE  20 mg Oral Daily    [START ON 3/7/2023] pantoprazole  40 mg Oral QAM AC    metoprolol tartrate  100 mg Oral BID    furosemide  20 mg IntraVENous Daily    insulin glargine  6 Units SubCUTAneous Nightly    levalbuterol  1.25 mg Nebulization 4x daily    azithromycin  500 mg Oral Daily    metoclopramide  10 mg IntraVENous BID    guaiFENesin  600 mg Oral BID    insulin lispro  0-8 Units SubCUTAneous TID WC    insulin lispro  0-4 Units SubCUTAneous Nightly    budesonide  0.5 mg Nebulization BID    montelukast  10 mg Oral Nightly    sodium chloride flush  5-40 mL IntraVENous 2 times per day    rOPINIRole  2 mg Oral Nightly    venlafaxine  75 mg Oral Daily    aspirin  81 mg Oral Daily     Continuous Infusions:   dextrose      sodium chloride         Objective Data:  Recent Labs     03/04/23 0524 03/05/23  0545 03/06/23  0737   WBC 15.9* 17.4* 13.3*   RBC 4.46 4.92 4.37   HGB 13.3 14.2 13.0   HCT 41.4 45.6 42.0   MCV 92.8 92.7 96.1   MCH 29.8 28.9 29.7   MCHC 32.1 31.1* 31.0*   RDW 14.2 14.0 14.6   * 524* 385   MPV 10.8 10.5 10.8     Recent Labs     03/04/23 0524 03/05/23  0545 03/06/23  0737    136 138   K 3.8 3.1* 3.6   CL 97* 96* 99   CO2 27 29 27   BUN 25* 24* 28*   CREATININE 0.6 0.6 0.6   GLUCOSE 217* 142* 117*   CALCIUM 9.1 8.7 8.3*   PROT 6.8 6.5 6.0*   LABALBU 3.7 3.6 3.4*   BILITOT <0.2 0.2 0.2   ALKPHOS 92 89 76   AST 11 15 21   ALT 23 25 31     Lab Results   Component Value Date    TROPONINI <0.01 06/27/2019    TROPONINI <0.01 07/26/2018    TROPONINI <0.01 07/25/2018        Assessment:  Acute respiratory failure with hypoxia that is multifactorial in nature including an exacerbation of COPD superimposed by restrictive lung disease  New onset atrial fibrillation with RVR  History of CVA  Valvular heart  disease of mild mitral stenosis  History of seizure disorder  Spinal stenosis with restless leg syndrome  Gastroesophageal reflux disease  Hypertension  Anxiety and depression  Abnormal upper GI with reflux status post gastric bypass      Plan:   Gonsalez Pew continues to improve on a daily basis. IV diuresis will be de-escalated today with plans to transition to oral diuresis tomorrow. Her respiratory status is stabilizing. She has been transitioned off the amiodarone infusion and has been started on flecainide therapy as per the cardiovascular team.  Eliquis therapy is being employed. Chronic comorbidities are otherwise well controlled. She will become increasingly ambulatory today while utilizing the respiratory assistive devices. Plans will be for discharge home tomorrow. More than 50% of my  time was spent at the bedside counseling/coordinating care with the patient and/or family with face to face contact. This time was spent reviewing notes and laboratory data as well as instructing and counseling the patient. Time I spent with the family or surrogate(s) is included only if the patient was incapable of providing the necessary information or participating in medical decisions. I also discussed the differential diagnosis and all of the proposed management plans with the patient and individuals accompanying the patient.     Josiane Rodas DO, F.A.C.O.I.  3/6/2023  12:48 PM

## 2023-03-06 NOTE — PROGRESS NOTES
GENERAL SURGERY  DAILY PROGRESS NOTE  3/6/2023  Chief Complaint   Patient presents with    Shortness of Breath     Shortness of breath started 1 week ago seen at clinic Monday. Not getting better. Patient stated that the end of triage \"I just feel like I am going to die\"         Subjective:  No new issues. Having less pain. Still c/o nausea.     Objective:  BP (!) 141/70   Pulse 72   Temp 97.9 °F (36.6 °C) (Oral)   Resp 20   Ht 5' (1.524 m)   Wt 218 lb (98.9 kg)   LMP  (LMP Unknown)   SpO2 96%   BMI 42.58 kg/m²     GENERAL:  Laying in bed, awake, alert, cooperative, no apparent distress  HEAD: Normocephalic, atraumatic  EYES: No sclera icterus, pupils equal  LUNGS:  No increased work of breathing  CARDIOVASCULAR:  regular rate  ABDOMEN:  Soft, non-tender, non-distended  EXTREMITIES: No edema or swelling  SKIN: Warm and dry, no rashes or lesions    Assessment/Plan:  77 y.o. 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 Aydee Campos as tolerated  Dispo per Primary service    Electronically signed by Maxine Bueno DO on 3/6/2023 at 7:09 AM

## 2023-03-06 NOTE — PROGRESS NOTES
Pulmonary/Critical Care Progress Note    We are following patient for COPD exacerbation with asthmatic component, small bilateral pleural effusions (new since 12/2023), question diastolic dysfunction    SUBJECTIVE:  Patient is feeling somewhat better today but notes that she is still wheezing. She does stop smoking about a week ago. Patient CO SOB for the last few days, stable, worse with exertion, better with rest, associated occasional cough, wheezing, no (fever,chills,cp)    Daily progress note:  3/4TH:off any o2 supplementation. CXR with pleural effusions/CHF, EF 60%.  Wean to po steroids, DC roflumilast, decrease but continue diuresis, added lantus for BS control, follow up chest imaging for resolution, dispo plans per primary team, encouraged on continued cessation of tob use    3/5th:aflutter today, amio drip per cards, redued diuretics, replacing electrolytes, dispo plans per primary team, encouraged on continued cessation of tob use    3/6th:back in sinus to be switched to flecainide and added eliquis per cards re afib, cont negative I/O as able, replace lytes, decreased steroids today, dispo plans per primary    MEDICATIONS:   apixaban  5 mg Oral BID    flecainide  100 mg Oral 2 times per day    metoprolol tartrate  100 mg Oral BID    furosemide  20 mg IntraVENous Daily    insulin glargine  6 Units SubCUTAneous Nightly    levalbuterol  1.25 mg Nebulization 4x daily    pantoprazole (PROTONIX) 40 mg injection  40 mg IntraVENous Q12H    predniSONE  30 mg Oral Daily    azithromycin  500 mg Oral Daily    metoclopramide  10 mg IntraVENous BID    guaiFENesin  600 mg Oral BID    insulin lispro  0-8 Units SubCUTAneous TID     insulin lispro  0-4 Units SubCUTAneous Nightly    budesonide  0.5 mg Nebulization BID    montelukast  10 mg Oral Nightly    sodium chloride flush  5-40 mL IntraVENous 2 times per day    rOPINIRole  2 mg Oral Nightly    venlafaxine  75 mg Oral Daily    aspirin  81 mg Oral Daily      dextrose sodium chloride       magnesium sulfate, sodium chloride nebulizer, diphenhydrAMINE, perflutren lipid microspheres, glucose, dextrose bolus **OR** dextrose bolus, glucagon (rDNA), dextrose, sodium chloride flush, sodium chloride, polyethylene glycol, acetaminophen **OR** acetaminophen, HYDROcodone 5 mg - acetaminophen, prochlorperazine      REVIEW OF SYSTEMS:  All other 10 systems reviewed and OW negative      OBJECTIVE:  Vitals:    03/06/23 0515   BP: (!) 141/70   Pulse: 72   Resp: 20   Temp: 97.9 °F (36.6 °C)   SpO2: 96%           O2 Device: None (Room air)    PHYSICAL EXAM:  Constitutional: No fever, chills, diaphoresis  Skin: Skin rash, no skin breakdown  HEENT: Mucous membranes are moist  Neck: +JVD, no lymphadenopathy, thyromegaly  Cardiovascular: S1, S2 regular. No S3 or rubs present  Respiratory: Expiratory wheezes over both posterior lung fields with, bibasilar crackles  Gastrointestinal: Left, obese, nontender  Genitourinary: No CVA tenderness  Extremities: No clubbing, cyanosis, or edema  Neurological: Awake, alert, oriented x3. No evidence of focal motor or sensory deficits  Psychological: In good spirits.   Appropriate affect    LABS:  WBC   Date Value Ref Range Status   03/06/2023 13.3 (H) 4.5 - 11.5 E9/L Final   03/05/2023 17.4 (H) 4.5 - 11.5 E9/L Final   03/04/2023 15.9 (H) 4.5 - 11.5 E9/L Final     Hemoglobin   Date Value Ref Range Status   03/06/2023 13.0 11.5 - 15.5 g/dL Final   03/05/2023 14.2 11.5 - 15.5 g/dL Final   03/04/2023 13.3 11.5 - 15.5 g/dL Final     Hematocrit   Date Value Ref Range Status   03/06/2023 42.0 34.0 - 48.0 % Final   03/05/2023 45.6 34.0 - 48.0 % Final   03/04/2023 41.4 34.0 - 48.0 % Final     MCV   Date Value Ref Range Status   03/06/2023 96.1 80.0 - 99.9 fL Final   03/05/2023 92.7 80.0 - 99.9 fL Final   03/04/2023 92.8 80.0 - 99.9 fL Final     Platelets   Date Value Ref Range Status   03/06/2023 385 130 - 450 E9/L Final   03/05/2023 524 (H) 130 - 450 E9/L Final 03/04/2023 480 (H) 130 - 450 E9/L Final     Sodium   Date Value Ref Range Status   03/06/2023 138 132 - 146 mmol/L Final   03/05/2023 136 132 - 146 mmol/L Final   03/04/2023 139 132 - 146 mmol/L Final     Potassium   Date Value Ref Range Status   03/06/2023 3.6 3.5 - 5.0 mmol/L Final   03/05/2023 3.1 (L) 3.5 - 5.0 mmol/L Final   03/04/2023 3.8 3.5 - 5.0 mmol/L Final     Potassium reflex Magnesium   Date Value Ref Range Status   02/12/2023 4.6 3.5 - 5.0 mmol/L Final   10/19/2022 4.8 3.5 - 5.0 mmol/L Final   03/16/2022 3.8 3.5 - 5.0 mmol/L Final     Chloride   Date Value Ref Range Status   03/06/2023 99 98 - 107 mmol/L Final   03/05/2023 96 (L) 98 - 107 mmol/L Final   03/04/2023 97 (L) 98 - 107 mmol/L Final     CO2   Date Value Ref Range Status   03/06/2023 27 22 - 29 mmol/L Final   03/05/2023 29 22 - 29 mmol/L Final   03/04/2023 27 22 - 29 mmol/L Final     BUN   Date Value Ref Range Status   03/06/2023 28 (H) 6 - 23 mg/dL Final   03/05/2023 24 (H) 6 - 23 mg/dL Final   03/04/2023 25 (H) 6 - 23 mg/dL Final     Creatinine   Date Value Ref Range Status   03/06/2023 0.6 0.5 - 1.0 mg/dL Final   03/05/2023 0.6 0.5 - 1.0 mg/dL Final   03/04/2023 0.6 0.5 - 1.0 mg/dL Final     Glucose   Date Value Ref Range Status   03/06/2023 117 (H) 74 - 99 mg/dL Final   03/05/2023 142 (H) 74 - 99 mg/dL Final   03/04/2023 217 (H) 74 - 99 mg/dL Final   03/06/2012 92 70 - 110 mg/dL Final   11/21/2011 86 70 - 110 mg/dL Final   06/10/2011 66 (L) 70 - 110 mg/dL Final     Calcium   Date Value Ref Range Status   03/06/2023 8.3 (L) 8.6 - 10.2 mg/dL Final   03/05/2023 8.7 8.6 - 10.2 mg/dL Final   03/04/2023 9.1 8.6 - 10.2 mg/dL Final     Total Protein   Date Value Ref Range Status   03/06/2023 6.0 (L) 6.4 - 8.3 g/dL Final   03/05/2023 6.5 6.4 - 8.3 g/dL Final   03/04/2023 6.8 6.4 - 8.3 g/dL Final     Albumin   Date Value Ref Range Status   03/06/2023 3.4 (L) 3.5 - 5.2 g/dL Final   03/05/2023 3.6 3.5 - 5.2 g/dL Final   03/04/2023 3.7 3.5 - 5.2 g/dL Final   03/06/2012 4.6 3.2 - 4.8 g/dL Final   11/21/2011 4.5 3.2 - 4.8 g/dL Final   06/10/2011 4.6 3.2 - 4.8 g/dL Final     Total Bilirubin   Date Value Ref Range Status   03/06/2023 0.2 0.0 - 1.2 mg/dL Final   03/05/2023 0.2 0.0 - 1.2 mg/dL Final   03/04/2023 <0.2 0.0 - 1.2 mg/dL Final     Alkaline Phosphatase   Date Value Ref Range Status   03/06/2023 76 35 - 104 U/L Final   03/05/2023 89 35 - 104 U/L Final   03/04/2023 92 35 - 104 U/L Final     AST   Date Value Ref Range Status   03/06/2023 21 0 - 31 U/L Final   03/05/2023 15 0 - 31 U/L Final   03/04/2023 11 0 - 31 U/L Final     ALT   Date Value Ref Range Status   03/06/2023 31 0 - 32 U/L Final   03/05/2023 25 0 - 32 U/L Final   03/04/2023 23 0 - 32 U/L Final     Est, Glom Filt Rate   Date Value Ref Range Status   03/06/2023 >60 >=60 mL/min/1.73 Final     Comment:     Pediatric calculator link  [email or web address]  Effective Oct 3, 2022  These results are not intended for use in patients  <25years of age. eGFR results are calculated without  a race factor using the 2021 CKD-EPI equation. Careful  clinical correlation is recommended, particularly when  comparing to results calculated using previous equations. The CKD-EPI equation is less accurate in patients with  extremes of muscle mass, extra-renal metabolism of  creatinine, excessive creatinine ingestion, or following  therapy that affects renal tubular secretion. 03/05/2023 >60 >=60 mL/min/1.73 Final     Comment:     Pediatric calculator link  [email or web address]_[email or web address]  Effective Oct 3, 2022  These results are not intended for use in patients  <25years of age. eGFR results are calculated without  a race factor using the 2021 CKD-EPI equation. Careful  clinical correlation is recommended, particularly when  comparing to results calculated using previous equations.   The CKD-EPI equation is less accurate in patients with  extremes of muscle mass, extra-renal metabolism of  creatinine, excessive creatinine ingestion, or following  therapy that affects renal tubular secretion. 03/04/2023 >60 >=60 mL/min/1.73 Final     Comment:     Pediatric calculator link  [email or web address]_[email or web address]  Effective Oct 3, 2022  These results are not intended for use in patients  <25years of age. eGFR results are calculated without  a race factor using the 2021 CKD-EPI equation. Careful  clinical correlation is recommended, particularly when  comparing to results calculated using previous equations. The CKD-EPI equation is less accurate in patients with  extremes of muscle mass, extra-renal metabolism of  creatinine, excessive creatinine ingestion, or following  therapy that affects renal tubular secretion. GFR    Date Value Ref Range Status   03/17/2022 >60  Final   03/16/2022 >60  Final   02/11/2022 >60  Final     Magnesium   Date Value Ref Range Status   03/05/2023 2.6 1.6 - 2.6 mg/dL Final   03/05/2023 2.7 (H) 1.6 - 2.6 mg/dL Final   03/02/2023 2.1 1.6 - 2.6 mg/dL Final     Phosphorus   Date Value Ref Range Status   03/02/2023 3.8 2.5 - 4.5 mg/dL Final   03/17/2022 3.8 2.5 - 4.5 mg/dL Final   11/19/2018 2.6 2.5 - 4.5 mg/dL Final     No results for input(s): PH, PO2, PCO2, HCO3, BE, O2SAT in the last 72 hours. RADIOLOGY:  XR CHEST PORTABLE   Final Result   Persistent but improving atelectasis/infiltrates and pleural effusions in the   lung bases left more than right. FL ESOPHAGRAM   Final Result   There appears to be contrast both within the gastric pouch and the native   stomach. It is difficult to evaluate the stomach due to the overlapping of   the gastric bypass changes.   It is recommended that the patient undergo a CT   scan of the abdomen approximately 20 minutes after initial ingestion of oral   contrast and medially following additional contrast administered just before   the patient has the CT scan. I cannot on wrap old the stomach on the   dedicated esophagram.         CTA PULMONARY W CONTRAST   Final Result   1. Moderate pleural and pericardial effusions   2. No acute pulmonary embolus identified         CT CHEST HIGH RESOLUTION   Final Result   Four-chamber cardiomegaly with valvular calcifications and small volume   pericardial effusion as well as questionable stranding of the pericardium   could represent acute inflammation. Concern for pericarditis without   calcifications. Smooth interlobular septal thickening with lower lobe   predominance and mild decompensation with small bilateral pleural effusions. XR CHEST (2 VW)   Final Result   CHF. Superimposed bibasilar pneumonia cannot be excluded. PROBLEM LIST:  Principal Problem:    Acute decompensated heart failure ([address]  Resolved Problems:    * No resolved hospital problems.  *      IMPRESSION:  COPD with exacerbation primarily with an asthmatic component  Acute HFPEF/ Diastolic dysfunction  Dm uncontrolled with hyperglycemia  Atelectasis   Small bilateral pleural effusions  Possible underlying obstructive sleep apnea  Atrial flutter    PLAN:  Continue  steroids   Continue IV azithromycin  Diurese,decreased to 20 daily, monitor RFP  Follow up chest imaging  Monitor labs, especially potassium and watch for contraction alkalosis while on Lasix  Cards recs re amio drip  Dispo plans per primary team        Electronically signed by Emelina Velez MD on 3/6/2023 at 11:03 AM

## 2023-03-06 NOTE — CARE COORDINATION
3/6/2023 1220 CM met with pt and  at the bedside for transition of care needs at d/c. Pt plan is to return home with her  as prior. Pt is being discharged home on Eliquis, SRI reviewed and discussed 30 day free trial coupon but didn't provide her one because she wants her meds filled through meds to beds. CM also initiated anticoagulation teaching pt verbalized understanding. Per outpt pharmacy pt's co pay  after the first 30 day free trial will be $38.70 and pt is agreeable to cost.  No other home going needs identified. Pt's  will provide transportation home. CM will follow.  Electronically signed by Burgess Estefany RN on 3/6/2023 at 12:48 PM

## 2023-03-07 VITALS
SYSTOLIC BLOOD PRESSURE: 127 MMHG | BODY MASS INDEX: 40.44 KG/M2 | TEMPERATURE: 97.7 F | HEART RATE: 65 BPM | WEIGHT: 206 LBS | RESPIRATION RATE: 18 BRPM | DIASTOLIC BLOOD PRESSURE: 82 MMHG | HEIGHT: 60 IN | OXYGEN SATURATION: 96 %

## 2023-03-07 LAB
ALBUMIN SERPL-MCNC: 3.5 G/DL (ref 3.5–5.2)
ALP BLD-CCNC: 81 U/L (ref 35–104)
ALT SERPL-CCNC: 46 U/L (ref 0–32)
ANION GAP SERPL CALCULATED.3IONS-SCNC: 9 MMOL/L (ref 7–16)
AST SERPL-CCNC: 32 U/L (ref 0–31)
BASOPHILS ABSOLUTE: 0.07 E9/L (ref 0–0.2)
BASOPHILS RELATIVE PERCENT: 0.5 % (ref 0–2)
BILIRUB SERPL-MCNC: 0.3 MG/DL (ref 0–1.2)
BLOOD CULTURE, ROUTINE: NORMAL
BUN BLDV-MCNC: 22 MG/DL (ref 6–23)
CALCIUM SERPL-MCNC: 8.7 MG/DL (ref 8.6–10.2)
CHLORIDE BLD-SCNC: 98 MMOL/L (ref 98–107)
CO2: 29 MMOL/L (ref 22–29)
CREAT SERPL-MCNC: 0.7 MG/DL (ref 0.5–1)
CULTURE, BLOOD 2: NORMAL
EOSINOPHILS ABSOLUTE: 0.12 E9/L (ref 0.05–0.5)
EOSINOPHILS RELATIVE PERCENT: 0.8 % (ref 0–6)
GFR SERPL CREATININE-BSD FRML MDRD: >60 ML/MIN/1.73
GLUCOSE BLD-MCNC: 123 MG/DL (ref 74–99)
HCT VFR BLD CALC: 45.7 % (ref 34–48)
HEMOGLOBIN: 13.8 G/DL (ref 11.5–15.5)
IMMATURE GRANULOCYTES #: 0.39 E9/L
IMMATURE GRANULOCYTES %: 2.6 % (ref 0–5)
LYMPHOCYTES ABSOLUTE: 3.2 E9/L (ref 1.5–4)
LYMPHOCYTES RELATIVE PERCENT: 21.1 % (ref 20–42)
MCH RBC QN AUTO: 28.5 PG (ref 26–35)
MCHC RBC AUTO-ENTMCNC: 30.2 % (ref 32–34.5)
MCV RBC AUTO: 94.2 FL (ref 80–99.9)
METER GLUCOSE: 106 MG/DL (ref 74–99)
METER GLUCOSE: 161 MG/DL (ref 74–99)
MONOCYTES ABSOLUTE: 1.15 E9/L (ref 0.1–0.95)
MONOCYTES RELATIVE PERCENT: 7.6 % (ref 2–12)
NEUTROPHILS ABSOLUTE: 10.23 E9/L (ref 1.8–7.3)
NEUTROPHILS RELATIVE PERCENT: 67.4 % (ref 43–80)
PDW BLD-RTO: 14.1 FL (ref 11.5–15)
PLATELET # BLD: 456 E9/L (ref 130–450)
PMV BLD AUTO: 10.6 FL (ref 7–12)
POTASSIUM SERPL-SCNC: 3.6 MMOL/L (ref 3.5–5)
RBC # BLD: 4.85 E12/L (ref 3.5–5.5)
SODIUM BLD-SCNC: 136 MMOL/L (ref 132–146)
TOTAL PROTEIN: 6.2 G/DL (ref 6.4–8.3)
WBC # BLD: 15.2 E9/L (ref 4.5–11.5)

## 2023-03-07 PROCEDURE — 82962 GLUCOSE BLOOD TEST: CPT

## 2023-03-07 PROCEDURE — 2580000003 HC RX 258: Performed by: INTERNAL MEDICINE

## 2023-03-07 PROCEDURE — 6360000002 HC RX W HCPCS: Performed by: INTERNAL MEDICINE

## 2023-03-07 PROCEDURE — 80053 COMPREHEN METABOLIC PANEL: CPT

## 2023-03-07 PROCEDURE — 85025 COMPLETE CBC W/AUTO DIFF WBC: CPT

## 2023-03-07 PROCEDURE — 6370000000 HC RX 637 (ALT 250 FOR IP): Performed by: INTERNAL MEDICINE

## 2023-03-07 PROCEDURE — 94640 AIRWAY INHALATION TREATMENT: CPT

## 2023-03-07 PROCEDURE — 36415 COLL VENOUS BLD VENIPUNCTURE: CPT

## 2023-03-07 PROCEDURE — 94669 MECHANICAL CHEST WALL OSCILL: CPT

## 2023-03-07 PROCEDURE — 6370000000 HC RX 637 (ALT 250 FOR IP): Performed by: SPECIALIST

## 2023-03-07 RX ORDER — FLECAINIDE ACETATE 100 MG/1
100 TABLET ORAL EVERY 12 HOURS SCHEDULED
Qty: 60 TABLET | Refills: 3 | Status: SHIPPED | OUTPATIENT
Start: 2023-03-07

## 2023-03-07 RX ORDER — FUROSEMIDE 40 MG/1
40 TABLET ORAL DAILY
Status: DISCONTINUED | OUTPATIENT
Start: 2023-03-08 | End: 2023-03-07 | Stop reason: HOSPADM

## 2023-03-07 RX ORDER — POTASSIUM CHLORIDE 750 MG/1
10 TABLET, EXTENDED RELEASE ORAL DAILY
Qty: 30 TABLET | Refills: 0 | Status: SHIPPED | OUTPATIENT
Start: 2023-03-07 | End: 2023-04-06

## 2023-03-07 RX ORDER — GUAIFENESIN 600 MG/1
600 TABLET, EXTENDED RELEASE ORAL 2 TIMES DAILY
Qty: 60 TABLET | Refills: 0 | Status: SHIPPED | OUTPATIENT
Start: 2023-03-07 | End: 2023-04-06

## 2023-03-07 RX ORDER — METOPROLOL TARTRATE 100 MG/1
100 TABLET ORAL 2 TIMES DAILY
Qty: 60 TABLET | Refills: 3 | Status: SHIPPED | OUTPATIENT
Start: 2023-03-07

## 2023-03-07 RX ORDER — FUROSEMIDE 40 MG/1
40 TABLET ORAL DAILY
Qty: 60 TABLET | Refills: 3 | Status: SHIPPED | OUTPATIENT
Start: 2023-03-08

## 2023-03-07 RX ADMIN — PREDNISONE 20 MG: 20 TABLET ORAL at 08:05

## 2023-03-07 RX ADMIN — FUROSEMIDE 20 MG: 10 INJECTION, SOLUTION INTRAMUSCULAR; INTRAVENOUS at 08:02

## 2023-03-07 RX ADMIN — LEVALBUTEROL HYDROCHLORIDE 1.25 MG: 1.25 SOLUTION, CONCENTRATE RESPIRATORY (INHALATION) at 06:42

## 2023-03-07 RX ADMIN — PANTOPRAZOLE SODIUM 40 MG: 40 TABLET, DELAYED RELEASE ORAL at 05:35

## 2023-03-07 RX ADMIN — VENLAFAXINE HYDROCHLORIDE 75 MG: 37.5 CAPSULE, EXTENDED RELEASE ORAL at 08:01

## 2023-03-07 RX ADMIN — GUAIFENESIN 600 MG: 600 TABLET, EXTENDED RELEASE ORAL at 08:01

## 2023-03-07 RX ADMIN — METOPROLOL TARTRATE 100 MG: 50 TABLET, FILM COATED ORAL at 08:02

## 2023-03-07 RX ADMIN — APIXABAN 5 MG: 5 TABLET, FILM COATED ORAL at 08:02

## 2023-03-07 RX ADMIN — LEVALBUTEROL HYDROCHLORIDE 1.25 MG: 1.25 SOLUTION, CONCENTRATE RESPIRATORY (INHALATION) at 09:23

## 2023-03-07 RX ADMIN — ASPIRIN 81 MG: 81 TABLET, CHEWABLE ORAL at 08:02

## 2023-03-07 RX ADMIN — METOCLOPRAMIDE 10 MG: 5 INJECTION, SOLUTION INTRAMUSCULAR; INTRAVENOUS at 08:02

## 2023-03-07 RX ADMIN — Medication 10 ML: at 08:09

## 2023-03-07 RX ADMIN — FLECAINIDE ACETATE 100 MG: 100 TABLET ORAL at 08:05

## 2023-03-07 RX ADMIN — BUDESONIDE 500 MCG: 0.5 SUSPENSION RESPIRATORY (INHALATION) at 06:42

## 2023-03-07 RX ADMIN — PROCHLORPERAZINE EDISYLATE 5 MG: 5 INJECTION INTRAMUSCULAR; INTRAVENOUS at 08:09

## 2023-03-07 RX ADMIN — AZITHROMYCIN MONOHYDRATE 500 MG: 250 TABLET ORAL at 08:01

## 2023-03-07 ASSESSMENT — PAIN SCALES - GENERAL
PAINLEVEL_OUTOF10: 0
PAINLEVEL_OUTOF10: 0

## 2023-03-07 ASSESSMENT — PULMONARY FUNCTION TESTS: PEFR_L/MIN: 160

## 2023-03-07 NOTE — PROGRESS NOTES
Cardiology  Progress Note      SUBJECTIVE:  No chest pain. No dyspnea. Feels good this morning. She wants to go home.     Current Inpatient Medications  Current Facility-Administered Medications: apixaban (ELIQUIS) tablet 5 mg, 5 mg, Oral, BID  flecainide (TAMBOCOR) tablet 100 mg, 100 mg, Oral, 2 times per day  predniSONE (DELTASONE) tablet 20 mg, 20 mg, Oral, Daily  pantoprazole (PROTONIX) tablet 40 mg, 40 mg, Oral, QAM AC  metoprolol tartrate (LOPRESSOR) tablet 100 mg, 100 mg, Oral, BID  furosemide (LASIX) injection 20 mg, 20 mg, IntraVENous, Daily  insulin glargine (LANTUS) injection vial 6 Units, 6 Units, SubCUTAneous, Nightly  magnesium sulfate 1000 mg in dextrose 5% 100 mL IVPB, 1,000 mg, IntraVENous, PRN  levalbuterol (XOPENEX) nebulizer solution 1.25 mg, 1.25 mg, Nebulization, 4x daily  sodium chloride nebulizer 0.9 % solution 3 mL, 3 mL, Nebulization, Q8H PRN  azithromycin (ZITHROMAX) tablet 500 mg, 500 mg, Oral, Daily  metoclopramide (REGLAN) injection 10 mg, 10 mg, IntraVENous, BID  guaiFENesin (MUCINEX) extended release tablet 600 mg, 600 mg, Oral, BID  diphenhydrAMINE (BENADRYL) tablet 25 mg, 25 mg, Oral, Q6H PRN  insulin lispro (HUMALOG) injection vial 0-8 Units, 0-8 Units, SubCUTAneous, TID WC  insulin lispro (HUMALOG) injection vial 0-4 Units, 0-4 Units, SubCUTAneous, Nightly  budesonide (PULMICORT) nebulizer suspension 500 mcg, 0.5 mg, Nebulization, BID  perflutren lipid microspheres (DEFINITY) injection 1.5 mL, 1.5 mL, IntraVENous, ONCE PRN  montelukast (SINGULAIR) tablet 10 mg, 10 mg, Oral, Nightly  glucose chewable tablet 16 g, 4 tablet, Oral, PRN  dextrose bolus 10% 125 mL, 125 mL, IntraVENous, PRN **OR** dextrose bolus 10% 250 mL, 250 mL, IntraVENous, PRN  glucagon (rDNA) injection 1 mg, 1 mg, SubCUTAneous, PRN  dextrose 10 % infusion, , IntraVENous, Continuous PRN  sodium chloride flush 0.9 % injection 5-40 mL, 5-40 mL, IntraVENous, 2 times per day  sodium chloride flush 0.9 % injection 5-40 mL, 5-40 mL, IntraVENous, PRN  0.9 % sodium chloride infusion, , IntraVENous, PRN  polyethylene glycol (GLYCOLAX) packet 17 g, 17 g, Oral, Daily PRN  acetaminophen (TYLENOL) tablet 650 mg, 650 mg, Oral, Q6H PRN **OR** acetaminophen (TYLENOL) suppository 650 mg, 650 mg, Rectal, Q6H PRN  rOPINIRole (REQUIP) tablet 2 mg, 2 mg, Oral, Nightly  HYDROcodone-acetaminophen (NORCO) 5-325 MG per tablet 1 tablet, 1 tablet, Oral, Q8H PRN  prochlorperazine (COMPAZINE) injection 5 mg, 5 mg, IntraVENous, Q8H PRN  venlafaxine (EFFEXOR XR) extended release capsule 75 mg, 75 mg, Oral, Daily  aspirin chewable tablet 81 mg, 81 mg, Oral, Daily      Physical  VITALS:  /82   Pulse 65   Temp 97.7 °F (36.5 °C) (Oral)   Resp 18   Ht 5' (1.524 m)   Wt 206 lb (93.4 kg)   LMP  (LMP Unknown)   SpO2 96%   BMI 40.23 kg/m²   CURRENT TEMPERATURE:  Temp: 97.7 °F (36.5 °C)  CONSTITUTIONAL: No acute distress. EYES: Vision is intact. ENT: No sore throat. No ear drainage. NECK: No JVD. BACK: Symmetric. LUNGS:  diminished breath sounds right base and left base  CARDIOVASCULAR:  normal S1 and S2, no S3, and no S4  ABDOMEN:  non-tender  NEUROLOGIC: No focal deficits. EXTREMITIES: No edema cyanosis or clubbing. DATA:        Cardiology Labs:  BMP:    Lab Results   Component Value Date/Time     03/06/2023 07:37 AM    K 3.6 03/06/2023 07:37 AM    K 4.6 02/12/2023 04:11 PM    CL 99 03/06/2023 07:37 AM    CO2 27 03/06/2023 07:37 AM    BUN 28 03/06/2023 07:37 AM     CBC:    Lab Results   Component Value Date/Time    WBC 13.3 03/06/2023 07:37 AM    RBC 4.37 03/06/2023 07:37 AM    HGB 13.0 03/06/2023 07:37 AM    HCT 42.0 03/06/2023 07:37 AM    MCV 96.1 03/06/2023 07:37 AM    RDW 14.6 03/06/2023 07:37 AM     03/06/2023 07:37 AM     PT/INR:  No results found for: PTINR  TROPONIN:  No components found for: TROP    ASSESSMENT      1. Shortness of breath. Please refer to consult. Appears to be more pulmonary related.   Patient has problem with COPD from smoking in the past.  She appears also to have component of mild acute diastolic heart failure mostly related to hypertension and obesity. We will keep her on Lasix for now. She just had cardiac catheterization a year ago showing patent coronary vessels with normal left ventricular systolic function. Troponin levels are negative. She has no complaints of chest pain. Chest CT high-resolution indicated small pericardial effusion. Chest CTA showed no evidence of pulmonary embolism   Echocardiogram is showing normal left ventricular systolic function with mild mitral stenosis but no evidence of any significant pericardial effusion. 2.new onset persistent atrial fibrillation. Patient was in sinus rhythm on admission. She went into atrial fibrillation with rapid ventricular response over the weekend. She was started on amiodarone drip. She just converted to sinus rhythm early next morning. She was started on Eliquis . Interestingly the patient had history of TIA/mini stroke in the past and the etiology was not clear. It is possible patient had paroxysmal atrial fibrillation in the past that was not detected up till now. Recommend long-term anticoagulation therapy in view of all above. Patient said that she felt symptomatic amiodarone was stopped. With her atrial fibrillation with fatigue and palpitation with some worsening shortness of breath. She felt much better this morning after she converted to sinus rhythm. Will stop amiodarone. Patient was started on flecainide to help maintain sinus rhythm. I am trying to avoid amiodarone p.o. in view of her history of asthma. Patient was instructed to call my office for follow-up in 2 to 3 weeks.

## 2023-03-07 NOTE — CARE COORDINATION
3/7/2023 0930 CM for transition of care needs at d/c.  Plan is for pt to return home with her  as prior. Pt is being discharged home on Eliquis, CM reviewed and discussed 30 day free trial coupon but didn't provide her one because she wants her meds filled through meds to beds. CM also initiated anticoagulation teaching pt verbalized understanding. Per outpt pharmacy pt's co pay  after the first 30 day free trial will be $38.70 and pt is agreeable to cost.  No other home going needs identified. Pt's  will provide transportation home. CM will follow.  Electronically signed by Nichol Mcdaniels RN on 3/7/2023 at 9:37 AM

## 2023-03-07 NOTE — DISCHARGE SUMMARY
Internal Medicine Progress Note     [facility]     Radha Thomas. JOHNNY UmañaASanketCSanketOSanketI. Ha Liu D.O., SARAOCRIS Rueda D.O. Shaina Nickerson, MSN, APRN, NP-C  Pushpa Palacios. Chloe Rothman, MSN, APRN-CNP       Internal Medicine  Discharge Summary    NAME: Jaiden Sánchez  :  1955  MRN:  [id number]  PCP:Diego Pritchard DO  ADMITTED: 3/1/2023      DISCHARGED: 3/7/23    ADMITTING PHYSICIAN: Radha Davila DO    CONSULTANT(S):   IP CONSULT TO INTERNAL MEDICINE  IP CONSULT TO CARDIOLOGY  IP CONSULT TO PULMONOLOGY  IP CONSULT TO HEART FAILURE NURSE/COORDINATOR  IP CONSULT TO DIETITIAN  IP CONSULT TO GENERAL SURGERY     ADMITTING DIAGNOSIS:   Mild intermittent asthma with exacerbation [J45.21]  Acute decompensated heart failure ([facility] [address] [I50.9]  Dyspnea, unspecified type [R06.00]  Congestive heart failure, unspecified HF chronicity, unspecified heart failure type ([facility] [I50.9]     DISCHARGE DIAGNOSES:   Acute respiratory failure with hypoxia that is multifactorial in nature including an exacerbation of COPD superimposed by restrictive lung disease  New onset atrial fibrillation with RVR  History of CVA  Valvular heart disease of mild mitral stenosis  History of seizure disorder  Spinal stenosis with restless leg syndrome  Gastroesophageal reflux disease  Hypertension  Anxiety and depression  Abnormal upper GI with reflux status post gastric bypass    BRIEF HISTORY OF PRESENT ILLNESS:   Patient is 77-year-old female who presented to the ED due to shortness of breath for the last few weeks. She has associated wheezing and productive cough. She denies any fever or chills. She has been treated with IV steroids as an outpatient. States that this did help.     LABS[de-identified]  Lab Results   Component Value Date    WBC 15.2 (H) 2023    HGB 13.8 2023    HCT 45.7 2023     (H) 2023     2023    K 3.6 2023    CL 98 2023 CREATININE 0.7 03/07/2023    BUN 22 03/07/2023    CO2 29 03/07/2023    GLUCOSE 123 (H) 03/07/2023    ALT 46 (H) 03/07/2023    AST 32 (H) 03/07/2023    INR 1.1 10/19/2022     Lab Results   Component Value Date    INR 1.1 10/19/2022    INR 1.0 03/16/2022    INR 1.0 11/03/2021    PROTIME 12.3 10/19/2022    PROTIME 11.4 03/16/2022    PROTIME 11.9 11/03/2021      Lab Results   Component Value Date    TSH 1.820 03/01/2023     Lab Results   Component Value Date    TRIG 101 06/10/2022    TRIG 92 03/17/2022    TRIG 145 02/11/2022     Lab Results   Component Value Date    HDL 58 06/10/2022    HDL 41 03/17/2022    HDL 58 02/11/2022     Lab Results   Component Value Date    LDLCALC 63 06/10/2022    LDLCALC 64 03/17/2022    LDLCALC 90 02/11/2022     Lab Results   Component Value Date    LABA1C 6.8 (H) 03/05/2023       IMAGING:  XR CHEST (2 VW)    Result Date: 3/1/2023  EXAMINATION: TWO XRAY VIEWS OF THE CHEST 3/1/2023 2:43 pm COMPARISON: None. HISTORY: ORDERING SYSTEM PROVIDED HISTORY: SOB TECHNOLOGIST PROVIDED HISTORY: Reason for exam:->SOB FINDINGS: Heart is enlarged. Bibasilar patchy opacities and bilateral pleural effusion. There is no pneumothorax. CHF. Superimposed bibasilar pneumonia cannot be excluded. FL ESOPHAGRAM    Result Date: 3/3/2023  EXAMINATION: SINGLE CONTRAST ESOPHAGRAM 3/3/2023 HISTORY: ORDERING SYSTEM PROVIDED HISTORY: evaluate Hiatal hernia TECHNOLOGIST PROVIDED HISTORY: Reason for exam:->evaluate Hiatal hernia Should a barium tablet be used, if available? ->No COMPARISON: None. TECHNIQUE: Multiple single contrast images of the esophagus and gastroesophageal junction were obtained following the oral administration of water soluble contrast FLUOROSCOPY DOSE AND TYPE: Radiation Exposure Index: Fluoroscopy time equals 2.4 minutes. Total dose equals 147 mGy FINDINGS: A double-contrast esophagram was performed Barium transits soft [place] without obstruction or stricture.   No esophageal mucosal irregularity is noted The patient underwent a previous gastric bypass approximately 20 years prior. I do not appreciate a hiatal hernia. There is filling of both the native [employer] and the gastric bypass pouch. The gastric mucosal pattern is unremarkable. The stomach appears tortuous secondary to the longstanding gastric bypass changes. There appears to be contrast both within the gastric pouch and the native stomach. It is difficult to evaluate the stomach due to the overlapping of the gastric bypass changes. It is recommended that the patient undergo a CT scan of the abdomen approximately 20 minutes after initial ingestion of oral contrast and medially following additional contrast administered just before the patient has the CT scan. I cannot on wrap old the stomach on the dedicated esophagram.     XR CHEST PORTABLE    Result Date: 3/4/2023  EXAMINATION: ONE XRAY VIEW OF THE CHEST 3/4/2023 12:07 pm COMPARISON: 03/02/2023 HISTORY: ORDERING SYSTEM PROVIDED HISTORY: sob TECHNOLOGIST PROVIDED HISTORY: Reason for exam:->sob FINDINGS: There is borderline cardiac size. There is improving but persistent atelectasis and pleural effusions in the lung bases more on the left side which may be due to improving pneumonia. Persistent but improving atelectasis/infiltrates and pleural effusions in the lung bases left more than right. XR CHEST 1 VIEW    Result Date: 2/12/2023  EXAMINATION: ONE XRAY VIEW OF THE CHEST 2/12/2023 4:51 pm COMPARISON: December 28, 2022 HISTORY: ORDERING SYSTEM PROVIDED HISTORY: chest pain TECHNOLOGIST PROVIDED HISTORY: Reason for exam:->chest pain FINDINGS: No airspace opacity or pleural effusion. The heart is normal size. No pneumothorax. No free air beneath the hemidiaphragms. No pneumonia or pleural effusion.      CT CHEST HIGH RESOLUTION    Result Date: 3/2/2023  EXAMINATION: CT IMAGES OF THE CHEST WITHOUT CONTRAST, HIGH RESOLUTION 3/2/2023 TECHNIQUE: CT of the chest was performed without the administration of intravenous contrast. High resolution CT imaging was performed of the lungs. Multiplanar reformatted images are provided for review. Automated exposure control, iterative reconstruction, and/or weight based adjustment of the mA/kV was utilized to reduce the radiation dose to as low as reasonably achievable. High resolution CT images were performed in the supine inspiration, supine expiration, and prone inspiration positions. COMPARISON: CTA chest dated 02/12/2023 HISTORY: ORDERING SYSTEM PROVIDED HISTORY: dyspnea. Hx of COVID/ TECHNOLOGIST PROVIDED HISTORY: Reason for exam:->dyspnea. Hx of COVID/ FINDINGS: Mediastinum:  No evidence of mediastinal, hilar or axillary lymphadenopathy. Esophagus in normal course the distal segment where there is a small hiatal hernia. Four-chamber cardiomegaly with valvular calcifications and small volume pericardial effusion as well as questionable stranding of the pericardium could represent acute inflammation. HRCT Findings/Lungs/pleura: No focal consolidative opacifications however smooth interlobular septal thickening with lower lobe predominance with small bilateral pleural effusions of minimal decompensation. No subpleural reticulation or honeycombing to suggest fibrotic change. No consolidation. No pleural process or pleural effusion. Prone imaging sequence performed without significant mosaicism of air trapping however minimal mosaicism increase on expiratory imaging alone. Leatha Raddle Upper Abdomen: Partially visualized portions of the upper abdomen without acute findings Soft Tissues/Bones: No acute osseous or soft tissue body wall findings. Four-chamber cardiomegaly with valvular calcifications and small volume pericardial effusion as well as questionable stranding of the pericardium could represent acute inflammation. Concern for pericarditis without calcifications.   Smooth interlobular septal thickening with lower lobe predominance and mild decompensation with small bilateral pleural effusions. CTA PULMONARY W CONTRAST    Result Date: 3/3/2023  EXAMINATION: CTA OF THE CHEST 3/2/2023 10:29 pm TECHNIQUE: CTA of the chest was performed after the administration of intravenous contrast.  Multiplanar reformatted images are provided for review. MIP images are provided for review. Automated exposure control, iterative reconstruction, and/or weight based adjustment of the mA/kV was utilized to reduce the radiation dose to as low as reasonably achievable. COMPARISON: 9 hours prior HISTORY: ORDERING SYSTEM PROVIDED HISTORY: R/O PE, elevated d dimer and dyspnea TECHNOLOGIST PROVIDED HISTORY: Reason for exam:->R/O PE, elevated d dimer and dyspnea FINDINGS: Pulmonary Arteries: No PE defects identified Mediastinum: Moderate pericardial effusion Lungs/pleura: Moderate pleural effusions with passive atelectasis. Subtle upper lung developed densities such as subpleural in the right upper lobe image 55 likely minimal atelectasis Upper Abdomen: Postop changes redemonstrated. Limited evaluation. The gastric components are somewhat prominent similar to previously Soft Tissues/Bones: No acute bone or soft tissue abnormality. 1. Moderate pleural and pericardial effusions 2. No acute pulmonary embolus identified     CTA PULMONARY W CONTRAST    Result Date: 2/12/2023  EXAMINATION: CTA OF THE CHEST 2/12/2023 5:34 pm TECHNIQUE: CTA of the chest was performed after the administration of intravenous contrast.  Multiplanar reformatted images are provided for review. MIP images are provided for review. Automated exposure control, iterative reconstruction, and/or weight based adjustment of the mA/kV was utilized to reduce the radiation dose to as low as reasonably achievable. COMPARISON: None.  HISTORY: ORDERING SYSTEM PROVIDED HISTORY: pleuritic chest pain TECHNOLOGIST PROVIDED HISTORY: Reason for exam:->pleuritic chest pain Decision Support Exception - unselect if not a suspected or confirmed emergency medical condition->Emergency Medical Condition (MA) FINDINGS: Pulmonary Arteries: Technically limited study due to suboptimal opacification of pulmonary arteries. No obvious pulmonary emboli involving the central pulmonary arteries. Mediastinum: No evidence of mediastinal lymphadenopathy. The heart and pericardium demonstrate no acute abnormality. There is no acute abnormality of the thoracic aorta. Lungs/pleura: The lungs are without acute process. No focal consolidation or pulmonary edema. No evidence of pleural effusion or pneumothorax. Upper Abdomen: Limited images of the upper abdomen are unremarkable. Soft Tissues/Bones: No acute bone or soft tissue abnormality. Technically limited study due to suboptimal opacification the pulmonary arteries. No obvious pulmonary emboli involving the central pulmonary arteries. No evidence for pleural effusion or pneumonia. RECOMMENDATIONS: Unavailable         HOSPITAL COURSE:   Boris Knight did very well throughout the hospitalization. She was found to be suffering from an acute exacerbation of COPD that led to new onset atrial fibrillation with rapid ventricular response. Her respiratory status has improved dramatically and she has been resumed on her home nebulized medications. She completed a course of antibiotics and corticosteroids. In the setting of atrial fibrillation with RVR, the cardiovascular team provided consultation. Beta-blocker therapy as well as flecainide therapy were instituted with current rate control. Eliquis was added as well. Her respiratory status has improved. Her cardiovascular status has improved. Plans will be for outpatient follow-up with Dr. Ravi Potts from a cardiovascular standpoint and further adjustments in cardiac medications. Otherwise she has been ambulatory and is acceptable for discharge home today. Patient is medically stable and acceptable for discharge today.     BRIEF PHYSICAL EXAMINATION AND LABORATORIES ON DAY OF DISCHARGE:  VITALS:  /82   Pulse 65   Temp 97.7 °F (36.5 °C) (Oral)   Resp 18   Ht 5' (1.524 m)   Wt 206 lb (93.4 kg)   LMP  (LMP Unknown)   SpO2 96%   BMI 40.23 kg/m²     HEENT:  PERRLA. EOMI. Sclera clear. Buccal mucosa moist.    Neck:  Supple. Trachea midline. No thyromegaly. No JVD. No bruits. Heart: Atrial fibrillation with current rate control. Lungs:  Symmetrical. Clear to auscultation bilaterally. No wheezes. No rhonchi. No rales. Abdomen: Soft. Non-tender. Non-distended. Bowel sounds positive. No organomegaly or masses. No pain on palpation    Extremities:  Peripheral pulses present. No peripheral edema. No ulcers. Neurologic:  Alert x 3. No focal deficit. Cranial nerves grossly intact. Skin:  No petechia. No hemorrhage. No wounds. DISPOSITION:  The patient's condition is stable. At this time the patient is without objective evidence of an acute process requiring continuing hospitalization or inpatient management. They are stable for discharge with outpatient follow-up. I have spoken with the patient and discussed the results of the current hospitalization, in addition to providing specific details for the plan of care and counseling regarding the diagnosis and prognosis. The plan has been discussed in detail and they are aware of the specific conditions for emergent return, as well as the importance of follow-up.   Their questions are answered at this time and they are agreeable with the plan for discharge to home    DISCHARGE MEDICATIONS:   Current Discharge Medication List             Details   apixaban (ELIQUIS) 5 MG TABS tablet Take 1 tablet by mouth 2 times daily  Qty: 60 tablet, Refills: 0      flecainide (TAMBOCOR) 100 MG tablet Take 1 tablet by mouth every 12 hours  Qty: 60 tablet, Refills: 3      furosemide (LASIX) 40 MG tablet Take 1 tablet by mouth daily  Qty: 60 tablet, Refills: 3      guaiFENesin (MUCINEX) 600 MG extended release tablet Take 1 tablet by mouth 2 times daily  Qty: 60 tablet, Refills: 0      metoprolol tartrate (LOPRESSOR) 100 MG tablet Take 1 tablet by mouth 2 times daily  Qty: 60 tablet, Refills: 3      potassium chloride (KLOR-CON M) 10 MEQ extended release tablet Take 1 tablet by mouth daily  Qty: 30 tablet, Refills: 0                Details   albuterol sulfate HFA (PROVENTIL HFA) 108 (90 Base) MCG/ACT inhaler Inhale 2 puffs into the lungs every 6 hours as needed for Wheezing  Qty: 1 each, Refills: 1    Associated Diagnoses: Asthma with COPD ([facility] [address]      ipratropium-albuterol (DUONEB) 0.5-2.5 (3) MG/3ML SOLN nebulizer solution Inhale 3 mLs into the lungs every 6 hours as needed for Shortness of Breath  Qty: 360 mL, Refills: 0      venlafaxine (EFFEXOR XR) 75 MG extended release capsule Take 1 capsule by mouth daily  Qty: 90 capsule, Refills: 0    Associated Diagnoses: Recurrent depression (HCC)      vitamin D (ERGOCALCIFEROL) 1.25 MG (71848 UT) CAPS capsule Take 1 capsule by mouth once a week  Qty: 12 capsule, Refills: 1    Associated Diagnoses: Vitamin D deficiency, unspecified      atorvastatin (LIPITOR) 40 MG tablet Take 1 tablet by mouth nightly  Qty: 90 tablet, Refills: 1    Associated Diagnoses: Dyslipidemia      rOPINIRole (REQUIP) 1 MG tablet Take 2 tablets by mouth nightly  Qty: 180 tablet, Refills: 1    Associated Diagnoses: RLS (restless legs syndrome)      aspirin 81 MG chewable tablet Take 1 tablet by mouth daily  Qty: 30 tablet, Refills: 3      esomeprazole (NEXIUM) 40 MG delayed release capsule Take 1 capsule by mouth every morning (before breakfast)  Qty: 90 capsule, Refills: 1    Associated Diagnoses: Gastroesophageal reflux disease, unspecified whether esophagitis present             FOLLOW UP/INSTRUCTIONS:  This patient is instructed to follow-up with her primary care physician. Patient is instructed to follow-up with the consults listed above as directed by them.   she is instructed to resume home medications and take new medications as indicated in the list above. If the patient has a recurrence of symptoms, she is instructed to go to the ED. Preparing for this patient's discharge, including paperwork, orders, instructions, and meeting with patient did require > 40 minutes.     Ezequiel Mack,      3/7/2023  12:47 PM

## 2023-03-08 LAB
ASPERGILLUS FUMIGATUS #1: NORMAL
ASPERGILLUS FUMIGATUS #6: NORMAL
AUREOBASIDIUM PULLULANS: NORMAL
MICROPOLYSPORA FAENI: NORMAL
PIGEON SERUM ABS: NORMAL

## 2023-03-08 NOTE — PROGRESS NOTES
CLINICAL PHARMACY NOTE: MEDS TO BEDS    Total # of Prescriptions Filled: 4   The following medications were delivered to the patient:  Eliquis 5 mg  Metoprolol Tart 100 mg  Furosemide 40 mg  Potassium Chloride Lulu ER 10 meQ      Additional Documentation:  Flecainide was out of stock and sent to [facility] for patient .

## 2023-03-14 DIAGNOSIS — R30.0 DYSURIA: ICD-10-CM

## 2023-03-27 ENCOUNTER — TELEPHONE (OUTPATIENT)
Dept: ONCOLOGY | Age: 68
End: 2023-03-27

## 2023-03-27 DIAGNOSIS — I50.9 ACUTE DECOMPENSATED HEART FAILURE (HCC): Primary | ICD-10-CM

## 2023-03-27 NOTE — TELEPHONE ENCOUNTER
Called patient to notify her of referral from Dr. Taylor Kerns office and to schedule her initial visit at the CHF clinic. Pt states that she is currently having problems with Transportation at this time. She will call her insurance company to see if they will provide her with transportation to the clinic and will call us back to schedule her appointment.

## 2023-03-29 ENCOUNTER — OFFICE VISIT (OUTPATIENT)
Dept: NEUROLOGY | Age: 68
End: 2023-03-29
Payer: MEDICARE

## 2023-03-29 ENCOUNTER — TELEPHONE (OUTPATIENT)
Dept: OTHER | Age: 68
End: 2023-03-29

## 2023-03-29 VITALS
DIASTOLIC BLOOD PRESSURE: 66 MMHG | TEMPERATURE: 97.5 F | OXYGEN SATURATION: 94 % | WEIGHT: 219 LBS | BODY MASS INDEX: 41.35 KG/M2 | HEART RATE: 55 BPM | HEIGHT: 61 IN | RESPIRATION RATE: 18 BRPM | SYSTOLIC BLOOD PRESSURE: 113 MMHG

## 2023-03-29 DIAGNOSIS — R41.841 COGNITIVE COMMUNICATION DEFICIT: Primary | ICD-10-CM

## 2023-03-29 PROCEDURE — 3074F SYST BP LT 130 MM HG: CPT | Performed by: PHYSICIAN ASSISTANT

## 2023-03-29 PROCEDURE — 1123F ACP DISCUSS/DSCN MKR DOCD: CPT | Performed by: PHYSICIAN ASSISTANT

## 2023-03-29 PROCEDURE — 3078F DIAST BP <80 MM HG: CPT | Performed by: PHYSICIAN ASSISTANT

## 2023-03-29 PROCEDURE — 99204 OFFICE O/P NEW MOD 45 MIN: CPT | Performed by: PHYSICIAN ASSISTANT

## 2023-03-29 NOTE — TELEPHONE ENCOUNTER
Called patient to schedule HFU visit with CHF clinic and set up transportation with [facility]. Patient unable to talk at that time and will call us back at a later date.

## 2023-03-29 NOTE — PROGRESS NOTES
Neurology  Office visit-new patient    Date:  3/29/2023  Patient Name:  Constantin Benjamin  YOB: 1955  MRN: [id number]     PCP:  Lester Prado DO   Referring:  Anusha Patel DO      Chief Complaint: Memory loss     History obtained from: patient     Assessment    Cognitive communication deficit patient exhibits symptoms of mild cognitive impairment that are likely related to untreated sleep apnea and B12 deficiency. History of gastric bypass and prior B12 noted to be in the low 200s. Not currently on any supplementation. Had sleep study years ago and was told she had sleep apnea but states she cannot use the mask. Plan  Check B12, if lower than 400 recommend replacement  Highly recommend treatment of sleep apnea  We will hold off on formal cognitive evaluation at this time as she scored a 26 out of 30 on MoCA-we will retest in 6 months  Return to office in 6 months or sooner as needed  Call with questions or concerns        History of Present Illness:  Constantin Benjamin is a 77 y.o. right handed female presenting for evaluation of memory loss. Patient reports that over the last 4 to 5 months she has noticed increasing short-term memory difficulties. She provides examples such as forgetting people's names, what she walks into a room for, taking her medications and misplacing items around her home. She denies any difficulties with her ADLs. She does not drive as she lost her license last year due to a DUI. Prior to that she denies any use getting lost.  Denies any dangerous behaviors. Does do grocery shopping but often times has to go back out to the store for forgetting items. Denies any behavior changes, personality changes, impulsive behaviors. No trouble recognizing close friends or family but mainly has issues with people she has not seen in a while. Previously diagnosed with sleep apnea several years ago. She does not use a CPAP stating she cannot use the machine.   She

## 2023-04-03 ENCOUNTER — TELEPHONE (OUTPATIENT)
Dept: OTHER | Age: 68
End: 2023-04-03

## 2023-04-03 NOTE — TELEPHONE ENCOUNTER
10:00 AM 4/3/2023  Called patient re: referral from the HF clinic referral.  Pt states she will call when she is ready to establish with this program.     Andrew Cook RN

## 2023-05-31 ENCOUNTER — HOSPITAL ENCOUNTER (OUTPATIENT)
Age: 68
Discharge: HOME | End: 2023-06-02
Payer: MEDICARE

## 2023-05-31 ENCOUNTER — HOSPITAL ENCOUNTER (OUTPATIENT)
Dept: GENERAL RADIOLOGY | Age: 68
Discharge: HOME | End: 2023-06-02
Payer: MEDICARE

## 2023-05-31 DIAGNOSIS — M25.551 RIGHT HIP PAIN: ICD-10-CM

## 2023-05-31 PROCEDURE — 73502 X-RAY EXAM HIP UNI 2-3 VIEWS: CPT

## 2023-06-02 ENCOUNTER — INITIAL CONSULT (OUTPATIENT)
Dept: SURGERY | Age: 68
End: 2023-06-02
Payer: MEDICARE

## 2023-06-02 VITALS
TEMPERATURE: 97.7 F | HEART RATE: 60 BPM | HEIGHT: 60 IN | DIASTOLIC BLOOD PRESSURE: 76 MMHG | SYSTOLIC BLOOD PRESSURE: 126 MMHG | WEIGHT: 235 LBS | BODY MASS INDEX: 46.13 KG/M2

## 2023-06-02 DIAGNOSIS — R11.2 NAUSEA AND VOMITING, UNSPECIFIED VOMITING TYPE: ICD-10-CM

## 2023-06-02 DIAGNOSIS — I10 ESSENTIAL HYPERTENSION: Primary | ICD-10-CM

## 2023-06-02 PROCEDURE — 3078F DIAST BP <80 MM HG: CPT | Performed by: SURGERY

## 2023-06-02 PROCEDURE — 99214 OFFICE O/P EST MOD 30 MIN: CPT | Performed by: SURGERY

## 2023-06-02 PROCEDURE — 3074F SYST BP LT 130 MM HG: CPT | Performed by: SURGERY

## 2023-06-02 PROCEDURE — 1123F ACP DISCUSS/DSCN MKR DOCD: CPT | Performed by: SURGERY

## 2023-06-02 RX ORDER — ESOMEPRAZOLE MAGNESIUM 40 MG/1
40 FOR SUSPENSION ORAL DAILY
COMMUNITY

## 2023-06-02 NOTE — PROGRESS NOTES
[address] [place] office note    Subjective:  nausea and vomiting    History: was in the hospital 3/2023 with breathing difficulty, CHF, pneumonia and A fib, now on multiple medications. Says she has had nausea and vomiting since discharge but gained 20 pounds since she stopped smoking. Physical Exam  VITALS:  Blood pressure 126/76, pulse 60, temperature 97.7 °F (36.5 °C), height 5' (1.524 m), weight 235 lb (106.6 kg), not currently breastfeeding. 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 PLAN:    Continue Nexium  Try to get back on the low calorie diet. Surgical revision of the anastomosis would be very risky due to her poor health and it is not causing any of her problems, she is not interested in another surgery. Will have her see Daryle Pillow to discuss medical weight loss.     Physician Signature: Electronically signed by Dr. Gabriel Gore M.D.   6/2/2023

## 2023-07-03 DIAGNOSIS — Z96.651 STATUS POST TOTAL RIGHT KNEE REPLACEMENT: Primary | ICD-10-CM

## 2023-07-06 ENCOUNTER — OFFICE VISIT (OUTPATIENT)
Dept: ORTHOPEDIC SURGERY | Age: 68
End: 2023-07-06

## 2023-07-06 VITALS — HEIGHT: 61 IN | WEIGHT: 235 LBS | BODY MASS INDEX: 44.37 KG/M2 | TEMPERATURE: 98 F

## 2023-07-06 DIAGNOSIS — K91.2 MALNUTRITION FOLLOWING GASTROINTESTINAL SURGERY: ICD-10-CM

## 2023-07-06 DIAGNOSIS — Z96.651 STATUS POST TOTAL RIGHT KNEE REPLACEMENT: Primary | ICD-10-CM

## 2023-07-06 LAB
ALBUMIN SERPL-MCNC: 4.3 G/DL (ref 3.5–5.2)
ALP SERPL-CCNC: 87 U/L (ref 35–104)
ALT SERPL-CCNC: 14 U/L (ref 0–32)
ANION GAP SERPL CALCULATED.3IONS-SCNC: 15 MMOL/L (ref 7–16)
AST SERPL-CCNC: 19 U/L (ref 0–31)
BILIRUB SERPL-MCNC: 0.3 MG/DL (ref 0–1.2)
BUN SERPL-MCNC: 14 MG/DL (ref 6–23)
CALCIUM SERPL-MCNC: 9.2 MG/DL (ref 8.6–10.2)
CHLORIDE SERPL-SCNC: 102 MMOL/L (ref 98–107)
CHOLESTEROL, TOTAL: 143 MG/DL (ref 0–199)
CO2 SERPL-SCNC: 23 MMOL/L (ref 22–29)
CREAT SERPL-MCNC: 0.7 MG/DL (ref 0.5–1)
ERYTHROCYTE [DISTWIDTH] IN BLOOD BY AUTOMATED COUNT: 13.4 FL (ref 11.5–15)
FERRITIN SERPL-MCNC: 33 NG/ML
GLUCOSE SERPL-MCNC: 123 MG/DL (ref 74–99)
HCT VFR BLD AUTO: 41.5 % (ref 34–48)
HDLC SERPL-MCNC: 51 MG/DL
HGB BLD-MCNC: 12.6 G/DL (ref 11.5–15.5)
LDLC SERPL CALC-MCNC: 76 MG/DL (ref 0–99)
MCH RBC QN AUTO: 29.5 PG (ref 26–35)
MCHC RBC AUTO-ENTMCNC: 30.4 % (ref 32–34.5)
MCV RBC AUTO: 97.2 FL (ref 80–99.9)
PLATELET # BLD AUTO: 351 E9/L (ref 130–450)
PMV BLD AUTO: 10.6 FL (ref 7–12)
POTASSIUM SERPL-SCNC: 4.1 MMOL/L (ref 3.5–5)
PROT SERPL-MCNC: 7.2 G/DL (ref 6.4–8.3)
RBC # BLD AUTO: 4.27 E12/L (ref 3.5–5.5)
SODIUM SERPL-SCNC: 140 MMOL/L (ref 132–146)
TRIGL SERPL-MCNC: 81 MG/DL (ref 0–149)
VITAMIN D 25-HYDROXY: 23 NG/ML (ref 30–100)
VLDLC SERPL CALC-MCNC: 16 MG/DL
WBC # BLD: 7.7 E9/L (ref 4.5–11.5)

## 2023-07-07 LAB
FOLATE SERPL-MCNC: 10.1 NG/ML (ref 4.8–24.2)
PREALB SERPL-MCNC: 16 MG/DL (ref 20–40)
VIT B12 SERPL-MCNC: 273 PG/ML (ref 211–946)

## 2023-07-09 LAB
COPPER SERPL-MCNC: 99.3 UG/DL (ref 80–155)
ZINC SERPL-MCNC: 68.1 UG/DL (ref 60–120)

## 2023-07-10 LAB
ANNOTATION COMMENT IMP: NORMAL
RETINYL PALMITATE SERPL-MCNC: <0.02 MG/L (ref 0–0.1)
VIT A SERPL-MCNC: 0.37 MG/L (ref 0.3–1.2)
VIT B1 BLD-MCNC: 122 NMOL/L (ref 70–180)

## 2023-07-25 DIAGNOSIS — E11.69 DIABETES MELLITUS TYPE 2 IN OBESE (HCC): Primary | ICD-10-CM

## 2023-07-25 DIAGNOSIS — E66.9 DIABETES MELLITUS TYPE 2 IN OBESE (HCC): Primary | ICD-10-CM

## 2023-07-25 RX ORDER — SEMAGLUTIDE 1.34 MG/ML
0.5 INJECTION, SOLUTION SUBCUTANEOUS WEEKLY
Qty: 1.5 ML | Refills: 0 | Status: SHIPPED
Start: 2023-07-25 | End: 2023-09-05 | Stop reason: ALTCHOICE

## 2023-08-01 ENCOUNTER — OFFICE VISIT (OUTPATIENT)
Dept: BARIATRICS/WEIGHT MGMT | Age: 68
End: 2023-08-01
Payer: MEDICARE

## 2023-08-01 VITALS
HEART RATE: 74 BPM | DIASTOLIC BLOOD PRESSURE: 72 MMHG | SYSTOLIC BLOOD PRESSURE: 145 MMHG | RESPIRATION RATE: 20 BRPM | HEIGHT: 61 IN | WEIGHT: 226 LBS | TEMPERATURE: 97.9 F | BODY MASS INDEX: 42.67 KG/M2

## 2023-08-01 DIAGNOSIS — F33.9 RECURRENT DEPRESSION (HCC): ICD-10-CM

## 2023-08-01 PROCEDURE — 3078F DIAST BP <80 MM HG: CPT | Performed by: NURSE PRACTITIONER

## 2023-08-01 PROCEDURE — 1123F ACP DISCUSS/DSCN MKR DOCD: CPT | Performed by: NURSE PRACTITIONER

## 2023-08-01 PROCEDURE — 1036F TOBACCO NON-USER: CPT | Performed by: NURSE PRACTITIONER

## 2023-08-01 PROCEDURE — 3017F COLORECTAL CA SCREEN DOC REV: CPT | Performed by: NURSE PRACTITIONER

## 2023-08-01 PROCEDURE — G8400 PT W/DXA NO RESULTS DOC: HCPCS | Performed by: NURSE PRACTITIONER

## 2023-08-01 PROCEDURE — 99213 OFFICE O/P EST LOW 20 MIN: CPT | Performed by: NURSE PRACTITIONER

## 2023-08-01 PROCEDURE — 99211 OFF/OP EST MAY X REQ PHY/QHP: CPT | Performed by: NURSE PRACTITIONER

## 2023-08-01 PROCEDURE — G8417 CALC BMI ABV UP PARAM F/U: HCPCS | Performed by: NURSE PRACTITIONER

## 2023-08-01 PROCEDURE — 1090F PRES/ABSN URINE INCON ASSESS: CPT | Performed by: NURSE PRACTITIONER

## 2023-08-01 PROCEDURE — G8427 DOCREV CUR MEDS BY ELIG CLIN: HCPCS | Performed by: NURSE PRACTITIONER

## 2023-08-01 PROCEDURE — 3074F SYST BP LT 130 MM HG: CPT | Performed by: NURSE PRACTITIONER

## 2023-08-01 ASSESSMENT — ENCOUNTER SYMPTOMS
SHORTNESS OF BREATH: 0
WHEEZING: 0
NAUSEA: 0
CONSTIPATION: 0
DIARRHEA: 0
COUGH: 0
VOMITING: 0

## 2023-08-01 NOTE — PATIENT INSTRUCTIONS
Please continue to take your vitamin and mineral supplements as instructed. If you received a blood work prescription today for laboratory monitoring due prior to your next routine follow-up visit, please have this blood work obtained 10 to 14 days prior to your next visit. It is important to fast for 12 hours prior to routine weight loss surgery blood work, EXCEPT for drinking water, to ensure accuracy of results. Please report nausea, vomiting, abdominal pain, or any other problems you experience to your surgeon. For problems related to weight loss surgery, it is best to go to [facility] Emergency Department and have your surgeon paged.

## 2023-08-17 ENCOUNTER — OFFICE VISIT (OUTPATIENT)
Dept: PAIN MANAGEMENT | Age: 68
End: 2023-08-17
Payer: MEDICARE

## 2023-08-17 ENCOUNTER — TELEPHONE (OUTPATIENT)
Dept: PAIN MANAGEMENT | Age: 68
End: 2023-08-17

## 2023-08-17 VITALS — HEIGHT: 61 IN | BODY MASS INDEX: 42.67 KG/M2 | TEMPERATURE: 97.5 F | RESPIRATION RATE: 18 BRPM | WEIGHT: 226 LBS

## 2023-08-17 DIAGNOSIS — M51.9 LUMBAR DISC DISORDER: ICD-10-CM

## 2023-08-17 DIAGNOSIS — M17.0 PRIMARY OSTEOARTHRITIS OF BOTH KNEES: ICD-10-CM

## 2023-08-17 DIAGNOSIS — G89.4 CHRONIC PAIN SYNDROME: Primary | ICD-10-CM

## 2023-08-17 DIAGNOSIS — M47.816 LUMBAR FACET ARTHROPATHY: ICD-10-CM

## 2023-08-17 DIAGNOSIS — M47.816 LUMBAR SPONDYLOSIS: ICD-10-CM

## 2023-08-17 DIAGNOSIS — M48.062 SPINAL STENOSIS OF LUMBAR REGION WITH NEUROGENIC CLAUDICATION: ICD-10-CM

## 2023-08-17 DIAGNOSIS — E66.01 OBESITY, CLASS III, BMI 40-49.9 (MORBID OBESITY) (HCC): ICD-10-CM

## 2023-08-17 PROBLEM — M54.16 LUMBAR RADICULOPATHY: Status: ACTIVE | Noted: 2023-08-17

## 2023-08-17 PROBLEM — E66.813 OBESITY, CLASS III, BMI 40-49.9 (MORBID OBESITY) (HCC): Status: ACTIVE | Noted: 2023-08-17

## 2023-08-17 PROCEDURE — 99204 OFFICE O/P NEW MOD 45 MIN: CPT | Performed by: PAIN MEDICINE

## 2023-08-17 PROCEDURE — 1123F ACP DISCUSS/DSCN MKR DOCD: CPT | Performed by: PAIN MEDICINE

## 2023-08-17 RX ORDER — SODIUM CHLORIDE 9 MG/ML
INJECTION, SOLUTION INTRAVENOUS PRN
OUTPATIENT
Start: 2023-08-17

## 2023-08-17 RX ORDER — ROPINIROLE 5 MG/1
5 TABLET, FILM COATED ORAL
COMMUNITY

## 2023-08-17 RX ORDER — SODIUM CHLORIDE 0.9 % (FLUSH) 0.9 %
5-40 SYRINGE (ML) INJECTION EVERY 12 HOURS SCHEDULED
OUTPATIENT
Start: 2023-08-17

## 2023-08-17 RX ORDER — SODIUM CHLORIDE 0.9 % (FLUSH) 0.9 %
5-40 SYRINGE (ML) INJECTION PRN
OUTPATIENT
Start: 2023-08-17

## 2023-08-17 NOTE — PROGRESS NOTES
[address], [address] [place] [address]      [phone]          Consult Note      Patient:  DANI Nixon 1955    Date of Service:  23    Requesting Physician:  Nabil Escalante DO    Reason for Consult:      Patient presents with complaints of low back pain that started a long time ago and has been progressively getting worse. Pain is described as sharp/constant. Pain is aggravated by movement and is relieved by nothing. pain     HISTORY OF PRESENT ILLNESS:      Pain does radiate to right thigh down to the knee. She  does not have numbness, tingling and does not have bladder or bowel dysfunction. She has not been on anticoagulation medications to include none. The patient  has not been on herbal supplements. The patient is not diabetic. Imaging:   Lumbar spine CT :  Degenerative lumbar spondylosis with altered vertebral alignment at L4-5. Moderate to severe L4-5 spinal stenosis related to bulging disc and facet  arthropathy. Right hip xray   Normal    Previous treatments: Physical Therapy, Epidural Steroid Injection, facet MBB, and medications.   Bilateral knees replaced,      Opioid Agreement:  Renewal date:N/A    Past Medical History:   Diagnosis Date    Acute CVA (cerebrovascular accident) ([address]) 3/17/2022    Anastomotic ulcer 2012    Anemia     Anxiety     Asthma     Biceps tendon tear 2015    Closed fracture of multiple ribs of right side     Closed fracture of nasal bone     Collapsed lung     COPD (chronic obstructive pulmonary disease) ([facility])     DDD (degenerative disc disease), lumbar     Degenerative disc disease at L5-S1 level     Depression     Distal radius fracture 2015    Fracture of left olecranon process 01/10/2017    GERD (gastroesophageal reflux disease)     Heart attack ([address])     History of blood transfusion     Hypertension     Impingement syndrome of shoulder 2014

## 2023-08-17 NOTE — TELEPHONE ENCOUNTER
Call to Simi Tj that procedure was approved for 8/21/2023 and that [facility] should call her a few days before for the pre op call and after 3:00 PM the business day before with the arrival time. Instructed Lizette to hold ibuprofen for 24 hours, naprosyn for 4 days and any aspirin containing products or fish oil for 7 days. Instructed to call office back if any questions. Lizette verbalized understanding. Lizette DOES NOT take eliquis any longer, she quit about one month ago.     Electronically signed by Alberto Victoria RN on 8/17/2023 at 2:37 PM

## 2023-08-21 ENCOUNTER — APPOINTMENT (OUTPATIENT)
Dept: OPERATING ROOM | Age: 68
End: 2023-08-21
Attending: PAIN MEDICINE
Payer: MEDICARE

## 2023-08-21 ENCOUNTER — HOSPITAL ENCOUNTER (OUTPATIENT)
Age: 68
Setting detail: OUTPATIENT SURGERY
Discharge: HOME | End: 2023-08-21
Attending: PAIN MEDICINE | Admitting: PAIN MEDICINE
Payer: MEDICARE

## 2023-08-21 VITALS
SYSTOLIC BLOOD PRESSURE: 141 MMHG | OXYGEN SATURATION: 96 % | RESPIRATION RATE: 16 BRPM | HEIGHT: 60 IN | WEIGHT: 220 LBS | TEMPERATURE: 98 F | HEART RATE: 88 BPM | BODY MASS INDEX: 43.19 KG/M2 | DIASTOLIC BLOOD PRESSURE: 72 MMHG

## 2023-08-21 DIAGNOSIS — M54.16 LUMBAR RADICULOPATHY: ICD-10-CM

## 2023-08-21 PROCEDURE — 7100000010 HC PHASE II RECOVERY - FIRST 15 MIN: Performed by: PAIN MEDICINE

## 2023-08-21 PROCEDURE — 2709999900 HC NON-CHARGEABLE SUPPLY: Performed by: PAIN MEDICINE

## 2023-08-21 PROCEDURE — 3600000005 HC SURGERY LEVEL 5 BASE: Performed by: PAIN MEDICINE

## 2023-08-21 PROCEDURE — A9579 GAD-BASE MR CONTRAST NOS,1ML: HCPCS | Performed by: PAIN MEDICINE

## 2023-08-21 PROCEDURE — 6360000004 HC RX CONTRAST MEDICATION: Performed by: PAIN MEDICINE

## 2023-08-21 PROCEDURE — 7100000011 HC PHASE II RECOVERY - ADDTL 15 MIN: Performed by: PAIN MEDICINE

## 2023-08-21 PROCEDURE — 2500000003 HC RX 250 WO HCPCS: Performed by: PAIN MEDICINE

## 2023-08-21 PROCEDURE — 62323 NJX INTERLAMINAR LMBR/SAC: CPT | Performed by: PAIN MEDICINE

## 2023-08-21 PROCEDURE — 6360000002 HC RX W HCPCS: Performed by: PAIN MEDICINE

## 2023-08-21 RX ORDER — SODIUM CHLORIDE 9 MG/ML
INJECTION, SOLUTION INTRAVENOUS PRN
Status: DISCONTINUED | OUTPATIENT
Start: 2023-08-21 | End: 2023-08-21 | Stop reason: HOSPADM

## 2023-08-21 RX ORDER — LIDOCAINE HYDROCHLORIDE 5 MG/ML
INJECTION, SOLUTION INFILTRATION; INTRAVENOUS PRN
Status: DISCONTINUED | OUTPATIENT
Start: 2023-08-21 | End: 2023-08-21 | Stop reason: ALTCHOICE

## 2023-08-21 RX ORDER — SODIUM CHLORIDE 0.9 % (FLUSH) 0.9 %
5-40 SYRINGE (ML) INJECTION PRN
Status: DISCONTINUED | OUTPATIENT
Start: 2023-08-21 | End: 2023-08-21 | Stop reason: HOSPADM

## 2023-08-21 RX ORDER — SODIUM CHLORIDE 0.9 % (FLUSH) 0.9 %
5-40 SYRINGE (ML) INJECTION EVERY 12 HOURS SCHEDULED
Status: DISCONTINUED | OUTPATIENT
Start: 2023-08-21 | End: 2023-08-21 | Stop reason: HOSPADM

## 2023-08-21 ASSESSMENT — PAIN DESCRIPTION - DESCRIPTORS: DESCRIPTORS: ACHING;BURNING;NAGGING

## 2023-08-21 ASSESSMENT — PAIN - FUNCTIONAL ASSESSMENT
PAIN_FUNCTIONAL_ASSESSMENT: PREVENTS OR INTERFERES SOME ACTIVE ACTIVITIES AND ADLS
PAIN_FUNCTIONAL_ASSESSMENT: 0-10

## 2023-08-21 ASSESSMENT — PAIN SCALES - GENERAL
PAINLEVEL_OUTOF10: 0
PAINLEVEL_OUTOF10: 0

## 2023-08-21 NOTE — OP NOTE
2023    Patient: Chris Gunter  :  1955  Age:  77 y.o. Sex:  female     PRE-OPERATIVE DIAGNOSIS: Lumbar disc displacement, lumbar radiculopathy. POST-OPERATIVE DIAGNOSIS: Same. PROCEDURE: Fluoroscopic guided therapeutic lumbar epidural steroid injection at the L4-5 level (# 1). SURGEON: SEVERINO Bolanos M.D.. ANESTHESIA: Local    ESTIMATED BLOOD LOSS: None.  ______________________________________________________________________    BRIEF HISTORY:  Chris Gunter comes in today for first lumbar epidural injection at L4-5 level. The potential complications of this procedure were discussed with her again today. She has elected to undergo the aforementioned procedure. Mercedes complete History & Physical examination were reviewed in depth, a copy of which is in the chart. DESCRIPTION OF PROCEDURE:    After confirming written and informed consent, a time-out was performed and Mercedes name and date of birth, the procedure to be performed as well as the plan for the location of the needle insertion were confirmed. The patient was brought into the procedure room and placed in the prone position on the fluoroscopy table. A pillow was placed under the patient's lower abdomen/upper pelvis to increase lumbar interlaminar space. Standard monitors were placed, and vital signs were observed throughout the procedure. The area of the lumbar spine was prepped with chloraprep and draped in a sterile manner. The L4-5 interspace was identified and marked under AP fluoroscopy. The skin and subcutaneous tissues at the above level were anesthestized with 0.5% lidocaine. With intermittent fluoroscopy, an # 18 gauge 3 1/2 tuohy epidural needle was inserted and directed toward the interlaminar space. The needle was slowly advanced using loss of resistance technique and 5 cc glass syringe  until the tip of the epidural needle has passed through the ligamentum flavum and entered the epidural space.  AP and

## 2023-09-05 DIAGNOSIS — E66.9 DIABETES MELLITUS TYPE 2 IN OBESE (HCC): Primary | ICD-10-CM

## 2023-09-05 DIAGNOSIS — E11.69 DIABETES MELLITUS TYPE 2 IN OBESE (HCC): Primary | ICD-10-CM

## 2023-09-05 RX ORDER — SEMAGLUTIDE 1.34 MG/ML
1 INJECTION, SOLUTION SUBCUTANEOUS WEEKLY
Qty: 3 ML | Refills: 0 | Status: SHIPPED | OUTPATIENT
Start: 2023-09-05

## 2023-09-13 ENCOUNTER — OFFICE VISIT (OUTPATIENT)
Dept: PAIN MANAGEMENT | Age: 68
End: 2023-09-13
Payer: MEDICARE

## 2023-09-13 VITALS
WEIGHT: 214 LBS | OXYGEN SATURATION: 96 % | RESPIRATION RATE: 18 BRPM | DIASTOLIC BLOOD PRESSURE: 68 MMHG | BODY MASS INDEX: 42.01 KG/M2 | TEMPERATURE: 96.8 F | HEART RATE: 87 BPM | SYSTOLIC BLOOD PRESSURE: 126 MMHG | HEIGHT: 60 IN

## 2023-09-13 DIAGNOSIS — G89.4 CHRONIC PAIN SYNDROME: Primary | ICD-10-CM

## 2023-09-13 DIAGNOSIS — M48.062 SPINAL STENOSIS OF LUMBAR REGION WITH NEUROGENIC CLAUDICATION: ICD-10-CM

## 2023-09-13 DIAGNOSIS — M47.816 LUMBAR FACET ARTHROPATHY: ICD-10-CM

## 2023-09-13 DIAGNOSIS — M47.816 LUMBAR SPONDYLOSIS: ICD-10-CM

## 2023-09-13 DIAGNOSIS — M17.0 PRIMARY OSTEOARTHRITIS OF BOTH KNEES: ICD-10-CM

## 2023-09-13 DIAGNOSIS — M51.9 LUMBAR DISC DISORDER: ICD-10-CM

## 2023-09-13 DIAGNOSIS — E66.01 OBESITY, CLASS III, BMI 40-49.9 (MORBID OBESITY) (HCC): ICD-10-CM

## 2023-09-13 PROCEDURE — 1123F ACP DISCUSS/DSCN MKR DOCD: CPT | Performed by: PAIN MEDICINE

## 2023-09-13 PROCEDURE — 3078F DIAST BP <80 MM HG: CPT | Performed by: PAIN MEDICINE

## 2023-09-13 PROCEDURE — 99213 OFFICE O/P EST LOW 20 MIN: CPT | Performed by: PAIN MEDICINE

## 2023-09-13 PROCEDURE — 3074F SYST BP LT 130 MM HG: CPT | Performed by: PAIN MEDICINE

## 2023-09-13 NOTE — PROGRESS NOTES
[address] [place] [address]  [phone]    Follow up Note      Joy Alegre     Date of Visit:  9/13/2023    CC:  Patient presents for follow up   Chief Complaint   Patient presents with    Follow-up     Lumbar epidural steroid injection L4-5 with low volume     HPI:    Pain is better. Appropriate analgesia with current medications regimen:N/A. Change in quality of symptoms:no. Medication side effects:not applicable . Recent diagnostic testing:none. Recent interventional procedures:LESI L4-5 .excellent. She has not been on anticoagulation medications to include none. The patient  has not been on herbal supplements. The patient is not diabetic. Imaging:   Lumbar spine CT 2023:  Degenerative lumbar spondylosis with altered vertebral alignment at L4-5. Moderate to severe L4-5 spinal stenosis related to bulging disc and facet  arthropathy. Right hip xray   Normal     Previous treatments: Physical Therapy, Epidural Steroid Injection, facet MBB, and medications.   Bilateral knees replaced,         Potential Aberrant Drug-Related Behavior:    No    Urine Drug Screening:  None    OARRS report:  09/2023 consistent     Opioid Agreement:  Renewal date:N/A    Past Medical History:   Diagnosis Date    Acute CVA (cerebrovascular accident) ([address]) 3/17/2022    Anastomotic ulcer 04/30/2012    Anemia     Anxiety     Asthma     Biceps tendon tear 01/02/2015    Closed fracture of multiple ribs of right side     Closed fracture of nasal bone     Collapsed lung 1986    COPD (chronic obstructive pulmonary disease) (HCC)     DDD (degenerative disc disease), lumbar     Degenerative disc disease at L5-S1 level     Depression     Distal radius fracture 05/11/2015    Fracture of left olecranon process 01/10/2017    GERD (gastroesophageal reflux disease)     Heart attack ([address])     History of blood transfusion     Hypertension     Impingement syndrome of

## 2023-09-19 ENCOUNTER — OFFICE VISIT (OUTPATIENT)
Dept: BARIATRICS/WEIGHT MGMT | Age: 68
End: 2023-09-19
Payer: MEDICARE

## 2023-09-19 VITALS
HEART RATE: 79 BPM | BODY MASS INDEX: 40.02 KG/M2 | TEMPERATURE: 97.7 F | SYSTOLIC BLOOD PRESSURE: 144 MMHG | RESPIRATION RATE: 20 BRPM | DIASTOLIC BLOOD PRESSURE: 77 MMHG | HEIGHT: 61 IN | WEIGHT: 212 LBS

## 2023-09-19 DIAGNOSIS — R11.0 NAUSEA: ICD-10-CM

## 2023-09-19 DIAGNOSIS — E11.69 DIABETES MELLITUS TYPE 2 IN OBESE (HCC): ICD-10-CM

## 2023-09-19 DIAGNOSIS — E66.9 DIABETES MELLITUS TYPE 2 IN OBESE (HCC): ICD-10-CM

## 2023-09-19 DIAGNOSIS — I10 ESSENTIAL HYPERTENSION: Primary | ICD-10-CM

## 2023-09-19 DIAGNOSIS — E55.9 VITAMIN D DEFICIENCY, UNSPECIFIED: ICD-10-CM

## 2023-09-19 PROCEDURE — 1123F ACP DISCUSS/DSCN MKR DOCD: CPT | Performed by: NURSE PRACTITIONER

## 2023-09-19 PROCEDURE — 99211 OFF/OP EST MAY X REQ PHY/QHP: CPT

## 2023-09-19 PROCEDURE — 3077F SYST BP >= 140 MM HG: CPT | Performed by: NURSE PRACTITIONER

## 2023-09-19 PROCEDURE — 3078F DIAST BP <80 MM HG: CPT | Performed by: NURSE PRACTITIONER

## 2023-09-19 PROCEDURE — 99213 OFFICE O/P EST LOW 20 MIN: CPT | Performed by: NURSE PRACTITIONER

## 2023-09-19 PROCEDURE — 3044F HG A1C LEVEL LT 7.0%: CPT | Performed by: NURSE PRACTITIONER

## 2023-09-19 RX ORDER — ONDANSETRON 4 MG/1
4 TABLET, ORALLY DISINTEGRATING ORAL 3 TIMES DAILY PRN
Qty: 30 TABLET | Refills: 1 | Status: SHIPPED | OUTPATIENT
Start: 2023-09-19

## 2023-09-19 RX ORDER — SEMAGLUTIDE 1.34 MG/ML
1 INJECTION, SOLUTION SUBCUTANEOUS WEEKLY
Qty: 3 ML | Refills: 2 | Status: SHIPPED | OUTPATIENT
Start: 2023-09-19

## 2023-09-19 RX ORDER — ERGOCALCIFEROL 1.25 MG/1
50000 CAPSULE ORAL WEEKLY
Qty: 12 CAPSULE | Refills: 1 | Status: SHIPPED | OUTPATIENT
Start: 2023-09-19

## 2023-09-19 ASSESSMENT — ENCOUNTER SYMPTOMS
NAUSEA: 0
WHEEZING: 0
DIARRHEA: 0
VOMITING: 0
COUGH: 0
SHORTNESS OF BREATH: 0
CONSTIPATION: 0

## 2023-09-19 NOTE — PATIENT INSTRUCTIONS
Please continue to take your vitamin and mineral supplements as instructed. If you received a blood work prescription today for laboratory monitoring due prior to your next routine follow-up visit, please have this blood work obtained 10 to 14 days prior to your next visit. It is important to fast for 12 hours prior to routine weight loss surgery blood work, EXCEPT for drinking water, to ensure accuracy of results. Please report nausea, vomiting, abdominal pain, or any other problems you experience to your surgeon. For problems related to weight loss surgery, it is best to go to [facility] [place] [place] Department and have your surgeon paged.

## 2023-09-20 ENCOUNTER — OFFICE VISIT (OUTPATIENT)
Dept: NEUROLOGY | Age: 68
End: 2023-09-20
Payer: MEDICARE

## 2023-09-20 ENCOUNTER — TELEPHONE (OUTPATIENT)
Dept: NEUROLOGY | Age: 68
End: 2023-09-20

## 2023-09-20 VITALS
OXYGEN SATURATION: 95 % | RESPIRATION RATE: 18 BRPM | SYSTOLIC BLOOD PRESSURE: 115 MMHG | HEIGHT: 60 IN | BODY MASS INDEX: 42.01 KG/M2 | HEART RATE: 77 BPM | TEMPERATURE: 97.3 F | DIASTOLIC BLOOD PRESSURE: 66 MMHG | WEIGHT: 214 LBS

## 2023-09-20 DIAGNOSIS — R41.841 COGNITIVE COMMUNICATION DEFICIT: ICD-10-CM

## 2023-09-20 DIAGNOSIS — G25.81 RLS (RESTLESS LEGS SYNDROME): Primary | ICD-10-CM

## 2023-09-20 PROCEDURE — 3078F DIAST BP <80 MM HG: CPT | Performed by: PHYSICIAN ASSISTANT

## 2023-09-20 PROCEDURE — 1123F ACP DISCUSS/DSCN MKR DOCD: CPT | Performed by: PHYSICIAN ASSISTANT

## 2023-09-20 PROCEDURE — 3074F SYST BP LT 130 MM HG: CPT | Performed by: PHYSICIAN ASSISTANT

## 2023-09-20 PROCEDURE — 99215 OFFICE O/P EST HI 40 MIN: CPT | Performed by: PHYSICIAN ASSISTANT

## 2023-09-20 RX ORDER — GABAPENTIN 100 MG/1
100 CAPSULE ORAL 3 TIMES DAILY
Qty: 90 CAPSULE | Refills: 0 | Status: SHIPPED | OUTPATIENT
Start: 2023-09-20 | End: 2023-10-20

## 2023-09-20 RX ORDER — ROPINIROLE 1 MG/1
TABLET, FILM COATED ORAL
Qty: 90 TABLET | Refills: 0 | Status: SHIPPED | OUTPATIENT
Start: 2023-09-20 | End: 2023-11-29

## 2023-09-20 NOTE — TELEPHONE ENCOUNTER
Pharmacy left a message requesting clarification on number of tabs to be dispensed on her Ropinirole  Calback# [phone]

## 2023-09-20 NOTE — PROGRESS NOTES
Neurology  Office visit-new patient    Date:  9/20/2023  Patient Name:  Celestino Dickerson  YOB: 1955  MRN: [id number]     PCP:  Ernestina Valderrama DO   Referring:  No ref. provider found      Chief Complaint: Memory loss     History obtained from: patient     Assessment    RLS with augmentation and side effects of nausea on higher doses of Requip (5 mg nightly). Will check iron studies. May be in part caused by peripheral neuropathy from B12 deficiency. Non pharmacologic strategies discussed- including increasing water intake, stretching, exercise, massage prior to bed. Needs to wean off Requip- will decrease by 1 mg every 2 weeks over the next several weeks. Gabapentin is first line recommendation for RLS. Will start 100 mg TID and increase based on response and tolerability. After she is off of Requip, could consider trial of Neupro 2 mg patches. Untreated GIL may also be contributing to RLS symptoms. Cognitive communication deficit patient exhibits symptoms of mild cognitive impairment that are likely related to untreated sleep apnea and B12 deficiency. History of gastric bypass and prior B12 noted to be in the low 200s as far back as 2010. Not currently on any supplementation- recent level 273. Levels below 400 can cause neurologic symptoms of cognitive decline as well as neuropathy. Given her history of gastric bypass, unsure if she would absorb oral B12 well and would recommend injections at least at first to get her level > 400. Had sleep study years ago and was told she had sleep apnea but states she cannot use the mask- would recommend reevaluation. Her grief and depression from the recent loss of her  are also contributing to her cognitive function.  Recommend       Plan  Wean Requip   Start gabapentin 100 mg TID- titrate based on response and tolerability   Check labs   Non pharmacologic strategies recommended   Highly recommend treatment of sleep apnea  Recommend B12 replacement

## 2023-10-03 ENCOUNTER — APPOINTMENT (OUTPATIENT)
Dept: CT IMAGING | Age: 68
End: 2023-10-03
Payer: MEDICARE

## 2023-10-03 ENCOUNTER — HOSPITAL ENCOUNTER (EMERGENCY)
Age: 68
Discharge: HOME | End: 2023-10-03
Attending: EMERGENCY MEDICINE
Payer: MEDICARE

## 2023-10-03 ENCOUNTER — APPOINTMENT (OUTPATIENT)
Dept: GENERAL RADIOLOGY | Age: 68
End: 2023-10-03
Payer: MEDICARE

## 2023-10-03 VITALS
DIASTOLIC BLOOD PRESSURE: 71 MMHG | HEART RATE: 87 BPM | RESPIRATION RATE: 19 BRPM | TEMPERATURE: 97.8 F | SYSTOLIC BLOOD PRESSURE: 130 MMHG | OXYGEN SATURATION: 96 %

## 2023-10-03 DIAGNOSIS — J02.9 PHARYNGITIS, UNSPECIFIED ETIOLOGY: Primary | ICD-10-CM

## 2023-10-03 LAB
ANION GAP SERPL CALCULATED.3IONS-SCNC: 11 MMOL/L (ref 7–16)
BASOPHILS # BLD: 0.06 K/UL (ref 0–0.2)
BASOPHILS NFR BLD: 1 % (ref 0–2)
BUN SERPL-MCNC: 13 MG/DL (ref 6–23)
CALCIUM SERPL-MCNC: 9.1 MG/DL (ref 8.6–10.2)
CHLORIDE SERPL-SCNC: 102 MMOL/L (ref 98–107)
CO2 SERPL-SCNC: 27 MMOL/L (ref 22–29)
CREAT SERPL-MCNC: 0.7 MG/DL (ref 0.5–1)
EOSINOPHIL # BLD: 0.12 K/UL (ref 0.05–0.5)
EOSINOPHILS RELATIVE PERCENT: 1 % (ref 0–6)
ERYTHROCYTE [DISTWIDTH] IN BLOOD BY AUTOMATED COUNT: 15.9 % (ref 11.5–15)
GFR SERPL CREATININE-BSD FRML MDRD: >60 ML/MIN/1.73M2
GLUCOSE SERPL-MCNC: 107 MG/DL (ref 74–99)
HCT VFR BLD AUTO: 43 % (ref 34–48)
HGB BLD-MCNC: 13.6 G/DL (ref 11.5–15.5)
IMM GRANULOCYTES # BLD AUTO: 0.04 K/UL (ref 0–0.58)
IMM GRANULOCYTES NFR BLD: 0 % (ref 0–5)
INFLUENZA A BY PCR: NOT DETECTED
INFLUENZA B BY PCR: NOT DETECTED
LYMPHOCYTES NFR BLD: 1.66 K/UL (ref 1.5–4)
LYMPHOCYTES RELATIVE PERCENT: 14 % (ref 20–42)
MCH RBC QN AUTO: 29.5 PG (ref 26–35)
MCHC RBC AUTO-ENTMCNC: 31.6 G/DL (ref 32–34.5)
MCV RBC AUTO: 93.3 FL (ref 80–99.9)
MONOCYTES NFR BLD: 0.7 K/UL (ref 0.1–0.95)
MONOCYTES NFR BLD: 6 % (ref 2–12)
NEUTROPHILS NFR BLD: 78 % (ref 43–80)
NEUTS SEG NFR BLD: 9.37 K/UL (ref 1.8–7.3)
PLATELET # BLD AUTO: 299 K/UL (ref 130–450)
PMV BLD AUTO: 10.4 FL (ref 7–12)
POTASSIUM SERPL-SCNC: 3.8 MMOL/L (ref 3.5–5)
RBC # BLD AUTO: 4.61 M/UL (ref 3.5–5.5)
SARS-COV-2 RDRP RESP QL NAA+PROBE: NOT DETECTED
SODIUM SERPL-SCNC: 140 MMOL/L (ref 132–146)
SPECIMEN DESCRIPTION: NORMAL
SPECIMEN SOURCE: NORMAL
STREP A, MOLECULAR: NEGATIVE
WBC OTHER # BLD: 12 K/UL (ref 4.5–11.5)

## 2023-10-03 PROCEDURE — 85025 COMPLETE CBC W/AUTO DIFF WBC: CPT

## 2023-10-03 PROCEDURE — 6360000004 HC RX CONTRAST MEDICATION: Performed by: RADIOLOGY

## 2023-10-03 PROCEDURE — 87502 INFLUENZA DNA AMP PROBE: CPT

## 2023-10-03 PROCEDURE — 96361 HYDRATE IV INFUSION ADD-ON: CPT

## 2023-10-03 PROCEDURE — 71046 X-RAY EXAM CHEST 2 VIEWS: CPT

## 2023-10-03 PROCEDURE — 96375 TX/PRO/DX INJ NEW DRUG ADDON: CPT

## 2023-10-03 PROCEDURE — 6370000000 HC RX 637 (ALT 250 FOR IP): Performed by: EMERGENCY MEDICINE

## 2023-10-03 PROCEDURE — 96374 THER/PROPH/DIAG INJ IV PUSH: CPT

## 2023-10-03 PROCEDURE — 2580000003 HC RX 258: Performed by: EMERGENCY MEDICINE

## 2023-10-03 PROCEDURE — 87635 SARS-COV-2 COVID-19 AMP PRB: CPT

## 2023-10-03 PROCEDURE — 99285 EMERGENCY DEPT VISIT HI MDM: CPT

## 2023-10-03 PROCEDURE — 80048 BASIC METABOLIC PNL TOTAL CA: CPT

## 2023-10-03 PROCEDURE — 70491 CT SOFT TISSUE NECK W/DYE: CPT

## 2023-10-03 PROCEDURE — 6360000002 HC RX W HCPCS: Performed by: EMERGENCY MEDICINE

## 2023-10-03 RX ORDER — ONDANSETRON 2 MG/ML
4 INJECTION INTRAMUSCULAR; INTRAVENOUS ONCE
Status: COMPLETED | OUTPATIENT
Start: 2023-10-03 | End: 2023-10-03

## 2023-10-03 RX ORDER — ACETAMINOPHEN 325 MG/1
650 TABLET ORAL ONCE
Status: COMPLETED | OUTPATIENT
Start: 2023-10-03 | End: 2023-10-03

## 2023-10-03 RX ORDER — 0.9 % SODIUM CHLORIDE 0.9 %
1000 INTRAVENOUS SOLUTION INTRAVENOUS ONCE
Status: COMPLETED | OUTPATIENT
Start: 2023-10-03 | End: 2023-10-03

## 2023-10-03 RX ORDER — DIPHENHYDRAMINE HYDROCHLORIDE 50 MG/ML
25 INJECTION INTRAMUSCULAR; INTRAVENOUS ONCE
Status: COMPLETED | OUTPATIENT
Start: 2023-10-03 | End: 2023-10-03

## 2023-10-03 RX ADMIN — ONDANSETRON 4 MG: 2 INJECTION INTRAMUSCULAR; INTRAVENOUS at 10:28

## 2023-10-03 RX ADMIN — DIPHENHYDRAMINE HYDROCHLORIDE 25 MG: 50 INJECTION INTRAMUSCULAR; INTRAVENOUS at 10:28

## 2023-10-03 RX ADMIN — IOPAMIDOL 75 ML: 755 INJECTION, SOLUTION INTRAVENOUS at 11:08

## 2023-10-03 RX ADMIN — SODIUM CHLORIDE 1000 ML: 9 INJECTION, SOLUTION INTRAVENOUS at 10:06

## 2023-10-03 RX ADMIN — METHYLPREDNISOLONE SODIUM SUCCINATE 125 MG: 125 INJECTION, POWDER, FOR SOLUTION INTRAMUSCULAR; INTRAVENOUS at 10:28

## 2023-10-03 RX ADMIN — ACETAMINOPHEN 650 MG: 325 TABLET ORAL at 12:24

## 2023-10-03 ASSESSMENT — PAIN SCALES - GENERAL: PAINLEVEL_OUTOF10: 5

## 2023-10-03 ASSESSMENT — LIFESTYLE VARIABLES
HOW OFTEN DO YOU HAVE A DRINK CONTAINING ALCOHOL: NEVER
HOW MANY STANDARD DRINKS CONTAINING ALCOHOL DO YOU HAVE ON A TYPICAL DAY: PATIENT DOES NOT DRINK

## 2023-10-03 ASSESSMENT — PAIN DESCRIPTION - DESCRIPTORS: DESCRIPTORS: ACHING

## 2023-10-03 ASSESSMENT — PAIN DESCRIPTION - LOCATION: LOCATION: HEAD

## 2023-10-23 ENCOUNTER — APPOINTMENT (OUTPATIENT)
Dept: CT IMAGING | Age: 68
End: 2023-10-23
Payer: MEDICARE

## 2023-10-23 ENCOUNTER — HOSPITAL ENCOUNTER (EMERGENCY)
Age: 68
Discharge: HOME | End: 2023-10-23
Payer: MEDICARE

## 2023-10-23 ENCOUNTER — APPOINTMENT (OUTPATIENT)
Dept: GENERAL RADIOLOGY | Age: 68
End: 2023-10-23
Payer: MEDICARE

## 2023-10-23 VITALS
OXYGEN SATURATION: 96 % | WEIGHT: 216.93 LBS | TEMPERATURE: 97.6 F | HEART RATE: 87 BPM | RESPIRATION RATE: 18 BRPM | BODY MASS INDEX: 42.37 KG/M2 | DIASTOLIC BLOOD PRESSURE: 73 MMHG | SYSTOLIC BLOOD PRESSURE: 149 MMHG

## 2023-10-23 DIAGNOSIS — F43.21 GRIEF REACTION: ICD-10-CM

## 2023-10-23 DIAGNOSIS — R07.9 CHEST PAIN, UNSPECIFIED TYPE: Primary | ICD-10-CM

## 2023-10-23 DIAGNOSIS — I15.9 SECONDARY HYPERTENSION: ICD-10-CM

## 2023-10-23 DIAGNOSIS — R05.9 COUGH, UNSPECIFIED TYPE: ICD-10-CM

## 2023-10-23 DIAGNOSIS — R51.9 ACUTE NONINTRACTABLE HEADACHE, UNSPECIFIED HEADACHE TYPE: ICD-10-CM

## 2023-10-23 LAB
ALBUMIN SERPL-MCNC: 4.3 G/DL (ref 3.5–5.2)
ALP SERPL-CCNC: 92 U/L (ref 35–104)
ALT SERPL-CCNC: 16 U/L (ref 0–32)
ANION GAP SERPL CALCULATED.3IONS-SCNC: 12 MMOL/L (ref 7–16)
AST SERPL-CCNC: 16 U/L (ref 0–31)
BACTERIA URNS QL MICRO: ABNORMAL
BASOPHILS # BLD: 0.05 K/UL (ref 0–0.2)
BASOPHILS NFR BLD: 1 % (ref 0–2)
BILIRUB SERPL-MCNC: 0.3 MG/DL (ref 0–1.2)
BILIRUB UR QL STRIP: NEGATIVE
BNP SERPL-MCNC: 127 PG/ML (ref 0–450)
BUN SERPL-MCNC: 11 MG/DL (ref 6–23)
CALCIUM SERPL-MCNC: 9 MG/DL (ref 8.6–10.2)
CHLORIDE SERPL-SCNC: 104 MMOL/L (ref 98–107)
CLARITY UR: ABNORMAL
CO2 SERPL-SCNC: 25 MMOL/L (ref 22–29)
COLOR UR: YELLOW
CREAT SERPL-MCNC: 0.7 MG/DL (ref 0.5–1)
CRYSTALS URNS MICRO: ABNORMAL /HPF
D DIMER: <200 NG/ML DDU (ref 0–232)
EOSINOPHIL # BLD: 0.1 K/UL (ref 0.05–0.5)
EOSINOPHILS RELATIVE PERCENT: 1 % (ref 0–6)
EPI CELLS #/AREA URNS HPF: ABNORMAL /HPF
ERYTHROCYTE [DISTWIDTH] IN BLOOD BY AUTOMATED COUNT: 15.1 % (ref 11.5–15)
GFR SERPL CREATININE-BSD FRML MDRD: >60 ML/MIN/1.73M2
GLUCOSE SERPL-MCNC: 125 MG/DL (ref 74–99)
GLUCOSE UR STRIP-MCNC: NEGATIVE MG/DL
HCT VFR BLD AUTO: 44.5 % (ref 34–48)
HGB BLD-MCNC: 14.3 G/DL (ref 11.5–15.5)
HGB UR QL STRIP.AUTO: ABNORMAL
IMM GRANULOCYTES # BLD AUTO: 0.11 K/UL (ref 0–0.58)
IMM GRANULOCYTES NFR BLD: 1 % (ref 0–5)
INFLUENZA A BY PCR: NOT DETECTED
INFLUENZA B BY PCR: NOT DETECTED
KETONES UR STRIP-MCNC: NEGATIVE MG/DL
LEUKOCYTE ESTERASE UR QL STRIP: ABNORMAL
LYMPHOCYTES NFR BLD: 1.57 K/UL (ref 1.5–4)
LYMPHOCYTES RELATIVE PERCENT: 17 % (ref 20–42)
MAGNESIUM SERPL-MCNC: 2.1 MG/DL (ref 1.6–2.6)
MCH RBC QN AUTO: 29.6 PG (ref 26–35)
MCHC RBC AUTO-ENTMCNC: 32.1 G/DL (ref 32–34.5)
MCV RBC AUTO: 92.1 FL (ref 80–99.9)
MONOCYTES NFR BLD: 0.57 K/UL (ref 0.1–0.95)
MONOCYTES NFR BLD: 6 % (ref 2–12)
MUCOUS THREADS URNS QL MICRO: PRESENT
NEUTROPHILS NFR BLD: 75 % (ref 43–80)
NEUTS SEG NFR BLD: 7.09 K/UL (ref 1.8–7.3)
NITRITE UR QL STRIP: NEGATIVE
PH UR STRIP: 6 [PH] (ref 5–9)
PLATELET # BLD AUTO: 340 K/UL (ref 130–450)
PMV BLD AUTO: 10.7 FL (ref 7–12)
POTASSIUM SERPL-SCNC: 3.4 MMOL/L (ref 3.5–5)
PROT SERPL-MCNC: 7.2 G/DL (ref 6.4–8.3)
PROT UR STRIP-MCNC: NEGATIVE MG/DL
RBC # BLD AUTO: 4.83 M/UL (ref 3.5–5.5)
RBC #/AREA URNS HPF: ABNORMAL /HPF
SARS-COV-2 RDRP RESP QL NAA+PROBE: NOT DETECTED
SODIUM SERPL-SCNC: 141 MMOL/L (ref 132–146)
SP GR UR STRIP: 1.02 (ref 1–1.03)
SPECIMEN DESCRIPTION: NORMAL
TROPONIN I SERPL HS-MCNC: <6 NG/L (ref 0–9)
UROBILINOGEN UR STRIP-ACNC: 0.2 EU/DL (ref 0–1)
WBC #/AREA URNS HPF: ABNORMAL /HPF
WBC OTHER # BLD: 9.5 K/UL (ref 4.5–11.5)

## 2023-10-23 PROCEDURE — 6360000002 HC RX W HCPCS: Performed by: PHYSICIAN ASSISTANT

## 2023-10-23 PROCEDURE — 81001 URINALYSIS AUTO W/SCOPE: CPT

## 2023-10-23 PROCEDURE — 85379 FIBRIN DEGRADATION QUANT: CPT

## 2023-10-23 PROCEDURE — 99285 EMERGENCY DEPT VISIT HI MDM: CPT

## 2023-10-23 PROCEDURE — 71046 X-RAY EXAM CHEST 2 VIEWS: CPT

## 2023-10-23 PROCEDURE — 85025 COMPLETE CBC W/AUTO DIFF WBC: CPT

## 2023-10-23 PROCEDURE — 87635 SARS-COV-2 COVID-19 AMP PRB: CPT

## 2023-10-23 PROCEDURE — 70450 CT HEAD/BRAIN W/O DYE: CPT

## 2023-10-23 PROCEDURE — 93005 ELECTROCARDIOGRAM TRACING: CPT | Performed by: PHYSICIAN ASSISTANT

## 2023-10-23 PROCEDURE — 6370000000 HC RX 637 (ALT 250 FOR IP): Performed by: PHYSICIAN ASSISTANT

## 2023-10-23 PROCEDURE — 96374 THER/PROPH/DIAG INJ IV PUSH: CPT

## 2023-10-23 PROCEDURE — 84484 ASSAY OF TROPONIN QUANT: CPT

## 2023-10-23 PROCEDURE — 83880 ASSAY OF NATRIURETIC PEPTIDE: CPT

## 2023-10-23 PROCEDURE — 83735 ASSAY OF MAGNESIUM: CPT

## 2023-10-23 PROCEDURE — 87502 INFLUENZA DNA AMP PROBE: CPT

## 2023-10-23 PROCEDURE — 96375 TX/PRO/DX INJ NEW DRUG ADDON: CPT

## 2023-10-23 PROCEDURE — 80053 COMPREHEN METABOLIC PANEL: CPT

## 2023-10-23 RX ORDER — DIPHENHYDRAMINE HYDROCHLORIDE 50 MG/ML
25 INJECTION INTRAMUSCULAR; INTRAVENOUS ONCE
Status: COMPLETED | OUTPATIENT
Start: 2023-10-23 | End: 2023-10-23

## 2023-10-23 RX ORDER — METOCLOPRAMIDE HYDROCHLORIDE 5 MG/ML
10 INJECTION INTRAMUSCULAR; INTRAVENOUS ONCE
Status: COMPLETED | OUTPATIENT
Start: 2023-10-23 | End: 2023-10-23

## 2023-10-23 RX ORDER — POTASSIUM CHLORIDE 20 MEQ/1
40 TABLET, EXTENDED RELEASE ORAL ONCE
Status: COMPLETED | OUTPATIENT
Start: 2023-10-23 | End: 2023-10-23

## 2023-10-23 RX ADMIN — DIPHENHYDRAMINE HYDROCHLORIDE 25 MG: 50 INJECTION INTRAMUSCULAR; INTRAVENOUS at 20:20

## 2023-10-23 RX ADMIN — METOCLOPRAMIDE 10 MG: 5 INJECTION, SOLUTION INTRAMUSCULAR; INTRAVENOUS at 20:20

## 2023-10-23 RX ADMIN — POTASSIUM CHLORIDE 40 MEQ: 1500 TABLET, EXTENDED RELEASE ORAL at 20:22

## 2023-10-23 ASSESSMENT — PAIN DESCRIPTION - DESCRIPTORS
DESCRIPTORS: PRESSURE
DESCRIPTORS: ACHING;THROBBING;PRESSURE

## 2023-10-23 ASSESSMENT — PAIN DESCRIPTION - LOCATION
LOCATION: HEAD
LOCATION: CHEST

## 2023-10-23 ASSESSMENT — PAIN SCALES - GENERAL
PAINLEVEL_OUTOF10: 8
PAINLEVEL_OUTOF10: 4

## 2023-10-23 ASSESSMENT — PAIN DESCRIPTION - ORIENTATION: ORIENTATION: MID

## 2023-10-23 ASSESSMENT — LIFESTYLE VARIABLES
HOW MANY STANDARD DRINKS CONTAINING ALCOHOL DO YOU HAVE ON A TYPICAL DAY: PATIENT DOES NOT DRINK
HOW OFTEN DO YOU HAVE A DRINK CONTAINING ALCOHOL: MONTHLY OR LESS

## 2023-10-23 ASSESSMENT — PAIN DESCRIPTION - PAIN TYPE: TYPE: ACUTE PAIN

## 2023-10-23 ASSESSMENT — PAIN - FUNCTIONAL ASSESSMENT: PAIN_FUNCTIONAL_ASSESSMENT: 0-10

## 2023-10-23 NOTE — ED PROVIDER NOTES
Nasal   Result Value Ref Range    Influenza A by PCR Not Detected Not Detected    Influenza B by PCR Not Detected Not Detected   COVID-19, Rapid    Specimen: Nasopharyngeal Swab   Result Value Ref Range    Specimen Description . NASOPHARYNGEAL SWAB     SARS-CoV-2, Rapid Not Detected Not Detected   CBC with Auto Differential   Result Value Ref Range    WBC 9.5 4.5 - 11.5 k/uL    RBC 4.83 3.50 - 5.50 m/uL    Hemoglobin 14.3 11.5 - 15.5 g/dL    Hematocrit 44.5 34.0 - 48.0 %    MCV 92.1 80.0 - 99.9 fL    MCH 29.6 26.0 - 35.0 pg    MCHC 32.1 32.0 - 34.5 g/dL    RDW 15.1 (H) 11.5 - 15.0 %    Platelets 535 945 - 068 k/uL    MPV 10.7 7.0 - 12.0 fL    Neutrophils % 75 43.0 - 80.0 %    Lymphocytes % 17 (L) 20.0 - 42.0 %    Monocytes % 6 2.0 - 12.0 %    Eosinophils % 1 0 - 6 %    Basophils % 1 0.0 - 2.0 %    Immature Granulocytes 1 0.0 - 5.0 %    Neutrophils Absolute 7.09 1.80 - 7.30 k/uL    Lymphocytes Absolute 1.57 1.50 - 4.00 k/uL    Monocytes Absolute 0.57 0.10 - 0.95 k/uL    Eosinophils Absolute 0.10 0.05 - 0.50 k/uL    Basophils Absolute 0.05 0.00 - 0.20 k/uL    Absolute Immature Granulocyte 0.11 0.00 - 0.58 k/uL   Comprehensive Metabolic Panel w/ Reflex to MG   Result Value Ref Range    Sodium 141 132 - 146 mmol/L    Potassium 3.4 (L) 3.5 - 5.0 mmol/L    Chloride 104 98 - 107 mmol/L    CO2 25 22 - 29 mmol/L    Anion Gap 12 7 - 16 mmol/L    Glucose 125 (H) 74 - 99 mg/dL    BUN 11 6 - 23 mg/dL    Creatinine 0.7 0.50 - 1.00 mg/dL    Est, Glom Filt Rate >60 >60 mL/min/1.73m2    Calcium 9.0 8.6 - 10.2 mg/dL    Total Protein 7.2 6.4 - 8.3 g/dL    Albumin 4.3 3.5 - 5.2 g/dL    Total Bilirubin 0.3 0.0 - 1.2 mg/dL    Alkaline Phosphatase 92 35 - 104 U/L    ALT 16 0 - 32 U/L    AST 16 0 - 31 U/L   Troponin   Result Value Ref Range    Troponin, High Sensitivity <6 0 - 9 ng/L   Brain Natriuretic Peptide   Result Value Ref Range    Pro- 0 - 450 pg/mL   D-Dimer, Quantitative   Result Value Ref Range    D-Dimer, Quant <200 0 -

## 2023-10-24 LAB
EKG ATRIAL RATE: 91 BPM
EKG P AXIS: 76 DEGREES
EKG P-R INTERVAL: 152 MS
EKG Q-T INTERVAL: 406 MS
EKG QRS DURATION: 88 MS
EKG QTC CALCULATION (BAZETT): 499 MS
EKG R AXIS: -114 DEGREES
EKG T AXIS: 81 DEGREES
EKG VENTRICULAR RATE: 91 BPM

## 2023-10-24 PROCEDURE — 93010 ELECTROCARDIOGRAM REPORT: CPT | Performed by: INTERNAL MEDICINE

## 2023-11-03 ENCOUNTER — OFFICE VISIT (OUTPATIENT)
Dept: BARIATRICS/WEIGHT MGMT | Age: 68
End: 2023-11-03
Payer: MEDICARE

## 2023-11-03 VITALS
BODY MASS INDEX: 40.05 KG/M2 | TEMPERATURE: 97 F | RESPIRATION RATE: 20 BRPM | DIASTOLIC BLOOD PRESSURE: 69 MMHG | SYSTOLIC BLOOD PRESSURE: 112 MMHG | WEIGHT: 204 LBS | HEART RATE: 87 BPM | HEIGHT: 60 IN

## 2023-11-03 DIAGNOSIS — E11.69 DIABETES MELLITUS TYPE 2 IN OBESE (HCC): Primary | ICD-10-CM

## 2023-11-03 DIAGNOSIS — E66.9 DIABETES MELLITUS TYPE 2 IN OBESE (HCC): Primary | ICD-10-CM

## 2023-11-03 PROCEDURE — 3044F HG A1C LEVEL LT 7.0%: CPT | Performed by: NURSE PRACTITIONER

## 2023-11-03 PROCEDURE — 99213 OFFICE O/P EST LOW 20 MIN: CPT | Performed by: NURSE PRACTITIONER

## 2023-11-03 PROCEDURE — 99211 OFF/OP EST MAY X REQ PHY/QHP: CPT

## 2023-11-03 PROCEDURE — 1123F ACP DISCUSS/DSCN MKR DOCD: CPT | Performed by: NURSE PRACTITIONER

## 2023-11-03 PROCEDURE — 3074F SYST BP LT 130 MM HG: CPT | Performed by: NURSE PRACTITIONER

## 2023-11-03 PROCEDURE — 3078F DIAST BP <80 MM HG: CPT | Performed by: NURSE PRACTITIONER

## 2023-11-03 RX ORDER — SEMAGLUTIDE 1.34 MG/ML
1.7 INJECTION, SOLUTION SUBCUTANEOUS WEEKLY
Qty: 3 ML | Refills: 3 | Status: SHIPPED | OUTPATIENT
Start: 2023-11-03

## 2023-11-03 NOTE — PROGRESS NOTES
Chief Complaint   Patient presents with    Weight Management       HPI:  Patient presents today for follow up of Ozempic. She has been on 1 mg weekly for the past several weeks and is tolerating it well. She denies any negative side effects. She does want to increase the dose at this time. She is here with her daughter at this time. 6/19/2023 - 235 lb  8/1/2023 -   226 lb  9/19/2023 - 212 lb  11/3/2023 - 204 lb    She is requesting an increase in the dose of the Ozempic at this time. Last Non-Surgical Weight Loss:      11/3/2023    10:06 AM   Non-Surgical Weight Loss Tracker   Consult Date 6/16/2023   Initial Height 5' 0\"   Initial Weight 235 lb   Ideal Body Weight 137 lb   Initial BMI 45.89   Initial Body Fat % 55.07   EBW 98 lb   Date 11/3/2023   Height 5' 0\"   Weight 204 lb   BMI 39.84   Weight Change 204 lb   Total Weight Change 0 lb   % EBWL <UNK>%   Subsequent Body Fat % 47.81   Body Fat % Change 47.81           Prior to Visit Medications    Medication Sig Taking? Authorizing Provider   rOPINIRole (REQUIP) 1 MG tablet Take 4 tablets by mouth nightly for 14 days, THEN 3 tablets nightly for 14 days, THEN 2 tablets nightly for 14 days, THEN 1 tablet nightly for 14 days, THEN 0.5 tablets nightly for 14 days. Yes ISABELLA Storm   gabapentin (NEURONTIN) 100 MG capsule Take 1 capsule by mouth 3 times daily for 30 days.  Yes ISABELLA Storm   Semaglutide, 1 MG/DOSE, (OZEMPIC, 1 MG/DOSE,) 4 MG/3ML SOPN Inject 1 mg into the skin once a week Yes KRYSTA Lenz CNP   vitamin D (ERGOCALCIFEROL) 1.25 MG (28635 UT) CAPS capsule Take 1 capsule by mouth once a week Yes KRYSTA Lenz CNP   ondansetron (ZOFRAN-ODT) 4 MG disintegrating tablet Take 1 tablet by mouth 3 times daily as needed for Nausea or Vomiting Yes KRYSTA Lenz CNP   esomeprazole Magnesium (NEXIUM) 40 MG PACK Take 1 packet by mouth daily Yes Provider, MD Rita   metoprolol tartrate (LOPRESSOR) 50 MG

## 2023-11-03 NOTE — PATIENT INSTRUCTIONS
Please continue to take your vitamin and mineral supplements as instructed. If you received a blood work prescription today for laboratory monitoring due prior to your next routine follow-up visit, please have this blood work obtained 10 to 14 days prior to your next visit. It is important to fast for 12 hours prior to routine weight loss surgery blood work, EXCEPT for drinking water, to ensure accuracy of results. Please report nausea, vomiting, abdominal pain, or any other problems you experience to your surgeon. For problems related to weight loss surgery, it is best to go to [facility] Emergency Department and have your surgeon paged.     Last Surgical Weight Loss:       No data to display

## 2023-11-06 ASSESSMENT — ENCOUNTER SYMPTOMS
COUGH: 0
CONSTIPATION: 0
DIARRHEA: 0
SHORTNESS OF BREATH: 0
NAUSEA: 0
WHEEZING: 0
VOMITING: 0

## 2023-11-08 ENCOUNTER — OFFICE VISIT (OUTPATIENT)
Dept: FAMILY MEDICINE CLINIC | Age: 68
End: 2023-11-08
Payer: MEDICARE

## 2023-11-08 VITALS
WEIGHT: 204 LBS | RESPIRATION RATE: 17 BRPM | HEART RATE: 83 BPM | TEMPERATURE: 97.8 F | SYSTOLIC BLOOD PRESSURE: 141 MMHG | HEIGHT: 60 IN | BODY MASS INDEX: 40.05 KG/M2 | OXYGEN SATURATION: 98 % | DIASTOLIC BLOOD PRESSURE: 83 MMHG

## 2023-11-08 DIAGNOSIS — J32.9 SINOBRONCHITIS: Primary | ICD-10-CM

## 2023-11-08 DIAGNOSIS — R05.1 ACUTE COUGH: ICD-10-CM

## 2023-11-08 DIAGNOSIS — J40 SINOBRONCHITIS: Primary | ICD-10-CM

## 2023-11-08 LAB
INFLUENZA A ANTIGEN, POC: NEGATIVE
INFLUENZA B ANTIGEN, POC: NEGATIVE
LOT EXPIRE DATE: NORMAL
LOT KIT NUMBER: NORMAL
SARS-COV-2, POC: NORMAL
VALID INTERNAL CONTROL: NORMAL
VENDOR AND KIT NAME POC: NORMAL

## 2023-11-08 PROCEDURE — 1123F ACP DISCUSS/DSCN MKR DOCD: CPT

## 2023-11-08 PROCEDURE — 99213 OFFICE O/P EST LOW 20 MIN: CPT

## 2023-11-08 PROCEDURE — 87428 SARSCOV & INF VIR A&B AG IA: CPT

## 2023-11-08 PROCEDURE — 3079F DIAST BP 80-89 MM HG: CPT

## 2023-11-08 PROCEDURE — 3077F SYST BP >= 140 MM HG: CPT

## 2023-11-08 RX ORDER — BROMPHENIRAMINE MALEATE, PSEUDOEPHEDRINE HYDROCHLORIDE, AND DEXTROMETHORPHAN HYDROBROMIDE 2; 30; 10 MG/5ML; MG/5ML; MG/5ML
SYRUP ORAL
Qty: 180 ML | Refills: 0 | Status: SHIPPED | OUTPATIENT
Start: 2023-11-08

## 2023-11-08 RX ORDER — METHYLPREDNISOLONE 4 MG/1
TABLET ORAL
Qty: 1 KIT | Refills: 0 | Status: SHIPPED | OUTPATIENT
Start: 2023-11-08

## 2023-11-08 RX ORDER — GUAIFENESIN, PSEUDOEPHEDRINE HYDROCHLORIDE 600; 60 MG/1; MG/1
1 TABLET, EXTENDED RELEASE ORAL EVERY 12 HOURS PRN
Qty: 14 TABLET | Refills: 0 | Status: SHIPPED
Start: 2023-11-08 | End: 2023-11-08 | Stop reason: CLARIF

## 2023-11-08 RX ORDER — AMOXICILLIN 500 MG/1
500 CAPSULE ORAL 3 TIMES DAILY
COMMUNITY

## 2023-11-08 RX ORDER — GUAIFENESIN, PSEUDOEPHEDRINE HYDROCHLORIDE 600; 60 MG/1; MG/1
1 TABLET, EXTENDED RELEASE ORAL EVERY 12 HOURS PRN
Qty: 14 TABLET | Refills: 0 | Status: SHIPPED | OUTPATIENT
Start: 2023-11-08

## 2023-11-08 SDOH — ECONOMIC STABILITY: INCOME INSECURITY: HOW HARD IS IT FOR YOU TO PAY FOR THE VERY BASICS LIKE FOOD, HOUSING, MEDICAL CARE, AND HEATING?: NOT HARD AT ALL

## 2023-11-08 SDOH — ECONOMIC STABILITY: HOUSING INSECURITY
IN THE LAST 12 MONTHS, WAS THERE A TIME WHEN YOU DID NOT HAVE A STEADY PLACE TO SLEEP OR SLEPT IN A SHELTER (INCLUDING NOW)?: NO

## 2023-11-08 SDOH — ECONOMIC STABILITY: FOOD INSECURITY: WITHIN THE PAST 12 MONTHS, THE FOOD YOU BOUGHT JUST DIDN'T LAST AND YOU DIDN'T HAVE MONEY TO GET MORE.: NEVER TRUE

## 2023-11-08 SDOH — ECONOMIC STABILITY: FOOD INSECURITY: WITHIN THE PAST 12 MONTHS, YOU WORRIED THAT YOUR FOOD WOULD RUN OUT BEFORE YOU GOT MONEY TO BUY MORE.: NEVER TRUE

## 2023-11-08 NOTE — PROGRESS NOTES
ENDOSCOPY  4/18/12    [facility]    UPPER GASTROINTESTINAL ENDOSCOPY N/A 11/20/2018    EGD BIOPSY performed by Chuyita Sarmiento MD at [address] [place] [address] 1/2/2019    EGD ESOPHAGOGASTRODUODENOSCOPY performed by Spencer Ruiz MD at [address] [facility] ARTHROSCOPY Right 12/02/2015    DEBRIDEMENT ASPIRATION; RIGHT DEQUARVAINES RELEASE    WRIST SURGERY Right 12/2/15       Family History   Problem Relation Age of Onset    Cancer Mother     Heart Disease Sister     Emphysema Father        Medications:     Current Outpatient Medications:     amoxicillin (AMOXIL) 500 MG capsule, Take 1 capsule by mouth 3 times daily, Disp: , Rfl:     methylPREDNISolone (MEDROL DOSEPACK) 4 MG tablet, Take by mouth., Disp: 1 kit, Rfl: 0    brompheniramine-pseudoephedrine-DM 2-30-10 MG/5ML syrup, 5 - 10 mL by mouth every 6 hours as needed for cough / congestion. , Disp: 180 mL, Rfl: 0    pseudoephedrine-guaiFENesin (MUCINEX D)  MG per extended release tablet, Take 1 tablet by mouth every 12 hours as needed for Congestion, Disp: 14 tablet, Rfl: 0    Semaglutide, 1 MG/DOSE, (OZEMPIC, 1 MG/DOSE,) 4 MG/3ML SOPN, Inject 1.7 mg into the skin once a week, Disp: 3 mL, Rfl: 3    rOPINIRole (REQUIP) 1 MG tablet, Take 4 tablets by mouth nightly for 14 days, THEN 3 tablets nightly for 14 days, THEN 2 tablets nightly for 14 days, THEN 1 tablet nightly for 14 days, THEN 0.5 tablets nightly for 14 days. , Disp: 90 tablet, Rfl: 0    vitamin D (ERGOCALCIFEROL) 1.25 MG (76341 UT) CAPS capsule, Take 1 capsule by mouth once a week, Disp: 12 capsule, Rfl: 1    ondansetron (ZOFRAN-ODT) 4 MG disintegrating tablet, Take 1 tablet by mouth 3 times daily as needed for Nausea or Vomiting, Disp: 30 tablet, Rfl: 1    esomeprazole Magnesium (NEXIUM) 40 MG PACK, Take 1 packet by mouth daily, Disp: , Rfl:     metoprolol tartrate (LOPRESSOR) 50 MG tablet, Take 1 tablet by mouth 2 times daily, Disp: 60 tablet, Rfl: 3    furosemide (LASIX) 40 MG

## 2023-11-22 ENCOUNTER — OFFICE VISIT (OUTPATIENT)
Dept: PAIN MANAGEMENT | Age: 68
End: 2023-11-22
Payer: MEDICARE

## 2023-11-22 VITALS
BODY MASS INDEX: 40.05 KG/M2 | TEMPERATURE: 97.1 F | DIASTOLIC BLOOD PRESSURE: 88 MMHG | SYSTOLIC BLOOD PRESSURE: 130 MMHG | OXYGEN SATURATION: 95 % | HEART RATE: 91 BPM | WEIGHT: 204 LBS | HEIGHT: 60 IN | RESPIRATION RATE: 18 BRPM

## 2023-11-22 DIAGNOSIS — M47.816 LUMBAR FACET ARTHROPATHY: ICD-10-CM

## 2023-11-22 DIAGNOSIS — M51.9 LUMBAR DISC DISORDER: ICD-10-CM

## 2023-11-22 DIAGNOSIS — M47.816 LUMBAR SPONDYLOSIS: ICD-10-CM

## 2023-11-22 DIAGNOSIS — M17.0 PRIMARY OSTEOARTHRITIS OF BOTH KNEES: ICD-10-CM

## 2023-11-22 DIAGNOSIS — G89.4 CHRONIC PAIN SYNDROME: ICD-10-CM

## 2023-11-22 DIAGNOSIS — E66.01 OBESITY, CLASS III, BMI 40-49.9 (MORBID OBESITY) (HCC): Primary | ICD-10-CM

## 2023-11-22 DIAGNOSIS — M48.062 SPINAL STENOSIS OF LUMBAR REGION WITH NEUROGENIC CLAUDICATION: ICD-10-CM

## 2023-11-22 PROCEDURE — 99213 OFFICE O/P EST LOW 20 MIN: CPT | Performed by: PAIN MEDICINE

## 2023-11-22 PROCEDURE — 1123F ACP DISCUSS/DSCN MKR DOCD: CPT | Performed by: PAIN MEDICINE

## 2023-11-22 PROCEDURE — 3079F DIAST BP 80-89 MM HG: CPT | Performed by: PAIN MEDICINE

## 2023-11-22 PROCEDURE — 99214 OFFICE O/P EST MOD 30 MIN: CPT | Performed by: PAIN MEDICINE

## 2023-11-22 PROCEDURE — 3075F SYST BP GE 130 - 139MM HG: CPT | Performed by: PAIN MEDICINE

## 2023-11-22 RX ORDER — SODIUM CHLORIDE 9 MG/ML
INJECTION, SOLUTION INTRAVENOUS PRN
OUTPATIENT
Start: 2023-11-22

## 2023-11-22 RX ORDER — SODIUM CHLORIDE 0.9 % (FLUSH) 0.9 %
5-40 SYRINGE (ML) INJECTION PRN
OUTPATIENT
Start: 2023-11-22

## 2023-11-22 RX ORDER — SODIUM CHLORIDE 0.9 % (FLUSH) 0.9 %
5-40 SYRINGE (ML) INJECTION EVERY 12 HOURS SCHEDULED
OUTPATIENT
Start: 2023-11-22

## 2023-11-27 RX ORDER — ROPINIROLE 1 MG/1
TABLET, FILM COATED ORAL
Qty: 147 TABLET | OUTPATIENT
Start: 2023-11-27

## 2023-12-06 ENCOUNTER — TELEPHONE (OUTPATIENT)
Dept: PAIN MANAGEMENT | Age: 68
End: 2023-12-06

## 2023-12-06 NOTE — TELEPHONE ENCOUNTER
Call to Javi Flores that procedure was approved for 12/11/2023 and that [facility] should call her a few days before for the pre op call and after 3:00 PM the business day before with the arrival time. Instructed Lizette to hold ibuprofen for 24 hours, naprosyn for 4 days and any aspirin containing products or fish oil for 7 days. Instructed to call office back if any questions. Lizette verbalized understanding.     Electronically signed by Lakeisha Yañez RN on 12/6/2023 at 1:40 PM

## 2023-12-11 ENCOUNTER — HOSPITAL ENCOUNTER (OUTPATIENT)
Age: 68
Setting detail: OUTPATIENT SURGERY
Discharge: HOME | End: 2023-12-11
Attending: PAIN MEDICINE | Admitting: PAIN MEDICINE
Payer: MEDICARE

## 2023-12-11 ENCOUNTER — HOSPITAL ENCOUNTER (OUTPATIENT)
Dept: OPERATING ROOM | Age: 68
Setting detail: OUTPATIENT SURGERY
Discharge: HOME | End: 2023-12-11
Attending: PAIN MEDICINE
Payer: MEDICARE

## 2023-12-11 VITALS
HEIGHT: 60 IN | RESPIRATION RATE: 16 BRPM | SYSTOLIC BLOOD PRESSURE: 126 MMHG | WEIGHT: 195 LBS | BODY MASS INDEX: 38.28 KG/M2 | DIASTOLIC BLOOD PRESSURE: 65 MMHG | TEMPERATURE: 97.9 F | HEART RATE: 81 BPM | OXYGEN SATURATION: 96 %

## 2023-12-11 DIAGNOSIS — M54.16 LUMBAR RADICULOPATHY: ICD-10-CM

## 2023-12-11 PROCEDURE — 3600000005 HC SURGERY LEVEL 5 BASE: Performed by: PAIN MEDICINE

## 2023-12-11 PROCEDURE — 7100000010 HC PHASE II RECOVERY - FIRST 15 MIN: Performed by: PAIN MEDICINE

## 2023-12-11 PROCEDURE — 2500000003 HC RX 250 WO HCPCS: Performed by: PAIN MEDICINE

## 2023-12-11 PROCEDURE — 2709999900 HC NON-CHARGEABLE SUPPLY: Performed by: PAIN MEDICINE

## 2023-12-11 PROCEDURE — 6360000002 HC RX W HCPCS: Performed by: PAIN MEDICINE

## 2023-12-11 PROCEDURE — 6360000004 HC RX CONTRAST MEDICATION: Performed by: PAIN MEDICINE

## 2023-12-11 PROCEDURE — A9579 GAD-BASE MR CONTRAST NOS,1ML: HCPCS | Performed by: PAIN MEDICINE

## 2023-12-11 PROCEDURE — 7100000011 HC PHASE II RECOVERY - ADDTL 15 MIN: Performed by: PAIN MEDICINE

## 2023-12-11 RX ORDER — SODIUM CHLORIDE 0.9 % (FLUSH) 0.9 %
5-40 SYRINGE (ML) INJECTION PRN
Status: DISCONTINUED | OUTPATIENT
Start: 2023-12-11 | End: 2023-12-11 | Stop reason: HOSPADM

## 2023-12-11 RX ORDER — LIDOCAINE HYDROCHLORIDE 5 MG/ML
INJECTION, SOLUTION INFILTRATION; INTRAVENOUS PRN
Status: DISCONTINUED | OUTPATIENT
Start: 2023-12-11 | End: 2023-12-11 | Stop reason: ALTCHOICE

## 2023-12-11 RX ORDER — SODIUM CHLORIDE 0.9 % (FLUSH) 0.9 %
5-40 SYRINGE (ML) INJECTION EVERY 12 HOURS SCHEDULED
Status: DISCONTINUED | OUTPATIENT
Start: 2023-12-11 | End: 2023-12-11 | Stop reason: HOSPADM

## 2023-12-11 RX ORDER — SODIUM CHLORIDE 9 MG/ML
INJECTION, SOLUTION INTRAVENOUS PRN
Status: DISCONTINUED | OUTPATIENT
Start: 2023-12-11 | End: 2023-12-11 | Stop reason: HOSPADM

## 2023-12-11 ASSESSMENT — PAIN DESCRIPTION - ORIENTATION: ORIENTATION: LOWER

## 2023-12-11 ASSESSMENT — PAIN DESCRIPTION - DESCRIPTORS: DESCRIPTORS: ACHING

## 2023-12-11 ASSESSMENT — PAIN DESCRIPTION - LOCATION: LOCATION: BACK

## 2023-12-11 ASSESSMENT — PAIN - FUNCTIONAL ASSESSMENT: PAIN_FUNCTIONAL_ASSESSMENT: 0-10

## 2023-12-11 ASSESSMENT — PAIN SCALES - GENERAL: PAINLEVEL_OUTOF10: 8

## 2023-12-11 NOTE — DISCHARGE INSTRUCTIONS
[facility]) Pain Management Department  [phone]   Post-Pain Block/ Radiofrequency Home Going Instructions    1-Go home, rest for the remainder of the day  2-Please do not lift over 20 pounds the day of the injection  3-If you received sedation No: alcohol, driving, operating lawn mowers, plows, tractors or other dangerous equipment until next morning. Do not make important decisions or sign legal documents for 24 hours. You may experience light headedness, dizziness, nausea or sleepiness after sedation. Do not stay alone. A responsible adult must be with you for 24 hours. You could be nauseated from the medications you have received. Your IV site may be sore and bruised. 4-No dietary restrictions     5-Resume all medications the same day, blood thinners to be resumed 24 hours after injection    6-Keep the surgical site clean and dry, you may shower the next morning and remove the      dressing. 7- No sitz baths, tub baths or hot tubs/swimming for 24 hours. 8- If you have any pain at the injection site(s), application of an ice pack to the area should be       helpful, 20 minutes on/20 minutes off for next 48 hours. 9- [facility] if you develop.   Fever greater than 100.4 F  Have bleeding or drainage from the puncture site  Have progressive Leg/arm numbness and or weakness  Loss of control of bowel and or bladder (wet/soil yourself)  Severe headache with inability to lift head  10-You may return to work the next day

## 2023-12-11 NOTE — H&P
3.50     Types: Cigarettes     Start date: 7/1/2023     Passive exposure: Current    Smokeless tobacco: Never   Vaping Use    Vaping Use: Never used   Substance and Sexual Activity    Alcohol use: Yes     Comment: Occasionally    Drug use: Not Currently    Sexual activity: Not Currently   Other Topics Concern    Not on file   Social History Narrative    ** Merged History Encounter **          Social Determinants of Health     Financial Resource Strain: Low Risk  (11/8/2023)    Overall Financial Resource Strain (CARDIA)     Difficulty of Paying Living Expenses: Not hard at all   Food Insecurity: Not on file (11/8/2023)   Transportation Needs: Unknown (11/8/2023)    PRAPARE - Transportation     Lack of Transportation (Medical): Not on file     Lack of Transportation (Non-Medical): No   Physical Activity: Insufficiently Active (12/13/2022)    Exercise Vital Sign     Days of Exercise per Week: 2 days     Minutes of Exercise per Session: 20 min   Stress: Not on file   Social Connections: Not on file   Intimate Partner Violence: Not on file   Housing Stability: Unknown (11/8/2023)    Housing Stability Vital Sign     Unable to Pay for Housing in the Last Year: Not on file     Number of [address] in the Last Year: Not on file     Unstable Housing in the Last Year: No       Family History   Problem Relation Age of Onset    Cancer Mother     Heart Disease Sister     Emphysema Father          REVIEW OF SYSTEMS:    CONSTITUTIONAL:  negative for  fevers, chills, sweats and fatigue    RESPIRATORY:  negative for  dry cough, cough with sputum, dyspnea, wheezing and chest pain    CARDIOVASCULAR:  negative for chest pain, dyspnea, palpitations, syncope    GASTROINTESTINAL:  negative for nausea, vomiting, change in bowel habits, diarrhea, constipation and abdominal pain    MUSCULOSKELETAL: negative for muscle weakness    SKIN: negative for itching or rashes.     BEHAVIOR/PSYCH:  negative for poor appetite, increased appetite,

## 2023-12-11 NOTE — OP NOTE
2023    Patient: Mehrdad Denny  :  1955  Age:  77 y.o. Sex:  female     PRE-OPERATIVE DIAGNOSIS: Lumbar disc displacement, lumbar radiculopathy. POST-OPERATIVE DIAGNOSIS: Same. PROCEDURE: Fluoroscopic guided therapeutic lumbar epidural steroid injection at the L5-S1 level (# 1). SURGEON: SEVERINO Braxton M.D.. ANESTHESIA: Local    ESTIMATED BLOOD LOSS: None.  ______________________________________________________________________    BRIEF HISTORY:  Mehrdad Denny comes in today for first lumbar epidural injection at L5-S1 level. The potential complications of this procedure were discussed with her again today. She has elected to undergo the aforementioned procedure. Mercedes complete History & Physical examination were reviewed in depth, a copy of which is in the chart. DESCRIPTION OF PROCEDURE:    After confirming written and informed consent, a time-out was performed and Mercedes name and date of birth, the procedure to be performed as well as the plan for the location of the needle insertion were confirmed. The patient was brought into the procedure room and placed in the prone position on the fluoroscopy table. A pillow was placed under the patient's lower abdomen/upper pelvis to increase lumbar interlaminar space. Standard monitors were placed, and vital signs were observed throughout the procedure. The area of the lumbar spine was prepped with chloraprep and draped in a sterile manner. The L5-S1 interspace was identified and marked under AP fluoroscopy. The skin and subcutaneous tissues at the above level were anesthestized with 0.5% lidocaine. With intermittent fluoroscopy, an # 18 gauge 3 1/2 tuohy epidural needle was inserted and directed toward the interlaminar space. The needle was slowly advanced using loss of resistance technique and 5 cc glass syringe  until the tip of the epidural needle has passed through the ligamentum flavum and entered the epidural space.  AP

## 2024-02-14 ENCOUNTER — OFFICE VISIT (OUTPATIENT)
Dept: BARIATRICS/WEIGHT MGMT | Age: 69
End: 2024-02-14
Payer: MEDICARE

## 2024-02-14 VITALS
WEIGHT: 191 LBS | BODY MASS INDEX: 37.5 KG/M2 | HEIGHT: 60 IN | DIASTOLIC BLOOD PRESSURE: 80 MMHG | SYSTOLIC BLOOD PRESSURE: 130 MMHG | HEART RATE: 98 BPM

## 2024-02-14 DIAGNOSIS — K21.9 GASTROESOPHAGEAL REFLUX DISEASE WITHOUT ESOPHAGITIS: ICD-10-CM

## 2024-02-14 DIAGNOSIS — E66.9 DIABETES MELLITUS TYPE 2 IN OBESE (HCC): Primary | ICD-10-CM

## 2024-02-14 DIAGNOSIS — R11.0 NAUSEA: ICD-10-CM

## 2024-02-14 DIAGNOSIS — E11.69 DIABETES MELLITUS TYPE 2 IN OBESE (HCC): Primary | ICD-10-CM

## 2024-02-14 PROCEDURE — 3044F HG A1C LEVEL LT 7.0%: CPT | Performed by: NURSE PRACTITIONER

## 2024-02-14 PROCEDURE — 99211 OFF/OP EST MAY X REQ PHY/QHP: CPT | Performed by: NURSE PRACTITIONER

## 2024-02-14 PROCEDURE — 3079F DIAST BP 80-89 MM HG: CPT | Performed by: NURSE PRACTITIONER

## 2024-02-14 PROCEDURE — 1123F ACP DISCUSS/DSCN MKR DOCD: CPT | Performed by: NURSE PRACTITIONER

## 2024-02-14 PROCEDURE — 3075F SYST BP GE 130 - 139MM HG: CPT | Performed by: NURSE PRACTITIONER

## 2024-02-14 PROCEDURE — 99214 OFFICE O/P EST MOD 30 MIN: CPT | Performed by: NURSE PRACTITIONER

## 2024-02-14 RX ORDER — ONDANSETRON 4 MG/1
4 TABLET, ORALLY DISINTEGRATING ORAL 3 TIMES DAILY PRN
Qty: 30 TABLET | Refills: 1 | Status: SHIPPED | OUTPATIENT
Start: 2024-02-14

## 2024-02-14 RX ORDER — SUCRALFATE 1 G/1
0.5 TABLET ORAL 4 TIMES DAILY
Qty: 60 TABLET | Refills: 1 | Status: SHIPPED | OUTPATIENT
Start: 2024-02-14 | End: 2024-04-14

## 2024-02-14 RX ORDER — SEMAGLUTIDE 2.68 MG/ML
2 INJECTION, SOLUTION SUBCUTANEOUS
Qty: 3 ML | Refills: 3 | Status: SHIPPED | OUTPATIENT
Start: 2024-02-14

## 2024-02-14 RX ORDER — OMEPRAZOLE 20 MG/1
20 CAPSULE, DELAYED RELEASE ORAL
Qty: 60 CAPSULE | Refills: 1 | Status: SHIPPED | OUTPATIENT
Start: 2024-02-14

## 2024-02-14 NOTE — PATIENT INSTRUCTIONS
Please continue to take your vitamin and mineral supplements as instructed.      If you received a blood work prescription today for laboratory monitoring due prior to your next routine follow-up visit, please have this blood work obtained 10 to 14 days prior to your next visit.  It is important to fast for 12 hours prior to routine weight loss surgery blood work, EXCEPT for drinking water, to ensure accuracy of results.    Please report nausea, vomiting, abdominal pain, or any other problems you experience to your surgeon.  For problems related to weight loss surgery, it is best to go to [facility] Emergency Department and have your surgeon paged.    Last Surgical Weight Loss:       No data to display

## 2024-02-14 NOTE — PROGRESS NOTES
Chief Complaint   Patient presents with    Medication Check     Ozempic        HPI:  Patient presents today for follow up of Ozempic. She reports that she had to go 6 weeks without the Ozempic due to a pharmacy shortage. She is currently on 1.0 mg weekly.   She still reports that she is still vomiting almost daily. 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 She does also tell me that she has a known hiatal hernia.     Prior to Visit Medications    Medication Sig Taking? Authorizing Provider   semaglutide, 2 MG/DOSE, (OZEMPIC, 2 MG/DOSE,) 8 MG/3ML SOPN sc injection Inject 0.75 mLs into the skin every 7 days Yes Margaret Lama APRN - CNP   omeprazole (PRILOSEC) 20 MG delayed release capsule Take 1 capsule by mouth 2 times daily (before meals) Yes Margaret Lama APRN - CNP   sucralfate (CARAFATE) 1 GM tablet Take 0.5 tablets by mouth 4 times daily Yes Margaret Lama APRN - CNP   venlafaxine (EFFEXOR XR) 37.5 MG extended release capsule Take 1 capsule by mouth daily Yes Diego Pritchard,    Semaglutide, 1 MG/DOSE, (OZEMPIC, 1 MG/DOSE,) 4 MG/3ML SOPN Inject 1.7 mg into the skin once a week Yes Margaret Lama APRN - CNP   vitamin D (ERGOCALCIFEROL) 1.25 MG (43949 UT) CAPS capsule Take 1 capsule by mouth once a week Yes Margaret Lama APRN - CNP   esomeprazole Magnesium (NEXIUM) 40 MG PACK Take 1 packet by mouth daily Yes Provider, MD Rita   albuterol sulfate HFA (PROVENTIL HFA) 108 (90 Base) MCG/ACT inhaler Inhale 2 puffs into the lungs every 6 hours as needed for Wheezing Yes Jet Leon APRN - CNP   rOPINIRole (REQUIP) 1 MG tablet Take 4 tablets by mouth nightly for 14 days, THEN 3 tablets nightly for 14 days, THEN 2 tablets nightly for 14 days, THEN 1 tablet nightly for 14 days, THEN 0.5 tablets nightly for 14 days.  Sharmin Gupta PA         Allergies   Allergen Reactions    Ceclor [Cefaclor]

## 2024-02-15 ENCOUNTER — TELEPHONE (OUTPATIENT)
Dept: BARIATRICS/WEIGHT MGMT | Age: 69
End: 2024-02-15

## 2024-02-15 DIAGNOSIS — E11.69 DIABETES MELLITUS TYPE 2 IN OBESE (HCC): ICD-10-CM

## 2024-02-15 DIAGNOSIS — E66.9 DIABETES MELLITUS TYPE 2 IN OBESE (HCC): ICD-10-CM

## 2024-02-15 NOTE — TELEPHONE ENCOUNTER
The pharmacist from [facility] called for a med clarification on her prescription of Ozempic. He states that the directions will not work for the 2mg pen. The call back phone number to the pharmacist is [phone].

## 2024-03-25 ENCOUNTER — OFFICE VISIT (OUTPATIENT)
Dept: FAMILY MEDICINE CLINIC | Age: 69
End: 2024-03-25
Payer: MEDICARE

## 2024-03-25 VITALS
SYSTOLIC BLOOD PRESSURE: 113 MMHG | DIASTOLIC BLOOD PRESSURE: 63 MMHG | TEMPERATURE: 97.3 F | HEIGHT: 60 IN | BODY MASS INDEX: 36.32 KG/M2 | WEIGHT: 185 LBS | OXYGEN SATURATION: 98 % | HEART RATE: 77 BPM | RESPIRATION RATE: 16 BRPM

## 2024-03-25 DIAGNOSIS — M25.532 LEFT WRIST PAIN: Primary | ICD-10-CM

## 2024-03-25 PROCEDURE — 3074F SYST BP LT 130 MM HG: CPT

## 2024-03-25 PROCEDURE — 1123F ACP DISCUSS/DSCN MKR DOCD: CPT

## 2024-03-25 PROCEDURE — 3078F DIAST BP <80 MM HG: CPT

## 2024-03-25 PROCEDURE — 99213 OFFICE O/P EST LOW 20 MIN: CPT

## 2024-03-25 RX ORDER — METHYLPREDNISOLONE 4 MG/1
TABLET ORAL
Qty: 1 KIT | Refills: 0 | Status: SHIPPED | OUTPATIENT
Start: 2024-03-25

## 2024-03-25 NOTE — PROGRESS NOTES
Base) MCG/ACT inhaler, Inhale 2 puffs into the lungs every 6 hours as needed for Wheezing, Disp: 1 each, Rfl: 1    Allergies:     Allergies   Allergen Reactions    Ceclor [Cefaclor] Anaphylaxis    Diprivan [Propofol] Hives     Swelling of face    Gabapentin Other (See Comments)     AMS and nausea and vomiting    Naproxen Anaphylaxis     Heart races    Adhesive Tape Hives    Blueberry Flavor     Ibuprofen Hives    Mirapex [Pramipexole]     Oxycodone-Acetaminophen     Potato     Shellfish-Derived Products Hives    Tramadol     Vaccinium Angustifolium     Blueberry Fruit Extract Hives    Ceclor [Cefaclor] Hives    Iodides Hives    Iodine Hives and Rash     Contrast    Iv Dye [Iodides] Rash    Naprosyn [Naproxen] Palpitations    Percocet [Oxycodone-Acetaminophen] Rash    Phenergan [Promethazine Hcl] Nausea And Vomiting    Promethazine Nausea And Vomiting    Sulfa Antibiotics Hives    Tape [Adhesive Tape] Rash    Tetanus Toxoids Swelling and Rash       Social History:     Social History     Tobacco Use    Smoking status: Every Day     Current packs/day: 0.25     Average packs/day: 0.3 packs/day for 14.2 years (3.6 ttl pk-yrs)     Types: Cigarettes     Start date: 7/1/2023     Passive exposure: Current    Smokeless tobacco: Never   Vaping Use    Vaping Use: Never used   Substance Use Topics    Alcohol use: Yes     Comment: Occasionally    Drug use: Not Currently       Physical Exam:     Vitals:    03/25/24 1158   BP: 113/63   Site: Right Upper Arm   Position: Sitting   Cuff Size: Large Adult   Pulse: 77   Resp: 16   Temp: 97.3 °F (36.3 °C)   TempSrc: Temporal   SpO2: 98%   Weight: 83.9 kg (185 lb)   Height: 1.524 m (5')       Physical Exam (PE)   Constitutional: Alert, development consistent with age.  HENT:      Head: Normocephalic.      Right Ear: External ear normal.      Left Ear: External ear normal.      Nose: Normal.      Mouth/Throat:     Mouth: Mucous membranes are moist.      Pharynx: Oropharynx is clear.  Eyes:

## 2024-03-27 ENCOUNTER — TELEPHONE (OUTPATIENT)
Dept: ORTHOPEDIC SURGERY | Age: 69
End: 2024-03-27

## 2024-03-27 NOTE — TELEPHONE ENCOUNTER
Attempted to contact patient to schedule for left wrist pain.  Phone voicemail full, unable to leave message.  Will continue to contact.  Patient is established with Dr. Olivas, LOV 7/6/23, and prior left carpal tunnel.

## 2024-05-08 ENCOUNTER — OFFICE VISIT (OUTPATIENT)
Dept: BARIATRICS/WEIGHT MGMT | Age: 69
End: 2024-05-08
Payer: MEDICARE

## 2024-05-08 VITALS
HEIGHT: 60 IN | DIASTOLIC BLOOD PRESSURE: 72 MMHG | SYSTOLIC BLOOD PRESSURE: 118 MMHG | WEIGHT: 188 LBS | BODY MASS INDEX: 36.91 KG/M2 | HEART RATE: 98 BPM

## 2024-05-08 DIAGNOSIS — K91.2 MALNUTRITION FOLLOWING GASTROINTESTINAL SURGERY: Primary | ICD-10-CM

## 2024-05-08 DIAGNOSIS — E11.9 TYPE 2 DIABETES MELLITUS WITHOUT COMPLICATION, WITHOUT LONG-TERM CURRENT USE OF INSULIN (HCC): ICD-10-CM

## 2024-05-08 PROCEDURE — G8417 CALC BMI ABV UP PARAM F/U: HCPCS | Performed by: NURSE PRACTITIONER

## 2024-05-08 PROCEDURE — 3074F SYST BP LT 130 MM HG: CPT | Performed by: NURSE PRACTITIONER

## 2024-05-08 PROCEDURE — 4004F PT TOBACCO SCREEN RCVD TLK: CPT | Performed by: NURSE PRACTITIONER

## 2024-05-08 PROCEDURE — 3017F COLORECTAL CA SCREEN DOC REV: CPT | Performed by: NURSE PRACTITIONER

## 2024-05-08 PROCEDURE — G8400 PT W/DXA NO RESULTS DOC: HCPCS | Performed by: NURSE PRACTITIONER

## 2024-05-08 PROCEDURE — 99213 OFFICE O/P EST LOW 20 MIN: CPT | Performed by: NURSE PRACTITIONER

## 2024-05-08 PROCEDURE — 3078F DIAST BP <80 MM HG: CPT | Performed by: NURSE PRACTITIONER

## 2024-05-08 PROCEDURE — 1123F ACP DISCUSS/DSCN MKR DOCD: CPT | Performed by: NURSE PRACTITIONER

## 2024-05-08 PROCEDURE — 1090F PRES/ABSN URINE INCON ASSESS: CPT | Performed by: NURSE PRACTITIONER

## 2024-05-08 PROCEDURE — 2022F DILAT RTA XM EVC RTNOPTHY: CPT | Performed by: NURSE PRACTITIONER

## 2024-05-08 PROCEDURE — 99211 OFF/OP EST MAY X REQ PHY/QHP: CPT

## 2024-05-08 PROCEDURE — G8427 DOCREV CUR MEDS BY ELIG CLIN: HCPCS | Performed by: NURSE PRACTITIONER

## 2024-05-08 PROCEDURE — 3044F HG A1C LEVEL LT 7.0%: CPT | Performed by: NURSE PRACTITIONER

## 2024-05-08 RX ORDER — SEMAGLUTIDE 1.34 MG/ML
0.5 INJECTION, SOLUTION SUBCUTANEOUS WEEKLY
Qty: 1.5 ML | Refills: 0 | Status: SHIPPED | OUTPATIENT
Start: 2024-05-08

## 2024-05-08 ASSESSMENT — ENCOUNTER SYMPTOMS
COUGH: 0
NAUSEA: 0
CONSTIPATION: 0
DIARRHEA: 0
VOMITING: 0
SHORTNESS OF BREATH: 0
WHEEZING: 0

## 2024-05-08 NOTE — PATIENT INSTRUCTIONS
Please continue to take your vitamin and mineral supplements as instructed.      If you received a blood work prescription today for laboratory monitoring due prior to your next routine follow-up visit, please have this blood work obtained 10 to 14 days prior to your next visit.  It is important to fast for 12 hours prior to routine weight loss surgery blood work, EXCEPT for drinking water, to ensure accuracy of results.    Please report nausea, vomiting, abdominal pain, or any other problems you experience to your surgeon.  For problems related to weight loss surgery, it is best to go to [facility] Emergency Department and have your surgeon paged.    Last Surgical Weight Loss:       No data to display

## 2024-05-08 NOTE — PROGRESS NOTES
Chief Complaint   Patient presents with    Medication Check     Ozempic, pt states she is not currently taking medication due to her vomiting after taking medication       HPI:  Patient presents today for follow up of ozempic. She reports that she stopped taking it due to side effects. She has a hard time exercising because of spinal stenosis. She tells me that she does not eat sweets, only eats small amount of bread. She is now on effexor for her mental health and reports that this has helped improve her depression. She also does get epidural injections every few months.     She has been off of the ozempic for about 1 month.     6/19/2023 - 235 lb  8/1/2023 -   226 lb  9/19/2023 - 212 lb  11/3/2023 - 204 lb  2/14/2024 - 191 lb  5/8/2024 - 188 lb    Prior to Visit Medications    Medication Sig Taking? Authorizing Provider   Semaglutide,0.25 or 0.5MG/DOS, (OZEMPIC, 0.25 OR 0.5 MG/DOSE,) 2 MG/1.5ML SOPN Inject 0.5 mg into the skin once a week Yes Margaret Lama APRN - CNP   venlafaxine (EFFEXOR XR) 37.5 MG extended release capsule take 1 capsule by mouth once daily Yes Diego Pritchard DO   rOPINIRole (REQUIP) 1 MG tablet take 2 tablets by mouth at bedtime  Patient taking differently: Take 2 tablets by mouth nightly Taking 1 Morning; 1 Afternoon & 2 Bedtime Yes Diego Pritchard DO   ondansetron (ZOFRAN-ODT) 4 MG disintegrating tablet Take 1 tablet by mouth 3 times daily as needed for Nausea or Vomiting Yes Margaret Lama APRN - CNP   vitamin D (ERGOCALCIFEROL) 1.25 MG (56024 UT) CAPS capsule Take 1 capsule by mouth once a week Yes Margaret Lama APRN - CNP   esomeprazole Magnesium (NEXIUM) 40 MG PACK Take 1 packet by mouth daily Yes Provider, MD Rita   sucralfate (CARAFATE) 1 GM tablet Take 0.5 tablets by mouth 4 times daily  Margaret Lama APRN - CNP   albuterol sulfate HFA (PROVENTIL HFA) 108 (90 Base) MCG/ACT inhaler Inhale 2 puffs into the lungs every 6 hours as needed for Wheezing

## 2024-07-28 ENCOUNTER — APPOINTMENT (OUTPATIENT)
Dept: GENERAL RADIOLOGY | Age: 69
End: 2024-07-28
Payer: MEDICARE

## 2024-07-28 ENCOUNTER — HOSPITAL ENCOUNTER (EMERGENCY)
Age: 69
Discharge: HOME | End: 2024-07-28
Attending: EMERGENCY MEDICINE
Payer: MEDICARE

## 2024-07-28 VITALS
HEART RATE: 81 BPM | RESPIRATION RATE: 18 BRPM | OXYGEN SATURATION: 96 % | HEIGHT: 60 IN | BODY MASS INDEX: 36.91 KG/M2 | DIASTOLIC BLOOD PRESSURE: 68 MMHG | TEMPERATURE: 98.4 F | SYSTOLIC BLOOD PRESSURE: 111 MMHG | WEIGHT: 188 LBS

## 2024-07-28 DIAGNOSIS — M25.511 ACUTE PAIN OF RIGHT SHOULDER: Primary | ICD-10-CM

## 2024-07-28 DIAGNOSIS — M19.011 PRIMARY OSTEOARTHRITIS OF RIGHT SHOULDER: ICD-10-CM

## 2024-07-28 PROCEDURE — 73030 X-RAY EXAM OF SHOULDER: CPT

## 2024-07-28 PROCEDURE — 6370000000 HC RX 637 (ALT 250 FOR IP): Performed by: EMERGENCY MEDICINE

## 2024-07-28 PROCEDURE — 99283 EMERGENCY DEPT VISIT LOW MDM: CPT

## 2024-07-28 RX ORDER — LIDOCAINE 50 MG/G
1 PATCH TOPICAL EVERY 24 HOURS
Qty: 10 PATCH | Refills: 0 | Status: SHIPPED | OUTPATIENT
Start: 2024-07-28 | End: 2024-08-07

## 2024-07-28 RX ORDER — HYDROCODONE BITARTRATE AND ACETAMINOPHEN 5; 325 MG/1; MG/1
1 TABLET ORAL ONCE
Status: COMPLETED | OUTPATIENT
Start: 2024-07-28 | End: 2024-07-28

## 2024-07-28 RX ADMIN — HYDROCODONE BITARTRATE AND ACETAMINOPHEN 1 TABLET: 5; 325 TABLET ORAL at 07:13

## 2024-07-28 ASSESSMENT — PAIN - FUNCTIONAL ASSESSMENT: PAIN_FUNCTIONAL_ASSESSMENT: NONE - DENIES PAIN

## 2024-07-28 ASSESSMENT — PAIN DESCRIPTION - DESCRIPTORS: DESCRIPTORS: SHARP;SORE

## 2024-07-28 ASSESSMENT — PAIN DESCRIPTION - ORIENTATION: ORIENTATION: RIGHT

## 2024-07-28 ASSESSMENT — PAIN SCALES - GENERAL: PAINLEVEL_OUTOF10: 10

## 2024-07-28 ASSESSMENT — PAIN DESCRIPTION - LOCATION: LOCATION: SHOULDER

## 2024-07-28 NOTE — ED PROVIDER NOTES
Medication List        START taking these medications    Details   lidocaine (LIDODERM) 5 % Place 1 patch onto the skin every 24 hours for 10 days 12 hours on, 12 hours off.  Qty: 10 patch, Refills: 0                 Counseling:   The emergency provider has spoken with the patient and discussed today’s results, in addition to providing specific details for the plan of care and counseling regarding the diagnosis and prognosis.  Questions are answered at this time and they are agreeable with the plan.      --------------------------------- IMPRESSION AND DISPOSITION ---------------------------------    IMPRESSION  1. Acute pain of right shoulder    2. Primary osteoarthritis of right shoulder        DISPOSITION  Disposition: Discharge to home  Patient condition is stable                 Erin Batista DO  07/28/24 0837

## 2024-08-01 ENCOUNTER — OFFICE VISIT (OUTPATIENT)
Dept: ORTHOPEDIC SURGERY | Age: 69
End: 2024-08-01
Payer: MEDICARE

## 2024-08-01 VITALS — BODY MASS INDEX: 36.91 KG/M2 | HEIGHT: 60 IN | WEIGHT: 188 LBS

## 2024-08-01 DIAGNOSIS — S46.211A RUPTURE OF RIGHT BICEPS TENDON, INITIAL ENCOUNTER: ICD-10-CM

## 2024-08-01 DIAGNOSIS — S46.011A TRAUMATIC COMPLETE TEAR OF RIGHT ROTATOR CUFF, INITIAL ENCOUNTER: Primary | ICD-10-CM

## 2024-08-01 PROCEDURE — 3017F COLORECTAL CA SCREEN DOC REV: CPT | Performed by: ORTHOPAEDIC SURGERY

## 2024-08-01 PROCEDURE — G8427 DOCREV CUR MEDS BY ELIG CLIN: HCPCS | Performed by: ORTHOPAEDIC SURGERY

## 2024-08-01 PROCEDURE — 1123F ACP DISCUSS/DSCN MKR DOCD: CPT | Performed by: ORTHOPAEDIC SURGERY

## 2024-08-01 PROCEDURE — 4004F PT TOBACCO SCREEN RCVD TLK: CPT | Performed by: ORTHOPAEDIC SURGERY

## 2024-08-01 PROCEDURE — G8417 CALC BMI ABV UP PARAM F/U: HCPCS | Performed by: ORTHOPAEDIC SURGERY

## 2024-08-01 PROCEDURE — G8400 PT W/DXA NO RESULTS DOC: HCPCS | Performed by: ORTHOPAEDIC SURGERY

## 2024-08-01 PROCEDURE — 99214 OFFICE O/P EST MOD 30 MIN: CPT | Performed by: ORTHOPAEDIC SURGERY

## 2024-08-01 PROCEDURE — 1090F PRES/ABSN URINE INCON ASSESS: CPT | Performed by: ORTHOPAEDIC SURGERY

## 2024-08-01 RX ORDER — DIAZEPAM 5 MG/1
5 TABLET ORAL PRN
Qty: 2 TABLET | Refills: 0 | Status: SHIPPED | OUTPATIENT
Start: 2024-08-01 | End: 2024-08-14

## 2024-08-01 RX ORDER — HYDROCODONE BITARTRATE AND ACETAMINOPHEN 5; 325 MG/1; MG/1
1 TABLET ORAL EVERY 6 HOURS PRN
Qty: 28 TABLET | Refills: 0 | Status: SHIPPED | OUTPATIENT
Start: 2024-08-01 | End: 2024-08-08

## 2024-08-01 NOTE — PROGRESS NOTES
fremitus.    Skin:  Upon inspection: the skin appears warm, dry and intact.  There is not a previous scar over the affected area.There is not any cellulitis, lymphedema or cutaneous lesions noted in the lower extremities.   Upon palpation there is no induration noted.      Neurologic:  Motor exam of the upper extremities show: The reflexes in biceps/triceps/brachioradialis are equal and symmetric.  Sensory exam C5-T1 are normal bilaterally.    Cardiovascular:  The vascular exam is normal and is well perfused to distal extremities.There are 2+ radial pulses bilaterally, and motor and sensation is intact to median, ulnar, and radial, musclocutaneus, and axillary nerve distribution and grossly symmetric bilaterally.  There is cap refill noted less than two seconds in all digits. There is not edema of the bilateral upper extremities.  There is not varicosities noted in the distal extremities.      Lymph:  Upon palpation,  there is no lymphadenopathy noted in bilateral upper extremities.      Musculoskeletal:  Gait: normal; examination of the nails and digits reveal no cyanosis or clubbing.      Cervical Exam:  On physical exam, Lizette Julian is well-developed, well-nourished, oriented to person, place and time.  her gait is normal.  On evaluation of hercervical spine, She has full range of motion of the cervical spine without pain.  There is no cervical tenderness to palpation.     Shoulder Exam:  On evaluation of her bilaterally upper extremities, her right shoulder has no deformity.  There is tenderness upon palpation of the anterior and lateral shoulder.  There is not evidence of scapular dyskinesis.  There is not muscle atrophy in shoulder girdle. The range of motion for the Right Shoulder is 90/20/PL and for the Left shoulder is 160/45/T8.  Right shoulder Motor strength is 1/5 in the supraspinatus, 1/5 internal rotation and 1/5 in external rotation, and Left shoulder motor strength 5/5 in supraspinatus, 5/5 in

## 2024-08-06 ENCOUNTER — OFFICE VISIT (OUTPATIENT)
Dept: BARIATRICS/WEIGHT MGMT | Age: 69
End: 2024-08-06
Payer: MEDICARE

## 2024-08-06 VITALS
HEART RATE: 82 BPM | HEIGHT: 60 IN | DIASTOLIC BLOOD PRESSURE: 72 MMHG | WEIGHT: 193 LBS | SYSTOLIC BLOOD PRESSURE: 116 MMHG | BODY MASS INDEX: 37.89 KG/M2

## 2024-08-06 DIAGNOSIS — R11.0 NAUSEA: ICD-10-CM

## 2024-08-06 DIAGNOSIS — K91.2 MALNUTRITION FOLLOWING GASTROINTESTINAL SURGERY: Primary | ICD-10-CM

## 2024-08-06 PROCEDURE — 3017F COLORECTAL CA SCREEN DOC REV: CPT | Performed by: NURSE PRACTITIONER

## 2024-08-06 PROCEDURE — 3078F DIAST BP <80 MM HG: CPT | Performed by: NURSE PRACTITIONER

## 2024-08-06 PROCEDURE — G8427 DOCREV CUR MEDS BY ELIG CLIN: HCPCS | Performed by: NURSE PRACTITIONER

## 2024-08-06 PROCEDURE — 99213 OFFICE O/P EST LOW 20 MIN: CPT | Performed by: NURSE PRACTITIONER

## 2024-08-06 PROCEDURE — G8400 PT W/DXA NO RESULTS DOC: HCPCS | Performed by: NURSE PRACTITIONER

## 2024-08-06 PROCEDURE — 3074F SYST BP LT 130 MM HG: CPT | Performed by: NURSE PRACTITIONER

## 2024-08-06 PROCEDURE — 1123F ACP DISCUSS/DSCN MKR DOCD: CPT | Performed by: NURSE PRACTITIONER

## 2024-08-06 PROCEDURE — 1090F PRES/ABSN URINE INCON ASSESS: CPT | Performed by: NURSE PRACTITIONER

## 2024-08-06 PROCEDURE — 99211 OFF/OP EST MAY X REQ PHY/QHP: CPT | Performed by: NURSE PRACTITIONER

## 2024-08-06 PROCEDURE — G8417 CALC BMI ABV UP PARAM F/U: HCPCS | Performed by: NURSE PRACTITIONER

## 2024-08-06 PROCEDURE — 4004F PT TOBACCO SCREEN RCVD TLK: CPT | Performed by: NURSE PRACTITIONER

## 2024-08-06 RX ORDER — ONDANSETRON 4 MG/1
4 TABLET, ORALLY DISINTEGRATING ORAL 3 TIMES DAILY PRN
Qty: 30 TABLET | Refills: 1 | Status: SHIPPED | OUTPATIENT
Start: 2024-08-06

## 2024-08-06 NOTE — PROGRESS NOTES
Chief Complaint   Patient presents with    Follow-up     Medication follow up of Ozempic       HPI:  Patient presents today for follow up of Ozempic. She reports that she stopped taking the medication a couple of months ago due to lack of losing further weight and having issues with her pharmacy and insurance.     She still reports that a lot of foods she is unable to tolerate, make her vomit. Has a hard time with pork, beef, steak. She tells me that she usually just eats cereal for her meals since it doesn't make her sick. She has a hard time with exercise.      6/19/2023 - 235 lb  8/1/2023 -   226 lb  9/19/2023 - 212 lb  11/3/2023 - 204 lb  2/14/2024 - 191 lb  5/8/2024 - 188 lb  8/6/2024 - 193 lb    Breakfast: rice krispie cereal with 2% milk  Snack: none  Lunch: scrambled egg, or egg salad or tuna salad with 1 piece of bread  Snack:none  Dinner: usually skips  Snack: bowl of cereal      Prior to Visit Medications    Medication Sig Taking? Authorizing Provider   ondansetron (ZOFRAN-ODT) 4 MG disintegrating tablet Take 1 tablet by mouth 3 times daily as needed for Nausea or Vomiting Yes Margaret Lama APRN - CNP   metFORMIN (GLUCOPHAGE) 500 MG tablet Take 2 tablets by mouth 2 times daily (with meals) Yes Margaret Lama APRN - CNP   diazePAM (VALIUM) 5 MG tablet Take 1 tablet by mouth as needed for Anxiety (take medicine 30 min prior to procedure) for up to 2 doses. Yes Buzz Olivas,    rOPINIRole (REQUIP) 1 MG tablet take 2 tablets by mouth at bedtime  Patient taking differently: Take 2 tablets by mouth nightly Taking 1 Morning; 1 Afternoon & 2 Bedtime Yes Diego Pritchard,    vitamin D (ERGOCALCIFEROL) 1.25 MG (01418 UT) CAPS capsule Take 1 capsule by mouth once a week Yes Margaret Lama APRN - CNP   esomeprazole Magnesium (NEXIUM) 40 MG PACK Take 1 packet by mouth daily Yes Provider, MD Rita   sucralfate (CARAFATE) 1 GM tablet Take 0.5 tablets by mouth 4 times daily  Margaret Lama

## 2024-08-06 NOTE — PATIENT INSTRUCTIONS
Please continue to take your vitamin and mineral supplements as instructed.      If you received a blood work prescription today for laboratory monitoring due prior to your next routine follow-up visit, please have this blood work obtained 10 to 14 days prior to your next visit.  It is important to fast for 12 hours prior to routine weight loss surgery blood work, EXCEPT for drinking water, to ensure accuracy of results.    Please report nausea, vomiting, abdominal pain, or any other problems you experience to your surgeon.  For problems related to weight loss surgery, it is best to go to [facility] Emergency Department and have your surgeon paged.        Rules:  Count every calorie every day  Limit sweets to one day per month  Limit chips/crackers/pretzels/nuts/popcorn to 100 marcella/day  Eliminate all sugar sweetened beverages (including fruit juice)  Limit restaurants (including fast food and food from a convenience store) to one time every two weeks while in town    Requirements:  Make sure protein intake is at least 65 grams per day (do not count protein every day; instead spot check your intake every 2-3 weeks and make sure what you think you are getting is close to accurate; consider using a protein shake if needed; these are in the pharmacy section of the stores, not the grocery section; Premier, Pure Protein and Fairlife are relatively inexpensive and taste good to most patients; other options are Nectar, Boost Max, Ensure Max, BeneProtein and GNC lean (which is lactose-free);   Nectar fruit, Premier Protein Clear, IsoPure Protein Drink, and Protein 2 O are water-based options; Quest (or Cosco, which is cheaper and is ordered on Amazon) and the Oh Ye 1 protein bars can also be used, but have less protein in them )  (Disclaimer: Dietary supplements rarely have their listed ingredients and the amount of each verified by a third party other. Sometimes they give verification for their claims to be

## 2024-10-07 ENCOUNTER — TELEPHONE (OUTPATIENT)
Dept: BARIATRICS/WEIGHT MGMT | Age: 69
End: 2024-10-07

## 2024-12-11 ENCOUNTER — HOSPITAL ENCOUNTER (EMERGENCY)
Age: 69
Discharge: HOME | End: 2024-12-12
Attending: EMERGENCY MEDICINE
Payer: MEDICARE

## 2024-12-11 DIAGNOSIS — R11.2 NAUSEA AND VOMITING, UNSPECIFIED VOMITING TYPE: ICD-10-CM

## 2024-12-11 DIAGNOSIS — R07.9 CHEST PAIN, UNSPECIFIED TYPE: Primary | ICD-10-CM

## 2024-12-11 PROCEDURE — 99285 EMERGENCY DEPT VISIT HI MDM: CPT

## 2024-12-12 ENCOUNTER — APPOINTMENT (OUTPATIENT)
Dept: GENERAL RADIOLOGY | Age: 69
End: 2024-12-12
Payer: MEDICARE

## 2024-12-12 ENCOUNTER — APPOINTMENT (OUTPATIENT)
Dept: CT IMAGING | Age: 69
End: 2024-12-12
Payer: MEDICARE

## 2024-12-12 VITALS
DIASTOLIC BLOOD PRESSURE: 80 MMHG | HEART RATE: 78 BPM | OXYGEN SATURATION: 96 % | WEIGHT: 180 LBS | BODY MASS INDEX: 35.34 KG/M2 | SYSTOLIC BLOOD PRESSURE: 142 MMHG | RESPIRATION RATE: 20 BRPM | HEIGHT: 60 IN | TEMPERATURE: 98.6 F

## 2024-12-12 LAB
ALBUMIN SERPL-MCNC: 4 G/DL (ref 3.5–5.2)
ALP SERPL-CCNC: 84 U/L (ref 35–104)
ALT SERPL-CCNC: 9 U/L (ref 0–32)
ANION GAP SERPL CALCULATED.3IONS-SCNC: 10 MMOL/L (ref 7–16)
AST SERPL-CCNC: 13 U/L (ref 0–31)
BACTERIA URNS QL MICRO: ABNORMAL
BASOPHILS # BLD: 0.08 K/UL (ref 0–0.2)
BASOPHILS NFR BLD: 1 % (ref 0–2)
BILIRUB SERPL-MCNC: 0.2 MG/DL (ref 0–1.2)
BILIRUB UR QL STRIP: NEGATIVE
BUN SERPL-MCNC: 16 MG/DL (ref 6–23)
CALCIUM SERPL-MCNC: 8.7 MG/DL (ref 8.6–10.2)
CHLORIDE SERPL-SCNC: 101 MMOL/L (ref 98–107)
CLARITY UR: ABNORMAL
CO2 SERPL-SCNC: 27 MMOL/L (ref 22–29)
COLOR UR: YELLOW
CREAT SERPL-MCNC: 0.7 MG/DL (ref 0.5–1)
EKG ATRIAL RATE: 81 BPM
EKG P AXIS: 104 DEGREES
EKG P-R INTERVAL: 126 MS
EKG Q-T INTERVAL: 436 MS
EKG QRS DURATION: 78 MS
EKG QTC CALCULATION (BAZETT): 506 MS
EKG R AXIS: -83 DEGREES
EKG T AXIS: 65 DEGREES
EKG VENTRICULAR RATE: 81 BPM
EOSINOPHIL # BLD: 0.12 K/UL (ref 0.05–0.5)
EOSINOPHILS RELATIVE PERCENT: 1 % (ref 0–6)
EPI CELLS #/AREA URNS HPF: ABNORMAL /HPF
ERYTHROCYTE [DISTWIDTH] IN BLOOD BY AUTOMATED COUNT: 13.4 % (ref 11.5–15)
GFR, ESTIMATED: 86 ML/MIN/1.73M2
GLUCOSE SERPL-MCNC: 105 MG/DL (ref 74–99)
GLUCOSE UR STRIP-MCNC: NEGATIVE MG/DL
HCT VFR BLD AUTO: 41 % (ref 34–48)
HGB BLD-MCNC: 13.1 G/DL (ref 11.5–15.5)
HGB UR QL STRIP.AUTO: ABNORMAL
IMM GRANULOCYTES # BLD AUTO: 0.04 K/UL (ref 0–0.58)
IMM GRANULOCYTES NFR BLD: 0 % (ref 0–5)
KETONES UR STRIP-MCNC: NEGATIVE MG/DL
LACTATE BLDV-SCNC: 1 MMOL/L (ref 0.5–2.2)
LEUKOCYTE ESTERASE UR QL STRIP: ABNORMAL
LIPASE SERPL-CCNC: 29 U/L (ref 13–60)
LYMPHOCYTES NFR BLD: 2.22 K/UL (ref 1.5–4)
LYMPHOCYTES RELATIVE PERCENT: 20 % (ref 20–42)
MCH RBC QN AUTO: 30.5 PG (ref 26–35)
MCHC RBC AUTO-ENTMCNC: 32 G/DL (ref 32–34.5)
MCV RBC AUTO: 95.3 FL (ref 80–99.9)
MONOCYTES NFR BLD: 0.84 K/UL (ref 0.1–0.95)
MONOCYTES NFR BLD: 7 % (ref 2–12)
NEUTROPHILS NFR BLD: 71 % (ref 43–80)
NEUTS SEG NFR BLD: 7.98 K/UL (ref 1.8–7.3)
NITRITE UR QL STRIP: NEGATIVE
PH UR STRIP: 5.5 [PH] (ref 5–9)
PLATELET # BLD AUTO: 303 K/UL (ref 130–450)
PMV BLD AUTO: 10.7 FL (ref 7–12)
POTASSIUM SERPL-SCNC: 4 MMOL/L (ref 3.5–5)
PROT SERPL-MCNC: 6.6 G/DL (ref 6.4–8.3)
PROT UR STRIP-MCNC: NEGATIVE MG/DL
RBC # BLD AUTO: 4.3 M/UL (ref 3.5–5.5)
RBC #/AREA URNS HPF: ABNORMAL /HPF
SODIUM SERPL-SCNC: 138 MMOL/L (ref 132–146)
SP GR UR STRIP: >1.03 (ref 1–1.03)
TROPONIN I SERPL HS-MCNC: 9 NG/L (ref 0–9)
TROPONIN I SERPL HS-MCNC: <6 NG/L (ref 0–9)
TROPONIN I SERPL HS-MCNC: <6 NG/L (ref 0–9)
UROBILINOGEN UR STRIP-ACNC: 0.2 EU/DL (ref 0–1)
WBC #/AREA URNS HPF: ABNORMAL /HPF
WBC OTHER # BLD: 11.3 K/UL (ref 4.5–11.5)

## 2024-12-12 PROCEDURE — 93005 ELECTROCARDIOGRAM TRACING: CPT | Performed by: EMERGENCY MEDICINE

## 2024-12-12 PROCEDURE — 2580000003 HC RX 258: Performed by: EMERGENCY MEDICINE

## 2024-12-12 PROCEDURE — 6360000002 HC RX W HCPCS: Performed by: EMERGENCY MEDICINE

## 2024-12-12 PROCEDURE — 71046 X-RAY EXAM CHEST 2 VIEWS: CPT

## 2024-12-12 PROCEDURE — 96375 TX/PRO/DX INJ NEW DRUG ADDON: CPT

## 2024-12-12 PROCEDURE — 6370000000 HC RX 637 (ALT 250 FOR IP): Performed by: EMERGENCY MEDICINE

## 2024-12-12 PROCEDURE — 85025 COMPLETE CBC W/AUTO DIFF WBC: CPT

## 2024-12-12 PROCEDURE — 93010 ELECTROCARDIOGRAM REPORT: CPT | Performed by: INTERNAL MEDICINE

## 2024-12-12 PROCEDURE — 74174 CTA ABD&PLVS W/CONTRAST: CPT

## 2024-12-12 PROCEDURE — 96374 THER/PROPH/DIAG INJ IV PUSH: CPT

## 2024-12-12 PROCEDURE — 71275 CT ANGIOGRAPHY CHEST: CPT

## 2024-12-12 PROCEDURE — 81001 URINALYSIS AUTO W/SCOPE: CPT

## 2024-12-12 PROCEDURE — 83605 ASSAY OF LACTIC ACID: CPT

## 2024-12-12 PROCEDURE — 6360000004 HC RX CONTRAST MEDICATION: Performed by: RADIOLOGY

## 2024-12-12 PROCEDURE — 83690 ASSAY OF LIPASE: CPT

## 2024-12-12 PROCEDURE — 84484 ASSAY OF TROPONIN QUANT: CPT

## 2024-12-12 PROCEDURE — 80053 COMPREHEN METABOLIC PANEL: CPT

## 2024-12-12 RX ORDER — IOPAMIDOL 755 MG/ML
80 INJECTION, SOLUTION INTRAVASCULAR
Status: COMPLETED | OUTPATIENT
Start: 2024-12-12 | End: 2024-12-12

## 2024-12-12 RX ORDER — DIPHENHYDRAMINE HYDROCHLORIDE 50 MG/ML
50 INJECTION INTRAMUSCULAR; INTRAVENOUS ONCE
Status: COMPLETED | OUTPATIENT
Start: 2024-12-12 | End: 2024-12-12

## 2024-12-12 RX ADMIN — ALUMINUM HYDROXIDE, MAGNESIUM HYDROXIDE, AND SIMETHICONE: 1200; 120; 1200 SUSPENSION ORAL at 04:46

## 2024-12-12 RX ADMIN — DIPHENHYDRAMINE HYDROCHLORIDE 50 MG: 50 INJECTION INTRAMUSCULAR; INTRAVENOUS at 02:17

## 2024-12-12 RX ADMIN — IOPAMIDOL 80 ML: 755 INJECTION, SOLUTION INTRAVENOUS at 03:06

## 2024-12-12 RX ADMIN — WATER 125 MG: 1 INJECTION INTRAMUSCULAR; INTRAVENOUS; SUBCUTANEOUS at 02:19

## 2024-12-12 ASSESSMENT — ENCOUNTER SYMPTOMS
VOMITING: 1
EYE PAIN: 0
EYE REDNESS: 0
EYE DISCHARGE: 0
ABDOMINAL DISTENTION: 0
COUGH: 0
SINUS PRESSURE: 0
DIARRHEA: 0
NAUSEA: 1
SHORTNESS OF BREATH: 1
BACK PAIN: 0
WHEEZING: 0
SORE THROAT: 0

## 2024-12-12 ASSESSMENT — PAIN DESCRIPTION - FREQUENCY: FREQUENCY: CONTINUOUS

## 2024-12-12 ASSESSMENT — LIFESTYLE VARIABLES
HOW MANY STANDARD DRINKS CONTAINING ALCOHOL DO YOU HAVE ON A TYPICAL DAY: 3 OR 4
HOW OFTEN DO YOU HAVE A DRINK CONTAINING ALCOHOL: MONTHLY OR LESS

## 2024-12-12 ASSESSMENT — PAIN DESCRIPTION - DESCRIPTORS: DESCRIPTORS: ACHING

## 2024-12-12 ASSESSMENT — PAIN DESCRIPTION - ONSET: ONSET: ON-GOING

## 2024-12-12 ASSESSMENT — PAIN DESCRIPTION - ORIENTATION: ORIENTATION: MID

## 2024-12-12 ASSESSMENT — PAIN DESCRIPTION - LOCATION: LOCATION: CHEST

## 2024-12-12 ASSESSMENT — PAIN DESCRIPTION - PAIN TYPE: TYPE: ACUTE PAIN

## 2024-12-12 ASSESSMENT — PAIN SCALES - GENERAL: PAINLEVEL_OUTOF10: 3

## 2024-12-12 NOTE — ED PROVIDER NOTES
------------------------------------------  I have spoken with the patient and discussed today’s results, in addition to providing specific details for the plan of care and counseling regarding the diagnosis and prognosis.  Their questions are answered at this time and they are agreeable with the plan. I discussed at length with them reasons for immediate return here for re evaluation. They will followup with primary care by calling their office tomorrow.      --------------------------------- ADDITIONAL PROVIDER NOTES ---------------------------------  At this time the patient is without objective evidence of an acute process requiring hospitalization or inpatient management.  They have remained hemodynamically stable throughout their entire ED visit and are stable for discharge with outpatient follow-up.     The plan has been discussed in detail and they are aware of the specific conditions for emergent return, as well as the importance of follow-up.      New Prescriptions    No medications on file       Diagnosis:  1. Chest pain, unspecified type    2. Nausea and vomiting, unspecified vomiting type        Disposition:  Patient's disposition: Discharge to home  Patient's condition is stable.         Krishna Mcintyre DO  12/12/24 0526

## 2025-04-01 ENCOUNTER — OFFICE VISIT (OUTPATIENT)
Dept: PHYSICAL MEDICINE AND REHAB | Age: 70
End: 2025-04-01
Payer: MEDICARE

## 2025-04-01 VITALS
SYSTOLIC BLOOD PRESSURE: 121 MMHG | BODY MASS INDEX: 40.25 KG/M2 | HEIGHT: 60 IN | HEART RATE: 87 BPM | WEIGHT: 205 LBS | DIASTOLIC BLOOD PRESSURE: 81 MMHG

## 2025-04-01 DIAGNOSIS — Z72.0 TOBACCO USE: ICD-10-CM

## 2025-04-01 DIAGNOSIS — R20.0 NUMBNESS AND TINGLING IN BOTH HANDS: ICD-10-CM

## 2025-04-01 DIAGNOSIS — R20.2 NUMBNESS AND TINGLING IN BOTH HANDS: ICD-10-CM

## 2025-04-01 DIAGNOSIS — R29.898 WEAKNESS OF BOTH HANDS: ICD-10-CM

## 2025-04-01 DIAGNOSIS — G56.23 ULNAR NEUROPATHY OF BOTH UPPER EXTREMITIES: Primary | ICD-10-CM

## 2025-04-01 PROCEDURE — 1123F ACP DISCUSS/DSCN MKR DOCD: CPT | Performed by: PHYSICAL MEDICINE & REHABILITATION

## 2025-04-01 PROCEDURE — 4004F PT TOBACCO SCREEN RCVD TLK: CPT | Performed by: PHYSICAL MEDICINE & REHABILITATION

## 2025-04-01 PROCEDURE — 1159F MED LIST DOCD IN RCRD: CPT | Performed by: PHYSICAL MEDICINE & REHABILITATION

## 2025-04-01 PROCEDURE — 1090F PRES/ABSN URINE INCON ASSESS: CPT | Performed by: PHYSICAL MEDICINE & REHABILITATION

## 2025-04-01 PROCEDURE — 99204 OFFICE O/P NEW MOD 45 MIN: CPT | Performed by: PHYSICAL MEDICINE & REHABILITATION

## 2025-04-01 PROCEDURE — 1125F AMNT PAIN NOTED PAIN PRSNT: CPT | Performed by: PHYSICAL MEDICINE & REHABILITATION

## 2025-04-01 PROCEDURE — 3017F COLORECTAL CA SCREEN DOC REV: CPT | Performed by: PHYSICAL MEDICINE & REHABILITATION

## 2025-04-01 PROCEDURE — G8417 CALC BMI ABV UP PARAM F/U: HCPCS | Performed by: PHYSICAL MEDICINE & REHABILITATION

## 2025-04-01 PROCEDURE — G8400 PT W/DXA NO RESULTS DOC: HCPCS | Performed by: PHYSICAL MEDICINE & REHABILITATION

## 2025-04-01 PROCEDURE — 3079F DIAST BP 80-89 MM HG: CPT | Performed by: PHYSICAL MEDICINE & REHABILITATION

## 2025-04-01 PROCEDURE — 3074F SYST BP LT 130 MM HG: CPT | Performed by: PHYSICAL MEDICINE & REHABILITATION

## 2025-04-01 PROCEDURE — G8427 DOCREV CUR MEDS BY ELIG CLIN: HCPCS | Performed by: PHYSICAL MEDICINE & REHABILITATION

## 2025-04-01 NOTE — PROGRESS NOTES
Delia Johnson DO  [facility]  [address] [place] [address]  Phone: [phone]  Fax: [phone]    PCP: Diego Pritchard DO  Date of visit: 4/1/25    Chief Complaint   Patient presents with    Numbness    Tingling     Numbness and tingling both hands weakness new patient referral        Dear Tonja,     Thank you for referring your patient to be seen.    As you know,  Lizette Julian is a 70 y.o. female with past medical history as below who presents with bilateral hand numbness and weakness for 3 week(s). There was a gradual onset of symptoms after no known injury. Now, the pain is constant and occurs daily.  The pain (using VAS) is rated Pain Score:   8/10, is described as numbing, and is located in both hands. There is numbness in ulnar distribution bilaterally. The symptoms have been unchanged since onset.  The pain is better with nothing.  The pain is worse with  use, sleeping .  There is weakness in hands. Denies neck pain. Denies arm pain. History of carpal tunnel release by Dr. Olivas, cyst removal. Did have left wrist pain last year. Presented to walk in care and referred to Dr. Olivas again. She did not schedule.     The prior workup has included: Xray, MRI,.     The prior treatment has included:  PT: none   Multiple allergies     Allergies   Allergen Reactions    Ceclor [Cefaclor] Anaphylaxis    Diprivan [Propofol] Hives     Swelling of face    Gabapentin Other (See Comments)     AMS and nausea and vomiting    Naproxen Anaphylaxis     Heart races    Adhesive Tape Hives    Blueberry Flavoring Agent (Non-Screening)     Ibuprofen Hives    Mirapex [Pramipexole]     Oxycodone-Acetaminophen     Potato     Shellfish-Derived Products Hives    Tramadol     Vaccinium Angustifolium     Blueberry Fruit Extract Hives    Ceclor [Cefaclor] Hives    Iodides Hives    Iodine Hives and Rash     Contrast    Iv Dye [Iodides] Rash    Naprosyn

## 2025-04-14 ENCOUNTER — PROCEDURE VISIT (OUTPATIENT)
Dept: PHYSICAL MEDICINE AND REHAB | Age: 70
End: 2025-04-14

## 2025-04-14 VITALS — BODY MASS INDEX: 39.27 KG/M2 | HEIGHT: 60 IN | WEIGHT: 200 LBS

## 2025-04-14 DIAGNOSIS — G56.23 ULNAR NEUROPATHY OF BOTH UPPER EXTREMITIES: ICD-10-CM

## 2025-04-14 NOTE — PROGRESS NOTES
[facility]  Electrodiagnostic Laboratory  *Acc[facility] the [facility] with exemplary status  [address] [place] [address]  Phone: [phone]  Fax: [phone]    Referring Provider: Delia Johnson*  Primary Care Physician: Diego Pritchard DO  Patient Name: Lizette Julian  Patient YOB: 1955  Gender: female  BMI: Body mass index is 39.06 kg/m².  Height 1.524 m (5'), weight 90.7 kg (200 lb), not currently breastfeeding.    4/14/2025    Reason for Referral: Ulnar neuropathy     Description of clinical problem:   Chief Complaint   Patient presents with    Extremity Pain     Pain in there hands and going into the arms. 1 month of symp. Left is worse than right.     Numbness     Numbness/tingling/burning in the hand.     Extremity Weakness     Decrease strength due to numbness.      Sensory NCS      Nerve / Sites Rec. Site Peak Lat PP Amp Segments Distance Velocity Temp.     ms µV  cm m/s °C   L Median - Digit II (Antidromic)      Palm Dig II 1.82 49.0 Palm - Dig II 7 64 32.4      Wrist Dig II 3.54 34.5 Wrist - Dig II 14 53 32.4   L Ulnar - Digit V (Antidromic)      Wrist Dig V 3.07 11.0 Wrist - Dig V 14 64 32.5   R Ulnar - Digit V (Antidromic)      Wrist Dig V 3.07 31.1 Wrist - Dig V 14 61 32.4   L Radial - Anatomical snuff box (Forearm)      Forearm Wrist 2.14 21.4 Forearm - Wrist 10 71 32.4   L Dorsal ulnar cutaneous - Hand dorsum (Forearm)      Forearm Hand dorsum 1.93 12.4 Forearm - Hand dorsum 8 55 32.3   R Dorsal ulnar cutaneous - Hand dorsum (Forearm)      Forearm Hand dorsum 1.93 20.2 Forearm - Hand dorsum 8 57 32.4       Motor NCS      Nerve / Sites Muscle Onset Amplitude Segments Distance Velocity Temp.     ms mV  cm m/s °C   L Median - APB      Palm APB 2.03 10.7 Palm - APB   32.4      Wrist APB 3.70 8.0 Wrist - Palm 8 48 32.5      Elbow APB 6.88 7.2 Elbow - Wrist 16 50 32.5   L Ulnar - ADM      Wrist ADM 2.50 10.6 Wrist - ADM 8  32.3      B.Elbow

## 2025-04-14 NOTE — PATIENT INSTRUCTIONS
Electrodiagnotic Laboratory  [facility]  JOYCE Landers D.O.   [facility]  [address] [place] [address]  Phone: [phone]  Fax: [phone]        Today you had an electrodiagnostic exam which included nerve conduction studies (NCS) and electromyography (EMG). This test evaluated the electrical activity of your nerves and muscles to help determine if you have a nerve or muscle disease.  This test can help determine the location and type of a nerve or muscle problem. This will help your referring doctor diagnose your condition and determine the appropriate next step in your treatment plan.     After your test:    1. There are no long lasting side effects of the test.     2. You may resume your normal activities without restrictions.     3.  Resume any medications that were stopped for the test.     4  If you have sore areas or bruising in your muscles where the needle was placed, apply a cold pack to the sore area for 15-20 minutes three to four times a day as needed for pain.  The soreness should go away in about 1-2 days.     5. Your results were provided  Briefly at the end of your test and the final detailed report will be provided to your referring physician, and/or primary care physician and any other parties you requested within 1-2 days of the examination. You may wish to contact your referring provider after a few days to determine what they would like you to do next.     6.  Please call [phone] with any questions or concerns and if you develop increased body temperature/fever, swelling, tenderness, increased pain and/or drainage from the sites where the needle was placed.     Thank you for choosing us for your health care needs.

## 2025-04-28 ENCOUNTER — OFFICE VISIT (OUTPATIENT)
Dept: ORTHOPEDIC SURGERY | Age: 70
End: 2025-04-28
Payer: MEDICARE

## 2025-04-28 VITALS — BODY MASS INDEX: 39.27 KG/M2 | HEIGHT: 60 IN | WEIGHT: 200 LBS

## 2025-04-28 DIAGNOSIS — G56.23 CUBITAL TUNNEL SYNDROME OF BOTH UPPER EXTREMITIES: Primary | ICD-10-CM

## 2025-04-28 PROCEDURE — 1159F MED LIST DOCD IN RCRD: CPT | Performed by: ORTHOPAEDIC SURGERY

## 2025-04-28 PROCEDURE — 1090F PRES/ABSN URINE INCON ASSESS: CPT | Performed by: ORTHOPAEDIC SURGERY

## 2025-04-28 PROCEDURE — G8400 PT W/DXA NO RESULTS DOC: HCPCS | Performed by: ORTHOPAEDIC SURGERY

## 2025-04-28 PROCEDURE — 99214 OFFICE O/P EST MOD 30 MIN: CPT | Performed by: ORTHOPAEDIC SURGERY

## 2025-04-28 PROCEDURE — G8417 CALC BMI ABV UP PARAM F/U: HCPCS | Performed by: ORTHOPAEDIC SURGERY

## 2025-04-28 PROCEDURE — 3017F COLORECTAL CA SCREEN DOC REV: CPT | Performed by: ORTHOPAEDIC SURGERY

## 2025-04-28 PROCEDURE — 1125F AMNT PAIN NOTED PAIN PRSNT: CPT | Performed by: ORTHOPAEDIC SURGERY

## 2025-04-28 PROCEDURE — 4004F PT TOBACCO SCREEN RCVD TLK: CPT | Performed by: ORTHOPAEDIC SURGERY

## 2025-04-28 PROCEDURE — 1123F ACP DISCUSS/DSCN MKR DOCD: CPT | Performed by: ORTHOPAEDIC SURGERY

## 2025-04-28 PROCEDURE — G8427 DOCREV CUR MEDS BY ELIG CLIN: HCPCS | Performed by: ORTHOPAEDIC SURGERY

## 2025-04-28 NOTE — PROGRESS NOTES
Previous Study: There is not a prior study for comparison.      Follow up EMG is recommended if clinically indicated.     Radiographic findings reviewed with patient    Impression:   Encounter Diagnosis   Name Primary?    Cubital tunnel syndrome of both upper extremities Yes       Plan: Natural history and expected course discussed. Questions answered.  Educational materials distributed.  Rest, ice, compression, and elevation (RICE) therapy.  Reduction in offending activity discussed.  OTC analgesics as needed.    We will perform a Left cubital tunnel release 5/23/25 and right cubital tunnel release 6/13/25.   The risks and benefits were reviewed with the patient such as:  DVT, infection,  injuries to blood vessels and nerves, non relief of symptoms, continued pain, worsening of symptoms.     At least 30 minutes was spent discussing the diagnosis and treatment options with the patient with at least 50% of the time was spent with decision making and counseling the patient.

## 2025-05-07 ENCOUNTER — PREP FOR PROCEDURE (OUTPATIENT)
Dept: ORTHOPEDIC SURGERY | Age: 70
End: 2025-05-07

## 2025-05-07 DIAGNOSIS — G56.22 CUBITAL TUNNEL SYNDROME ON LEFT: ICD-10-CM

## 2025-05-16 ENCOUNTER — ANESTHESIA EVENT (OUTPATIENT)
Dept: OPERATING ROOM | Age: 70
End: 2025-05-16
Payer: MEDICARE

## 2025-05-16 ENCOUNTER — TELEPHONE (OUTPATIENT)
Dept: ORTHOPEDIC SURGERY | Age: 70
End: 2025-05-16

## 2025-05-16 DIAGNOSIS — G56.22 CUBITAL TUNNEL SYNDROME ON LEFT: ICD-10-CM

## 2025-05-16 LAB
ANION GAP SERPL CALCULATED.3IONS-SCNC: 11 MMOL/L (ref 7–16)
BUN BLDV-MCNC: 19 MG/DL (ref 8–23)
CALCIUM SERPL-MCNC: 9.6 MG/DL (ref 8.8–10.2)
CHLORIDE BLD-SCNC: 101 MMOL/L (ref 98–107)
CO2: 26 MMOL/L (ref 22–29)
CREAT SERPL-MCNC: 1.1 MG/DL (ref 0.5–1)
GFR, ESTIMATED: 56 ML/MIN/1.73M2
GLUCOSE BLD-MCNC: 114 MG/DL (ref 74–99)
HCT VFR BLD CALC: 42.9 % (ref 34–48)
HEMOGLOBIN: 13.6 G/DL (ref 11.5–15.5)
MCH RBC QN AUTO: 30.4 PG (ref 26–35)
MCHC RBC AUTO-ENTMCNC: 31.7 G/DL (ref 32–34.5)
MCV RBC AUTO: 96 FL (ref 80–99.9)
PDW BLD-RTO: 13.8 % (ref 11.5–15)
PLATELET # BLD: 301 K/UL (ref 130–450)
PMV BLD AUTO: 10.9 FL (ref 7–12)
POTASSIUM SERPL-SCNC: 4.6 MMOL/L (ref 3.5–5.1)
RBC # BLD: 4.47 M/UL (ref 3.5–5.5)
SODIUM BLD-SCNC: 138 MMOL/L (ref 136–145)
WBC # BLD: 10.1 K/UL (ref 4.5–11.5)

## 2025-05-16 RX ORDER — VITAMIN E (DL,TOCOPHERYL ACET) 180 MG
CAPSULE ORAL NIGHTLY
COMMUNITY

## 2025-05-16 NOTE — TELEPHONE ENCOUNTER
Pts states  told her to get off the baby aspirin for 2 weeks but her cardiologist said to only go off of it for 1 week. Wanted to let someone know, she will only be going off of it for the 1 week. Surgery next Friday 5/23 left elbow

## 2025-05-16 NOTE — PROGRESS NOTES
Dr Olivas office & pt notified that ASA can only be held for 1 week pre op per pts cardiologist DR Ortega

## 2025-05-21 ENCOUNTER — PREP FOR PROCEDURE (OUTPATIENT)
Dept: ORTHOPEDIC SURGERY | Age: 70
End: 2025-05-21

## 2025-05-21 DIAGNOSIS — G56.21 ULNAR NERVE ENTRAPMENT, RIGHT: ICD-10-CM

## 2025-05-23 ENCOUNTER — ANESTHESIA (OUTPATIENT)
Dept: OPERATING ROOM | Age: 70
End: 2025-05-23
Payer: MEDICARE

## 2025-05-23 ENCOUNTER — HOSPITAL ENCOUNTER (OUTPATIENT)
Age: 70
Setting detail: OUTPATIENT SURGERY
Discharge: HOME | End: 2025-05-23
Attending: ORTHOPAEDIC SURGERY | Admitting: ORTHOPAEDIC SURGERY
Payer: MEDICARE

## 2025-05-23 VITALS
DIASTOLIC BLOOD PRESSURE: 56 MMHG | HEIGHT: 60 IN | RESPIRATION RATE: 16 BRPM | HEART RATE: 80 BPM | BODY MASS INDEX: 41.62 KG/M2 | TEMPERATURE: 98.1 F | SYSTOLIC BLOOD PRESSURE: 118 MMHG | WEIGHT: 212 LBS | OXYGEN SATURATION: 96 %

## 2025-05-23 DIAGNOSIS — G56.21 ULNAR NERVE ENTRAPMENT, RIGHT: ICD-10-CM

## 2025-05-23 DIAGNOSIS — G56.22 CUBITAL TUNNEL SYNDROME ON LEFT: Primary | ICD-10-CM

## 2025-05-23 PROCEDURE — 64718 REVISE ULNAR NERVE AT ELBOW: CPT | Performed by: ORTHOPAEDIC SURGERY

## 2025-05-23 PROCEDURE — 6360000002 HC RX W HCPCS

## 2025-05-23 PROCEDURE — 2580000003 HC RX 258: Performed by: ANESTHESIOLOGY

## 2025-05-23 PROCEDURE — 7100000011 HC PHASE II RECOVERY - ADDTL 15 MIN: Performed by: ORTHOPAEDIC SURGERY

## 2025-05-23 PROCEDURE — 3700000001 HC ADD 15 MINUTES (ANESTHESIA): Performed by: ORTHOPAEDIC SURGERY

## 2025-05-23 PROCEDURE — 3600000004 HC SURGERY LEVEL 4 BASE: Performed by: ORTHOPAEDIC SURGERY

## 2025-05-23 PROCEDURE — 2709999900 HC NON-CHARGEABLE SUPPLY: Performed by: ORTHOPAEDIC SURGERY

## 2025-05-23 PROCEDURE — 7100000010 HC PHASE II RECOVERY - FIRST 15 MIN: Performed by: ORTHOPAEDIC SURGERY

## 2025-05-23 PROCEDURE — 3600000014 HC SURGERY LEVEL 4 ADDTL 15MIN: Performed by: ORTHOPAEDIC SURGERY

## 2025-05-23 PROCEDURE — 2500000003 HC RX 250 WO HCPCS: Performed by: NURSE ANESTHETIST, CERTIFIED REGISTERED

## 2025-05-23 PROCEDURE — 6360000002 HC RX W HCPCS: Performed by: NURSE ANESTHETIST, CERTIFIED REGISTERED

## 2025-05-23 PROCEDURE — 7100000001 HC PACU RECOVERY - ADDTL 15 MIN: Performed by: ORTHOPAEDIC SURGERY

## 2025-05-23 PROCEDURE — 7100000000 HC PACU RECOVERY - FIRST 15 MIN: Performed by: ORTHOPAEDIC SURGERY

## 2025-05-23 PROCEDURE — 6360000002 HC RX W HCPCS: Performed by: ORTHOPAEDIC SURGERY

## 2025-05-23 PROCEDURE — 3700000000 HC ANESTHESIA ATTENDED CARE: Performed by: ORTHOPAEDIC SURGERY

## 2025-05-23 PROCEDURE — 2720000010 HC SURG SUPPLY STERILE: Performed by: ORTHOPAEDIC SURGERY

## 2025-05-23 PROCEDURE — 6370000000 HC RX 637 (ALT 250 FOR IP): Performed by: ANESTHESIOLOGY

## 2025-05-23 RX ORDER — SODIUM CHLORIDE 0.9 % (FLUSH) 0.9 %
5-40 SYRINGE (ML) INJECTION PRN
Status: DISCONTINUED | OUTPATIENT
Start: 2025-05-23 | End: 2025-05-23 | Stop reason: HOSPADM

## 2025-05-23 RX ORDER — DIPHENHYDRAMINE HYDROCHLORIDE 50 MG/ML
INJECTION, SOLUTION INTRAMUSCULAR; INTRAVENOUS
Status: DISCONTINUED | OUTPATIENT
Start: 2025-05-23 | End: 2025-05-23 | Stop reason: SDUPTHER

## 2025-05-23 RX ORDER — SODIUM CHLORIDE, SODIUM LACTATE, POTASSIUM CHLORIDE, CALCIUM CHLORIDE 600; 310; 30; 20 MG/100ML; MG/100ML; MG/100ML; MG/100ML
INJECTION, SOLUTION INTRAVENOUS CONTINUOUS
Status: DISCONTINUED | OUTPATIENT
Start: 2025-05-23 | End: 2025-05-23 | Stop reason: HOSPADM

## 2025-05-23 RX ORDER — BUPIVACAINE HYDROCHLORIDE 2.5 MG/ML
INJECTION, SOLUTION EPIDURAL; INFILTRATION; INTRACAUDAL; PERINEURAL PRN
Status: DISCONTINUED | OUTPATIENT
Start: 2025-05-23 | End: 2025-05-23 | Stop reason: ALTCHOICE

## 2025-05-23 RX ORDER — DROPERIDOL 2.5 MG/ML
0.62 INJECTION, SOLUTION INTRAMUSCULAR; INTRAVENOUS
Status: DISCONTINUED | OUTPATIENT
Start: 2025-05-23 | End: 2025-05-23 | Stop reason: HOSPADM

## 2025-05-23 RX ORDER — FENTANYL CITRATE 50 UG/ML
INJECTION, SOLUTION INTRAMUSCULAR; INTRAVENOUS
Status: DISCONTINUED | OUTPATIENT
Start: 2025-05-23 | End: 2025-05-23 | Stop reason: SDUPTHER

## 2025-05-23 RX ORDER — SODIUM CHLORIDE 0.9 % (FLUSH) 0.9 %
5-40 SYRINGE (ML) INJECTION EVERY 12 HOURS SCHEDULED
Status: DISCONTINUED | OUTPATIENT
Start: 2025-05-23 | End: 2025-05-23 | Stop reason: HOSPADM

## 2025-05-23 RX ORDER — HYDROCODONE BITARTRATE AND ACETAMINOPHEN 5; 325 MG/1; MG/1
1 TABLET ORAL EVERY 6 HOURS PRN
Status: DISCONTINUED | OUTPATIENT
Start: 2025-05-23 | End: 2025-05-23 | Stop reason: HOSPADM

## 2025-05-23 RX ORDER — SODIUM CHLORIDE 9 MG/ML
INJECTION, SOLUTION INTRAVENOUS PRN
Status: DISCONTINUED | OUTPATIENT
Start: 2025-05-23 | End: 2025-05-23 | Stop reason: HOSPADM

## 2025-05-23 RX ORDER — NALOXONE HYDROCHLORIDE 0.4 MG/ML
INJECTION, SOLUTION INTRAMUSCULAR; INTRAVENOUS; SUBCUTANEOUS PRN
Status: DISCONTINUED | OUTPATIENT
Start: 2025-05-23 | End: 2025-05-23 | Stop reason: HOSPADM

## 2025-05-23 RX ORDER — FENTANYL CITRATE 0.05 MG/ML
50 INJECTION, SOLUTION INTRAMUSCULAR; INTRAVENOUS EVERY 5 MIN PRN
Status: DISCONTINUED | OUTPATIENT
Start: 2025-05-23 | End: 2025-05-23 | Stop reason: HOSPADM

## 2025-05-23 RX ORDER — HYDROCODONE BITARTRATE AND ACETAMINOPHEN 5; 325 MG/1; MG/1
1 TABLET ORAL EVERY 6 HOURS PRN
Qty: 28 TABLET | Refills: 0 | Status: SHIPPED | OUTPATIENT
Start: 2025-05-23 | End: 2025-05-30

## 2025-05-23 RX ORDER — MIDAZOLAM HYDROCHLORIDE 1 MG/ML
INJECTION, SOLUTION INTRAMUSCULAR; INTRAVENOUS
Status: DISCONTINUED | OUTPATIENT
Start: 2025-05-23 | End: 2025-05-23 | Stop reason: SDUPTHER

## 2025-05-23 RX ORDER — GLYCOPYRROLATE 0.2 MG/ML
INJECTION INTRAMUSCULAR; INTRAVENOUS
Status: DISCONTINUED | OUTPATIENT
Start: 2025-05-23 | End: 2025-05-23 | Stop reason: SDUPTHER

## 2025-05-23 RX ORDER — DIPHENHYDRAMINE HYDROCHLORIDE 50 MG/ML
12.5 INJECTION, SOLUTION INTRAMUSCULAR; INTRAVENOUS
Status: DISCONTINUED | OUTPATIENT
Start: 2025-05-23 | End: 2025-05-23 | Stop reason: HOSPADM

## 2025-05-23 RX ORDER — DEXAMETHASONE SODIUM PHOSPHATE 10 MG/ML
INJECTION, SOLUTION INTRAMUSCULAR; INTRAVENOUS
Status: DISCONTINUED | OUTPATIENT
Start: 2025-05-23 | End: 2025-05-23 | Stop reason: SDUPTHER

## 2025-05-23 RX ORDER — ETOMIDATE 2 MG/ML
INJECTION INTRAVENOUS
Status: DISCONTINUED | OUTPATIENT
Start: 2025-05-23 | End: 2025-05-23 | Stop reason: SDUPTHER

## 2025-05-23 RX ORDER — ONDANSETRON 2 MG/ML
INJECTION INTRAMUSCULAR; INTRAVENOUS
Status: DISCONTINUED | OUTPATIENT
Start: 2025-05-23 | End: 2025-05-23 | Stop reason: SDUPTHER

## 2025-05-23 RX ORDER — CLINDAMYCIN PHOSPHATE 900 MG/50ML
900 INJECTION, SOLUTION INTRAVENOUS ONCE
Status: COMPLETED | OUTPATIENT
Start: 2025-05-23 | End: 2025-05-23

## 2025-05-23 RX ADMIN — HYDROCODONE BITARTRATE AND ACETAMINOPHEN 1 TABLET: 5; 325 TABLET ORAL at 13:23

## 2025-05-23 RX ADMIN — FENTANYL CITRATE 100 MCG: 50 INJECTION, SOLUTION INTRAMUSCULAR; INTRAVENOUS at 12:08

## 2025-05-23 RX ADMIN — ETOMIDATE 14 MG: 2 INJECTION, SOLUTION INTRAVENOUS at 12:08

## 2025-05-23 RX ADMIN — DIPHENHYDRAMINE HYDROCHLORIDE 12.5 MG: 50 INJECTION, SOLUTION INTRAMUSCULAR; INTRAVENOUS at 12:12

## 2025-05-23 RX ADMIN — CLINDAMYCIN PHOSPHATE 900 MG: 900 INJECTION, SOLUTION INTRAVENOUS at 12:01

## 2025-05-23 RX ADMIN — DEXAMETHASONE SODIUM PHOSPHATE 10 MG: 10 INJECTION INTRAMUSCULAR; INTRAVENOUS at 12:12

## 2025-05-23 RX ADMIN — SODIUM CHLORIDE, POTASSIUM CHLORIDE, SODIUM LACTATE AND CALCIUM CHLORIDE: 600; 310; 30; 20 INJECTION, SOLUTION INTRAVENOUS at 09:26

## 2025-05-23 RX ADMIN — MIDAZOLAM 2 MG: 1 INJECTION INTRAMUSCULAR; INTRAVENOUS at 12:04

## 2025-05-23 RX ADMIN — GLYCOPYRROLATE 0.2 MG: 0.2 INJECTION, SOLUTION INTRAMUSCULAR; INTRAVENOUS at 12:16

## 2025-05-23 RX ADMIN — FENTANYL CITRATE 50 MCG: 50 INJECTION, SOLUTION INTRAMUSCULAR; INTRAVENOUS at 12:25

## 2025-05-23 RX ADMIN — FENTANYL CITRATE 50 MCG: 50 INJECTION, SOLUTION INTRAMUSCULAR; INTRAVENOUS at 12:13

## 2025-05-23 RX ADMIN — ONDANSETRON 4 MG: 2 INJECTION, SOLUTION INTRAMUSCULAR; INTRAVENOUS at 12:30

## 2025-05-23 RX ADMIN — Medication 20 MG: at 12:08

## 2025-05-23 ASSESSMENT — PAIN - FUNCTIONAL ASSESSMENT
PAIN_FUNCTIONAL_ASSESSMENT: NONE - DENIES PAIN
PAIN_FUNCTIONAL_ASSESSMENT: NONE - DENIES PAIN
PAIN_FUNCTIONAL_ASSESSMENT: 0-10
PAIN_FUNCTIONAL_ASSESSMENT: 0-10
PAIN_FUNCTIONAL_ASSESSMENT: PREVENTS OR INTERFERES SOME ACTIVE ACTIVITIES AND ADLS
PAIN_FUNCTIONAL_ASSESSMENT: NONE - DENIES PAIN
PAIN_FUNCTIONAL_ASSESSMENT: 0-10

## 2025-05-23 ASSESSMENT — PAIN DESCRIPTION - DESCRIPTORS: DESCRIPTORS: NUMBNESS;ACHING

## 2025-05-23 ASSESSMENT — PAIN DESCRIPTION - LOCATION: LOCATION: ELBOW

## 2025-05-23 ASSESSMENT — PAIN DESCRIPTION - ORIENTATION: ORIENTATION: LEFT

## 2025-05-23 ASSESSMENT — LIFESTYLE VARIABLES: SMOKING_STATUS: 1

## 2025-05-23 ASSESSMENT — PAIN SCALES - GENERAL: PAINLEVEL_OUTOF10: 8

## 2025-05-23 NOTE — ANESTHESIA POSTPROCEDURE EVALUATION
Department of Anesthesiology  Postprocedure Note    Patient: Lizette Julian  MRN: [id number]  YOB: 1955  Date of evaluation: 5/23/2025    Procedure Summary       Date: 05/23/25 Room / Location: [address]    Anesthesia Start: 1158 Anesthesia Stop: 1253    Procedure: LEFT ELBOW CUBITAL TUNNEL RELEASE-5/23/25 (Left: Elbow) Diagnosis:       Cubital tunnel syndrome on left      (Cubital tunnel syndrome on left [G56.22])    Surgeons: Buzz Olivas DO Responsible Provider: Heath Gonzalez MD    Anesthesia Type: General ASA Status: 3            Anesthesia Type: General    Sharona Phase I: Sharona Score: 9    Sharona Phase II: Sharona Score: 10    Anesthesia Post Evaluation    Patient location during evaluation: PACU  Patient participation: complete - patient participated  Level of consciousness: awake  Airway patency: patent  Nausea & Vomiting: no nausea and no vomiting  Cardiovascular status: hemodynamically stable  Respiratory status: room air and spontaneous ventilation  Hydration status: stable  Pain management: adequate    No notable events documented.

## 2025-05-23 NOTE — OP NOTE
Operative Note      Patient: Lizette Julian  YOB: 1955  MRN: [id number]    Date of Procedure: 5/23/2025    Pre-Op Diagnosis Codes:      * Cubital tunnel syndrome on left [G56.22]    Post-Op Diagnosis: Same       Procedure(s):  LEFT ELBOW CUBITAL TUNNEL RELEASE-5/23/25    Surgeon(s):  Buzz Meza DO    Assistant:   Resident: Panda Martin DO    Anesthesia: General    Estimated Blood Loss (mL): less than 100     Complications: None    Specimens:   * No specimens in log *    Implants:  * No implants in log *      Drains: * No LDAs found *    Findings:  Infection Present At Time Of Surgery (PATOS) (choose all levels that have infection present):  No infection present  Other Findings: as above    Detailed Description of Procedure:   below    SURGEON: BUZZ MEZA D.O.   ASSISTANT: as above  PREOPERATIVE DIAGNOSIS: left  elbow cubital tunnel syndrome.   POSTOPERATIVE DIAGNOSIS:  leftelbow cubital tunnel syndrome.   PROCEDURE: left Cubital tunnel release.   ANESTHESIA: general,  ESTIMATED BLOOD LOSS: Minimal.   COMPLICATIONS: None.   OPERATIVE PROCEDURE: The patient was brought to the operative suite and was   given a general anesthesia. The left arm was identified with a preoperative   time out, placed a tourniquet around the left arm, prepped and draped the arm   in a sterile fashion.   I outlined an incision along the medial elbow, along the area of the cubital   tunnel. I made an incision with a #15 blade through skin and subcutaneous   tissue. I dissected down to the level of the medial epicondyle and medial   elbow. I exposed the underlying nerve and felt it. I then dove down into the   cubital tunnel, right through Vu ligament, released the ligament   proximally and distally to full extent, proximally to arcade of Luke. I   released the complete extent of the Vu ligament, dissected down through   into the flexor carpi ulnaris muscle belly and fascia. The nerve was   completely

## 2025-05-23 NOTE — H&P
Updated H&P    Chief Complaint   Patient presents with    Hand Pain       Bilateral Hand Numbness, x 2 months, had EMG done.  L>R, states of previous CTR surgery in bilateral hands.           Lizette Julian is a 70 y.o. year old  female who presents for evaluation of bilateral wrist pain L>R.  she reports this started about 2 months ago.  she does not remember a specific injury that started the pain.  She complains of numbness and tingling in the ring and pinky fingers. She has trouble lifting objects  The injury was none.  The pain is worse with movement and better with rest.  The patient has tried OTC analgesics.  The treatment has not been effective.  The patient is left dominant.     Past Medical History        Past Medical History:   Diagnosis Date    Acute CVA (cerebrovascular accident) ([facility]) 03/17/2022    Acute decompensated heart failure ([facility]) 03/01/2023    Anastomotic ulcer 04/30/2012    Anemia      Anxiety      Asthma      Biceps tendon tear 01/02/2015    Closed fracture of multiple ribs of right side      Closed fracture of nasal bone      Collapsed lung 1986    COPD (chronic obstructive pulmonary disease) ([facility])      COVID 11/20/2023    DDD (degenerative disc disease), lumbar      Degenerative disc disease at L5-S1 level      Depression      Distal radius fracture 05/11/2015    Fracture of left olecranon process 01/10/2017    GERD (gastroesophageal reflux disease)      Heart attack ([facility])      History of blood transfusion      Hypertension      Impingement syndrome of shoulder 12/09/2014    Intractable abdominal pain 11/18/2018    Left carpal tunnel syndrome 01/30/2017    Nasal bones, closed fracture, closed, initial encounter 12/31/2015    Osteoarthritis      Peptic ulcer, unspecified site, unspecified as acute or chronic, without mention of hemorrhage or perforation      RLS (restless legs syndrome)      Rotator cuff tear 12/09/2014    Seizure ([facility])      Sepsis ([facility])      Spinal stenosis       muscular dystrophy, (-) RSD,(-) joint pain (-)swelling, (-) joint pain,swelling.  Neurologic: (-) epilepsy, (-)seizures,(-) brain tumor,(-) TIA, (-)stroke, (-)headaches, (-)Parkinson disease,(-) memory loss, (-) LOC.  Cardiovascular: (-) Chest pain, (-) swelling in legs/feet, (-) SOB, (-) cramping in legs/feet with walking.  Respiratory: (-) SOB, (-) Coughing, (-) night sweats.  GI: (-) nausea, (-) vomiting, (-) diarrhea, (-) blood in stool, (-) gastric ulcer.  Psychiatric: (-) Depression, (-) Anxiety, (-) bipolar disease, (-) Alzheimer's Disease  Allergic/Immunologic: (-) allergies latex, (-) allergies metal, (-) skin sensitivity.  Hematlogic: (-) anemia, (-) blood transfusion, (-) DVT/PE, (-) Clotting disorders        Subjective:     Constitution:  BP (!) 109/52   Pulse 81   Temp (!) 96.7 °F (35.9 °C) (Temporal)   Resp 20   Ht 1.524 m (5')   Wt 96.2 kg (212 lb)   LMP  (LMP Unknown)   SpO2 96%   BMI 41.40 kg/m²      Vital signs are stable.  In general, patient is awake, alert and oriented X3, in no apparent distress.  Examination of HENT reveals normocephalic, atraumatic.  PERRLA/EOMI sclera are white.  Conjunctivae are clear.  TM's are intact.  Pharynx is pink and moist.  Uvula and tongue are midline.  Heart: Positive S1 and positive S2 with regular rate and rhythm.  Lungs: Clear to auscultation bilaterally without rales, rhonchi or wheezes.  Abdomen: soft, nontender.  Positive bowel sounds.  No organomegaly.  No guarding or rigidity.     Psycihatric:  The patient is alert and oriented x 3, appears to be stated age and in no distress.       Respiratory:  Respiratory effort is not labored.  Patient is not gasping.  Palpation of the chest reveals no tactile fremitus.     Skin:  Upon inspection: the skin appears warm, dry and intact.  There is not a previous scar over the affected area.There is not any cellulitis, lymphedema or cutaneous lesions noted in the lower extremities.   Upon palpation there is no

## 2025-05-23 NOTE — DISCHARGE INSTRUCTIONS
Cubital Tunnel Post-op Instructions    [facility]  [phone]  Buzz Olivas D.O.        Change your operative dressing on post-op day #5    Use bandaid for dressing. May remove sling when comfortable    You may shower with soap and water on post-op day #1 but keep your incision clean and dry.    Non-weight bearing activities with affected hand until post-op appointment.    No lifting over 5 pounds.     May begin moving fingers immediately.  No heavy lifting.  No pushing or pulling.    Take pain medications as prescribed.  Take with food.______________________  If you anticipate the need for a refill on your medication and the weekend is approaching, please call the office by noon on Friday.  No refills will be called in over the weekend.  DO NOT take Tylenol products while taking prescribed pain medicine.    Resume regular diet and medications (unless otherwise directed by your doctor).    You should have a responsible adult with you for 24 hours.    If you have any questions or concerns, please call the office.       If any problems occur or if you have any further questions, please call your doctor as soon as possible.  If you find that you cannot reach your doctor but feel that your condition needs a doctor's attention go to the emergency room.               Infection After Surgery: Care Instructions  Overview  After surgery, an infection is always possible. It doesn't mean that the surgery didn't go well.  Because an infection can be serious, your doctor has taken steps to manage it.  Your doctor checked the infection and cleaned it if necessary. Your doctor may have made an opening in the area so that the pus can drain out. You may have gauze in the cut so that the area will stay open and keep draining. You may need antibiotics.  You will need to follow up with your doctor to make sure the infection has gone away.  Follow-up care is a key part of your treatment and safety. Be  sure to make and go to all appointments, and call your doctor if you are having problems. It's also a good idea to know your test results and keep a list of the medicines you take.  How can you care for yourself at home?  Make sure your surgeon knows about the infection, especially if you saw another doctor about your symptoms.  If your doctor prescribed antibiotics, take them as directed. Do not stop taking them just because you feel better. You need to take the full course of antibiotics.  Ask your doctor if you can take an over-the-counter pain medicine, such as acetaminophen (Tylenol), ibuprofen (Advil, Motrin), or naproxen (Aleve). Be safe with medicines. Read and follow all instructions on the label.  Do not take two or more pain medicines at the same time unless the doctor told you to. Many pain medicines have acetaminophen, which is Tylenol. Too much acetaminophen (Tylenol) can be harmful.  Prop up the area on a pillow anytime you sit or lie down during the next 3 days. Try to keep it above the level of your heart. This will help reduce swelling.  Keep the skin clean and dry.  You may have a bandage over the cut (incision). A bandage helps the incision heal and protects it. Your doctor will tell you how to take care of this. Keep it clean and dry. You may have drainage from the wound.  If your doctor told you how to care for your incision, follow your doctor's instructions. If you did not get instructions, follow this general advice:  Wash around the incision with clean water 2 times a day. Don't use hydrogen peroxide or alcohol, which can slow healing.  When should you call for help?   Call your doctor now or seek immediate medical care if:    You have signs that your infection is getting worse, such as:  Increased pain, swelling, warmth, or redness in the area.  Red streaks leading from the area.  Pus draining from the wound.  A new or higher fever.   Watch closely for changes in your health, and be sure

## 2025-06-05 NOTE — PROGRESS NOTES
SPEECH/LANGUAGE PATHOLOGY  CLINICAL ASSESSMENT OF SWALLOWING FUNCTION   and PLAN OF CARE    PATIENT NAME:  Lexie Zhang  (female)     MRN:  [id number]    :  1955  (69 y.o.)  STATUS:  Inpatient: Room 0422/0422-    TODAY'S DATE:  3/17/2022  REFERRING PROVIDER:     SLP swallowing-dysphagia (IP) Start: 22, End: 22, ONE TIME, Standing Count: 1 Occurrences, R    Ingrid Lazo DO  REASON FOR REFERRAL: dysphagia    EVALUATING THERAPIST: HEAVENLY Alexandre                 RESULTS:    DYSPHAGIA DIAGNOSIS:   Clinical indicators of esophageal motility deficit suspected and limited intake on exam, not able to determine type or severity of dysphagia       DIET RECOMMENDATIONS:  NPO  If continues to exhibit difficulty keeping water down. If water tolerated then recommend Easy to chew consistency solids (IDDSI level 7, transitional) with  thin liquids (IDDSI level 0)       FEEDING RECOMMENDATIONS:     Assistance level:  Stand by assistance is needed during all oral intake      Compensatory strategies recommended: trial sips of water prior to any PO intake due to her not being able to keep anything down per her report       Discussed recommendations with nursing and/or faxed report to referring provider: Yes    SPEECH THERAPY  PLAN OF CARE   The dysphagia POC is established based on physician order, dysphagia diagnosis and results of clinical assessment     Skilled SLP intervention for dysphagia management up to 5x per week until goals met, pt plateaus in function and/or discharged from hospital    Conditions Requiring Skilled Therapeutic Intervention for dysphagia:    Patient is performing below functional baseline d/t  current acute condition, Multiple diagnoses, multiple medications, and increased dependency upon caregivers.     Specific dysphagia interventions to include:     Training in positioning for improved integrity of swallow  Compensatory strategy training   Therapeutic exercises  Trials of upgraded diet/liquid     Specific instructions for next treatment:  ongoing PO analysis to upgrade diet and evaluate tolerance of current PO recommendation  Patient Treatment Goals:    Short Term Goals:  Pt will participate in ongoing mealtime assessment to provide diet modification and compensatory strategy implementation to minimize risk of aspiration associated with PO intake    Long Term Goals:   Pt will improve oropharyngeal swallow function to ensure airway protection during PO intake to maintain adequate nutrition/hydration and decrease signs/symptoms of aspiration to less than 1 x/day. OTHER RECOMMENDATIONS:  A Video Swallow Study (MBSS) is recommended and requires a physician order IF silent aspiration is suspected based on medical presentation     Patient/family Goal:    To be able to eat/drink     Plan of care discussed with Patient   The Patient understand(s) the diagnosis, prognosis and plan of care     Rehabilitation Potential/Prognosis: good                    ADMITTING DIAGNOSIS: Stroke determined by clinical assessment ([facility] [address] [I63.9]  Cerebrovascular accident (CVA), unspecified mechanism ([address] [I63.9]    VISIT DIAGNOSIS:   Visit Diagnoses       Codes    Cerebrovascular accident (CVA), unspecified mechanism ([address]    -  Primary I63.9           PATIENT REPORT/COMPLAINT: reports no difficulty swallowing but reports things just won't stay down right now. Reports she has had esophageal dilation in the past and gastric bypass   RN cleared patient for participation in assessment     yes But does report she may still be very tired due to ativan from MRI earlier today     PRIOR LEVEL OF SWALLOW FUNCTION:    PAST HISTORY OF DYSPHAGIA?: none reported    Home diet: Regular consistency solids (IDDSI level 7) with  thin liquids (IDDSI level 0)  Current Diet Order:  ADULT DIET;  Regular    PROCEDURE:  Consistencies Administered During the Evaluation   Liquids: thin liquid   Solids:  not appropriate      Method of Intake:   cup  Self fed      Position:   Seated, upright    CLINICAL ASSESSMENT:  Oral Stage: The oral stage of swallowing was within functional limits for consistencies administered      Pharyngeal Stage:    Patient with latent cough and then did expel secretions she reports this has been happening most of the day and has a cup at the bedside that she has been spitting secretions into. Cognition:   Within functional limits for this exam but still very tired from ativan     Oral Peripheral Examination   Adequate lingual/labial strength     Current Respiratory Status    room air     Parameters of Speech Production  Respiration:  Adequate for speech production  Quality:   Within functional limits  Intensity: Within functional limits    Volitional Swallow: present     Volitional Cough:   present     Pain: No pain reported. EDUCATION:   The Speech Language Pathologist (SLP) completed education regarding results of evaluation and that intervention is warranted at this time. Learner: Patient  Education: Reviewed results and recommendations of this evaluation  Evaluation of Education:  Kishore Alegre understanding    This plan may be re-evaluated and revised as warranted. Evaluation Time includes thorough review of current medical information, gathering information on past medical history/social history and prior level of function, completion of standardized testing/informal observation of tasks, assessment of data and education on plan of care and goals. Patient seen for swallow therapy 1 minutes. patient educated regarding results of clinical swallow eval. Reviewed current solid/liquid consistency diet recommendation for   Dental soft (Easy to Chew) solids with  thin liquids (IDDSI level 0) IF she is able to tolerate sips of water without them coming back up on her, and discussed compensatory strategies to ensure safe PO intake. Reviewed aspiration precautions.  Discussed use of drinking water first to decrease risk of aspiration with intake prior to taking any PO dinner this evening  Patient was able to restate compensatory strategies during session. Patient and or family  indicated understanding of all information provided via satisfactory verbal response. [x]The admitting diagnosis and active problem list, have been reviewed prior to initiation of this evaluation.         ACTIVE PROBLEM LIST:   Patient Active Problem List   Diagnosis    GERD (gastroesophageal reflux disease)    History of tobacco abuse    History of bariatric surgery    [facility]hma with COPD [facility] [address]    Personal history of gastric bypass    Uncomplicated asthma    RLS (restless legs syndrome)    Spinal stenosis of lumbosacral region    Recurrent depression ([facility] [address]    Essential hypertension    Stroke determined by clinical assessment ([facility] [address]    Acute CVA (cerebrovascular accident) ([facility] [address]    Near syncope         CPT code:  51659  bedside swallow eval  86351  dysphagia tx      Lucy Escobar MSCCC/SLP  Speech Language Pathologist  [id number] PAST MEDICAL HISTORY:  AICD (automatic cardioverter/defibrillator) present     Atrial fibrillation and flutter     BPH (benign prostatic hyperplasia)     Cataract     CVA (cerebrovascular accident)     ESRD on dialysis     H/O ventricular tachycardia     HFrEF (heart failure with reduced ejection fraction)     History of ischemic cardiomyopathy     HLD (hyperlipidemia)     HTN (hypertension)     MI, old     Pacemaker

## 2025-06-06 ENCOUNTER — OFFICE VISIT (OUTPATIENT)
Dept: ORTHOPEDIC SURGERY | Age: 70
End: 2025-06-06

## 2025-06-06 VITALS — BODY MASS INDEX: 40.05 KG/M2 | HEIGHT: 60 IN | TEMPERATURE: 98.6 F | WEIGHT: 204 LBS

## 2025-06-06 DIAGNOSIS — Z98.890 S/P CUBITAL TUNNEL RELEASE: Primary | ICD-10-CM

## 2025-06-06 NOTE — PROGRESS NOTES
Lizette MICHAELS Julian is here for followup after left cubital tunnel surgery. Pain is controlled without any medications.. The patient notes improvement in the following symptoms:pain.  The patient denies fever, wound drainage, increasing redness, pus, increasing pain, increasing swelling. Post op problems reported: none.  She complains of itchiness in the fingers and still has numbness.       Objective:         General :    alert, appears stated age, and cooperative   Sutures:   Sutures out.   Incision:  healing well, no significant drainage, no dehiscence, no significant erythema   Tenderness:  none   Flexion ROM:  full range of motion   Extension ROM:  full range of motion   Effusion:  no         Assessment:        Status post left cubital tunnel surgery. Doing well postoperatively.        Plan:     Sutures removed today.  HEP  Follow up: prn  No heavy lifting, pushing, pulling  Can get incision wet, do not submerge until closed  ROM elbow, wrist, hand

## 2025-06-12 ENCOUNTER — ANESTHESIA EVENT (OUTPATIENT)
Dept: OPERATING ROOM | Age: 70
End: 2025-06-12
Payer: MEDICARE

## 2025-06-12 ASSESSMENT — LIFESTYLE VARIABLES: SMOKING_STATUS: 1

## 2025-06-12 NOTE — ANESTHESIA PRE PROCEDURE
Cubital tunnel syndrome on left G56.22    Ulnar nerve entrapment, right G56.21       Past Medical History:        Diagnosis Date    A-fib ([facility])     Acute CVA (cerebrovascular accident) ([facility]) 2022    no deficits    Acute decompensated heart failure ([facility]) 2023    Anastomotic ulcer 2012    Anemia     Anxiety     Asthma     Biceps tendon tear 2015    CAD (coronary artery disease)     Closed fracture of multiple ribs of right side     Closed fracture of nasal bone     Collapsed lung     occurred post alesia & was told by anesthesiologist it was d/t diprovan    COPD (chronic obstructive pulmonary disease) ([facility])     COVID 2023    flu symptoms    DDD (degenerative disc disease), lumbar     Degenerative disc disease at L5-S1 level     Depression     Distal radius fracture 2015    Fracture of left olecranon process 01/10/2017    GERD (gastroesophageal reflux disease)     History of blood transfusion     Hypertension     Impingement syndrome of shoulder 2014    Intractable abdominal pain 2018    Left carpal tunnel syndrome 2017    Nasal bones, closed fracture, closed, initial encounter 2015    Osteoarthritis     Peptic ulcer, unspecified site, unspecified as acute or chronic, without mention of hemorrhage or perforation     RLS (restless legs syndrome)     Rotator cuff tear 2014    Sepsis ([facility])     Spinal stenosis        Past Surgical History:        Procedure Laterality Date    ABDOMEN SURGERY      ABSCESS DRAINAGE Left 2016    left thigh seroma    APPENDECTOMY      ARM SURGERY Left 2025    LEFT ELBOW CUBITAL TUNNEL RELEASE-25 performed by Buzz Olivas DO at [facility] OR    CARPAL TUNNEL RELEASE      right hand    CARPAL TUNNEL RELEASE Left 2017     SECTION  1984    CHOLECYSTECTOMY  1986    COLONOSCOPY  2011    DILATATION, ESOPHAGUS      DILATION AND CURETTAGE OF UTERUS      x4    ENDOSCOPY, COLON,

## 2025-06-13 ENCOUNTER — ANESTHESIA (OUTPATIENT)
Dept: OPERATING ROOM | Age: 70
End: 2025-06-13
Payer: MEDICARE

## 2025-06-13 ENCOUNTER — HOSPITAL ENCOUNTER (OUTPATIENT)
Age: 70
Setting detail: OUTPATIENT SURGERY
Discharge: HOME | End: 2025-06-13
Attending: ORTHOPAEDIC SURGERY | Admitting: ORTHOPAEDIC SURGERY
Payer: MEDICARE

## 2025-06-13 VITALS
HEIGHT: 60 IN | SYSTOLIC BLOOD PRESSURE: 134 MMHG | RESPIRATION RATE: 16 BRPM | DIASTOLIC BLOOD PRESSURE: 67 MMHG | TEMPERATURE: 97.2 F | HEART RATE: 83 BPM | OXYGEN SATURATION: 95 % | BODY MASS INDEX: 42.96 KG/M2 | WEIGHT: 218.8 LBS

## 2025-06-13 DIAGNOSIS — G56.21 ULNAR NERVE ENTRAPMENT, RIGHT: Primary | ICD-10-CM

## 2025-06-13 PROCEDURE — 6360000002 HC RX W HCPCS: Performed by: NURSE ANESTHETIST, CERTIFIED REGISTERED

## 2025-06-13 PROCEDURE — 64718 REVISE ULNAR NERVE AT ELBOW: CPT | Performed by: ORTHOPAEDIC SURGERY

## 2025-06-13 PROCEDURE — 7100000010 HC PHASE II RECOVERY - FIRST 15 MIN: Performed by: ORTHOPAEDIC SURGERY

## 2025-06-13 PROCEDURE — 3600000014 HC SURGERY LEVEL 4 ADDTL 15MIN: Performed by: ORTHOPAEDIC SURGERY

## 2025-06-13 PROCEDURE — 7100000001 HC PACU RECOVERY - ADDTL 15 MIN: Performed by: ORTHOPAEDIC SURGERY

## 2025-06-13 PROCEDURE — 6360000002 HC RX W HCPCS

## 2025-06-13 PROCEDURE — 3700000000 HC ANESTHESIA ATTENDED CARE: Performed by: ORTHOPAEDIC SURGERY

## 2025-06-13 PROCEDURE — 2580000003 HC RX 258: Performed by: ANESTHESIOLOGY

## 2025-06-13 PROCEDURE — 2500000003 HC RX 250 WO HCPCS: Performed by: NURSE ANESTHETIST, CERTIFIED REGISTERED

## 2025-06-13 PROCEDURE — 7100000011 HC PHASE II RECOVERY - ADDTL 15 MIN: Performed by: ORTHOPAEDIC SURGERY

## 2025-06-13 PROCEDURE — 6370000000 HC RX 637 (ALT 250 FOR IP): Performed by: ANESTHESIOLOGY

## 2025-06-13 PROCEDURE — 6360000002 HC RX W HCPCS: Performed by: ORTHOPAEDIC SURGERY

## 2025-06-13 PROCEDURE — 7100000000 HC PACU RECOVERY - FIRST 15 MIN: Performed by: ORTHOPAEDIC SURGERY

## 2025-06-13 PROCEDURE — 3600000004 HC SURGERY LEVEL 4 BASE: Performed by: ORTHOPAEDIC SURGERY

## 2025-06-13 PROCEDURE — 3700000001 HC ADD 15 MINUTES (ANESTHESIA): Performed by: ORTHOPAEDIC SURGERY

## 2025-06-13 PROCEDURE — 2709999900 HC NON-CHARGEABLE SUPPLY: Performed by: ORTHOPAEDIC SURGERY

## 2025-06-13 RX ORDER — SODIUM CHLORIDE 0.9 % (FLUSH) 0.9 %
5-40 SYRINGE (ML) INJECTION EVERY 12 HOURS SCHEDULED
Status: DISCONTINUED | OUTPATIENT
Start: 2025-06-13 | End: 2025-06-13 | Stop reason: HOSPADM

## 2025-06-13 RX ORDER — HYDROCODONE BITARTRATE AND ACETAMINOPHEN 5; 325 MG/1; MG/1
1 TABLET ORAL PRN
Status: COMPLETED | OUTPATIENT
Start: 2025-06-13 | End: 2025-06-13

## 2025-06-13 RX ORDER — SODIUM CHLORIDE, SODIUM LACTATE, POTASSIUM CHLORIDE, CALCIUM CHLORIDE 600; 310; 30; 20 MG/100ML; MG/100ML; MG/100ML; MG/100ML
INJECTION, SOLUTION INTRAVENOUS CONTINUOUS
Status: DISCONTINUED | OUTPATIENT
Start: 2025-06-13 | End: 2025-06-13 | Stop reason: HOSPADM

## 2025-06-13 RX ORDER — FENTANYL CITRATE 0.05 MG/ML
25 INJECTION, SOLUTION INTRAMUSCULAR; INTRAVENOUS EVERY 5 MIN PRN
Status: DISCONTINUED | OUTPATIENT
Start: 2025-06-13 | End: 2025-06-13 | Stop reason: HOSPADM

## 2025-06-13 RX ORDER — SODIUM CHLORIDE 9 MG/ML
INJECTION, SOLUTION INTRAVENOUS PRN
Status: DISCONTINUED | OUTPATIENT
Start: 2025-06-13 | End: 2025-06-13 | Stop reason: HOSPADM

## 2025-06-13 RX ORDER — FENTANYL CITRATE 50 UG/ML
INJECTION, SOLUTION INTRAMUSCULAR; INTRAVENOUS
Status: DISCONTINUED | OUTPATIENT
Start: 2025-06-13 | End: 2025-06-13 | Stop reason: SDUPTHER

## 2025-06-13 RX ORDER — MIDAZOLAM HYDROCHLORIDE 1 MG/ML
INJECTION, SOLUTION INTRAMUSCULAR; INTRAVENOUS
Status: DISCONTINUED | OUTPATIENT
Start: 2025-06-13 | End: 2025-06-13 | Stop reason: SDUPTHER

## 2025-06-13 RX ORDER — NALOXONE HYDROCHLORIDE 0.4 MG/ML
INJECTION, SOLUTION INTRAMUSCULAR; INTRAVENOUS; SUBCUTANEOUS PRN
Status: DISCONTINUED | OUTPATIENT
Start: 2025-06-13 | End: 2025-06-13 | Stop reason: HOSPADM

## 2025-06-13 RX ORDER — ETOMIDATE 2 MG/ML
INJECTION INTRAVENOUS
Status: DISCONTINUED | OUTPATIENT
Start: 2025-06-13 | End: 2025-06-13 | Stop reason: SDUPTHER

## 2025-06-13 RX ORDER — GLYCOPYRROLATE 0.2 MG/ML
INJECTION INTRAMUSCULAR; INTRAVENOUS
Status: DISCONTINUED | OUTPATIENT
Start: 2025-06-13 | End: 2025-06-13 | Stop reason: SDUPTHER

## 2025-06-13 RX ORDER — PROCHLORPERAZINE EDISYLATE 5 MG/ML
5 INJECTION INTRAMUSCULAR; INTRAVENOUS
Status: DISCONTINUED | OUTPATIENT
Start: 2025-06-13 | End: 2025-06-13 | Stop reason: HOSPADM

## 2025-06-13 RX ORDER — SODIUM CHLORIDE 0.9 % (FLUSH) 0.9 %
5-40 SYRINGE (ML) INJECTION PRN
Status: DISCONTINUED | OUTPATIENT
Start: 2025-06-13 | End: 2025-06-13 | Stop reason: HOSPADM

## 2025-06-13 RX ORDER — LIDOCAINE HYDROCHLORIDE 20 MG/ML
INJECTION, SOLUTION INTRAVENOUS
Status: DISCONTINUED | OUTPATIENT
Start: 2025-06-13 | End: 2025-06-13 | Stop reason: SDUPTHER

## 2025-06-13 RX ORDER — DEXAMETHASONE SODIUM PHOSPHATE 10 MG/ML
INJECTION, SOLUTION INTRAMUSCULAR; INTRAVENOUS
Status: DISCONTINUED | OUTPATIENT
Start: 2025-06-13 | End: 2025-06-13 | Stop reason: SDUPTHER

## 2025-06-13 RX ORDER — DIPHENHYDRAMINE HYDROCHLORIDE 50 MG/ML
INJECTION, SOLUTION INTRAMUSCULAR; INTRAVENOUS
Status: DISCONTINUED | OUTPATIENT
Start: 2025-06-13 | End: 2025-06-13 | Stop reason: SDUPTHER

## 2025-06-13 RX ORDER — ONDANSETRON 2 MG/ML
INJECTION INTRAMUSCULAR; INTRAVENOUS
Status: DISCONTINUED | OUTPATIENT
Start: 2025-06-13 | End: 2025-06-13 | Stop reason: SDUPTHER

## 2025-06-13 RX ORDER — BUPIVACAINE HYDROCHLORIDE 2.5 MG/ML
INJECTION, SOLUTION EPIDURAL; INFILTRATION; INTRACAUDAL; PERINEURAL PRN
Status: DISCONTINUED | OUTPATIENT
Start: 2025-06-13 | End: 2025-06-13 | Stop reason: ALTCHOICE

## 2025-06-13 RX ORDER — HYDROCODONE BITARTRATE AND ACETAMINOPHEN 5; 325 MG/1; MG/1
1 TABLET ORAL EVERY 6 HOURS PRN
Qty: 28 TABLET | Refills: 0 | Status: SHIPPED | OUTPATIENT
Start: 2025-06-13 | End: 2025-06-20

## 2025-06-13 RX ORDER — CLINDAMYCIN PHOSPHATE 900 MG/50ML
900 INJECTION, SOLUTION INTRAVENOUS ONCE
Status: COMPLETED | OUTPATIENT
Start: 2025-06-13 | End: 2025-06-13

## 2025-06-13 RX ADMIN — DEXAMETHASONE SODIUM PHOSPHATE 10 MG: 10 INJECTION, SOLUTION INTRAMUSCULAR; INTRAVENOUS at 06:36

## 2025-06-13 RX ADMIN — HYDROCODONE BITARTRATE AND ACETAMINOPHEN 1 TABLET: 5; 325 TABLET ORAL at 08:10

## 2025-06-13 RX ADMIN — Medication 20 MG: at 06:31

## 2025-06-13 RX ADMIN — ETOMIDATE 16 MG: 2 INJECTION, SOLUTION INTRAVENOUS at 06:31

## 2025-06-13 RX ADMIN — GLYCOPYRROLATE 0.2 MG: 0.2 INJECTION INTRAMUSCULAR; INTRAVENOUS at 06:31

## 2025-06-13 RX ADMIN — FENTANYL CITRATE 100 MCG: 50 INJECTION, SOLUTION INTRAMUSCULAR; INTRAVENOUS at 06:54

## 2025-06-13 RX ADMIN — CLINDAMYCIN PHOSPHATE 900 MG: 900 INJECTION, SOLUTION INTRAVENOUS at 06:20

## 2025-06-13 RX ADMIN — ONDANSETRON 4 MG: 2 INJECTION, SOLUTION INTRAMUSCULAR; INTRAVENOUS at 07:05

## 2025-06-13 RX ADMIN — SODIUM CHLORIDE, POTASSIUM CHLORIDE, SODIUM LACTATE AND CALCIUM CHLORIDE: 600; 310; 30; 20 INJECTION, SOLUTION INTRAVENOUS at 06:01

## 2025-06-13 RX ADMIN — LIDOCAINE HYDROCHLORIDE 100 MG: 20 INJECTION, SOLUTION INTRAVENOUS at 06:31

## 2025-06-13 RX ADMIN — FENTANYL CITRATE 100 MCG: 50 INJECTION, SOLUTION INTRAMUSCULAR; INTRAVENOUS at 06:31

## 2025-06-13 RX ADMIN — MIDAZOLAM 2 MG: 1 INJECTION INTRAMUSCULAR; INTRAVENOUS at 06:29

## 2025-06-13 RX ADMIN — DIPHENHYDRAMINE HYDROCHLORIDE 25 MG: 50 INJECTION INTRAMUSCULAR; INTRAVENOUS at 06:36

## 2025-06-13 ASSESSMENT — PAIN DESCRIPTION - DESCRIPTORS: DESCRIPTORS: ACHING;DISCOMFORT

## 2025-06-13 ASSESSMENT — PAIN DESCRIPTION - ORIENTATION: ORIENTATION: RIGHT

## 2025-06-13 ASSESSMENT — PAIN - FUNCTIONAL ASSESSMENT
PAIN_FUNCTIONAL_ASSESSMENT: 0-10
PAIN_FUNCTIONAL_ASSESSMENT: NONE - DENIES PAIN
PAIN_FUNCTIONAL_ASSESSMENT: NONE - DENIES PAIN

## 2025-06-13 ASSESSMENT — PAIN SCALES - GENERAL
PAINLEVEL_OUTOF10: 9
PAINLEVEL_OUTOF10: 5

## 2025-06-13 ASSESSMENT — PAIN DESCRIPTION - LOCATION: LOCATION: ELBOW;ARM

## 2025-06-13 NOTE — OP NOTE
Operative Note      Patient: Lizette Julian  YOB: 1955  MRN: [id number]    Date of Procedure: 6/13/2025    Pre-Op Diagnosis Codes:      * Ulnar nerve entrapment, right [G56.21]    Post-Op Diagnosis: Same       Procedure(s):  RIGHT ELBOW CUBITAL TUNNEL RELEASE-6/13/25    Surgeon(s):  Buzz Olivas DO    Assistant:   * No surgical staff found *    Anesthesia: General    Estimated Blood Loss (mL): less than 100     Complications: None    Specimens:   * No specimens in log *    Implants:  * No implants in log *      Drains: * No LDAs found *    Findings:  Infection Present At Time Of Surgery (PATOS) (choose all levels that have infection present):  No infection present  Other Findings: as above    Detailed Description of Procedure:   below      SURGEON: BUZZ OLIVAS D.O.   ASSISTANT: as above  PREOPERATIVE DIAGNOSIS: right  elbow cubital tunnel syndrome.   POSTOPERATIVE DIAGNOSIS:  rightelbow cubital tunnel syndrome.   PROCEDURE: right Cubital tunnel release.   ANESTHESIA: general  ESTIMATED BLOOD LOSS: Minimal.   COMPLICATIONS: None.   OPERATIVE PROCEDURE: The patient was brought to the operative suite and was   given a general anesthesia. The right arm was identified with a preoperative   time out, placed a tourniquet around the right arm, prepped and draped the arm   in a sterile fashion.   I outlined an incision along the medial elbow, along the area of the cubital   tunnel. I made an incision with a #15 blade through skin and subcutaneous   tissue. I dissected down to the level of the medial epicondyle and medial   elbow. I exposed the underlying nerve and felt it. I then dove down into the   cubital tunnel, right through Vu ligament, released the ligament   proximally and distally to full extent, proximally to arcruth of Luke. I   released the complete extent of the Vu ligament, dissected down through   into the flexor carpi ulnaris muscle belly and fascia. The nerve was

## 2025-06-13 NOTE — H&P
Updated H&P          Chief Complaint   Patient presents with    Hand Pain       Bilateral Hand Numbness, x 2 months, had EMG done.  L>R, states of previous CTR surgery in bilateral hands.           Lizette Julian is a 70 y.o. year old  female who presents for evaluation of bilateral wrist pain L>R.  she reports this started about 2 months ago.  she does not remember a specific injury that started the pain.  She complains of numbness and tingling in the ring and pinky fingers. She has trouble lifting objects  The injury was none.  The pain is worse with movement and better with rest.  The patient has tried OTC analgesics.  The treatment has not been effective.  The patient is left dominant.     Past Medical History           Past Medical History:   Diagnosis Date    Acute CVA (cerebrovascular accident) ([facility]) 03/17/2022    Acute decompensated heart failure ([facility]) 03/01/2023    Anastomotic ulcer 04/30/2012    Anemia      Anxiety      Asthma      Biceps tendon tear 01/02/2015    Closed fracture of multiple ribs of right side      Closed fracture of nasal bone      Collapsed lung 1986    COPD (chronic obstructive pulmonary disease) ([facility])      COVID 11/20/2023    DDD (degenerative disc disease), lumbar      Degenerative disc disease at L5-S1 level      Depression      Distal radius fracture 05/11/2015    Fracture of left olecranon process 01/10/2017    GERD (gastroesophageal reflux disease)      Heart attack ([facility])      History of blood transfusion      Hypertension      Impingement syndrome of shoulder 12/09/2014    Intractable abdominal pain 11/18/2018    Left carpal tunnel syndrome 01/30/2017    Nasal bones, closed fracture, closed, initial encounter 12/31/2015    Osteoarthritis      Peptic ulcer, unspecified site, unspecified as acute or chronic, without mention of hemorrhage or perforation      RLS (restless legs syndrome)      Rotator cuff tear 12/09/2014    Seizure ([facility])      Sepsis ([facility])      Spinal  Highest education level: Not on file   Occupational History    Not on file   Tobacco Use    Smoking status: Every Day       Current packs/day: 0.25       Average packs/day: 0.3 packs/day for 15.3 years (3.8 ttl pk-yrs)       Types: Cigarettes       Start date: 7/1/2023       Passive exposure: Current    Smokeless tobacco: Never   Vaping Use    Vaping status: Never Used   Substance and Sexual Activity    Alcohol use: Yes       Comment: Occasionally    Drug use: Never    Sexual activity: Not Currently       Partners: Male   Other Topics Concern    Not on file   Social History Narrative     ** Merged History Encounter **            Social Drivers of Health              Financial Resource Strain: Low Risk  (11/8/2023)     Overall Financial Resource Strain (CARDIA)      Difficulty of Paying Living Expenses: Not hard at all   Food Insecurity: Not on file (11/8/2023)   Transportation Needs: Unknown (11/8/2023)     PRAPARE - Transportation      Lack of Transportation (Medical): Not on file      Lack of Transportation (Non-Medical): No   Physical Activity: Insufficiently Active (1/3/2024)     Exercise Vital Sign      Days of Exercise per Week: 2 days      Minutes of Exercise per Session: 20 min   Stress: Not on file   Social Connections: Not on file   Intimate Partner Violence: Not on file   Housing Stability: Unknown (11/8/2023)     Housing Stability Vital Sign      Unable to Pay for Housing in the Last Year: Not on file      Number of Places Lived in the Last Year: Not on file      Unstable Housing in the Last Year: No         Family History             Family History   Problem Relation Age of Onset    Cancer Mother      Heart Disease Sister      Emphysema Father              REVIEW OF SYSTEMS:      General/Constitution:  (-)weight loss, (-)fever, (-)chills, (-)weakness.   Skin: (-) rash,(-) psoriasis,(-) eczema, (-)skin cancer.   Musculoskeletal: (-) fractures,  (-) dislocations,(-) collagen vascular disease, (-)  fibromyalgia, (-) multiple sclerosis, (-) muscular dystrophy, (-) RSD,(-) joint pain (-)swelling, (-) joint pain,swelling.  Neurologic: (-) epilepsy, (-)seizures,(-) brain tumor,(-) TIA, (-)stroke, (-)headaches, (-)Parkinson disease,(-) memory loss, (-) LOC.  Cardiovascular: (-) Chest pain, (-) swelling in legs/feet, (-) SOB, (-) cramping in legs/feet with walking.  Respiratory: (-) SOB, (-) Coughing, (-) night sweats.  GI: (-) nausea, (-) vomiting, (-) diarrhea, (-) blood in stool, (-) gastric ulcer.  Psychiatric: (-) Depression, (-) Anxiety, (-) bipolar disease, (-) Alzheimer's Disease  Allergic/Immunologic: (-) allergies latex, (-) allergies metal, (-) skin sensitivity.  Hematlogic: (-) anemia, (-) blood transfusion, (-) DVT/PE, (-) Clotting disorders        Subjective:     Constitution:  /86   Pulse 82   Temp 97.2 °F (36.2 °C) (Temporal)   Resp 16   Ht 1.524 m (5')   Wt 99.2 kg (218 lb 12.8 oz)   LMP  (LMP Unknown)   SpO2 94%   BMI 42.73 kg/m²         Vital signs are stable.  In general, patient is awake, alert and oriented X3, in no apparent distress.  Examination of HENT reveals normocephalic, atraumatic.  PERRLA/EOMI sclera are white.  Conjunctivae are clear.  TM's are intact.  Pharynx is pink and moist.  Uvula and tongue are midline.  Heart: Positive S1 and positive S2 with regular rate and rhythm.  Lungs: Clear to auscultation bilaterally without rales, rhonchi or wheezes.  Abdomen: soft, nontender.  Positive bowel sounds.  No organomegaly.  No guarding or rigidity.     Psycihatric:  The patient is alert and oriented x 3, appears to be stated age and in no distress.       Respiratory:  Respiratory effort is not labored.  Patient is not gasping.  Palpation of the chest reveals no tactile fremitus.     Skin:  Upon inspection: the skin appears warm, dry and intact.  There is not a previous scar over the affected area.There is not any cellulitis, lymphedema or cutaneous lesions noted in the lower

## 2025-06-13 NOTE — ANESTHESIA POSTPROCEDURE EVALUATION
Department of Anesthesiology  Postprocedure Note    Patient: Lizette Julian  MRN: [id number]  YOB: 1955  Date of evaluation: 6/13/2025    Procedure Summary       Date: 06/13/25 Room / Location: [address]    Anesthesia Start: 0624 Anesthesia Stop: 0726    Procedure: RIGHT ELBOW CUBITAL TUNNEL RELEASE-6/13/25 (Right: Elbow) Diagnosis:       Ulnar nerve entrapment, right      (Ulnar nerve entrapment, right [G56.21])    Surgeons: Buzz Olivas DO Responsible Provider: Yarely Connor DO    Anesthesia Type: General ASA Status: 3            Anesthesia Type: General    Sharona Phase I: Sharona Score: 10    Sharona Phase II: Sharona Score: 10    Anesthesia Post Evaluation    Patient location during evaluation: PACU  Patient participation: complete - patient participated  Level of consciousness: awake and alert  Airway patency: patent  Nausea & Vomiting: no nausea and no vomiting  Cardiovascular status: hemodynamically stable  Respiratory status: acceptable  Hydration status: euvolemic  Pain management: adequate    No notable events documented.

## 2025-06-13 NOTE — DISCHARGE INSTRUCTIONS
Cubital Tunnel Post-op Instructions    [facility]  [phone]  Buzz Olivas D.O.        Change your operative dressing on post-op day #5    Use bandaid for dressing. May remove sling when comfortable    You may shower with soap and water on post-op day #1 but keep your incision clean and dry.    Non-weight bearing activities with affected hand until post-op appointment.    No lifting over 5 pounds.     May begin moving fingers immediately.  No heavy lifting.  No pushing or pulling.    Take pain medications as prescribed.  Take with food.______________________  If you anticipate the need for a refill on your medication and the weekend is approaching, please call the office by noon on Friday.  No refills will be called in over the weekend.  DO NOT take Tylenol products while taking prescribed pain medicine.    Resume regular diet and medications (unless otherwise directed by your doctor).    You should have a responsible adult with you for 24 hours.    If you have any questions or concerns, please call the office.       If any problems occur or if you have any further questions, please call your doctor as soon as possible.  If you find that you cannot reach your doctor but feel that your condition needs a doctor's attention go to the emergency room.

## 2025-06-26 ENCOUNTER — OFFICE VISIT (OUTPATIENT)
Dept: ORTHOPEDIC SURGERY | Age: 70
End: 2025-06-26

## 2025-06-26 VITALS — HEIGHT: 60 IN | WEIGHT: 218 LBS | BODY MASS INDEX: 42.8 KG/M2 | TEMPERATURE: 98.6 F

## 2025-06-26 DIAGNOSIS — Z98.890 S/P CUBITAL TUNNEL RELEASE: Primary | ICD-10-CM

## 2025-06-26 PROCEDURE — 99024 POSTOP FOLLOW-UP VISIT: CPT

## 2025-06-26 RX ORDER — HYDROCODONE BITARTRATE AND ACETAMINOPHEN 5; 325 MG/1; MG/1
1 TABLET ORAL EVERY 6 HOURS PRN
Qty: 28 TABLET | Refills: 0 | Status: SHIPPED | OUTPATIENT
Start: 2025-06-26 | End: 2025-07-03

## 2025-06-26 NOTE — PROGRESS NOTES
Lizette MICHAELS Julian is here for followup after right cubital tunnel surgery. Pain is controlled with current analgesics.  Medication(s) being used: Norco.. The patient notes improvement in the following symptoms:strength, pain.  The patient denies fever, wound drainage, increasing redness, pus, increasing pain, increasing swelling. Post op problems reported: none.  She does not notice any improvement with sensation.       Objective:         General :    alert, appears stated age, and cooperative   Sutures:   Sutures out.   Incision:  healing well, no significant drainage, no dehiscence, no significant erythema   Tenderness:  none   Flexion ROM:  full range of motion   Extension ROM:  full range of motion   Effusion:  no         Assessment:        Status post right cubital tunnel surgery. Doing well postoperatively.        Plan:     Sutures removed today.  HEP  Follow up: prn  No heavy lifting, pushing, pulling for 3-4 weeks  Can get incision wet, do not submerge until closed  Norco 5mg

## 2025-07-03 NOTE — PROGRESS NOTES
Universal Safety Interventions CHOLECYSTECTOMY  1986    CHOLECYSTECTOMY      COLONOSCOPY  3/2011    DILATATION, ESOPHAGUS      DILATION AND CURETTAGE OF UTERUS      x4    ENDOSCOPY, COLON, DIAGNOSTIC      JOINT REPLACEMENT  1/8/2003    right    JOINT REPLACEMENT  6/5/2003    left knee    JOINT REPLACEMENT      bilat knee replacement    KNEE ARTHROSCOPY  2002    bilateral    OTHER SURGICAL HISTORY  01/14/16    closed reduction nasal bone fracture    OTHER SURGICAL HISTORY Left 6 2 16    seroma incision and drainage thigh    AL LAP,DIAGNOSTIC ABDOMEN N/A 11/21/2018    DIAGNOSTIC LAPAROSCOPY, LYSIS OF ADHESIONS performed by Rachelle Rey MD at [address] [place] [address]  06/14/2005    Dr. Bina Osuna ARTHROSCOPY Right 1 2 15    rotator cuff repair, subacromial decompression, debridement    SHOULDER SURGERY  1999    left    TONSILLECTOMY  1959    UPPER GASTROINTESTINAL ENDOSCOPY  10/2004    UPPER GASTROINTESTINAL ENDOSCOPY  4/18/12    [facility]    UPPER GASTROINTESTINAL ENDOSCOPY N/A 11/20/2018    EGD BIOPSY performed by Adam Womack MD at [facility] Right 12/02/2015    DEBRIDEMENT ASPIRATION; RIGHT DEQUARVAINES RELEASE    WRIST SURGERY Right 12/2/15       Home Medications  Prior to Visit Medications    Medication Sig Taking?  Authorizing Provider   polyethylene glycol (GLYCOLAX) packet Take 17 g by mouth daily Yes Lupis Montoya,    esomeprazole (NEXIUM) 40 MG delayed release capsule Take 40 mg by mouth 2 times daily  Yes Historical Provider, MD   sucralfate (CARAFATE) 1 GM/10ML suspension Take 1 g by mouth 3 times daily (with meals) Yes Historical Provider, MD   albuterol (PROVENTIL) (5 MG/ML) 0.5% nebulizer solution Take 0.5 mLs by nebulization every 6 hours as needed for Wheezing or Shortness of Breath Yes Saskia Babin,    traZODone (DESYREL) 50 MG tablet Take 50 mg by mouth nightly Yes Historical Provider, MD   ondansetron (ZOFRAN-ODT) 4 MG disintegrating tablet Take 1 tablet by

## 2025-07-30 ENCOUNTER — HOSPITAL ENCOUNTER (OUTPATIENT)
Dept: LAB | Age: 70
Discharge: HOME | End: 2025-07-30
Payer: MEDICARE

## 2025-07-30 LAB
25(OH)D3 SERPL-MCNC: 23.3 NG/ML (ref 30–100)
ALBUMIN SERPL-MCNC: 4.2 G/DL (ref 3.5–5.2)
ALP SERPL-CCNC: 104 U/L (ref 35–104)
ALT SERPL-CCNC: 15 U/L (ref 0–35)
ANION GAP SERPL CALCULATED.3IONS-SCNC: 11 MMOL/L (ref 7–16)
AST SERPL-CCNC: 15 U/L (ref 0–35)
BASOPHILS # BLD: 0.1 K/UL (ref 0–0.2)
BASOPHILS NFR BLD: 1 % (ref 0–2)
BILIRUB SERPL-MCNC: 0.4 MG/DL (ref 0–1.2)
BUN SERPL-MCNC: 20 MG/DL (ref 8–23)
CALCIUM SERPL-MCNC: 9.2 MG/DL (ref 8.8–10.2)
CHLORIDE SERPL-SCNC: 99 MMOL/L (ref 98–107)
CHOLEST SERPL-MCNC: 170 MG/DL
CO2 SERPL-SCNC: 28 MMOL/L (ref 22–29)
CREAT SERPL-MCNC: 0.9 MG/DL (ref 0.5–1)
EOSINOPHIL # BLD: 0.11 K/UL (ref 0.05–0.5)
EOSINOPHILS RELATIVE PERCENT: 1 % (ref 0–6)
ERYTHROCYTE [DISTWIDTH] IN BLOOD BY AUTOMATED COUNT: 13.6 % (ref 11.5–15)
FOLATE SERPL-MCNC: 10.7 NG/ML (ref 4.6–34.8)
GFR, ESTIMATED: 72 ML/MIN/1.73M2
GLUCOSE SERPL-MCNC: 117 MG/DL (ref 74–99)
HBA1C MFR BLD: 6 % (ref 4–5.6)
HCT VFR BLD AUTO: 46.8 % (ref 34–48)
HDLC SERPL-MCNC: 65 MG/DL
HGB BLD-MCNC: 14.9 G/DL (ref 11.5–15.5)
IMM GRANULOCYTES # BLD AUTO: 0.06 K/UL (ref 0–0.58)
IMM GRANULOCYTES NFR BLD: 1 % (ref 0–5)
LDLC SERPL CALC-MCNC: 86 MG/DL
LYMPHOCYTES NFR BLD: 1.99 K/UL (ref 1.5–4)
LYMPHOCYTES RELATIVE PERCENT: 18 % (ref 20–42)
MCH RBC QN AUTO: 29.7 PG (ref 26–35)
MCHC RBC AUTO-ENTMCNC: 31.8 G/DL (ref 32–34.5)
MCV RBC AUTO: 93.2 FL (ref 80–99.9)
MONOCYTES NFR BLD: 0.83 K/UL (ref 0.1–0.95)
MONOCYTES NFR BLD: 8 % (ref 2–12)
NEUTROPHILS NFR BLD: 72 % (ref 43–80)
NEUTS SEG NFR BLD: 7.78 K/UL (ref 1.8–7.3)
PLATELET # BLD AUTO: 309 K/UL (ref 130–450)
PMV BLD AUTO: 10.6 FL (ref 7–12)
POTASSIUM SERPL-SCNC: 4.5 MMOL/L (ref 3.5–5.1)
PROT SERPL-MCNC: 6.8 G/DL (ref 6.4–8.3)
RBC # BLD AUTO: 5.02 M/UL (ref 3.5–5.5)
SODIUM SERPL-SCNC: 138 MMOL/L (ref 136–145)
TRIGL SERPL-MCNC: 94 MG/DL
TSH SERPL DL<=0.05 MIU/L-ACNC: 1.03 UIU/ML (ref 0.27–4.2)
VIT B12 SERPL-MCNC: 311 PG/ML (ref 232–1245)
VLDLC SERPL CALC-MCNC: 19 MG/DL
WBC OTHER # BLD: 10.9 K/UL (ref 4.5–11.5)

## 2025-07-30 PROCEDURE — 82306 VITAMIN D 25 HYDROXY: CPT

## 2025-07-30 PROCEDURE — 36415 COLL VENOUS BLD VENIPUNCTURE: CPT

## 2025-07-30 PROCEDURE — 83036 HEMOGLOBIN GLYCOSYLATED A1C: CPT

## 2025-07-30 PROCEDURE — 80061 LIPID PANEL: CPT

## 2025-07-30 PROCEDURE — 82746 ASSAY OF FOLIC ACID SERUM: CPT

## 2025-07-30 PROCEDURE — 84443 ASSAY THYROID STIM HORMONE: CPT

## 2025-07-30 PROCEDURE — 85025 COMPLETE CBC W/AUTO DIFF WBC: CPT

## 2025-07-30 PROCEDURE — 82607 VITAMIN B-12: CPT

## 2025-07-30 PROCEDURE — 80053 COMPREHEN METABOLIC PANEL: CPT

## (undated) DEVICE — CLOTH PREP W7.5XL7.5IN 2% CHG SKIN ALC AND RNS FREE

## (undated) DEVICE — APPLICATOR MEDICATED 26 CC SOLUTION HI LT ORNG CHLORAPREP

## (undated) DEVICE — MEDI-VAC YANKAUER SUCTION HANDLE W/BULBOUS TIP: Brand: CARDINAL HEALTH

## (undated) DEVICE — SET ENDO INSTR LAPAROSCOPIC INCISIONAL

## (undated) DEVICE — BASIC PACK: Brand: CONVERTORS

## (undated) DEVICE — NEEDLE HYPO 25GA L1.5IN BLU POLYPR HUB S STL REG BVL STR

## (undated) DEVICE — KIT BEDSIDE REVITAL OX 500ML

## (undated) DEVICE — BANDAGE COMPR W4XL108IN WHT LAYERED NO CLSR SYN RUB ESMARCH

## (undated) DEVICE — 3 ML SYRINGE LUER-LOCK TIP: Brand: MONOJECT

## (undated) DEVICE — INTENDED FOR TISSUE SEPARATION, AND OTHER PROCEDURES THAT REQUIRE A SHARP SURGICAL BLADE TO PUNCTURE OR CUT.: Brand: BARD-PARKER ® STAINLESS STEEL BLADES

## (undated) DEVICE — ZIMMER® STERILE DISPOSABLE TOURNIQUET CUFF WITH PROTECTIVE SLEEVE AND PLC, DUAL PORT, SINGLE BLADDER, 18 IN. (46 CM)

## (undated) DEVICE — GAUZE,SPONGE,4"X4",12PLY,STERILE,LF,2'S: Brand: MEDLINE

## (undated) DEVICE — TOWEL,OR,DSP,ST,BLUE,STD,6/PK,12PK/CS: Brand: MEDLINE

## (undated) DEVICE — YANKAUER,BULB TIP,W/O VENT,RIGID,STERILE: Brand: MEDLINE

## (undated) DEVICE — SPONGE LAP W18XL18IN WHT COT 4 PLY FLD STRUNG RADPQ DISP ST 2 PER PACK

## (undated) DEVICE — MEDI-VAC NON-CONDUCTIVE SUCTION TUBING: Brand: CARDINAL HEALTH

## (undated) DEVICE — PADDING CAST W4INXL4YD NONSTERILE COT COHESIVE HND TEARABLE

## (undated) DEVICE — BANDAGE ADH W1XL3IN NAT FAB WVN FLX DURABLE N ADH PD SEAL

## (undated) DEVICE — GAUZE,SPONGE,4"X4",16PLY,XRAY,STRL,LF: Brand: MEDLINE

## (undated) DEVICE — BLOCK BITE 60FR CAREGUARD

## (undated) DEVICE — PMI PTFE COATED LAPAROSCOPIC WIRE L-HOOK 44 CM: Brand: PMI

## (undated) DEVICE — PEN: MARKING STD 100/CS: Brand: MEDICAL ACTION INDUSTRIES

## (undated) DEVICE — HOOK LOCK LATEX FREE ELASTIC BANDAGE 3INX5YD

## (undated) DEVICE — TRAY EPI SGL DOSE 18GA NDL CUST AULTMAN HOSP

## (undated) DEVICE — 6 X 9  1.75MIL 4-WALL LABGUARD: Brand: MINIGRIP COMMERCIAL LLC

## (undated) DEVICE — GLOVE SURG 7 LTX TRIFLEX WIDE FINGER PWDR

## (undated) DEVICE — CAMERA STRYKER 1488 HD GEN

## (undated) DEVICE — BLADE CLIPPER GEN PURP NS

## (undated) DEVICE — FORCEPS BX L240CM JAW DIA2.8MM L CAP W/ NDL MIC MESH TOOTH

## (undated) DEVICE — TUBING, SUCTION, 1/4" X 10', STRAIGHT: Brand: MEDLINE

## (undated) DEVICE — CONTAINER SPEC COLL 960ML POLYPR TRIANG GRAD INTAKE/OUTPUT

## (undated) DEVICE — PADDING CAST 4 YDX3 IN COTTON NS WBRL

## (undated) DEVICE — 20 ML SYRINGE REGULAR TIP: Brand: MONOJECT

## (undated) DEVICE — TOPAZ ICW: Brand: COBLATION

## (undated) DEVICE — BANDAGE,SELF ADHRNT,COFLEX,4"X5YD,STRL: Brand: COLABEL

## (undated) DEVICE — [HIGH FLOW INSUFFLATOR,  DO NOT USE IF PACKAGE IS DAMAGED,  KEEP DRY,  KEEP AWAY FROM SUNLIGHT,  PROTECT FROM HEAT AND RADIOACTIVE SOURCES.]: Brand: PNEUMOSURE

## (undated) DEVICE — SLING ARM 9 1/2X20IN L MULTIPAK

## (undated) DEVICE — Device: Brand: DEFENDO VALVE AND CONNECTOR KIT

## (undated) DEVICE — GOWN ISOLATN REG YEL M WT MULTIPLY SIDETIE LEV 2

## (undated) DEVICE — Device

## (undated) DEVICE — TROCAR ENDOSCP L100MM DIA5MM BLDELSS STBL SL OBT RADLUC

## (undated) DEVICE — GOWN,SIRUS,NONRNF,SETINSLV,XL,20/CS: Brand: MEDLINE

## (undated) DEVICE — 3M™ RED DOT™ MONITORING ELECTRODE WITH FOAM TAPE AND STICKY GEL 2560, 50/BAG, 20/CASE, 72/PLT: Brand: RED DOT™

## (undated) DEVICE — COUNTER NDL 10 COUNT HLD 20 FOAM BLK SGL MAG

## (undated) DEVICE — KENDALL 450 SERIES MONITORING FOAM ELECTRODE - RECTANGULAR SHAPE ( 3/PK): Brand: KENDALL

## (undated) DEVICE — SKIN AFFIX SURG ADHESIVE 72/CS 0.55ML: Brand: MEDLINE

## (undated) DEVICE — BLADE,STAINLESS-STEEL,15,STRL,DISPOSABLE: Brand: MEDLINE

## (undated) DEVICE — Z DISCONTINUED USE 2132709 NEEDLE HYPO 18GA L1.5IN PNK POLYPR HUB S STL THN WALL FILL

## (undated) DEVICE — PATIENT RETURN ELECTRODE, SINGLE-USE, CONTACT QUALITY MONITORING, ADULT, WITH 9FT CORD, FOR PATIENTS WEIGING OVER 33LBS. (15KG): Brand: MEGADYNE

## (undated) DEVICE — 3M™ STERI-DRAPE™ U-DRAPE, LONG 1019: Brand: STERI-DRAPE™

## (undated) DEVICE — NEEDLE HYPO 18GA L1.5IN PNK POLYPR HUB S STL REG BVL STR

## (undated) DEVICE — SOLUTION IRRIG 1000ML 0.9% SOD CHL USP POUR PLAS BTL

## (undated) DEVICE — SYRINGE IRRIG 60ML SFT PLIABLE BLB EZ TO GRP 1 HND USE W/

## (undated) DEVICE — GOWN SIRUS NONREIN XL W/TWL: Brand: MEDLINE INDUSTRIES, INC.

## (undated) DEVICE — MASK,FACE,MAXFLUIDPROTECT,SHIELD/ERLPS: Brand: MEDLINE

## (undated) DEVICE — EXTRA LONG 45 DEGREE LENS

## (undated) DEVICE — NEEDLE HYPO 18GA L1.5IN PNK POLYPR HUB S STL THN WALL FILL

## (undated) DEVICE — GARMENT,MEDLINE,DVT,INT,CALF,MED, GEN2: Brand: MEDLINE

## (undated) DEVICE — PACK SURG LAP CHOLE CUSTOM

## (undated) DEVICE — STANDARD HYPODERMIC NEEDLE,POLYPROPYLENE HUB: Brand: MONOJECT

## (undated) DEVICE — TUBING, SUCTION, 3/16" X 10', STRAIGHT: Brand: MEDLINE

## (undated) DEVICE — GAUZE,SPONGE,4"X4",16PLY,STRL,LF,10/TRAY: Brand: MEDLINE

## (undated) DEVICE — MARKER,SKIN,WI/RULER AND LABELS: Brand: MEDLINE

## (undated) DEVICE — DRAPE C ARM W41XL65IN UNIV W/ CLP AND RUBBERBAND

## (undated) DEVICE — SYRINGE, LUER LOCK, 10ML: Brand: MEDLINE

## (undated) DEVICE — DRESSING GZ XRFRM 4X4(25/BX 6BX/CS)

## (undated) DEVICE — GLOVE ORANGE PI 7 1/2   MSG9075

## (undated) DEVICE — NEEDLE HYPO 27GA L1.25IN GRY POLYPR HUB S STL REG BVL STR

## (undated) DEVICE — SPONGE GZ 4IN 4IN 4 PLY N WVN AVANT

## (undated) DEVICE — COVER,LIGHT HANDLE,FLX,2/PK: Brand: MEDLINE INDUSTRIES, INC.

## (undated) DEVICE — CHLORAPREP 26ML ORANGE

## (undated) DEVICE — Z INACTIVE USE 2660664 SOLUTION IRRIG 3000ML 0.9% SOD CHL USP UROMATIC PLAS CONT

## (undated) DEVICE — 6 ML SYRINGE LUER-LOCK TIP: Brand: MONOJECT

## (undated) DEVICE — TROCAR ENDOSCP L150MM DIA5MM BLDELSS STBL SL CAM PRT W/

## (undated) DEVICE — GLOVE SURG SZ 8 L11.2IN FNGR THK12.7MIL CUF THK9.7MIL BRN

## (undated) DEVICE — APPLICATOR MEDICATED 10.5 CC SOLUTION HI LT ORNG CHLORAPREP

## (undated) DEVICE — 12 ML SYRINGE,LUER-LOCK TIP: Brand: MONOJECT

## (undated) DEVICE — PUMP SUC IRR TBNG L10FT W/ HNDPC ASSEMB STRYKEFLOW 2

## (undated) DEVICE — SET ENDO INSTR RED YEL LAPAROSCOPIC

## (undated) DEVICE — DOUBLE BASIN SET: Brand: MEDLINE INDUSTRIES, INC.

## (undated) DEVICE — LAPAROSCOPIC SCISSORS: Brand: EPIX LAPAROSCOPIC SCISSORS

## (undated) DEVICE — LUBRICANT SURG JELLY ST BACTER TUBE 4.25OZ

## (undated) DEVICE — BLADE ES ELASTOMERIC COAT INSUL DURABLE BEND UPTO 90DEG